# Patient Record
Sex: FEMALE | Race: WHITE | NOT HISPANIC OR LATINO
[De-identification: names, ages, dates, MRNs, and addresses within clinical notes are randomized per-mention and may not be internally consistent; named-entity substitution may affect disease eponyms.]

---

## 2017-08-16 ENCOUNTER — NON-APPOINTMENT (OUTPATIENT)
Age: 52
End: 2017-08-16

## 2017-08-16 ENCOUNTER — APPOINTMENT (OUTPATIENT)
Dept: INTERNAL MEDICINE | Facility: CLINIC | Age: 52
End: 2017-08-16
Payer: COMMERCIAL

## 2017-08-16 ENCOUNTER — OTHER (OUTPATIENT)
Age: 52
End: 2017-08-16

## 2017-08-16 VITALS
BODY MASS INDEX: 37.73 KG/M2 | HEART RATE: 74 BPM | HEIGHT: 64 IN | RESPIRATION RATE: 16 BRPM | WEIGHT: 220.99 LBS | OXYGEN SATURATION: 95 % | SYSTOLIC BLOOD PRESSURE: 128 MMHG | DIASTOLIC BLOOD PRESSURE: 70 MMHG | TEMPERATURE: 98.3 F

## 2017-08-16 PROCEDURE — 99214 OFFICE O/P EST MOD 30 MIN: CPT | Mod: 25

## 2017-08-16 PROCEDURE — 94060 EVALUATION OF WHEEZING: CPT

## 2017-10-30 ENCOUNTER — RX RENEWAL (OUTPATIENT)
Age: 52
End: 2017-10-30

## 2017-11-10 ENCOUNTER — RX RENEWAL (OUTPATIENT)
Age: 52
End: 2017-11-10

## 2017-12-01 ENCOUNTER — NON-APPOINTMENT (OUTPATIENT)
Age: 52
End: 2017-12-01

## 2017-12-01 ENCOUNTER — APPOINTMENT (OUTPATIENT)
Dept: RADIOLOGY | Facility: CLINIC | Age: 52
End: 2017-12-01
Payer: COMMERCIAL

## 2017-12-01 ENCOUNTER — APPOINTMENT (OUTPATIENT)
Dept: INTERNAL MEDICINE | Facility: CLINIC | Age: 52
End: 2017-12-01
Payer: COMMERCIAL

## 2017-12-01 ENCOUNTER — OUTPATIENT (OUTPATIENT)
Dept: OUTPATIENT SERVICES | Facility: HOSPITAL | Age: 52
LOS: 1 days | End: 2017-12-01
Payer: COMMERCIAL

## 2017-12-01 VITALS
WEIGHT: 220 LBS | SYSTOLIC BLOOD PRESSURE: 136 MMHG | TEMPERATURE: 98.6 F | RESPIRATION RATE: 16 BRPM | HEART RATE: 74 BPM | HEIGHT: 64 IN | OXYGEN SATURATION: 95 % | DIASTOLIC BLOOD PRESSURE: 80 MMHG | BODY MASS INDEX: 37.56 KG/M2

## 2017-12-01 DIAGNOSIS — Z00.8 ENCOUNTER FOR OTHER GENERAL EXAMINATION: ICD-10-CM

## 2017-12-01 PROCEDURE — 94060 EVALUATION OF WHEEZING: CPT

## 2017-12-01 PROCEDURE — 71020: CPT | Mod: 26

## 2017-12-01 PROCEDURE — 71046 X-RAY EXAM CHEST 2 VIEWS: CPT

## 2017-12-01 PROCEDURE — 99214 OFFICE O/P EST MOD 30 MIN: CPT | Mod: 25

## 2017-12-01 RX ORDER — DOXYCYCLINE HYCLATE 100 MG/1
100 CAPSULE ORAL
Qty: 20 | Refills: 0 | Status: COMPLETED | COMMUNITY
Start: 2017-12-01 | End: 2017-12-01

## 2018-01-02 ENCOUNTER — APPOINTMENT (OUTPATIENT)
Dept: INTERNAL MEDICINE | Facility: CLINIC | Age: 53
End: 2018-01-02
Payer: COMMERCIAL

## 2018-01-02 ENCOUNTER — RECORD ABSTRACTING (OUTPATIENT)
Age: 53
End: 2018-01-02

## 2018-01-02 VITALS
WEIGHT: 229 LBS | SYSTOLIC BLOOD PRESSURE: 108 MMHG | RESPIRATION RATE: 18 BRPM | HEART RATE: 84 BPM | BODY MASS INDEX: 39.09 KG/M2 | DIASTOLIC BLOOD PRESSURE: 66 MMHG | TEMPERATURE: 98.4 F | OXYGEN SATURATION: 95 % | HEIGHT: 64 IN

## 2018-01-02 DIAGNOSIS — Z82.49 FAMILY HISTORY OF ISCHEMIC HEART DISEASE AND OTHER DISEASES OF THE CIRCULATORY SYSTEM: ICD-10-CM

## 2018-01-02 DIAGNOSIS — I38 ENDOCARDITIS, VALVE UNSPECIFIED: ICD-10-CM

## 2018-01-02 DIAGNOSIS — Z80.0 FAMILY HISTORY OF MALIGNANT NEOPLASM OF DIGESTIVE ORGANS: ICD-10-CM

## 2018-01-02 DIAGNOSIS — D50.9 IRON DEFICIENCY ANEMIA, UNSPECIFIED: ICD-10-CM

## 2018-01-02 PROCEDURE — 94060 EVALUATION OF WHEEZING: CPT

## 2018-01-02 PROCEDURE — 94729 DIFFUSING CAPACITY: CPT

## 2018-01-02 PROCEDURE — 99215 OFFICE O/P EST HI 40 MIN: CPT | Mod: 25

## 2018-01-02 PROCEDURE — 94727 GAS DIL/WSHOT DETER LNG VOL: CPT

## 2018-01-02 PROCEDURE — ZZZZZ: CPT

## 2018-01-02 RX ORDER — PREDNISONE 10 MG/1
10 TABLET ORAL
Qty: 27 | Refills: 0 | Status: DISCONTINUED | COMMUNITY
Start: 2017-08-16 | End: 2018-01-02

## 2018-01-02 RX ORDER — PREDNISONE 20 MG/1
20 TABLET ORAL
Qty: 20 | Refills: 0 | Status: DISCONTINUED | COMMUNITY
Start: 2017-12-01 | End: 2018-01-02

## 2018-01-02 RX ORDER — LEVOFLOXACIN 500 MG/1
500 TABLET, FILM COATED ORAL DAILY
Qty: 10 | Refills: 0 | Status: DISCONTINUED | COMMUNITY
Start: 2017-12-01 | End: 2018-01-02

## 2018-06-29 ENCOUNTER — NON-APPOINTMENT (OUTPATIENT)
Age: 53
End: 2018-06-29

## 2018-06-29 ENCOUNTER — APPOINTMENT (OUTPATIENT)
Dept: INTERNAL MEDICINE | Facility: CLINIC | Age: 53
End: 2018-06-29
Payer: COMMERCIAL

## 2018-06-29 VITALS
OXYGEN SATURATION: 95 % | HEART RATE: 76 BPM | TEMPERATURE: 98.3 F | WEIGHT: 225 LBS | BODY MASS INDEX: 38.41 KG/M2 | DIASTOLIC BLOOD PRESSURE: 80 MMHG | SYSTOLIC BLOOD PRESSURE: 100 MMHG | HEIGHT: 64 IN | RESPIRATION RATE: 18 BRPM

## 2018-06-29 PROCEDURE — 94060 EVALUATION OF WHEEZING: CPT

## 2018-06-29 PROCEDURE — 99214 OFFICE O/P EST MOD 30 MIN: CPT | Mod: 25

## 2018-06-29 RX ORDER — FEXOFENADINE HYDROCHLORIDE 180 MG/1
180 TABLET, FILM COATED ORAL DAILY
Refills: 0 | Status: DISCONTINUED | COMMUNITY
End: 2018-06-29

## 2018-06-29 RX ORDER — PREDNISONE 20 MG/1
20 TABLET ORAL DAILY
Qty: 19 | Refills: 0 | Status: DISCONTINUED | COMMUNITY
Start: 2018-01-02 | End: 2018-06-29

## 2018-06-29 RX ORDER — ALBUTEROL SULFATE 2.5 MG/3ML
(2.5 MG/3ML) SOLUTION RESPIRATORY (INHALATION) EVERY 6 HOURS
Refills: 0 | Status: DISCONTINUED | COMMUNITY
End: 2018-06-29

## 2018-06-29 RX ORDER — OMEPRAZOLE 40 MG/1
40 CAPSULE, DELAYED RELEASE ORAL
Qty: 30 | Refills: 2 | Status: DISCONTINUED | COMMUNITY
Start: 2017-10-30 | End: 2018-06-29

## 2018-06-29 RX ORDER — ALBUTEROL SULFATE 0.63 MG/3ML
0.63 SOLUTION RESPIRATORY (INHALATION)
Qty: 75 | Refills: 0 | Status: DISCONTINUED | COMMUNITY
Start: 2018-02-03 | End: 2018-06-29

## 2018-06-29 RX ORDER — AZITHROMYCIN 500 MG/1
500 TABLET, FILM COATED ORAL DAILY
Qty: 7 | Refills: 0 | Status: DISCONTINUED | COMMUNITY
Start: 2018-01-03 | End: 2018-06-29

## 2018-06-29 RX ORDER — METHYLPREDNISOLONE 4 MG/1
4 TABLET ORAL
Qty: 21 | Refills: 0 | Status: DISCONTINUED | COMMUNITY
Start: 2018-02-23 | End: 2018-06-29

## 2018-06-29 RX ORDER — CLARITHROMYCIN 500 MG/1
500 TABLET, FILM COATED ORAL
Qty: 20 | Refills: 0 | Status: DISCONTINUED | COMMUNITY
Start: 2018-02-12 | End: 2018-06-29

## 2018-06-29 RX ORDER — AMLODIPINE BESYLATE 10 MG/1
10 TABLET ORAL
Qty: 90 | Refills: 0 | Status: DISCONTINUED | COMMUNITY
Start: 2018-04-26 | End: 2018-06-29

## 2018-06-29 NOTE — DATA REVIEWED
[FreeTextEntry1] : Spirometric analysis reveals a moderate degree of restriction. An underlying obstructive process cannot be entirely ruled out. Bronchodilator reactivity is demonstrated.

## 2018-06-29 NOTE — PLAN
[FreeTextEntry1] : 1. Continue with medication as outlined above.\par \par 2. The patient will use a nebulizer with budesonide for emergencies only.\par \par 3. Will now institute a trial of Astepro nasal spray, 2 squirts in each nostril b.i.d. to help with her morning sputum production which is most likely due to postnasal drip.\par \par 4. Followup in 6 months with full pulmonary function testing.\par \par 5. Follow up with her primary care physician, Dr. Gramajo.

## 2018-06-29 NOTE — PHYSICAL EXAM
[General Appearance - Alert] : alert [General Appearance - In No Acute Distress] : in no acute distress [Outer Ear] : the ears and nose were normal in appearance [Oropharynx] : the oropharynx was normal [Neck Appearance] : the appearance of the neck was normal [Neck Cervical Mass (___cm)] : no neck mass was observed [Jugular Venous Distention Increased] : there was no jugular-venous distention [Thyroid Diffuse Enlargement] : the thyroid was not enlarged [Thyroid Nodule] : there were no palpable thyroid nodules [Auscultation Breath Sounds / Voice Sounds] : lungs were clear to auscultation bilaterally [Heart Rate And Rhythm] : heart rate was normal and rhythm regular [Heart Sounds] : normal S1 and S2 [Heart Sounds Gallop] : no gallops [Murmurs] : no murmurs [Heart Sounds Pericardial Friction Rub] : no pericardial rub [Full Pulse] : the pedal pulses are present [Edema] : there was no peripheral edema [Bowel Sounds] : normal bowel sounds [Abdomen Soft] : soft [Abdomen Tenderness] : non-tender [Cervical Lymph Nodes Enlarged Posterior Bilaterally] : posterior cervical [Cervical Lymph Nodes Enlarged Anterior Bilaterally] : anterior cervical [Supraclavicular Lymph Nodes Enlarged Bilaterally] : supraclavicular [] : no rash [FreeTextEntry1] : Centripetal obesity is noted

## 2018-06-29 NOTE — HISTORY OF PRESENT ILLNESS
[de-identified] : Patient comes in today for a followup evaluation, and reassessment of her pulmonary status.\par \par Overall, from a pulmonary standpoint, she is doing relatively well. She is using her Advair and the Singulair on a daily basis. She has not had any recent upper respiratory infections. At the URI is, usually triggered the asthma if they are significant. 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 It takes her some time to get the secretions expectorated. She is not sure if she has any allergies. She is not using any inhaled nasal corticosteroids. She does feel as if she has some nasal congestion. Secretions from her nose are clear.\par \par The patient was given a nebulizer by a physician at an urgent care center. She has been using budesonide in the nebulizer only if she has worsening symptoms. She has been fairly stable, and she has not used it much recently.\par \par The patient does not exercise. She now comes in for an assessment.

## 2018-07-27 PROBLEM — Z80.0 FAMILY HISTORY OF COLON CANCER: Status: ACTIVE | Noted: 2017-08-16

## 2018-11-20 ENCOUNTER — RX RENEWAL (OUTPATIENT)
Age: 53
End: 2018-11-20

## 2019-02-26 ENCOUNTER — RX RENEWAL (OUTPATIENT)
Age: 54
End: 2019-02-26

## 2019-03-31 ENCOUNTER — INPATIENT (INPATIENT)
Facility: HOSPITAL | Age: 54
LOS: 2 days | Discharge: HOME | End: 2019-04-03
Attending: FAMILY MEDICINE | Admitting: FAMILY MEDICINE
Payer: COMMERCIAL

## 2019-03-31 VITALS
OXYGEN SATURATION: 91 % | HEART RATE: 113 BPM | SYSTOLIC BLOOD PRESSURE: 158 MMHG | DIASTOLIC BLOOD PRESSURE: 102 MMHG | RESPIRATION RATE: 28 BRPM | TEMPERATURE: 98 F | WEIGHT: 210.1 LBS | HEIGHT: 64 IN

## 2019-03-31 LAB
ALBUMIN SERPL ELPH-MCNC: 3.5 G/DL — SIGNIFICANT CHANGE UP (ref 3.3–5)
ALP SERPL-CCNC: 121 U/L — HIGH (ref 40–120)
ALT FLD-CCNC: 24 U/L — SIGNIFICANT CHANGE UP (ref 12–78)
ANION GAP SERPL CALC-SCNC: 7 MMOL/L — SIGNIFICANT CHANGE UP (ref 5–17)
AST SERPL-CCNC: 57 U/L — HIGH (ref 15–37)
BASOPHILS # BLD AUTO: 0.05 K/UL — SIGNIFICANT CHANGE UP (ref 0–0.2)
BASOPHILS NFR BLD AUTO: 0.5 % — SIGNIFICANT CHANGE UP (ref 0–2)
BILIRUB SERPL-MCNC: 0.2 MG/DL — SIGNIFICANT CHANGE UP (ref 0.2–1.2)
BUN SERPL-MCNC: 13 MG/DL — SIGNIFICANT CHANGE UP (ref 7–23)
CALCIUM SERPL-MCNC: 8.7 MG/DL — SIGNIFICANT CHANGE UP (ref 8.5–10.1)
CHLORIDE SERPL-SCNC: 111 MMOL/L — HIGH (ref 96–108)
CO2 SERPL-SCNC: 23 MMOL/L — SIGNIFICANT CHANGE UP (ref 22–31)
CREAT SERPL-MCNC: 0.93 MG/DL — SIGNIFICANT CHANGE UP (ref 0.5–1.3)
EOSINOPHIL # BLD AUTO: 0.01 K/UL — SIGNIFICANT CHANGE UP (ref 0–0.5)
EOSINOPHIL NFR BLD AUTO: 0.1 % — SIGNIFICANT CHANGE UP (ref 0–6)
GLUCOSE SERPL-MCNC: 141 MG/DL — HIGH (ref 70–99)
HCT VFR BLD CALC: 42.6 % — SIGNIFICANT CHANGE UP (ref 34.5–45)
HGB BLD-MCNC: 14.7 G/DL — SIGNIFICANT CHANGE UP (ref 11.5–15.5)
IMM GRANULOCYTES NFR BLD AUTO: 0.4 % — SIGNIFICANT CHANGE UP (ref 0–1.5)
LYMPHOCYTES # BLD AUTO: 1.01 K/UL — SIGNIFICANT CHANGE UP (ref 1–3.3)
LYMPHOCYTES # BLD AUTO: 9.9 % — LOW (ref 13–44)
MCHC RBC-ENTMCNC: 30.5 PG — SIGNIFICANT CHANGE UP (ref 27–34)
MCHC RBC-ENTMCNC: 34.5 GM/DL — SIGNIFICANT CHANGE UP (ref 32–36)
MCV RBC AUTO: 88.4 FL — SIGNIFICANT CHANGE UP (ref 80–100)
MONOCYTES # BLD AUTO: 0.48 K/UL — SIGNIFICANT CHANGE UP (ref 0–0.9)
MONOCYTES NFR BLD AUTO: 4.7 % — SIGNIFICANT CHANGE UP (ref 2–14)
NEUTROPHILS # BLD AUTO: 8.59 K/UL — HIGH (ref 1.8–7.4)
NEUTROPHILS NFR BLD AUTO: 84.4 % — HIGH (ref 43–77)
NRBC # BLD: 0 /100 WBCS — SIGNIFICANT CHANGE UP (ref 0–0)
PLATELET # BLD AUTO: 353 K/UL — SIGNIFICANT CHANGE UP (ref 150–400)
POTASSIUM SERPL-MCNC: 3.3 MMOL/L — LOW (ref 3.5–5.3)
POTASSIUM SERPL-SCNC: 3.3 MMOL/L — LOW (ref 3.5–5.3)
PROT SERPL-MCNC: 7 GM/DL — SIGNIFICANT CHANGE UP (ref 6–8.3)
RBC # BLD: 4.82 M/UL — SIGNIFICANT CHANGE UP (ref 3.8–5.2)
RBC # FLD: 12.2 % — SIGNIFICANT CHANGE UP (ref 10.3–14.5)
SODIUM SERPL-SCNC: 141 MMOL/L — SIGNIFICANT CHANGE UP (ref 135–145)
WBC # BLD: 10.18 K/UL — SIGNIFICANT CHANGE UP (ref 3.8–10.5)
WBC # FLD AUTO: 10.18 K/UL — SIGNIFICANT CHANGE UP (ref 3.8–10.5)

## 2019-03-31 PROCEDURE — 71045 X-RAY EXAM CHEST 1 VIEW: CPT | Mod: 26

## 2019-03-31 PROCEDURE — 93010 ELECTROCARDIOGRAM REPORT: CPT

## 2019-03-31 PROCEDURE — 99285 EMERGENCY DEPT VISIT HI MDM: CPT | Mod: 25

## 2019-03-31 RX ORDER — ALBUTEROL 90 UG/1
2.5 AEROSOL, METERED ORAL
Qty: 0 | Refills: 0 | Status: COMPLETED | OUTPATIENT
Start: 2019-03-31 | End: 2019-03-31

## 2019-03-31 RX ORDER — TIOTROPIUM BROMIDE 18 UG/1
1 CAPSULE ORAL; RESPIRATORY (INHALATION) DAILY
Qty: 0 | Refills: 0 | Status: DISCONTINUED | OUTPATIENT
Start: 2019-03-31 | End: 2019-04-03

## 2019-03-31 RX ORDER — SODIUM CHLORIDE 9 MG/ML
2000 INJECTION INTRAMUSCULAR; INTRAVENOUS; SUBCUTANEOUS ONCE
Qty: 0 | Refills: 0 | Status: COMPLETED | OUTPATIENT
Start: 2019-03-31 | End: 2019-03-31

## 2019-03-31 RX ORDER — IPRATROPIUM/ALBUTEROL SULFATE 18-103MCG
3 AEROSOL WITH ADAPTER (GRAM) INHALATION
Qty: 0 | Refills: 0 | Status: COMPLETED | OUTPATIENT
Start: 2019-03-31 | End: 2019-03-31

## 2019-03-31 RX ORDER — POTASSIUM CHLORIDE 20 MEQ
40 PACKET (EA) ORAL ONCE
Qty: 0 | Refills: 0 | Status: COMPLETED | OUTPATIENT
Start: 2019-03-31 | End: 2019-03-31

## 2019-03-31 RX ORDER — SODIUM CHLORIDE 9 MG/ML
1000 INJECTION INTRAMUSCULAR; INTRAVENOUS; SUBCUTANEOUS
Qty: 0 | Refills: 0 | Status: DISCONTINUED | OUTPATIENT
Start: 2019-03-31 | End: 2019-04-02

## 2019-03-31 RX ORDER — IPRATROPIUM/ALBUTEROL SULFATE 18-103MCG
3 AEROSOL WITH ADAPTER (GRAM) INHALATION EVERY 4 HOURS
Qty: 0 | Refills: 0 | Status: DISCONTINUED | OUTPATIENT
Start: 2019-03-31 | End: 2019-04-03

## 2019-03-31 RX ORDER — ALBUTEROL 90 UG/1
1 AEROSOL, METERED ORAL EVERY 4 HOURS
Qty: 0 | Refills: 0 | Status: DISCONTINUED | OUTPATIENT
Start: 2019-03-31 | End: 2019-04-03

## 2019-03-31 RX ORDER — MAGNESIUM SULFATE 500 MG/ML
2 VIAL (ML) INJECTION ONCE
Qty: 0 | Refills: 0 | Status: COMPLETED | OUTPATIENT
Start: 2019-03-31 | End: 2019-03-31

## 2019-03-31 RX ADMIN — Medication 3 MILLILITER(S): at 21:43

## 2019-03-31 RX ADMIN — Medication 3 MILLILITER(S): at 21:26

## 2019-03-31 RX ADMIN — Medication 125 MILLIGRAM(S): at 21:02

## 2019-03-31 RX ADMIN — SODIUM CHLORIDE 2000 MILLILITER(S): 9 INJECTION INTRAMUSCULAR; INTRAVENOUS; SUBCUTANEOUS at 21:03

## 2019-03-31 RX ADMIN — Medication 40 MILLIEQUIVALENT(S): at 23:07

## 2019-03-31 RX ADMIN — Medication 3 MILLILITER(S): at 21:03

## 2019-03-31 RX ADMIN — Medication 50 GRAM(S): at 21:43

## 2019-03-31 NOTE — H&P ADULT - NSHPPHYSICALEXAM_GEN_ALL_CORE
ICU Vital Signs Last 24 Hrs    T(F): 98.4 (31 Mar 2019 20:02), Max: 98.4 (31 Mar 2019 20:02)  HR: 105 (31 Mar 2019 21:36) (105 - 113)  BP: 149/79 (31 Mar 2019 21:36) (149/79 - 158/102)    RR: 26 (31 Mar 2019 21:36) (24 - 28)  SpO2: 94% (31 Mar 2019 21:36) (91% - 95%)

## 2019-03-31 NOTE — H&P ADULT - HISTORY OF PRESENT ILLNESS
Patient with PMHx of Asthma presents to ED c/o SOB, wheezing, cough and congestion x 3 days. Patient seen by urgent care yesterday and started on Albuterol, Prednisone 40 daily, but S&S are getting worse. No intubations in the past. Non smoker. Patient is a 54 y/o F with  PMHx of Asthma, HTN, Right Nephrectomy   and Hypothyroidism who presents to the ED c/o increasing SOB,  wheezing, cough and congestion for 2-3 days.  Pt's  reports he also noticed she was wheezing in her sleep 5-6 days ago.   Patient was started on prednisone and albuterol after going to [facility] care yesterday, where RVP was also negative.   But pt now reports feeling worse.  She had completed a prednisone course on .   No history of intubations in the past.   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    Pt was hypoxic to 85 % on arrival to the ED.     Surg Hx:  R nephrectomy  Csection x2    Fam Hx:   Father  of cardiomyopathy at age 54   Mother  of colon ca in her 60s

## 2019-03-31 NOTE — ED PROVIDER NOTE - CLINICAL SUMMARY MEDICAL DECISION MAKING FREE TEXT BOX
Pt with likely asthma exacerbation, wheezing on arrival, satting 93% on RA.  Received duonebs, solumedrol, magnesium, with improvement while in bed, but remains MEAD with sat 89% with walking.  RVP pending.  CXR appears unremarkable.  No cardiac risk factors, EKG nonischemic.  No PE risk factors, and no signs of DVT on exam.  Will admit for further evaluation and management given continued MEAD and hypoxia.

## 2019-03-31 NOTE — H&P ADULT - NSHPOUTPATIENTPROVIDERS_GEN_ALL_CORE
Dr. Mayra Gramajo Dr. Mayra Gramajo  Pulmonary Dr. Monalisa Issa [facility] [place]  [facility], [facility] Dr. Fuentes  Urology Dr. Young

## 2019-03-31 NOTE — ED ADULT TRIAGE NOTE - CHIEF COMPLAINT QUOTE
shortness of breath and chest pain that began Friday. was seen at urgent care and given albuterol with no improvement. hx of asthma

## 2019-03-31 NOTE — ED PROVIDER NOTE - OBJECTIVE STATEMENT
Patient with PMHx of Asthma presents to ED c/o SOB, wheezing, cough and congestion x 3 days. Patient seen by urgent care yesterday and started on Albuterol, Prednisone 40 daily, but S&S are getting worse. No intubations in the past. Non smoker. PATRICIA Knight

## 2019-03-31 NOTE — ED STATDOCS - PROGRESS NOTE DETAILS
[facility] for ED attending, Dr. Darden. 52y/o F w/ PMHx of Asthma presents to the ED w/ SOB and asthma exacerbation.  Pt started feeling ill with generalized weakness 2 days ago while at work.  Then went to clinic and was started on PO prednisone, she did not start medication until today.  Pt states she could not sleep yesterday, +orthopnea, needed to sit up to sleep.  Pt states she used her Albuterol inhaler 2x today w/o improvement.  Pt currently on 2LNC and O2sat 90-93%.  Pt is compliant w/ her Asthma medication.  Allergy to PCN. Due to Asthma exacerbation while on medication, will send to main ED for further evaluation.

## 2019-03-31 NOTE — ED ADULT NURSE NOTE - OBJECTIVE STATEMENT
pt p/w c/o asthma exacerbation with audible wheezing, and cough.  pt has hx of asthma since 04, seen at urgent care given nebs and prednisone taper with no improvement.  pt a/ox3 speaking in full sentences but MEAD when walking 10 ft.  sating 89 on RA, and >95 on 2L.

## 2019-03-31 NOTE — ED ADULT NURSE NOTE - NSIMPLEMENTINTERV_GEN_ALL_ED
Implemented All Universal Safety Interventions:  [place] to call system. Call bell, personal items and telephone within reach. Instruct patient to call for assistance. Room bathroom lighting operational. Non-slip footwear when patient is off stretcher. Physically safe environment: no spills, clutter or unnecessary equipment. Stretcher in lowest position, wheels locked, appropriate side rails in place.

## 2019-03-31 NOTE — H&P ADULT - NSHPSOCIALHISTORY_GEN_ALL_CORE
No tob/ETOH/drug use Pt works as a .  She lives with her .   No tob/ETOH/drug use. Pt reports second hand smoke from her parents.

## 2019-03-31 NOTE — ED PROVIDER NOTE - PROGRESS NOTE DETAILS
O2 sat continues to drop t0 89% RA and SOB gets worse when ambulating. Will admit patient. PATRCIIA Knight

## 2019-03-31 NOTE — H&P ADULT - ASSESSMENT
Pt is admitted w/    I. Asthma Exacerbation, SOB, MEAD and  hypoxia   - s/p Duonebs in the ED and Mag, cont nebs  - s/p Solumedrol , cont  - s/p IVF   - O2 support    II. Hypokalemia  - replete    III. DVT prophylaxis  - ambulation  - consider heparin if pt does not walk    IV. IMPROVE VTE Individual Risk Assessment    RISK                                                                Points    [  ] Previous VTE                                                  3    [  ] Thrombophilia                                               2    [  ] Lower limb paralysis                                      2        (unable to hold up >15 seconds)      [  ] Current Cancer                                              2         (within 6 months)    [  ] Immobilization > 24 hrs                                1    [  ] ICU/CCU stay > 24 hours                              1    [  ] Age > 60                                                      1    IMPROVE VTE Score _____0____    IMPROVE Score 0-1: Low Risk, No VTE prophylaxis required for most patients, encourage ambulation.   IMPROVE Score 2-3: At risk, pharmacologic VTE prophylaxis is indicated for most patients (in the absence of a contraindication)  IMPROVE Score > or = 4: High Risk, pharmacologic VTE prophylaxis is indicated for most patients (in the absence of a contraindication) Patient is a 54 y/o F with  PMHx of Asthma, HTN, Right Nephrectomy  and Hypothyroidism who presents to the ED c/o increasing SOB,  wheezing, cough and congestion for 2-3 days.  Pt's  reports he also noticed she was wheezing in her sleep 5-6 days ago.   Patient was started on prednisone and albuterol after going to [facility] care yesterday where RVP was also negative.  But pt now reports feeling worse.  She had completed a prednisone course on Jan 26.   No history of intubations in the past.   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    Pt was hypoxic to 85 % on arrival to the ED.     Pt is admitted w/    I. Asthma Exacerbation, SOB, MAED and  hypoxia to 85%   - s/p 3 Duonebs in the ED and 2g Mag, cont nebs  - s/p Solumedrol 125mg , cont with taper  - s/p IVF  x2 L  - O2 support  - pulm consult Dr. Sheldon    II. Hypokalemia  - replete, s/p 40 MeQ    III. Mild elevation of LFTS  - Hep C screening  - outpt follow up with Dr. Fuentes    IV. DVT prophylaxis  - ambulation, scds  - consider heparin if pt does not walk    V. IMPROVE VTE Individual Risk Assessment    RISK                                                                Points    [  ] Previous VTE                                                  3    [  ] Thrombophilia                                               2    [  ] Lower limb paralysis                                      2        (unable to hold up >15 seconds)      [  ] Current Cancer                                              2         (within 6 months)    [  ] Immobilization > 24 hrs                                1    [  ] ICU/CCU stay > 24 hours                              1    [  ] Age > 60                                                      1    IMPROVE VTE Score _____0____    IMPROVE Score 0-1: Low Risk, No VTE prophylaxis required for most patients, encourage ambulation.   IMPROVE Score 2-3: At risk, pharmacologic VTE prophylaxis is indicated for most patients (in the absence of a contraindication)  IMPROVE Score > or = 4: High Risk, pharmacologic VTE prophylaxis is indicated for most patients (in the absence of a contraindication)

## 2019-04-01 DIAGNOSIS — J98.01 ACUTE BRONCHOSPASM: ICD-10-CM

## 2019-04-01 DIAGNOSIS — J45.32 MILD PERSISTENT ASTHMA WITH STATUS ASTHMATICUS: ICD-10-CM

## 2019-04-01 DIAGNOSIS — R05 COUGH: ICD-10-CM

## 2019-04-01 DIAGNOSIS — R06.00 DYSPNEA, UNSPECIFIED: ICD-10-CM

## 2019-04-01 LAB
ADD ON TEST-SPECIMEN IN LAB: SIGNIFICANT CHANGE UP
ANION GAP SERPL CALC-SCNC: 10 MMOL/L — SIGNIFICANT CHANGE UP (ref 5–17)
BUN SERPL-MCNC: 9 MG/DL — SIGNIFICANT CHANGE UP (ref 7–23)
CALCIUM SERPL-MCNC: 9.5 MG/DL — SIGNIFICANT CHANGE UP (ref 8.5–10.1)
CHLORIDE SERPL-SCNC: 110 MMOL/L — HIGH (ref 96–108)
CO2 SERPL-SCNC: 23 MMOL/L — SIGNIFICANT CHANGE UP (ref 22–31)
CREAT SERPL-MCNC: 0.82 MG/DL — SIGNIFICANT CHANGE UP (ref 0.5–1.3)
GLUCOSE SERPL-MCNC: 149 MG/DL — HIGH (ref 70–99)
HCV AB S/CO SERPL IA: 0.18 S/CO — SIGNIFICANT CHANGE UP (ref 0–0.79)
HCV AB SERPL-IMP: SIGNIFICANT CHANGE UP
MAGNESIUM SERPL-MCNC: 2.3 MG/DL — SIGNIFICANT CHANGE UP (ref 1.6–2.6)
POTASSIUM SERPL-MCNC: 4.5 MMOL/L — SIGNIFICANT CHANGE UP (ref 3.5–5.3)
POTASSIUM SERPL-SCNC: 4.5 MMOL/L — SIGNIFICANT CHANGE UP (ref 3.5–5.3)
RAPID RVP RESULT: DETECTED
RV+EV RNA SPEC QL NAA+PROBE: DETECTED
SODIUM SERPL-SCNC: 143 MMOL/L — SIGNIFICANT CHANGE UP (ref 135–145)

## 2019-04-01 PROCEDURE — 99255 IP/OBS CONSLTJ NEW/EST HI 80: CPT

## 2019-04-01 RX ORDER — AMLODIPINE BESYLATE 2.5 MG/1
10 TABLET ORAL DAILY
Qty: 0 | Refills: 0 | Status: DISCONTINUED | OUTPATIENT
Start: 2019-04-01 | End: 2019-04-03

## 2019-04-01 RX ORDER — CLARITHROMYCIN 500 MG
0 TABLET ORAL
Qty: 20 | Refills: 0 | COMMUNITY

## 2019-04-01 RX ORDER — LEVOTHYROXINE SODIUM 125 MCG
88 TABLET ORAL DAILY
Qty: 0 | Refills: 0 | Status: DISCONTINUED | OUTPATIENT
Start: 2019-04-01 | End: 2019-04-03

## 2019-04-01 RX ORDER — MONTELUKAST 4 MG/1
10 TABLET, CHEWABLE ORAL DAILY
Qty: 0 | Refills: 0 | Status: DISCONTINUED | OUTPATIENT
Start: 2019-04-01 | End: 2019-04-03

## 2019-04-01 RX ORDER — BUDESONIDE, MICRONIZED 100 %
0 POWDER (GRAM) MISCELLANEOUS
Qty: 120 | Refills: 0 | COMMUNITY

## 2019-04-01 RX ORDER — BUDESONIDE AND FORMOTEROL FUMARATE DIHYDRATE 160; 4.5 UG/1; UG/1
2 AEROSOL RESPIRATORY (INHALATION)
Qty: 0 | Refills: 0 | Status: DISCONTINUED | OUTPATIENT
Start: 2019-04-01 | End: 2019-04-03

## 2019-04-01 RX ADMIN — SODIUM CHLORIDE 75 MILLILITER(S): 9 INJECTION INTRAMUSCULAR; INTRAVENOUS; SUBCUTANEOUS at 03:59

## 2019-04-01 RX ADMIN — Medication 40 MILLIGRAM(S): at 06:44

## 2019-04-01 RX ADMIN — Medication 600 MILLIGRAM(S): at 17:40

## 2019-04-01 RX ADMIN — Medication 3 MILLILITER(S): at 22:07

## 2019-04-01 RX ADMIN — Medication 3 MILLILITER(S): at 12:01

## 2019-04-01 RX ADMIN — Medication 3 MILLILITER(S): at 16:41

## 2019-04-01 RX ADMIN — Medication 88 MICROGRAM(S): at 05:29

## 2019-04-01 RX ADMIN — MONTELUKAST 10 MILLIGRAM(S): 4 TABLET, CHEWABLE ORAL at 12:00

## 2019-04-01 RX ADMIN — Medication 40 MILLIGRAM(S): at 17:41

## 2019-04-01 RX ADMIN — Medication 3 MILLILITER(S): at 08:23

## 2019-04-01 RX ADMIN — AMLODIPINE BESYLATE 10 MILLIGRAM(S): 2.5 TABLET ORAL at 06:45

## 2019-04-01 RX ADMIN — BUDESONIDE AND FORMOTEROL FUMARATE DIHYDRATE 2 PUFF(S): 160; 4.5 AEROSOL RESPIRATORY (INHALATION) at 22:07

## 2019-04-01 RX ADMIN — Medication 40 MILLIGRAM(S): at 12:00

## 2019-04-01 NOTE — PROGRESS NOTE ADULT - ASSESSMENT
52 y/o F with  PMH of Asthma, HTN, Right Nephrectomy 2004  and Hypothyroidism presented with complaints of worsening SOB,  wheezing, cough and congestion and admitted for Asthma exacerbation.      #Acute Hypoxic Respiratory Failure 2/2 Acute Asthma Exacerbation (Mild Persistent) d/t entero/rhino virus infection  - CXR clear  - Pt was hypoxic to 85% on admission  - s/p 3 Duonebs in the ED and 2g Mg  - s/p Solumedrol 125mg   - s/p IVF  x2 L  - Continue Nebs, inhalers PRN  - O2 support PRN  - Mucolytics  - Supportive care with IVF @ 75mm/hr  - Solumedrol increased to 40mg q6hrs. Will taper based on clinical assessment.   - pulm consult with Dr. Sheldon appreciated  - Monitor pt clinically and O2 sat    #Hypokalemia  - Resolved  - Continue to monitor electrolytes    #Mildly elevated LFTs likely 2/2 viral infection  - Hep C negative  - Will continue to monitor  - follow up with Dr. Fuentes outpatient    #HTN  - Continue Norvasc 10mg daily    #Hypothyroidism  - Continue Synthroid 88mcg    #DVT prophylaxis  - ambulation/scds

## 2019-04-01 NOTE — CONSULT NOTE ADULT - SUBJECTIVE AND OBJECTIVE BOX
HPI:  Patient is a 52 y/o F with  PMHx of Asthma, HTN, Right Nephrectomy   and Hypothyroidism who presents to the ED c/o increasing SOB,  wheezing, cough and congestion for 2-3 days.  Pt's  reports he also noticed she was wheezing in her sleep 5-6 days ago.   Patient was started on prednisone and albuterol after going to [facility] care yesterday, where RVP was also negative.   But pt now reports feeling worse.  She had completed a prednisone course on .   No history of intubations in the past.   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    The patient is maintained on Advair 500/50 as outpatient. States she has been compliant. She had an exacerbation in January, treated with steroids. Now admitted for worsening symptoms.    Pt was hypoxic to 85 % on arrival to the ED.     Surg Hx:  R nephrectomy  Csection x2    Fam Hx:   Father  of cardiomyopathy at age 54   Mother  of colon ca in her 60s (31 Mar 2019 23:42)      PAST MEDICAL & SURGICAL HISTORY:      MEDICATIONS  (STANDING):  ALBUTerol    90 MICROgram(s) HFA Inhaler 1 Puff(s) Inhalation every 4 hours  ALBUTerol/ipratropium for Nebulization 3 milliLiter(s) Nebulizer every 4 hours  amLODIPine   Tablet 10 milliGRAM(s) Oral daily  buDESOnide 160 MICROgram(s)/formoterol 4.5 MICROgram(s) Inhaler 2 Puff(s) Inhalation two times a day  guaiFENesin  milliGRAM(s) Oral every 12 hours  levothyroxine 88 MICROGram(s) Oral daily  methylPREDNISolone sodium succinate Injectable 40 milliGRAM(s) IV Push every 6 hours  methylPREDNISolone sodium succinate Injectable 40 milliGRAM(s) IV Push two times a day  montelukast 10 milliGRAM(s) Oral daily  sodium chloride 0.9%. 1000 milliLiter(s) (75 mL/Hr) IV Continuous <Continuous>  tiotropium 18 MICROgram(s) Capsule 1 Capsule(s) Inhalation daily    MEDICATIONS  (PRN):      Allergies    penicillin (Unknown)    Intolerances        SOCIAL HISTORY: Denies tobacco, etoh abuse or illicit drug use    FAMILY HISTORY:      Vital Signs Last 24 Hrs  T(C): 36.9 (2019 05:14), Max: 36.9 (31 Mar 2019 20:02)  T(F): 98.5 (2019 05:14), Max: 98.5 (2019 03:53)  HR: 73 (2019 08:26) (73 - 113)  BP: 125/44 (2019 05:14) (125/44 - 158/102)  BP(mean): --  RR: 18 (2019 05:14) (18 - 28)  SpO2: 97% (2019 05:14) (91% - 98%)    REVIEW OF SYSTEMS:    CONSTITUTIONAL:  As per HPI.  HEENT:  Eyes:  No diplopia or blurred vision. ENT:  No earache, sore throat or runny nose.  CARDIOVASCULAR:  No pressure, squeezing, tightness, heaviness or aching about the chest, neck, axilla or epigastrium.  RESPIRATORY:  See HPI  GASTROINTESTINAL:  No nausea, vomiting or diarrhea.  GENITOURINARY:  No dysuria, frequency or urgency.  MUSCULOSKELETAL:  As per HPI.  SKIN:  No change in skin, hair or nails.  NEUROLOGIC:  No paresthesias, fasciculations, seizures or weakness.  PSYCHIATRIC:  No disorder of thought or mood.  ENDOCRINE:  No heat or cold intolerance, polyuria or polydipsia.  HEMATOLOGICAL:  No easy bruising or bleedings:  .     PHYSICAL EXAMINATION:    GENERAL APPEARANCE:  Pt. is not currently dyspneic, in no distress. Pt. is alert, oriented, and pleasant.  HEENT:  Pupils are normal and react normally. No icterus. Mucous membranes well colored.  NECK:  Supple. No lymphadenopathy. Jugular venous pressure not elevated. Carotids equal.   HEART:   The cardiac impulse has a normal quality. Regular. Normal S1 and S2. There are no murmurs, rubs or gallops noted  CHEST:  Chest with scattered wheezes diffusely. Sl increased respiratory effort.  ABDOMEN:  Soft and nontender.   EXTREMITIES:  There is no cyanosis, clubbing or edema.   SKIN:  No rash or significant lesions are noted.    LABS:                        14.7   10.18 )-----------( 353      ( 31 Mar 2019 20:54 )             42.6         143  |  110<H>  |  9   ----------------------------<  149<H>  4.5   |  23  |  0.82    Ca    9.5      2019 07:46  Mg     2.3         TPro  7.0  /  Alb  3.5  /  TBili  0.2  /  DBili  x   /  AST  57<H>  /  ALT  24  /  AlkPhos  121<H>      LIVER FUNCTIONS - ( 31 Mar 2019 22:17 )  Alb: 3.5 g/dL / Pro: 7.0 gm/dL / ALK PHOS: 121 U/L / ALT: 24 U/L / AST: 57 U/L / GGT: x                       RADIOLOGY & ADDITIONAL STUDIES:

## 2019-04-01 NOTE — CONSULT NOTE ADULT - ASSESSMENT
- IV Solumedrol--increase to Q6H, until improved, then taper  - Cont aerosols   - Mucolytics  - Monitor O2 sats

## 2019-04-01 NOTE — PROGRESS NOTE ADULT - SUBJECTIVE AND OBJECTIVE BOX
HPI: 52 y/o F with  PMH of Asthma, HTN, Right Nephrectomy 2004  and Hypothyroidism presented to the ED with complaints of worsening SOB,  wheezing, cough and congestion for 2-3 days PTA.  Pt report onset 3/29. Pt reports SOB was not relieved with inhaler and nebs at home. Pt went to urgent care and was tested neg for RVP. Pt was given 5- therapy of prednisone 40mg but only took 1 dose. Wheezing and SOB worsened on 3/30-3/31 so much that pt was unable to ambulate short distance without dyspnea. Pt then came to the ED. As per Pt's , he also noticed pt was wheezing in her sleep 5-6 days PTA. She had completed a prednisone course on Jan 26. No history of intubations in the past or hospital admission for Asthma exacerbation. 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  Pt was hypoxic to 85 % on arrival to the ED.     SUBJECTIVE (4/1): Pt was seen and examined. Pt endorses HPI above. Reports she feels much better than on admission. Still has SOB but with ambulation but is able to ambulate longer distance than previous. Denies CP or tightness. Saturating above 90s.     REVIEW OF SYSTEMS:  CONSTITUTIONAL: No weakness, fevers or chills  EYES/ENT: No visual changes;  No vertigo or throat pain   NECK: No pain or stiffness  RESPIRATORY: + cough and wheezing; + shortness of breath on exertion  CARDIOVASCULAR: No chest pain or palpitations  GASTROINTESTINAL: No abdominal or epigastric pain. No nausea, vomiting, or hematemesis; No diarrhea or constipation. No melena or hematochezia.  GENITOURINARY: No dysuria, frequency or hematuria  NEUROLOGICAL: No numbness or weakness  SKIN: No itching, burning, rashes, or lesions   All other review of systems is negative unless indicated above    Vital Signs Last 24 Hrs  T(C): 36.5 (01 Apr 2019 17:32), Max: 36.9 (31 Mar 2019 20:02)  T(F): 97.7 (01 Apr 2019 17:32), Max: 98.5 (01 Apr 2019 03:53)  HR: 91 (01 Apr 2019 17:32) (73 - 113)  BP: 148/81 (01 Apr 2019 17:32) (125/44 - 158/102)  RR: 18 (01 Apr 2019 17:32) (18 - 28)  SpO2: 93% (01 Apr 2019 17:32) (91% - 98%)      PHYSICAL EXAM:  Constitutional: NAD, awake and alert, well-developed  HEENT: PERR, EOMI, Normal Hearing, MMM  Neck: Soft and supple, No LAD, No JVD  Respiratory: Diffuse wheezing b/l  Cardiovascular: S1 and S2, regular rate and rhythm, no Murmurs, gallops or rubs  Gastrointestinal: Bowel Sounds present, soft, nontender, nondistended, no guarding, no rebound  Extremities: No peripheral edema  Vascular: 2+ peripheral pulses  Neurological: A/O x 3, no focal deficits  Musculoskeletal: 5/5 strength b/l upper and lower extremities  Skin: No rashes    MEDICATIONS:  MEDICATIONS  (STANDING):  ALBUTerol    90 MICROgram(s) HFA Inhaler 1 Puff(s) Inhalation every 4 hours  ALBUTerol/ipratropium for Nebulization 3 milliLiter(s) Nebulizer every 4 hours  amLODIPine   Tablet 10 milliGRAM(s) Oral daily  buDESOnide 160 MICROgram(s)/formoterol 4.5 MICROgram(s) Inhaler 2 Puff(s) Inhalation two times a day  guaiFENesin  milliGRAM(s) Oral every 12 hours  levothyroxine 88 MICROGram(s) Oral daily  methylPREDNISolone sodium succinate Injectable 40 milliGRAM(s) IV Push every 6 hours  montelukast 10 milliGRAM(s) Oral daily  sodium chloride 0.9%. 1000 milliLiter(s) (75 mL/Hr) IV Continuous <Continuous>  tiotropium 18 MICROgram(s) Capsule 1 Capsule(s) Inhalation daily      LABS: All Labs Reviewed:                        14.7   10.18 )-----------( 353      ( 31 Mar 2019 20:54 )             42.6     04-01    143  |  110<H>  |  9   ----------------------------<  149<H>  4.5   |  23  |  0.82    Ca    9.5      01 Apr 2019 07:46  Mg     2.3     04-01    TPro  7.0  /  Alb  3.5  /  TBili  0.2  /  DBili  x   /  AST  57<H>  /  ALT  24  /  AlkPhos  121<H>  03-31      RADIOLOGY/EKG:    < from: Xray Chest 1 View-PORTABLE IMMEDIATE (03.31.19 @ 21:23) >  EXAM:  XR CHEST PORTABLE IMMED 1V                            PROCEDURE DATE:  03/31/2019          INTERPRETATION:  Exam Date: 3/31/2019 9:23 PM    Chest radiograph (one view)         CLINICAL INFORMATION:  SOB    TECHNIQUE:  Single frontal view of the chest was obtained.    COMPARISON: 12/1/2017    FINDINGS/  IMPRESSION:          The lungs are clear.  No pleural abnormality is seen.      Cardiomediastinal silhouette is intact.    < end of copied text >

## 2019-04-01 NOTE — PATIENT PROFILE ADULT - HAS THE PATIENT BEEN ADMITTED FROM SHORT TERM REHAB?
Problem: Patient Care Overview  Goal: Plan of Care Review  Outcome: Ongoing (interventions implemented as appropriate)  Infant remains in isolette with stable axillary temperatures.  VSS on RA.  Infant is unable to consistently complete ordered BID nipple attempts (OT/PT consult placed).  No parental contact so far this shift.       no

## 2019-04-01 NOTE — PROGRESS NOTE ADULT - ATTENDING COMMENTS
Patient seen and examined with Family Medicine Residents Samantha Moore, Yaneth Delgado and Miryam Mejia on the Family Medicine Teaching Service.  Agree with history, physical, labs and plan which were reviewed in detail after a face to face encounter with the patient.

## 2019-04-02 DIAGNOSIS — Z90.5 ACQUIRED ABSENCE OF KIDNEY: ICD-10-CM

## 2019-04-02 LAB
ANION GAP SERPL CALC-SCNC: 10 MMOL/L — SIGNIFICANT CHANGE UP (ref 5–17)
BUN SERPL-MCNC: 17 MG/DL — SIGNIFICANT CHANGE UP (ref 7–23)
CALCIUM SERPL-MCNC: 8.9 MG/DL — SIGNIFICANT CHANGE UP (ref 8.5–10.1)
CHLORIDE SERPL-SCNC: 111 MMOL/L — HIGH (ref 96–108)
CO2 SERPL-SCNC: 24 MMOL/L — SIGNIFICANT CHANGE UP (ref 22–31)
CREAT SERPL-MCNC: 0.86 MG/DL — SIGNIFICANT CHANGE UP (ref 0.5–1.3)
GLUCOSE SERPL-MCNC: 135 MG/DL — HIGH (ref 70–99)
HCT VFR BLD CALC: 40.5 % — SIGNIFICANT CHANGE UP (ref 34.5–45)
HGB BLD-MCNC: 13.8 G/DL — SIGNIFICANT CHANGE UP (ref 11.5–15.5)
MCHC RBC-ENTMCNC: 30.7 PG — SIGNIFICANT CHANGE UP (ref 27–34)
MCHC RBC-ENTMCNC: 34.1 GM/DL — SIGNIFICANT CHANGE UP (ref 32–36)
MCV RBC AUTO: 90 FL — SIGNIFICANT CHANGE UP (ref 80–100)
NRBC # BLD: 0 /100 WBCS — SIGNIFICANT CHANGE UP (ref 0–0)
PLATELET # BLD AUTO: 324 K/UL — SIGNIFICANT CHANGE UP (ref 150–400)
POTASSIUM SERPL-MCNC: 4 MMOL/L — SIGNIFICANT CHANGE UP (ref 3.5–5.3)
POTASSIUM SERPL-SCNC: 4 MMOL/L — SIGNIFICANT CHANGE UP (ref 3.5–5.3)
RBC # BLD: 4.5 M/UL — SIGNIFICANT CHANGE UP (ref 3.8–5.2)
RBC # FLD: 12.7 % — SIGNIFICANT CHANGE UP (ref 10.3–14.5)
SODIUM SERPL-SCNC: 145 MMOL/L — SIGNIFICANT CHANGE UP (ref 135–145)
WBC # BLD: 19.73 K/UL — HIGH (ref 3.8–10.5)
WBC # FLD AUTO: 19.73 K/UL — HIGH (ref 3.8–10.5)

## 2019-04-02 PROCEDURE — 99233 SBSQ HOSP IP/OBS HIGH 50: CPT

## 2019-04-02 RX ORDER — TRIAMCINOLONE 4 MG
60 TABLET ORAL ONCE
Qty: 0 | Refills: 0 | Status: DISCONTINUED | OUTPATIENT
Start: 2019-04-02 | End: 2019-04-02

## 2019-04-02 RX ORDER — SODIUM CHLORIDE 0.65 %
1 AEROSOL, SPRAY (ML) NASAL
Qty: 0 | Refills: 0 | Status: DISCONTINUED | OUTPATIENT
Start: 2019-04-02 | End: 2019-04-03

## 2019-04-02 RX ORDER — TRIAMCINOLONE 4 MG
60 TABLET ORAL AT BEDTIME
Qty: 0 | Refills: 0 | Status: COMPLETED | OUTPATIENT
Start: 2019-04-02 | End: 2019-04-02

## 2019-04-02 RX ORDER — PANTOPRAZOLE SODIUM 20 MG/1
40 TABLET, DELAYED RELEASE ORAL ONCE
Qty: 0 | Refills: 0 | Status: COMPLETED | OUTPATIENT
Start: 2019-04-02 | End: 2019-04-02

## 2019-04-02 RX ADMIN — Medication 3 MILLILITER(S): at 18:16

## 2019-04-02 RX ADMIN — BUDESONIDE AND FORMOTEROL FUMARATE DIHYDRATE 2 PUFF(S): 160; 4.5 AEROSOL RESPIRATORY (INHALATION) at 10:13

## 2019-04-02 RX ADMIN — MONTELUKAST 10 MILLIGRAM(S): 4 TABLET, CHEWABLE ORAL at 11:29

## 2019-04-02 RX ADMIN — Medication 3 MILLILITER(S): at 14:19

## 2019-04-02 RX ADMIN — Medication 3 MILLILITER(S): at 20:15

## 2019-04-02 RX ADMIN — Medication 40 MILLIGRAM(S): at 11:27

## 2019-04-02 RX ADMIN — Medication 40 MILLIGRAM(S): at 16:53

## 2019-04-02 RX ADMIN — Medication 40 MILLIGRAM(S): at 01:00

## 2019-04-02 RX ADMIN — Medication 40 MILLIGRAM(S): at 05:56

## 2019-04-02 RX ADMIN — BUDESONIDE AND FORMOTEROL FUMARATE DIHYDRATE 2 PUFF(S): 160; 4.5 AEROSOL RESPIRATORY (INHALATION) at 20:16

## 2019-04-02 RX ADMIN — Medication 60 MILLIGRAM(S): at 21:41

## 2019-04-02 RX ADMIN — Medication 88 MICROGRAM(S): at 05:56

## 2019-04-02 RX ADMIN — PANTOPRAZOLE SODIUM 40 MILLIGRAM(S): 20 TABLET, DELAYED RELEASE ORAL at 21:41

## 2019-04-02 RX ADMIN — Medication 3 MILLILITER(S): at 00:59

## 2019-04-02 RX ADMIN — Medication 600 MILLIGRAM(S): at 05:56

## 2019-04-02 RX ADMIN — Medication 600 MILLIGRAM(S): at 16:54

## 2019-04-02 RX ADMIN — Medication 3 MILLILITER(S): at 05:17

## 2019-04-02 RX ADMIN — Medication 3 MILLILITER(S): at 10:06

## 2019-04-02 RX ADMIN — AMLODIPINE BESYLATE 10 MILLIGRAM(S): 2.5 TABLET ORAL at 05:56

## 2019-04-02 NOTE — PROGRESS NOTE ADULT - ATTENDING COMMENTS
Patient seen and examined with Family Medicine Residents Samantha Moore on the Family Medicine Teaching Service.  Agree with history, physical, labs and plan which were reviewed in detail after a face to face encounter with the patient.

## 2019-04-02 NOTE — PROGRESS NOTE ADULT - ASSESSMENT
54 y/o F with  PMH of Asthma, HTN, Right Nephrectomy 2004  and Hypothyroidism presented with complaints of worsening SOB,  wheezing, cough and congestion and admitted for Asthma exacerbation.      #Acute Asthma Exacerbation (Mild Persistent) d/t entero/rhino virus infection  - Continue Nebs and inhalers PRN  - O2 support PRN  - Mucolytics  - Supportive care with IVF @ 75mm/hr  - Solumedrol changed to 40mg IV q8hrs.   - Will switch to po steroid tomorrow.  - Give Kenalog 60mg today  - Continuous pulse ox  - Monitor pt clinically and O2 sat  - pulm consult with Dr. Sheldon appreciated    #Leukocytosis likely 2/2 to steroid use  - Monitor vitals and CBC.    #Mildly elevated LFTs likely 2/2 viral infection  - Will continue to monitor  - follow up with Dr. Fuentes outpatient    #HTN  - Continue Norvasc 10mg daily    #Hypothyroidism  - Continue Synthroid 88mcg    #DVT prophylaxis  - ambulation/scds

## 2019-04-02 NOTE — PROGRESS NOTE ADULT - SUBJECTIVE AND OBJECTIVE BOX
HPI: 54 y/o F with  PMH of Asthma, HTN, Right Nephrectomy 2004  and Hypothyroidism presented to the ED with complaints of worsening SOB,  wheezing, cough and congestion for 2-3 days PTA.  Pt report onset 3/29. Pt reports SOB was not relieved with inhaler and nebs at home. Pt went to urgent care and was tested neg for RVP. Pt was given 5- therapy of prednisone 40mg but only took 1 dose. Wheezing and SOB worsened on 3/30-3/31 so much that pt was unable to ambulate short distance without dyspnea. Pt then came to the ED. As per Pt's , he also noticed pt was wheezing in her sleep 5-6 days PTA. She had completed a prednisone course on Jan 26. No history of intubations in the past or hospital admission for Asthma exacerbation. 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  Pt was hypoxic to 85 % on arrival to the ED.     4/2: Pt was seen and examined. No acute complaints. Pt is ambulating on room air with no episodes of desaturation. Wheezing is much improved as well.     REVIEW OF SYSTEMS:  CONSTITUTIONAL: No weakness, fevers or chills  EYES/ENT: No visual changes;  No vertigo or throat pain   NECK: No pain or stiffness  RESPIRATORY: + cough. No wheezing, hemoptysis; No shortness of breath  CARDIOVASCULAR: No chest pain or palpitations  GASTROINTESTINAL: No abdominal or epigastric pain. No nausea, vomiting, or hematemesis; No diarrhea or constipation. No melena or hematochezia.  GENITOURINARY: No dysuria, frequency or hematuria  NEUROLOGICAL: No numbness or weakness  SKIN: No itching, burning, rashes, or lesions   All other review of systems is negative unless indicated above    Vital Signs Last 24 Hrs  T(C): 36.8 (02 Apr 2019 11:06), Max: 36.8 (02 Apr 2019 11:06)  T(F): 98.2 (02 Apr 2019 11:06), Max: 98.2 (02 Apr 2019 11:06)  HR: 95 (02 Apr 2019 11:06) (73 - 95)  BP: 135/80 (02 Apr 2019 11:06) (135/80 - 148/81)  RR: 18 (02 Apr 2019 11:06) (18 - 18)  SpO2: 100% (02 Apr 2019 11:06) (93% - 100%)    I&O's Summary    01 Apr 2019 07:01  -  02 Apr 2019 07:00  --------------------------------------------------------  IN: 1150 mL / OUT: 0 mL / NET: 1150 mL        PHYSICAL EXAM:  Constitutional: NAD, awake and alert, well-developed  HEENT: PERR, EOMI, Normal Hearing, MMM  Neck: Soft and supple, No LAD, No JVD  Respiratory: Breath sounds are clear bilaterally, No wheezing, rales or rhonchi  Cardiovascular: S1 and S2, regular rate and rhythm, no Murmurs, gallops or rubs. No wheezing   Gastrointestinal: Bowel Sounds present, soft, nontender, nondistended, no guarding, no rebound  Extremities: No peripheral edema  Vascular: 2+ peripheral pulses  Neurological: A/O x 3, no focal deficits  Musculoskeletal: 5/5 strength b/l upper and lower extremities  Skin: No rashes    MEDICATIONS:  MEDICATIONS  (STANDING):  ALBUTerol    90 MICROgram(s) HFA Inhaler 1 Puff(s) Inhalation every 4 hours  ALBUTerol/ipratropium for Nebulization 3 milliLiter(s) Nebulizer every 4 hours  amLODIPine   Tablet 10 milliGRAM(s) Oral daily  buDESOnide 160 MICROgram(s)/formoterol 4.5 MICROgram(s) Inhaler 2 Puff(s) Inhalation two times a day  guaiFENesin  milliGRAM(s) Oral every 12 hours  levothyroxine 88 MICROGram(s) Oral daily  methylPREDNISolone sodium succinate Injectable 40 milliGRAM(s) IV Push every 8 hours  montelukast 10 milliGRAM(s) Oral daily  sodium chloride 0.9%. 1000 milliLiter(s) (75 mL/Hr) IV Continuous <Continuous>  tiotropium 18 MICROgram(s) Capsule 1 Capsule(s) Inhalation daily  triamcinolone    acetonide 40 mG/mL Injectable (KENALOG-40) 60 milliGRAM(s) IntraMuscular once      LABS: All Labs Reviewed:                        13.8   19.73 )-----------( 324      ( 02 Apr 2019 07:06 )             40.5     04-02    145  |  111<H>  |  17  ----------------------------<  135<H>  4.0   |  24  |  0.86    Ca    8.9      02 Apr 2019 07:06  Mg     2.3     04-01    TPro  7.0  /  Alb  3.5  /  TBili  0.2  /  DBili  x   /  AST  57<H>  /  ALT  24  /  AlkPhos  121<H>  03-31

## 2019-04-02 NOTE — PROGRESS NOTE ADULT - SUBJECTIVE AND OBJECTIVE BOX
Subjective:    Awake, alert. Feels better, but still notes wheezing. Min sputum    MEDICATIONS  (STANDING):  ALBUTerol    90 MICROgram(s) HFA Inhaler 1 Puff(s) Inhalation every 4 hours  ALBUTerol/ipratropium for Nebulization 3 milliLiter(s) Nebulizer every 4 hours  amLODIPine   Tablet 10 milliGRAM(s) Oral daily  buDESOnide 160 MICROgram(s)/formoterol 4.5 MICROgram(s) Inhaler 2 Puff(s) Inhalation two times a day  guaiFENesin  milliGRAM(s) Oral every 12 hours  levothyroxine 88 MICROGram(s) Oral daily  methylPREDNISolone sodium succinate Injectable 40 milliGRAM(s) IV Push every 6 hours  montelukast 10 milliGRAM(s) Oral daily  sodium chloride 0.9%. 1000 milliLiter(s) (75 mL/Hr) IV Continuous <Continuous>  tiotropium 18 MICROgram(s) Capsule 1 Capsule(s) Inhalation daily    MEDICATIONS  (PRN):      Allergies    penicillin (Unknown)    Intolerances        Vital Signs Last 24 Hrs  T(C): 36.4 (02 Apr 2019 05:21), Max: 36.9 (01 Apr 2019 10:59)  T(F): 97.5 (02 Apr 2019 05:21), Max: 98.5 (01 Apr 2019 10:59)  HR: 84 (02 Apr 2019 05:22) (73 - 93)  BP: 140/61 (02 Apr 2019 05:21) (134/75 - 148/81)  BP(mean): --  RR: 18 (02 Apr 2019 05:21) (18 - 18)  SpO2: 93% (02 Apr 2019 05:22) (93% - 93%)    PHYSICAL EXAMINATION:    NECK:  Supple. No lymphadenopathy. Jugular venous pressure not elevated. Carotids equal.   HEART:   The cardiac impulse has a normal quality. Reg., Nl S1 and S2.  There are no murmurs, rubs or gallops noted  CHEST:  Chest with persistent wheezing. Normal respiratory effort.  ABDOMEN:  Soft and nontender.   EXTREMITIES:  There is no edema.       LABS:                        13.8   19.73 )-----------( 324      ( 02 Apr 2019 07:06 )             40.5     04-02    145  |  111<H>  |  17  ----------------------------<  135<H>  4.0   |  24  |  0.86    Ca    8.9      02 Apr 2019 07:06  Mg     2.3     04-01    TPro  7.0  /  Alb  3.5  /  TBili  0.2  /  DBili  x   /  AST  57<H>  /  ALT  24  /  AlkPhos  121<H>  03-31          RADIOLOGY & ADDITIONAL TESTS:    Assessment and Recommendation:   · Assessment		  - IV Solumedrol--maintain Q6H another 24 hrs, then taper  - Cont aerosols   - Mucolytics  - Monitor O2 sats  - Ambulate in hallways  - Kenalog to be administered prior to D/C  - Leukocytosis-?due to steroids    Problem/Recommendation - 1:  Problem: Mild persistent asthma with status asthmaticus.    Problem/Recommendation - 2:  ·  Problem: Acute bronchospasm.     Problem/Recommendation - 3:  ·  Problem: Cough.     Problem/Recommendation - 4:  ·  Problem: Dyspnea, unspecified type.

## 2019-04-03 ENCOUNTER — TRANSCRIPTION ENCOUNTER (OUTPATIENT)
Age: 54
End: 2019-04-03

## 2019-04-03 VITALS — OXYGEN SATURATION: 94 %

## 2019-04-03 LAB
ANION GAP SERPL CALC-SCNC: 6 MMOL/L — SIGNIFICANT CHANGE UP (ref 5–17)
BUN SERPL-MCNC: 18 MG/DL — SIGNIFICANT CHANGE UP (ref 7–23)
CALCIUM SERPL-MCNC: 8.8 MG/DL — SIGNIFICANT CHANGE UP (ref 8.5–10.1)
CHLORIDE SERPL-SCNC: 108 MMOL/L — SIGNIFICANT CHANGE UP (ref 96–108)
CO2 SERPL-SCNC: 28 MMOL/L — SIGNIFICANT CHANGE UP (ref 22–31)
CREAT SERPL-MCNC: 0.95 MG/DL — SIGNIFICANT CHANGE UP (ref 0.5–1.3)
GLUCOSE SERPL-MCNC: 114 MG/DL — HIGH (ref 70–99)
HCT VFR BLD CALC: 40.1 % — SIGNIFICANT CHANGE UP (ref 34.5–45)
HGB BLD-MCNC: 13.4 G/DL — SIGNIFICANT CHANGE UP (ref 11.5–15.5)
MCHC RBC-ENTMCNC: 30.7 PG — SIGNIFICANT CHANGE UP (ref 27–34)
MCHC RBC-ENTMCNC: 33.4 GM/DL — SIGNIFICANT CHANGE UP (ref 32–36)
MCV RBC AUTO: 92 FL — SIGNIFICANT CHANGE UP (ref 80–100)
NRBC # BLD: 0 /100 WBCS — SIGNIFICANT CHANGE UP (ref 0–0)
PLATELET # BLD AUTO: 338 K/UL — SIGNIFICANT CHANGE UP (ref 150–400)
POTASSIUM SERPL-MCNC: 4.4 MMOL/L — SIGNIFICANT CHANGE UP (ref 3.5–5.3)
POTASSIUM SERPL-SCNC: 4.4 MMOL/L — SIGNIFICANT CHANGE UP (ref 3.5–5.3)
RBC # BLD: 4.36 M/UL — SIGNIFICANT CHANGE UP (ref 3.8–5.2)
RBC # FLD: 12.8 % — SIGNIFICANT CHANGE UP (ref 10.3–14.5)
SODIUM SERPL-SCNC: 142 MMOL/L — SIGNIFICANT CHANGE UP (ref 135–145)
WBC # BLD: 18.36 K/UL — HIGH (ref 3.8–10.5)
WBC # FLD AUTO: 18.36 K/UL — HIGH (ref 3.8–10.5)

## 2019-04-03 RX ADMIN — AMLODIPINE BESYLATE 10 MILLIGRAM(S): 2.5 TABLET ORAL at 06:21

## 2019-04-03 RX ADMIN — Medication 3 MILLILITER(S): at 12:02

## 2019-04-03 RX ADMIN — Medication 88 MICROGRAM(S): at 06:21

## 2019-04-03 RX ADMIN — Medication 3 MILLILITER(S): at 00:51

## 2019-04-03 RX ADMIN — BUDESONIDE AND FORMOTEROL FUMARATE DIHYDRATE 2 PUFF(S): 160; 4.5 AEROSOL RESPIRATORY (INHALATION) at 08:33

## 2019-04-03 RX ADMIN — MONTELUKAST 10 MILLIGRAM(S): 4 TABLET, CHEWABLE ORAL at 09:45

## 2019-04-03 RX ADMIN — Medication 600 MILLIGRAM(S): at 06:21

## 2019-04-03 RX ADMIN — Medication 3 MILLILITER(S): at 08:38

## 2019-04-03 RX ADMIN — Medication 40 MILLIGRAM(S): at 06:21

## 2019-04-03 NOTE — DISCHARGE NOTE PROVIDER - CARE PROVIDERS DIRECT ADDRESSES
[email or web address]

## 2019-04-03 NOTE — DISCHARGE NOTE PROVIDER - CARE PROVIDER_API CALL
Mayra Gramajo)  Internal Medicine  [address] [place] [address]  Phone: [phone]  Fax: [phone]  Follow Up Time:     Johnny Sheldon)  Internal Medicine; Pulmonary Disease  [address] [place] [address]  Phone: [phone]  Fax: [phone]  Follow Up Time:

## 2019-04-03 NOTE — DISCHARGE NOTE PROVIDER - NSDCCPCAREPLAN_GEN_ALL_CORE_FT
PRINCIPAL DISCHARGE DIAGNOSIS  Diagnosis: Asthma exacerbation  Assessment and Plan of Treatment: Take Predisone 40mg daily for 3 additional day.   Continue to use your albuterol inhaler and Advair as needed for wheezing/shortness of breath.  Continue Mucinex as needed for cough  Continue to take Montelukast 10mg daily  Continue to adequately hydrate yourself  Follow up with Dr. guevara in 3-4 days for re assessment      SECONDARY DISCHARGE DIAGNOSES  Diagnosis: Hypothyroidism  Assessment and Plan of Treatment: Continue synthroid 88mcg daily  Follow up with Dr. Gramajo within a week.    Diagnosis: HTN (hypertension)  Assessment and Plan of Treatment: Continue Norvasc 10mg daily  Follow up with Dr. Gramajo within a week.    Diagnosis: Elevated LFTs  Assessment and Plan of Treatment: Your liver enzymes were mildly elevated. It can be because of the viral infection you had.   Follow up with Dr. Gramajo within 1 week.

## 2019-04-03 NOTE — DISCHARGE NOTE NURSING/CASE MANAGEMENT/SOCIAL WORK - NSDCDPATPORTLINK_GEN_ALL_CORE
You can access the [facility] Portal, offered by [facility], by registering with the following website: [email or web address]

## 2019-04-03 NOTE — CHART NOTE - NSCHARTNOTEFT_GEN_A_CORE
Notified PCP, Dr. Gramajo about pt hospital course. PMD will f/u LFTs, Asthma and medical conditions.

## 2019-04-03 NOTE — DISCHARGE NOTE PROVIDER - HOSPITAL COURSE
54 y/o F presented to the ED with complaints of worsening SOB,  wheezing, cough and congestion and was admitted for Asthma exacerbation. Pt received supportive care, oxygen as needed, Steroid and nebulizer treatment in the hospital. CXR was done which was negative for infection. Pt has shown significant clinical improvement and is stable to be discharged home today.         4/3: Pt was seen and examined. Denies SOB. Reports great improvement. Feels better to go home. No acute complaints or concerns.         REVIEW OF SYSTEMS:    CONSTITUTIONAL: No weakness, fevers or chills    EYES/ENT: No visual changes;  No vertigo or throat pain     NECK: No pain or stiffness    RESPIRATORY: No cough, wheezing, hemoptysis; No shortness of breath    CARDIOVASCULAR: No chest pain or palpitations    GASTROINTESTINAL: No abdominal or epigastric pain. No nausea, vomiting, or hematemesis; No diarrhea or constipation. No melena or hematochezia.    GENITOURINARY: No dysuria, frequency or hematuria    NEUROLOGICAL: No numbness or weakness    SKIN: No itching, burning, rashes, or lesions     All other review of systems is negative unless indicated above        Vital Signs Last 24 Hrs    T(C): 37.1 (03 Apr 2019 10:52), Max: 37.3 (02 Apr 2019 16:56)    T(F): 98.7 (03 Apr 2019 10:52), Max: 99.2 (02 Apr 2019 16:56)    HR: 88 (03 Apr 2019 12:02) (77 - 91)    BP: 138/77 (03 Apr 2019 10:52) (120/78 - 138/77)    RR: 18 (03 Apr 2019 10:52) (18 - 18)    SpO2: 94% (03 Apr 2019 12:02) (94% - 100%)            PHYSICAL EXAM:    Constitutional: NAD, awake and alert, well-developed    HEENT: PERR, EOMI, Normal Hearing, MMM    Neck: Soft and supple, No LAD, No JVD    Respiratory: Very mild wheezing at Right lung.     Cardiovascular: S1 and S2, regular rate and rhythm, no Murmurs, gallops or rubs    Gastrointestinal: Bowel Sounds present, soft, nontender, nondistended, no guarding, no rebound    Extremities: No peripheral edema    Vascular: 2+ peripheral pulses    Neurological: A/O x 3, no focal deficits    Musculoskeletal: 5/5 strength b/l upper and lower extremities    Skin: No rashes                DISPOSITION: 52 y/o Female with pertinent hx of asthma  presented to the ED with complaints of worsening SOB, wheezing, cough and congestion and was subsequently  admitted for Asthma exacerbation. Pt received supportive care, oxygen as needed, Steroid and nebulizer treatment in the hospital. CXR was done which was negative for infection. Pt has shown significant clinical improvement and is stable to be discharged home today. Will be discharged on 3 additional days of steroids. instructed to follow up with Pulmonologist within a week of discharge.        4/3: Pt was seen and examined. Denies SOB. Reports great improvement. Feels better to go home. No acute complaints or concerns.         REVIEW OF SYSTEMS:    CONSTITUTIONAL: No weakness, fevers or chills    EYES/ENT: No visual changes;  No vertigo or throat pain     NECK: No pain or stiffness    RESPIRATORY: No cough, wheezing, hemoptysis; No shortness of breath    CARDIOVASCULAR: No chest pain or palpitations    GASTROINTESTINAL: No abdominal or epigastric pain. No nausea, vomiting, or hematemesis; No diarrhea or constipation. No melena or hematochezia.    GENITOURINARY: No dysuria, frequency or hematuria    NEUROLOGICAL: No numbness or weakness    SKIN: No itching, burning, rashes, or lesions     All other review of systems is negative unless indicated above        Vital Signs Last 24 Hrs    T(C): 37.1 (03 Apr 2019 10:52), Max: 37.3 (02 Apr 2019 16:56)    T(F): 98.7 (03 Apr 2019 10:52), Max: 99.2 (02 Apr 2019 16:56)    HR: 88 (03 Apr 2019 12:02) (77 - 91)    BP: 138/77 (03 Apr 2019 10:52) (120/78 - 138/77)    RR: 18 (03 Apr 2019 10:52) (18 - 18)    SpO2: 94% (03 Apr 2019 12:02) (94% - 100%)            PHYSICAL EXAM:    Constitutional: NAD, awake and alert, well-developed    HEENT: PERR, EOMI, Normal Hearing, MMM    Neck: Soft and supple, No LAD, No JVD    Respiratory: Very mild wheezing at Right lung.     Cardiovascular: S1 and S2, regular rate and rhythm, no Murmurs, gallops or rubs    Gastrointestinal: Bowel Sounds present, soft, nontender, nondistended, no guarding, no rebound    Extremities: No peripheral edema    Vascular: 2+ peripheral pulses    Neurological: A/O x 3, no focal deficits    Musculoskeletal: 5/5 strength b/l upper and lower extremities    Skin: No rashes                DISPOSITION:

## 2019-04-05 ENCOUNTER — APPOINTMENT (OUTPATIENT)
Dept: INTERNAL MEDICINE | Facility: CLINIC | Age: 54
End: 2019-04-05
Payer: COMMERCIAL

## 2019-04-05 VITALS
HEIGHT: 64 IN | BODY MASS INDEX: 36.19 KG/M2 | SYSTOLIC BLOOD PRESSURE: 116 MMHG | HEART RATE: 62 BPM | RESPIRATION RATE: 16 BRPM | DIASTOLIC BLOOD PRESSURE: 78 MMHG | OXYGEN SATURATION: 96 % | WEIGHT: 212 LBS | TEMPERATURE: 98.2 F

## 2019-04-05 PROCEDURE — 94727 GAS DIL/WSHOT DETER LNG VOL: CPT

## 2019-04-05 PROCEDURE — 99496 TRANSJ CARE MGMT HIGH F2F 7D: CPT | Mod: 25

## 2019-04-05 PROCEDURE — ZZZZZ: CPT

## 2019-04-05 PROCEDURE — 94729 DIFFUSING CAPACITY: CPT

## 2019-04-05 PROCEDURE — 94060 EVALUATION OF WHEEZING: CPT

## 2019-04-05 RX ORDER — FLUTICASONE PROPIONATE AND SALMETEROL 500; 50 UG/1; UG/1
500-50 POWDER RESPIRATORY (INHALATION)
Qty: 1 | Refills: 2 | Status: DISCONTINUED | COMMUNITY
Start: 2019-02-26 | End: 2019-04-05

## 2019-04-05 RX ORDER — BUDESONIDE 1 MG/2ML
1 INHALANT ORAL
Qty: 120 | Refills: 0 | Status: DISCONTINUED | COMMUNITY
Start: 2018-02-14 | End: 2019-04-05

## 2019-04-05 NOTE — PLAN
[FreeTextEntry1] : 1. Continue his medication as outlined above.\par \par 2. The patient will continue with her prednisone taper. She will take 40 mg for 2 more days, on 4/6, and 4/7. She will then decrease to 20 mg daily for 4 days, followed by 10 mg daily for 6 days before discontinuing.\par \par 3. Zithromax 500 mg daily for 5 days\par \par 4. Continue with Mucinex on an as-needed basis\par \par 5. Increase activity as tolerated.\par \par 6. Follow up with myself in 6 months with pre-and post spirometry and O2 saturation.

## 2019-04-05 NOTE — HISTORY OF PRESENT ILLNESS
[de-identified] : Patient comes in today for a transition of care/hospital followup assessment.\par \par The patient was recently hospitalized at [facility] for an episode of asthmatic bronchitis. She was treated for several days with IV Solu-Medrol. She was given aerosols around the clock. Prior to discharge, she received 60 mg of Kenalog intramuscularly. She was eventually discharged home on 4/3/19.\par \par At this time, the patient states that overall she is improved from a pulmonary standpoint. She does still however complained of generalized fatigue. She notes having muscle weakness in her lower extremities. She is producing green sputum. She was not placed on any antibiotics. Occasionally, she has used her albuterol for rescue since being home. She is using Mucinex as needed. She denies any fevers chills or rigors. She now comes in for this assessment.

## 2019-04-05 NOTE — PHYSICAL EXAM
[Normal Oropharynx] : the oropharynx was normal [No JVD] : no jugular venous distention [Supple] : supple [Clear to Auscultation] : lungs were clear to auscultation bilaterally [No Accessory Muscle Use] : no accessory muscle use [Regular Rhythm] : with a regular rhythm [Normal S1, S2] : normal S1 and S2 [No Edema] : there was no peripheral edema [No Extremity Clubbing/Cyanosis] : no extremity clubbing/cyanosis [Soft] : abdomen soft [Non Tender] : non-tender [Normal Supraclavicular Nodes] : no supraclavicular lymphadenopathy [Normal Posterior Cervical Nodes] : no posterior cervical lymphadenopathy [Normal Anterior Cervical Nodes] : no anterior cervical lymphadenopathy [No CVA Tenderness] : no CVA  tenderness [de-identified] : Obese white female

## 2019-04-05 NOTE — DATA REVIEWED
[FreeTextEntry1] : A pulmonary function test is performed. Lung volumes are within normal limits with a slight decrease in the vital capacity. Lung mechanics reveal a mild decrease in Rubio with slight bronchodilator reactivity. The DLCO and saturation maintained. This represents a mild degree of obstruction with slight air reactivity. This is consistent with patient's clinical diagnosis of asthma. A slight decrease in the vital capacity is noted.

## 2019-04-07 LAB — CYTOLOGY CVX/VAG DOC THIN PREP: NEGATIVE

## 2019-04-08 DIAGNOSIS — E87.6 HYPOKALEMIA: ICD-10-CM

## 2019-04-08 DIAGNOSIS — J98.01 ACUTE BRONCHOSPASM: ICD-10-CM

## 2019-04-08 DIAGNOSIS — B34.8 OTHER VIRAL INFECTIONS OF UNSPECIFIED SITE: ICD-10-CM

## 2019-04-08 DIAGNOSIS — J96.01 ACUTE RESPIRATORY FAILURE WITH HYPOXIA: ICD-10-CM

## 2019-04-08 DIAGNOSIS — B34.1 ENTEROVIRUS INFECTION, UNSPECIFIED: ICD-10-CM

## 2019-04-08 DIAGNOSIS — R94.5 ABNORMAL RESULTS OF LIVER FUNCTION STUDIES: ICD-10-CM

## 2019-04-08 DIAGNOSIS — J45.32 MILD PERSISTENT ASTHMA WITH STATUS ASTHMATICUS: ICD-10-CM

## 2019-04-08 DIAGNOSIS — J45.901 UNSPECIFIED ASTHMA WITH (ACUTE) EXACERBATION: ICD-10-CM

## 2019-04-08 DIAGNOSIS — Z88.0 ALLERGY STATUS TO PENICILLIN: ICD-10-CM

## 2019-04-08 DIAGNOSIS — E03.9 HYPOTHYROIDISM, UNSPECIFIED: ICD-10-CM

## 2019-04-08 DIAGNOSIS — I10 ESSENTIAL (PRIMARY) HYPERTENSION: ICD-10-CM

## 2019-04-12 PROBLEM — J45.909 UNSPECIFIED ASTHMA, UNCOMPLICATED: Chronic | Status: INACTIVE | Noted: 2019-03-31 | Resolved: 2019-04-11

## 2019-08-01 ENCOUNTER — APPOINTMENT (OUTPATIENT)
Dept: INTERNAL MEDICINE | Facility: CLINIC | Age: 54
End: 2019-08-01
Payer: COMMERCIAL

## 2019-08-01 ENCOUNTER — NON-APPOINTMENT (OUTPATIENT)
Age: 54
End: 2019-08-01

## 2019-08-01 VITALS
DIASTOLIC BLOOD PRESSURE: 88 MMHG | OXYGEN SATURATION: 96 % | WEIGHT: 217.99 LBS | HEART RATE: 82 BPM | RESPIRATION RATE: 18 BRPM | HEIGHT: 64 IN | TEMPERATURE: 98.9 F | SYSTOLIC BLOOD PRESSURE: 122 MMHG | BODY MASS INDEX: 37.22 KG/M2

## 2019-08-01 PROCEDURE — 94060 EVALUATION OF WHEEZING: CPT

## 2019-08-01 PROCEDURE — 99214 OFFICE O/P EST MOD 30 MIN: CPT | Mod: 25

## 2019-08-01 RX ORDER — AZELASTINE HYDROCHLORIDE 205.5 UG/1
0.15 SPRAY, METERED NASAL TWICE DAILY
Qty: 1 | Refills: 11 | Status: DISCONTINUED | COMMUNITY
Start: 2018-06-29 | End: 2019-08-01

## 2019-08-01 RX ORDER — LEVOTHYROXINE SODIUM 0.07 MG/1
75 TABLET ORAL
Refills: 0 | Status: DISCONTINUED | COMMUNITY
End: 2019-08-01

## 2019-08-01 RX ORDER — AZITHROMYCIN 500 MG/1
500 TABLET, FILM COATED ORAL DAILY
Qty: 5 | Refills: 0 | Status: DISCONTINUED | COMMUNITY
Start: 2019-04-05 | End: 2019-08-01

## 2019-08-01 RX ORDER — BUDESONIDE 1 MG/2ML
1 INHALANT ORAL
Qty: 60 | Refills: 5 | Status: DISCONTINUED | COMMUNITY
Start: 2018-06-29 | End: 2019-08-01

## 2019-08-01 RX ORDER — PREDNISONE 20 MG/1
20 TABLET ORAL AS DIRECTED
Qty: 9 | Refills: 0 | Status: DISCONTINUED | COMMUNITY
Start: 2019-04-05 | End: 2019-08-01

## 2019-08-01 RX ORDER — ALBUTEROL SULFATE 90 UG/1
108 (90 BASE) POWDER, METERED RESPIRATORY (INHALATION)
Refills: 0 | Status: DISCONTINUED | COMMUNITY
End: 2019-08-01

## 2019-08-01 NOTE — DATA REVIEWED
[FreeTextEntry1] : Spirometric analysis reveals a mild degree of restriction a moderate to significant degree of obstruction is noted. Bronchodilator reactivity is not demonstrated.

## 2019-08-01 NOTE — PHYSICAL EXAM
[Well Developed] : well developed [Well Nourished] : well nourished [PERRL] : pupils equal round and reactive to light [Normal Oropharynx] : the oropharynx was normal [No JVD] : no jugular venous distention [Supple] : supple [No Accessory Muscle Use] : no accessory muscle use [No Respiratory Distress] : no respiratory distress  [Regular Rhythm] : with a regular rhythm [Normal S1, S2] : normal S1 and S2 [No Edema] : there was no peripheral edema [No Extremity Clubbing/Cyanosis] : no extremity clubbing/cyanosis [Soft] : abdomen soft [Normal Bowel Sounds] : normal bowel sounds [Normal Supraclavicular Nodes] : no supraclavicular lymphadenopathy [Normal Anterior Cervical Nodes] : no anterior cervical lymphadenopathy [Normal Posterior Cervical Nodes] : no posterior cervical lymphadenopathy [No CVA Tenderness] : no CVA  tenderness [de-identified] : There are end inspiratory, and expiratory wheezes noted with a slight prolongation of expiratory phase.

## 2019-08-01 NOTE — HISTORY OF PRESENT ILLNESS
[FreeTextEntry8] : The patient comes in today for an emergency evaluation.\par \par The patient states that she was in her usual state of health, until approximately 1-1/2 days ago, when she noted the onset of a cough and sneezing. The cough has been nonproductive. She has developed chest tightness and chest congestion. She has developed a wheeze. She denies any fevers, chills, or rigors. She denies having any sick contacts.\par \par The patient notes that she has had nasal congestion as well. The secretions from her nose are clear. She has been compliant with her Flonase. She discontinued her Claritin back in June. She denies any obvious allergy-type symptoms at present. She now comes in for this assessment

## 2019-08-01 NOTE — PLAN
[FreeTextEntry1] : 1. Continue with medication as outlined above.\par \par 2. Will now administer prednisone in burst therapy at a dose of 40 mg daily for 6 days.\par \par 3. The patient will reinstitute an antihistamine.\par \par 4. Followup in October with pre-post spirometry and O2 saturation. She will contact this office immediately if the symptoms persist or worsen.

## 2019-09-10 ENCOUNTER — RX RENEWAL (OUTPATIENT)
Age: 54
End: 2019-09-10

## 2019-10-07 ENCOUNTER — APPOINTMENT (OUTPATIENT)
Dept: INTERNAL MEDICINE | Facility: CLINIC | Age: 54
End: 2019-10-07
Payer: COMMERCIAL

## 2019-10-07 ENCOUNTER — NON-APPOINTMENT (OUTPATIENT)
Age: 54
End: 2019-10-07

## 2019-10-07 VITALS
BODY MASS INDEX: 38.24 KG/M2 | SYSTOLIC BLOOD PRESSURE: 146 MMHG | TEMPERATURE: 98.3 F | WEIGHT: 224 LBS | HEART RATE: 66 BPM | OXYGEN SATURATION: 95 % | HEIGHT: 64 IN | DIASTOLIC BLOOD PRESSURE: 78 MMHG | RESPIRATION RATE: 18 BRPM

## 2019-10-07 VITALS — SYSTOLIC BLOOD PRESSURE: 144 MMHG | DIASTOLIC BLOOD PRESSURE: 80 MMHG

## 2019-10-07 PROCEDURE — 94060 EVALUATION OF WHEEZING: CPT

## 2019-10-07 PROCEDURE — 99214 OFFICE O/P EST MOD 30 MIN: CPT | Mod: 25

## 2019-10-07 PROCEDURE — 96372 THER/PROPH/DIAG INJ SC/IM: CPT | Mod: 59

## 2019-10-07 RX ORDER — AMLODIPINE BESYLATE 10 MG/1
10 TABLET ORAL
Refills: 0 | Status: DISCONTINUED | COMMUNITY
End: 2019-10-07

## 2019-10-07 RX ORDER — TRIAMCINOLONE ACETONIDE 40 MG/ML
40 SUSPENSION INTRA-ARTERIAL; INTRAMUSCULAR
Qty: 1 | Refills: 0 | Status: COMPLETED | OUTPATIENT
Start: 2019-10-07

## 2019-10-07 RX ORDER — PREDNISONE 20 MG/1
20 TABLET ORAL
Qty: 12 | Refills: 0 | Status: DISCONTINUED | COMMUNITY
Start: 2019-08-01 | End: 2019-10-07

## 2019-10-07 RX ADMIN — TRIAMCINOLONE ACETONIDE 0 MG/ML: 40 INJECTION, SUSPENSION INTRA-ARTICULAR; INTRAMUSCULAR at 00:00

## 2019-10-07 NOTE — DATA REVIEWED
[FreeTextEntry1] : Pre/post spirometry reveals restriction and obstruction with mild airway reactivity.

## 2019-10-07 NOTE — HISTORY OF PRESENT ILLNESS
[FreeTextEntry8] : Patient comes in today with complaints of wheezing and dry nonproductive cough over a week. She has allergies and says that they have been bad since September.  She went to urgent care on Friday night for a rash on her underarms that spread to her abdomen. They gave her prednisone 50 mg x 5 days and told her to take Benadryl. Her rash is now gone. She was wheezing at that time and says that the prednisone helped her wheezing a little bit. She is compliant with her Singulair and Advair. She has been using Flonase and mucinex. She used her rescue inhaler once on Friday night and used her nebulizer yesterday. Reports watery itchy eyes, sneezing, post nasal drip, nasal congestion with clear discharge.  Denies fevers, chills, SOB, body aches, nausea, vomiting, diarrhea. \par \par Her BP is slightly elevated this visit. She says she was switched from atenolol to diltiazem by her primary for her bp in September and it had been lower when she returned for BP check. She says she checks her bp at home and hasn’t since Friday morning because she started to take prednisone. She says bp usually runs 110s-120s/ 70s. Denies headache, visual changes, dizziness. Denies using any decongestants.

## 2019-10-07 NOTE — PHYSICAL EXAM
[Normal Sclera/Conjunctiva] : normal sclera/conjunctiva [Normal Outer Ear/Nose] : the outer ears and nose were normal in appearance [Normal Oropharynx] : the oropharynx was normal [Normal TMs] : both tympanic membranes were normal [No Respiratory Distress] : no respiratory distress  [No Lymphadenopathy] : no lymphadenopathy [No Accessory Muscle Use] : no accessory muscle use [Normal] : normal rate, regular rhythm, normal S1 and S2 and no murmur heard [No Edema] : there was no peripheral edema [No Rash] : no rash [Alert and Oriented x3] : oriented to person, place, and time [de-identified] : erythematous nasal mucosa. [de-identified] : end expiratory wheeze noted. No crackles.

## 2019-10-07 NOTE — PLAN
[FreeTextEntry1] : Continue medications as above.\par Kenalog 60 mg IM in office.\par Start Astepro nasal spray.\par Finish Prednisone 50 mg.\par Continue antihistamine.\par Continue to check BP at home. If remains elevated after completing Prednisone call primary. Reviewed s/s of stroke with patient and if experiencing to go to ER.\par If symptoms worsen or do not get better call MD office.

## 2019-10-22 ENCOUNTER — APPOINTMENT (OUTPATIENT)
Dept: INTERNAL MEDICINE | Facility: CLINIC | Age: 54
End: 2019-10-22

## 2019-11-13 ENCOUNTER — FORM ENCOUNTER (OUTPATIENT)
Age: 54
End: 2019-11-13

## 2019-11-14 ENCOUNTER — OUTPATIENT (OUTPATIENT)
Dept: OUTPATIENT SERVICES | Facility: HOSPITAL | Age: 54
LOS: 1 days | End: 2019-11-14
Payer: COMMERCIAL

## 2019-11-14 ENCOUNTER — APPOINTMENT (OUTPATIENT)
Dept: INTERNAL MEDICINE | Facility: CLINIC | Age: 54
End: 2019-11-14
Payer: COMMERCIAL

## 2019-11-14 ENCOUNTER — APPOINTMENT (OUTPATIENT)
Dept: RADIOLOGY | Facility: CLINIC | Age: 54
End: 2019-11-14
Payer: COMMERCIAL

## 2019-11-14 VITALS
DIASTOLIC BLOOD PRESSURE: 85 MMHG | TEMPERATURE: 98.6 F | HEIGHT: 64 IN | WEIGHT: 228 LBS | RESPIRATION RATE: 18 BRPM | OXYGEN SATURATION: 93 % | SYSTOLIC BLOOD PRESSURE: 134 MMHG | HEART RATE: 71 BPM | BODY MASS INDEX: 38.93 KG/M2

## 2019-11-14 DIAGNOSIS — J98.01 ACUTE BRONCHOSPASM: ICD-10-CM

## 2019-11-14 DIAGNOSIS — Z00.8 ENCOUNTER FOR OTHER GENERAL EXAMINATION: ICD-10-CM

## 2019-11-14 PROCEDURE — 71046 X-RAY EXAM CHEST 2 VIEWS: CPT

## 2019-11-14 PROCEDURE — 71046 X-RAY EXAM CHEST 2 VIEWS: CPT | Mod: 26

## 2019-11-14 PROCEDURE — 94060 EVALUATION OF WHEEZING: CPT

## 2019-11-14 PROCEDURE — 99214 OFFICE O/P EST MOD 30 MIN: CPT | Mod: 25

## 2019-11-14 RX ORDER — PREDNISONE 50 MG/1
50 TABLET ORAL DAILY
Refills: 0 | Status: DISCONTINUED | COMMUNITY
End: 2019-11-14

## 2019-11-14 NOTE — DATA REVIEWED
[FreeTextEntry1] : Pre/post spirometry shows restriction and obstruction with improvement post bronchodilator.

## 2019-11-14 NOTE — HISTORY OF PRESENT ILLNESS
[FreeTextEntry8] : \par Developed wheeze and cough 4 days ago.  She started using her nebulizer q 6 hrs.  Better next day but past 2 days increased dry cough   +nasal congestion-clear.  No fevers, body aches.  Having pain upper back unrelated to movement or respiration.  She is compliant with meds.  Denies overt GERD.  \par \par Treated here last month for Asthmatic Bronchitis.  She completed a course of prednisone(given by  for contact dermatitis) and had Kenalog 60 mg with complete resolution of symptoms.

## 2019-11-14 NOTE — REVIEW OF SYSTEMS
[Wheezing] : wheezing [Shortness Of Breath] : shortness of breath [Cough] : cough [Dyspnea on Exertion] : dyspnea on exertion [Skin Rash] : skin rash [Negative] : Psychiatric [Fever] : no fever [Chest Pain] : no chest pain [Sore Throat] : no sore throat

## 2019-11-14 NOTE — ASSESSMENT
[FreeTextEntry1] : CXR today.\par \par Start Prednisone 80 mg x 2 days, 60 mg x 3 days, 40 mg x 4 days, 20 mg x 5 days.\par \par Omeprazole 40 mg qd.\par \par doxycycline x 7 days\par \par Start Spiriva 2 inh qd.  Instructed on proper use.  \par \par Albuterol nebs q 4-6 hrs.\par \par To ED any decompensation in status.  All questions addressed.  Call any concerns.  \par \par

## 2019-11-14 NOTE — PHYSICAL EXAM
[No Respiratory Distress] : no respiratory distress  [No Accessory Muscle Use] : no accessory muscle use [Normal Rate] : normal rate  [Regular Rhythm] : with a regular rhythm [No Extremity Clubbing/Cyanosis] : no extremity clubbing/cyanosis [Normal S1, S2] : normal S1 and S2 [No Edema] : there was no peripheral edema [Normal] : no posterior cervical lymphadenopathy and no anterior cervical lymphadenopathy [de-identified] : scaly pink rash R bicep [de-identified] : expiratory wheezes throughout

## 2019-11-15 ENCOUNTER — TRANSCRIPTION ENCOUNTER (OUTPATIENT)
Age: 54
End: 2019-11-15

## 2019-11-15 ENCOUNTER — APPOINTMENT (OUTPATIENT)
Dept: INTERNAL MEDICINE | Facility: CLINIC | Age: 54
End: 2019-11-15

## 2019-11-25 ENCOUNTER — NON-APPOINTMENT (OUTPATIENT)
Age: 54
End: 2019-11-25

## 2019-11-25 ENCOUNTER — APPOINTMENT (OUTPATIENT)
Dept: INTERNAL MEDICINE | Facility: CLINIC | Age: 54
End: 2019-11-25
Payer: COMMERCIAL

## 2019-11-25 VITALS
DIASTOLIC BLOOD PRESSURE: 82 MMHG | SYSTOLIC BLOOD PRESSURE: 128 MMHG | TEMPERATURE: 98.4 F | HEART RATE: 62 BPM | OXYGEN SATURATION: 96 % | RESPIRATION RATE: 16 BRPM | BODY MASS INDEX: 40.12 KG/M2 | WEIGHT: 235 LBS | HEIGHT: 64 IN

## 2019-11-25 PROCEDURE — 99214 OFFICE O/P EST MOD 30 MIN: CPT | Mod: 25

## 2019-11-25 PROCEDURE — 94060 EVALUATION OF WHEEZING: CPT

## 2019-11-25 RX ORDER — GUAIFENESIN 600 MG/1
600 TABLET, EXTENDED RELEASE ORAL
Qty: 14 | Refills: 0 | Status: DISCONTINUED | COMMUNITY
Start: 2019-10-07 | End: 2019-11-25

## 2019-11-25 RX ORDER — DOXYCYCLINE HYCLATE 100 MG/1
100 TABLET ORAL TWICE DAILY
Qty: 14 | Refills: 0 | Status: DISCONTINUED | COMMUNITY
Start: 2019-11-14 | End: 2019-11-25

## 2019-11-25 NOTE — ASSESSMENT
[FreeTextEntry1] : Complete course of Prednisone.\par \par continue with Spiriva and Advair and Omeprazole along with GERD trigger avoidance.  \par \par Will check LFTs and RF now with all yearly FBW to be done prior to CPE with  in May.\par \par Call if swelling persists.  This is likely due to prolonged steroids.  \par \par FU with  as planned in February\par \par Discussed current recommendations regarding exercise for general health and well being and for prevention/progression of chronic disease with goal of 30 minutes of moderate intensity activity most days of week.  For weight lose may require 60 - 90 minutes of moderate intensity exercise.\par \par \par Long discussion focusing on benefits of  Lifestyle modifications.  Encouraged healthy diet with lean meats, fish, chicken,increased fruits and veggies, higher fiber and avoidance of sweets, soft drinks and fruit juices.   Discussed  portion control  and importance of  increased exercise for health promotion and disease prevention. Calorie restriction with 500 -1000 bryant necessary to promote weight loss of 1-2 lbs weekly.\par

## 2019-11-25 NOTE — DATA REVIEWED
[FreeTextEntry1] : Pre/post spirometry shows restriction with mild airway reactivity.   \par \par records reviewed\par 8/30/19  AST 55, ALT 22, creat 0.9, glu 96

## 2019-11-25 NOTE — PHYSICAL EXAM
[No Lymphadenopathy] : no lymphadenopathy [Supple] : supple [Thyroid Normal, No Nodules] : the thyroid was normal and there were no nodules present [Normal] : no posterior cervical lymphadenopathy and no anterior cervical lymphadenopathy [de-identified] : trace - 1+  LE edema BL

## 2019-11-25 NOTE — HISTORY OF PRESENT ILLNESS
[FreeTextEntry1] : Here for FU of Asthma/establish PCP [de-identified] : She is doing much better in regards to her asthma.  She is completing a 2 week taper of Prednisone.  currently on 20 mg.  completes in 2 days.  Her symptoms improved quickly.  She denies cough, SOB, wheeze.  Sleeping without difficulty.  She has had some knee and ankle swelling which she attributes to steroids.   \par Requesting to change to this office for PCP.   In May she was changed from Amlodopine 10 mg to Diltiazem ER 240mg with improved BP control.   \par Hx of mild - stable LFT elevations.    \par +solitary kidney - normal RF.  \par Thyroid nodules followed q 6 months by .   \par Just joined gym  and attempting weight loss.  Using treadmill.  Cutting back on carbs reginaldo bread.   Wants to get off her meds.

## 2019-12-21 ENCOUNTER — EMERGENCY (EMERGENCY)
Facility: HOSPITAL | Age: 54
LOS: 0 days | Discharge: HOME | End: 2019-12-21
Attending: STUDENT IN AN ORGANIZED HEALTH CARE EDUCATION/TRAINING PROGRAM
Payer: COMMERCIAL

## 2019-12-21 VITALS
HEART RATE: 75 BPM | DIASTOLIC BLOOD PRESSURE: 90 MMHG | RESPIRATION RATE: 17 BRPM | OXYGEN SATURATION: 99 % | SYSTOLIC BLOOD PRESSURE: 154 MMHG | TEMPERATURE: 98 F

## 2019-12-21 VITALS — HEIGHT: 64 IN | WEIGHT: 225.09 LBS

## 2019-12-21 DIAGNOSIS — Z88.0 ALLERGY STATUS TO PENICILLIN: ICD-10-CM

## 2019-12-21 DIAGNOSIS — L50.9 URTICARIA, UNSPECIFIED: ICD-10-CM

## 2019-12-21 DIAGNOSIS — J45.909 UNSPECIFIED ASTHMA, UNCOMPLICATED: ICD-10-CM

## 2019-12-21 DIAGNOSIS — R21 RASH AND OTHER NONSPECIFIC SKIN ERUPTION: ICD-10-CM

## 2019-12-21 PROCEDURE — 99284 EMERGENCY DEPT VISIT MOD MDM: CPT

## 2019-12-21 PROCEDURE — 99283 EMERGENCY DEPT VISIT LOW MDM: CPT

## 2019-12-21 RX ORDER — FAMOTIDINE 10 MG/ML
1 INJECTION INTRAVENOUS
Qty: 14 | Refills: 0
Start: 2019-12-21 | End: 2020-01-03

## 2019-12-21 NOTE — ED STATDOCS - OBJECTIVE STATEMENT
55 y/o female with PMHx presents to the ED c/o generalized pruritic urticarial rash. Notes similar sx 1 month ago for which she took Benadryl which offered temporary relief. Pt was following with pulmonologist regarding prior asthma sx and was advised allergist follow up which pt has scheduled. Notes that she has been consuming "breathe-easy" tea for asthma exacerbation. Recently dc'd PO steroids. No other new exposures/cosmetics. Applied OTC Cortisone with no relief. No other acute sx.

## 2019-12-21 NOTE — ED STATDOCS - SKIN, MLM
anterior trunk and b/l arms with macular rash nonblanching. no sloughing of skin. anterior trunk and b/l arms with macular rash; no sloughing of skin.

## 2019-12-21 NOTE — ED STATDOCS - CLINICAL SUMMARY MEDICAL DECISION MAKING FREE TEXT BOX
53 y/o with rash. Will give oral steroids, supportive care. 55 y/o with rash. Will give oral steroids, supportive care. Will manage with prednisone and pepcid. Advised to continue with benadryl at home. Advised to follow up with dermatology. Made aware if rash extends into oral mucosa to seek care at closest ER. Recommended outpatient dermatology within 1 week.

## 2019-12-21 NOTE — ED STATDOCS - PROGRESS NOTE DETAILS
54 y.o F with PMH of asthma presents with diffuse rash. Describes rash as itchy. States she had similar rash 1 month ago which improved with benadryl. Attempted benadryl at home yesterday which did not improve symptoms. States she is followed by pulmonology for her asthma, was advised by pulmonology to see allergist. Was recently d/c'd from steroids for management of asthma. Denies exposure to new lotions, soaps, detergents. Denies SOB, tongue swelling, pain in mouth. PE: Well appearing. Cardiac: s1s2, RRR. lungs: CTAB. Skin: diffuse macular rash to bilateral UE and abdomen. No intraoral lesions. No sloughing of skin. A/P: Rash. Will manage with prednisone and pepcid. Advised to continue with benadryl at home. Advised to follow up with dermatology. Made aware if rash extends into oral mucosa to seek care at closest ER. Recommended outpatient dermatology within 1 week. - Drake Giron PA-C patient states she will follow up with Dr. Gregory in [place] for dermatologic follow up. - Drake Giron PA-C

## 2019-12-21 NOTE — ED ADULT TRIAGE NOTE - CHIEF COMPLAINT QUOTE
Pt reports hives x days that have spread.  Pt denies SOB or andioedema. Pt denies contact with new detergents or medications, but reports drinking breathe easy tea.

## 2019-12-21 NOTE — ED STATDOCS - ENMT, MLM
no mucous membrane involvement. Nasal mucosa clear.  Mouth with normal mucosa  Throat has no vesicles, no oropharyngeal exudates and uvula is midline.

## 2019-12-21 NOTE — ED STATDOCS - ATTENDING CONTRIBUTION TO CARE
I, Yuliya Man DO,  performed the initial face to face bedside interview with this patient regarding history of present illness, review of symptoms and relevant past medical, social and family history.  I completed an independent physical examination.  I was the initial provider who evaluated this patient. I have signed out the follow up of any pending tests (i.e. labs, radiological studies) to the ACP.  I have communicated the patient’s plan of care and disposition with the ACP.  The history, relevant review of systems, past medical and surgical history, medical decision making, and physical examination was documented by the scribe in my presence and I attest to the accuracy of the documentation.

## 2019-12-23 ENCOUNTER — MEDICATION RENEWAL (OUTPATIENT)
Age: 54
End: 2019-12-23

## 2019-12-23 ENCOUNTER — RX RENEWAL (OUTPATIENT)
Age: 54
End: 2019-12-23

## 2020-01-15 ENCOUNTER — NON-APPOINTMENT (OUTPATIENT)
Age: 55
End: 2020-01-15

## 2020-01-15 ENCOUNTER — APPOINTMENT (OUTPATIENT)
Dept: INTERNAL MEDICINE | Facility: CLINIC | Age: 55
End: 2020-01-15

## 2020-01-15 ENCOUNTER — APPOINTMENT (OUTPATIENT)
Dept: INTERNAL MEDICINE | Facility: CLINIC | Age: 55
End: 2020-01-15
Payer: COMMERCIAL

## 2020-01-15 VITALS
DIASTOLIC BLOOD PRESSURE: 86 MMHG | TEMPERATURE: 98.5 F | RESPIRATION RATE: 18 BRPM | BODY MASS INDEX: 39.26 KG/M2 | OXYGEN SATURATION: 94 % | HEIGHT: 64 IN | SYSTOLIC BLOOD PRESSURE: 134 MMHG | HEART RATE: 72 BPM | WEIGHT: 229.99 LBS

## 2020-01-15 DIAGNOSIS — L50.9 URTICARIA, UNSPECIFIED: ICD-10-CM

## 2020-01-15 PROCEDURE — 94010 BREATHING CAPACITY TEST: CPT

## 2020-01-15 PROCEDURE — 99214 OFFICE O/P EST MOD 30 MIN: CPT | Mod: 25

## 2020-01-15 PROCEDURE — 96372 THER/PROPH/DIAG INJ SC/IM: CPT

## 2020-01-15 RX ORDER — PREDNISONE 20 MG/1
20 TABLET ORAL
Qty: 40 | Refills: 0 | Status: DISCONTINUED | COMMUNITY
Start: 2019-11-14 | End: 2020-01-15

## 2020-01-15 RX ORDER — LEVOCETIRIZINE DIHYDROCHLORIDE 5 MG/1
5 TABLET ORAL
Refills: 0 | Status: DISCONTINUED | COMMUNITY
End: 2020-01-15

## 2020-01-15 RX ORDER — PREDNISONE 20 MG/1
20 TABLET ORAL
Qty: 18 | Refills: 0 | Status: COMPLETED | COMMUNITY
Start: 2020-01-15 | End: 2020-01-24

## 2020-01-15 RX ORDER — TRIAMCINOLONE ACETONIDE 40 MG/ML
40 SUSPENSION INTRA-ARTERIAL; INTRAMUSCULAR
Qty: 6 | Refills: 0 | Status: COMPLETED | OUTPATIENT
Start: 2020-01-15

## 2020-01-15 RX ADMIN — TRIAMCINOLONE ACETONIDE 1.5 MG/ML: 40 INJECTION, SUSPENSION INTRA-ARTICULAR; INTRAMUSCULAR at 00:00

## 2020-01-15 NOTE — HISTORY OF PRESENT ILLNESS
[FreeTextEntry8] : Acute visit-cough.   Called this am for visit.  Went to allergist 1/8 with due to recurrent Hives  and started on Allegra bid and Pepcid.   Having BW and Sinus CT done.   Since then  increasing nasal congestion,  cough and wheeze.  + laryngitis.   Cough is productive  of purulent sputum. NO fevers.  Using rescue inhaler frequently.    \par Started on Spiriva last visit and finds this helpful.  \par Had recent Endo FU- no thyroid issues, no DM

## 2020-01-15 NOTE — PHYSICAL EXAM
[Normal Outer Ear/Nose] : the outer ears and nose were normal in appearance [Normal Oropharynx] : the oropharynx was normal [No JVD] : no jugular venous distention [Supple] : supple [No Lymphadenopathy] : no lymphadenopathy [No Respiratory Distress] : no respiratory distress  [No Accessory Muscle Use] : no accessory muscle use [No Edema] : there was no peripheral edema [No Extremity Clubbing/Cyanosis] : no extremity clubbing/cyanosis [Normal] : affect was normal and insight and judgment were intact [de-identified] : +laryngitis [de-identified] : Inspiratory and expiratory wheeze BL.   [de-identified] : +centri[ital obesity

## 2020-01-15 NOTE — ASSESSMENT
[FreeTextEntry1] : \par Prednisone 60 mg 9 day taper\par \par zithromycin 500 mg x 5 days\par \par Kenalog 60 mg IM today\par \par Albuterol q 4-6 hrs. \par \par Await PHILIP by .  Pt may be candidate for Xolair/Fasenra.   \par \par All questions were answered.  \par ED for any worsening.   \par

## 2020-02-19 ENCOUNTER — RESULT CHARGE (OUTPATIENT)
Age: 55
End: 2020-02-19

## 2020-02-20 ENCOUNTER — NON-APPOINTMENT (OUTPATIENT)
Age: 55
End: 2020-02-20

## 2020-02-20 ENCOUNTER — APPOINTMENT (OUTPATIENT)
Dept: INTERNAL MEDICINE | Facility: CLINIC | Age: 55
End: 2020-02-20
Payer: COMMERCIAL

## 2020-02-20 VITALS
WEIGHT: 230 LBS | TEMPERATURE: 98.7 F | HEART RATE: 72 BPM | OXYGEN SATURATION: 97 % | RESPIRATION RATE: 16 BRPM | HEIGHT: 64 IN | BODY MASS INDEX: 39.27 KG/M2 | DIASTOLIC BLOOD PRESSURE: 82 MMHG | SYSTOLIC BLOOD PRESSURE: 122 MMHG

## 2020-02-20 DIAGNOSIS — R94.5 ABNORMAL RESULTS OF LIVER FUNCTION STUDIES: ICD-10-CM

## 2020-02-20 DIAGNOSIS — E78.89 OTHER LIPOPROTEIN METABOLISM DISORDERS: ICD-10-CM

## 2020-02-20 LAB
ALBUMIN SERPL ELPH-MCNC: 4.2 G/DL
ALP BLD-CCNC: 89 U/L
ALT SERPL-CCNC: 31 U/L
ANION GAP SERPL CALC-SCNC: 12 MMOL/L
AST SERPL-CCNC: 66 U/L
BASOPHILS # BLD AUTO: 0.03 K/UL
BASOPHILS NFR BLD AUTO: 0.4 %
BILIRUB SERPL-MCNC: 0.4 MG/DL
BUN SERPL-MCNC: 16 MG/DL
CALCIUM SERPL-MCNC: 9.6 MG/DL
CHLORIDE SERPL-SCNC: 108 MMOL/L
CO2 SERPL-SCNC: 27 MMOL/L
CREAT SERPL-MCNC: 1.04 MG/DL
EOSINOPHIL # BLD AUTO: 0.37 K/UL
EOSINOPHIL NFR BLD AUTO: 5.1 %
GLUCOSE SERPL-MCNC: 102 MG/DL
HCT VFR BLD CALC: 42.5 %
HGB BLD-MCNC: 14 G/DL
IMM GRANULOCYTES NFR BLD AUTO: 0.1 %
LYMPHOCYTES # BLD AUTO: 1.98 K/UL
LYMPHOCYTES NFR BLD AUTO: 27.2 %
MAN DIFF?: NORMAL
MCHC RBC-ENTMCNC: 30.8 PG
MCHC RBC-ENTMCNC: 32.9 GM/DL
MCV RBC AUTO: 93.6 FL
MONOCYTES # BLD AUTO: 0.57 K/UL
MONOCYTES NFR BLD AUTO: 7.8 %
NEUTROPHILS # BLD AUTO: 4.33 K/UL
NEUTROPHILS NFR BLD AUTO: 59.4 %
PLATELET # BLD AUTO: 236 K/UL
POTASSIUM SERPL-SCNC: 4.4 MMOL/L
PROT SERPL-MCNC: 6.7 G/DL
RBC # BLD: 4.54 M/UL
RBC # FLD: 13.3 %
SODIUM SERPL-SCNC: 146 MMOL/L
WBC # FLD AUTO: 7.29 K/UL

## 2020-02-20 PROCEDURE — 99214 OFFICE O/P EST MOD 30 MIN: CPT | Mod: 25

## 2020-02-20 PROCEDURE — 94060 EVALUATION OF WHEEZING: CPT

## 2020-02-20 RX ORDER — AZITHROMYCIN 500 MG/1
500 TABLET, FILM COATED ORAL DAILY
Qty: 5 | Refills: 0 | Status: DISCONTINUED | COMMUNITY
Start: 2020-01-15 | End: 2020-02-20

## 2020-02-20 RX ORDER — FAMOTIDINE 10 MG/1
TABLET, FILM COATED ORAL
Refills: 0 | Status: DISCONTINUED | COMMUNITY
End: 2020-02-20

## 2020-02-20 RX ORDER — FEXOFENADINE HCL 60 MG
TABLET ORAL
Refills: 0 | Status: DISCONTINUED | COMMUNITY
End: 2020-02-20

## 2020-02-20 NOTE — PHYSICAL EXAM
[No Edema] : there was no peripheral edema [No Extremity Clubbing/Cyanosis] : no extremity clubbing/cyanosis [Normal] : affect was normal and insight and judgment were intact

## 2020-02-20 NOTE — ASSESSMENT
[FreeTextEntry1] : \par Educated on asthma triggers and treatment goals.  I have strongly opposed her decision to stop all her medications and reviewed her risks in doing so.  Encouraged her to continue with weight loss efforts and dietary changes  with eventual goal of reducing medication use however I do not support her decision of stopping all meds at this time.  \par She has declined Fasenra.  \par \par FU in May for CPE.  Do FBW prior to visit.  \par \par

## 2020-02-20 NOTE — DATA REVIEWED
[FreeTextEntry1] : Abdominal US + steatosis.\par \par \par Pre/post spirometry shows normal flows with significant airway reactivity.

## 2020-02-20 NOTE — HISTORY OF PRESENT ILLNESS
[de-identified] : Doing very well.  She was treated 4 weeks ago for another Asthma exacerbation. given Kenalog 60 mg, PRednsione 60 mg tapering and zithromax.   Her Asthma is now controlled.   has recommended Fasenra but she is reluctant.  She has now placed herself on a dairy and gluten free diet and she feels  that this is what is helping her and she does not want to take any more medications.  She has lost 10 lbs.  2 weeks ago  she stopped Allegra, Omeprazole, Advair and Spiriva.  She feels she has to take control of her life and insists on stopping her medications.  \par Hives have resolved and notes a sense of smell she has't had in years.   \par She had recent Stress test through .  All "perfect".     [FreeTextEntry1] : FU Asthma/HTN

## 2020-03-12 ENCOUNTER — APPOINTMENT (OUTPATIENT)
Dept: INTERNAL MEDICINE | Facility: CLINIC | Age: 55
End: 2020-03-12
Payer: COMMERCIAL

## 2020-03-12 VITALS
OXYGEN SATURATION: 97 % | HEART RATE: 99 BPM | BODY MASS INDEX: 37.39 KG/M2 | SYSTOLIC BLOOD PRESSURE: 128 MMHG | WEIGHT: 219 LBS | RESPIRATION RATE: 14 BRPM | HEIGHT: 64 IN | TEMPERATURE: 98.7 F | DIASTOLIC BLOOD PRESSURE: 80 MMHG

## 2020-03-12 DIAGNOSIS — Z87.09 PERSONAL HISTORY OF OTHER DISEASES OF THE RESPIRATORY SYSTEM: ICD-10-CM

## 2020-03-12 LAB
FLUAV SPEC QL CULT: NEGATIVE
FLUBV AG SPEC QL IA: NEGATIVE

## 2020-03-12 PROCEDURE — 87804 INFLUENZA ASSAY W/OPTIC: CPT | Mod: QW

## 2020-03-12 PROCEDURE — 99214 OFFICE O/P EST MOD 30 MIN: CPT | Mod: 25

## 2020-03-13 NOTE — ASSESSMENT
[FreeTextEntry1] : 1.Asthma: start medrol dose teagan, Went over instructions and side effects of medication.\par Rapid Flu negative. \par Increase fluids, rest. \par Take Tylenol 500 mg 1-2 tabs as needed every 8 hours for pain. \par Call for new or worsening symptoms.\par

## 2020-03-13 NOTE — HISTORY OF PRESENT ILLNESS
[FreeTextEntry8] : Ms. JOSE ALEJANDRO HOFF is a 54 year F who comes in for an acute visit.\par Pt is a 53 y/o F with pmhx of Asthma comes in with complaints of sore throat. Pt recently discontinued all of her medications including inhalers. Pt with subjective fever at home with fatigue. Denies any recent travel or sick contacts. \par Patient denies any cp, sob,abdominal pain, nausea, vomiting, palpitations, chills, constipation, diarrhea.\par

## 2020-03-23 ENCOUNTER — RX RENEWAL (OUTPATIENT)
Age: 55
End: 2020-03-23

## 2020-04-22 ENCOUNTER — TRANSCRIPTION ENCOUNTER (OUTPATIENT)
Age: 55
End: 2020-04-22

## 2020-04-23 ENCOUNTER — TRANSCRIPTION ENCOUNTER (OUTPATIENT)
Age: 55
End: 2020-04-23

## 2020-04-29 ENCOUNTER — APPOINTMENT (OUTPATIENT)
Dept: INTERNAL MEDICINE | Facility: CLINIC | Age: 55
End: 2020-04-29
Payer: COMMERCIAL

## 2020-04-29 PROCEDURE — 99213 OFFICE O/P EST LOW 20 MIN: CPT | Mod: 95

## 2020-04-29 RX ORDER — PREDNISONE 20 MG/1
20 TABLET ORAL
Qty: 18 | Refills: 0 | Status: COMPLETED | COMMUNITY
Start: 2020-04-29 | End: 2020-05-08

## 2020-04-29 RX ORDER — METHYLPREDNISOLONE 4 MG/1
4 TABLET ORAL
Qty: 1 | Refills: 0 | Status: DISCONTINUED | COMMUNITY
Start: 2020-03-12 | End: 2020-04-29

## 2020-04-29 NOTE — ASSESSMENT
[FreeTextEntry1] : \par \par Advised to continue Spiriva, advair, azelastine, singulair and allegra.  \par Albuterol q 4-6 hours.\par \par Prednisone 60 mg taper over 9 days.  \par \par Continue to shelter in place and other COVID precautions.  Number provided for COVID  testing.  Would consider if symptoms not improving or any fever or viral S+S develop. \par \par Call if not improving.  \par \par FU for CPE next week as scheduled.

## 2020-04-29 NOTE — HISTORY OF PRESENT ILLNESS
[Home] : at home, [unfilled] , at the time of the visit. [Medical Office: ([facility])___] : at the medical office located in  [Patient] : the patient [FreeTextEntry8] : Requests visit due to increased asthma symptoms.  \par 2 weeks ago she began to have onset of Migraine and allergy symptoms with tickle like cough.    She called requesting to restart her Asthma/allery meds.\par   2 months ago she decided against my urging  to discontinue all medications as she felt dietary changes she made elliminating gluten and dairy were sufficient in controlling her Asthma.   Over the past week she has had increased cough and wheeze. Cough She is using her NEbulizer some days.  She denies COVID exposure,  fevers, body aches, anosmia or SOB.  She has not been out of her home in 2 weeks.  \par BP at home 130/75

## 2020-04-29 NOTE — PHYSICAL EXAM
[No Acute Distress] : no acute distress [Well Nourished] : well nourished [Well-Appearing] : well-appearing [No Respiratory Distress] : no respiratory distress  [No Accessory Muscle Use] : no accessory muscle use [Speech Grossly Normal] : speech grossly normal [Normal Affect] : the affect was normal [Normal Insight/Judgement] : insight and judgment were intact [de-identified] : audible wheeze appreciated with forced exhalation.  Speaks in full sentences.

## 2020-05-12 ENCOUNTER — APPOINTMENT (OUTPATIENT)
Dept: INTERNAL MEDICINE | Facility: CLINIC | Age: 55
End: 2020-05-12
Payer: COMMERCIAL

## 2020-05-12 DIAGNOSIS — R09.82 POSTNASAL DRIP: ICD-10-CM

## 2020-05-12 DIAGNOSIS — E66.9 OBESITY, UNSPECIFIED: ICD-10-CM

## 2020-05-12 PROCEDURE — 99213 OFFICE O/P EST LOW 20 MIN: CPT | Mod: 95

## 2020-05-12 RX ADMIN — TRIAMCINOLONE ACETONIDE 1.5 MG/ML: 40 INJECTION, SUSPENSION INTRA-ARTICULAR; INTRAMUSCULAR at 00:00

## 2020-05-12 NOTE — HISTORY OF PRESENT ILLNESS
[Home] : at home, [unfilled] , at the time of the visit. [Medical Office: ([facility])___] : at the medical office located in  [Patient] : the patient [Self] : self [de-identified] : Spoke with the patient today via a Telehealth visit.\par \par The patient states, that at this time, she is doing relatively well. She recently finished a 9 day course of prednisone, and which she took 60 mg for 3 days, followed by 40 mg for 3 days, and 20 mg for 3 days. She finished this regimen 5 days ago. Prior to that, she had a significant dry nonproductive cough. Her cough is primarily resolved. She states, that she is currently taking Advair, Singulair, Astelin, Spiriva, and Allegra. Her cough is now only very slight.\par \par The patient has had several exacerbations of her underlying air reactivity since January. This is her third course of prednisone. She did receive a Kenalog injection back in January, which she states did help her for several months. Of note is the fact that the patient continues to remain gluten-free and dairy free. At one point, she had discontinued all of her maintenance asthma medications before they were restarted.\par \par The patient is complaining of mild allergy symptoms. She did see an allergist who placed her on Allegra. She is complaining of itchy eyes and intermittent sneezing. She denies having any significant postnasal drip and she now calls in for this evaluation.\par \par

## 2020-05-12 NOTE — PHYSICAL EXAM
[No Acute Distress] : no acute distress [Well Nourished] : well nourished [No Respiratory Distress] : no respiratory distress  [Normal Affect] : the affect was normal [Normal Insight/Judgement] : insight and judgment were intact

## 2020-05-12 NOTE — PLAN
[FreeTextEntry1] : 1. The patient will remain on her current maintenance regimen as outlined above.\par \par 2. The patient will come in for a Kenalog injection in which she will receive 60 mg intramuscularly. This will be administered sometime this week. Hopefully it will carry her through the remainder of the allergy season.\par \par 3. The patient will followup within the next 6 months for her routine wellness evaluation with yearly blood work.\par \par The total length of time of the call was 16 minutes

## 2020-05-15 ENCOUNTER — MED ADMIN CHARGE (OUTPATIENT)
Age: 55
End: 2020-05-15

## 2020-05-15 ENCOUNTER — APPOINTMENT (OUTPATIENT)
Dept: INTERNAL MEDICINE | Facility: CLINIC | Age: 55
End: 2020-05-15
Payer: COMMERCIAL

## 2020-05-15 PROCEDURE — 96372 THER/PROPH/DIAG INJ SC/IM: CPT

## 2020-05-15 RX ORDER — TRIAMCINOLONE ACETONIDE 40 MG/ML
40 SUSPENSION INTRA-ARTERIAL; INTRAMUSCULAR
Qty: 6 | Refills: 0 | Status: COMPLETED | OUTPATIENT
Start: 2020-05-12

## 2020-07-09 DIAGNOSIS — I65.29 OCCLUSION AND STENOSIS OF UNSPECIFIED CAROTID ARTERY: ICD-10-CM

## 2020-08-15 ENCOUNTER — LABORATORY RESULT (OUTPATIENT)
Age: 55
End: 2020-08-15

## 2020-08-16 LAB
25(OH)D3 SERPL-MCNC: 56.5 NG/ML
ALBUMIN SERPL ELPH-MCNC: 4.2 G/DL
ALP BLD-CCNC: 104 U/L
ALT SERPL-CCNC: 24 U/L
ANION GAP SERPL CALC-SCNC: 14 MMOL/L
APPEARANCE: ABNORMAL
AST SERPL-CCNC: 61 U/L
BACTERIA: NEGATIVE
BASOPHILS # BLD AUTO: 0.08 K/UL
BASOPHILS NFR BLD AUTO: 0.9 %
BILIRUB SERPL-MCNC: 0.3 MG/DL
BILIRUBIN URINE: NEGATIVE
BLOOD URINE: NEGATIVE
BUN SERPL-MCNC: 12 MG/DL
CALCIUM SERPL-MCNC: 9.2 MG/DL
CHLORIDE SERPL-SCNC: 107 MMOL/L
CHOLEST SERPL-MCNC: 184 MG/DL
CHOLEST/HDLC SERPL: 2.8 RATIO
CO2 SERPL-SCNC: 23 MMOL/L
COLOR: YELLOW
CREAT SERPL-MCNC: 0.92 MG/DL
EOSINOPHIL # BLD AUTO: 1.5 K/UL
EOSINOPHIL NFR BLD AUTO: 16.7 %
ESTIMATED AVERAGE GLUCOSE: 105 MG/DL
GLUCOSE QUALITATIVE U: NEGATIVE
GLUCOSE SERPL-MCNC: 108 MG/DL
HBA1C MFR BLD HPLC: 5.3 %
HCT VFR BLD CALC: 44.1 %
HDLC SERPL-MCNC: 67 MG/DL
HGB BLD-MCNC: 14.2 G/DL
HYALINE CASTS: 0 /LPF
IMM GRANULOCYTES NFR BLD AUTO: 0.2 %
KETONES URINE: NEGATIVE
LDLC SERPL CALC-MCNC: 109 MG/DL
LEUKOCYTE ESTERASE URINE: ABNORMAL
LYMPHOCYTES # BLD AUTO: 2.04 K/UL
LYMPHOCYTES NFR BLD AUTO: 22.7 %
MAN DIFF?: NORMAL
MCHC RBC-ENTMCNC: 30.5 PG
MCHC RBC-ENTMCNC: 32.2 GM/DL
MCV RBC AUTO: 94.6 FL
MICROSCOPIC-UA: NORMAL
MONOCYTES # BLD AUTO: 0.63 K/UL
MONOCYTES NFR BLD AUTO: 7 %
NEUTROPHILS # BLD AUTO: 4.71 K/UL
NEUTROPHILS NFR BLD AUTO: 52.5 %
NITRITE URINE: NEGATIVE
PH URINE: 6.5
PLATELET # BLD AUTO: 299 K/UL
POTASSIUM SERPL-SCNC: 4.2 MMOL/L
PROT SERPL-MCNC: 6.6 G/DL
PROTEIN URINE: NORMAL
RBC # BLD: 4.66 M/UL
RBC # FLD: 12.6 %
RED BLOOD CELLS URINE: 1 /HPF
SODIUM SERPL-SCNC: 144 MMOL/L
SPECIFIC GRAVITY URINE: 1.02
SQUAMOUS EPITHELIAL CELLS: 10 /HPF
T3FREE SERPL-MCNC: 2.42 PG/ML
T3RU NFR SERPL: 0.9 TBI
T4 FREE SERPL-MCNC: 1.6 NG/DL
T4 SERPL-MCNC: 9 UG/DL
TRIGL SERPL-MCNC: 41 MG/DL
TSH SERPL-ACNC: 1.85 UIU/ML
UROBILINOGEN URINE: ABNORMAL
WBC # FLD AUTO: 8.98 K/UL
WHITE BLOOD CELLS URINE: 150 /HPF

## 2020-08-17 ENCOUNTER — TRANSCRIPTION ENCOUNTER (OUTPATIENT)
Age: 55
End: 2020-08-17

## 2020-08-20 DIAGNOSIS — Z87.440 PERSONAL HISTORY OF URINARY (TRACT) INFECTIONS: ICD-10-CM

## 2020-08-20 LAB
APPEARANCE: CLEAR
BACTERIA UR CULT: NORMAL
BACTERIA: NEGATIVE
BILIRUBIN URINE: NEGATIVE
BLOOD URINE: NEGATIVE
COLOR: NORMAL
GLUCOSE QUALITATIVE U: NEGATIVE
HYALINE CASTS: 1 /LPF
KETONES URINE: NEGATIVE
LEUKOCYTE ESTERASE URINE: ABNORMAL
MICROSCOPIC-UA: NORMAL
NITRITE URINE: NEGATIVE
PH URINE: 6
PROTEIN URINE: NEGATIVE
RED BLOOD CELLS URINE: 3 /HPF
SPECIFIC GRAVITY URINE: 1.01
SQUAMOUS EPITHELIAL CELLS: 5 /HPF
UROBILINOGEN URINE: NORMAL
WHITE BLOOD CELLS URINE: 10 /HPF

## 2020-09-07 ENCOUNTER — EMERGENCY (EMERGENCY)
Facility: HOSPITAL | Age: 55
LOS: 0 days | Discharge: HOME | End: 2020-09-07
Attending: EMERGENCY MEDICINE
Payer: MEDICAID

## 2020-09-07 VITALS
SYSTOLIC BLOOD PRESSURE: 148 MMHG | RESPIRATION RATE: 18 BRPM | OXYGEN SATURATION: 100 % | HEART RATE: 85 BPM | DIASTOLIC BLOOD PRESSURE: 90 MMHG | TEMPERATURE: 98 F

## 2020-09-07 DIAGNOSIS — M54.30 SCIATICA, UNSPECIFIED SIDE: ICD-10-CM

## 2020-09-07 DIAGNOSIS — Z90.5 ACQUIRED ABSENCE OF KIDNEY: ICD-10-CM

## 2020-09-07 DIAGNOSIS — G89.29 OTHER CHRONIC PAIN: ICD-10-CM

## 2020-09-07 DIAGNOSIS — J45.909 UNSPECIFIED ASTHMA, UNCOMPLICATED: ICD-10-CM

## 2020-09-07 DIAGNOSIS — M54.9 DORSALGIA, UNSPECIFIED: ICD-10-CM
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
## 2020-09-07 DIAGNOSIS — Z88.0 ALLERGY STATUS TO PENICILLIN: ICD-10-CM

## 2020-09-07 DIAGNOSIS — M54.5 LOW BACK PAIN: ICD-10-CM
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
## 2020-09-07 DIAGNOSIS — I10 ESSENTIAL (PRIMARY) HYPERTENSION: ICD-10-CM

## 2020-09-07 DIAGNOSIS — E03.9 HYPOTHYROIDISM, UNSPECIFIED: ICD-10-CM

## 2020-09-07 LAB
APPEARANCE UR: CLEAR — SIGNIFICANT CHANGE UP
BILIRUB UR-MCNC: NEGATIVE — SIGNIFICANT CHANGE UP
COLOR SPEC: YELLOW — SIGNIFICANT CHANGE UP
DIFF PNL FLD: NEGATIVE — SIGNIFICANT CHANGE UP
GLUCOSE UR QL: NEGATIVE MG/DL — SIGNIFICANT CHANGE UP
KETONES UR-MCNC: NEGATIVE — SIGNIFICANT CHANGE UP
LEUKOCYTE ESTERASE UR-ACNC: ABNORMAL
NITRITE UR-MCNC: NEGATIVE — SIGNIFICANT CHANGE UP
PH UR: 8 — SIGNIFICANT CHANGE UP (ref 5–8)
PROT UR-MCNC: NEGATIVE MG/DL — SIGNIFICANT CHANGE UP
SP GR SPEC: 1.01 — SIGNIFICANT CHANGE UP (ref 1.01–1.02)
UROBILINOGEN FLD QL: NEGATIVE MG/DL — SIGNIFICANT CHANGE UP

## 2020-09-07 PROCEDURE — 99283 EMERGENCY DEPT VISIT LOW MDM: CPT

## 2020-09-07 PROCEDURE — 81001 URINALYSIS AUTO W/SCOPE: CPT

## 2020-09-07 PROCEDURE — 99284 EMERGENCY DEPT VISIT MOD MDM: CPT

## 2020-09-07 PROCEDURE — 87086 URINE CULTURE/COLONY COUNT: CPT

## 2020-09-07 RX ORDER — OXYCODONE HYDROCHLORIDE 5 MG/1
5 TABLET ORAL ONCE
Refills: 0 | Status: DISCONTINUED | OUTPATIENT
Start: 2020-09-07 | End: 2020-09-07

## 2020-09-07 RX ORDER — ACETAMINOPHEN 500 MG
1000 TABLET ORAL ONCE
Refills: 0 | Status: COMPLETED | OUTPATIENT
Start: 2020-09-07 | End: 2020-09-07

## 2020-09-07 RX ORDER — OXYCODONE HYDROCHLORIDE 5 MG/1
1 TABLET ORAL
Qty: 12 | Refills: 0
Start: 2020-09-07 | End: 2020-09-09

## 2020-09-07 RX ORDER — CYCLOBENZAPRINE HYDROCHLORIDE 10 MG/1
10 TABLET, FILM COATED ORAL ONCE
Refills: 0 | Status: COMPLETED | OUTPATIENT
Start: 2020-09-07 | End: 2020-09-07

## 2020-09-07 RX ORDER — ACETAMINOPHEN 500 MG
2 TABLET ORAL
Qty: 30 | Refills: 0
Start: 2020-09-07 | End: 2020-09-11

## 2020-09-07 RX ORDER — CYCLOBENZAPRINE HYDROCHLORIDE 10 MG/1
1 TABLET, FILM COATED ORAL
Qty: 12 | Refills: 0
Start: 2020-09-07 | End: 2020-09-10

## 2020-09-07 RX ADMIN — CYCLOBENZAPRINE HYDROCHLORIDE 10 MILLIGRAM(S): 10 TABLET, FILM COATED ORAL at 12:35

## 2020-09-07 RX ADMIN — OXYCODONE HYDROCHLORIDE 5 MILLIGRAM(S): 5 TABLET ORAL at 12:35

## 2020-09-07 RX ADMIN — Medication 1000 MILLIGRAM(S): at 12:35

## 2020-09-07 NOTE — ED STATDOCS - CLINICAL SUMMARY MEDICAL DECISION MAKING FREE TEXT BOX
Acute on chronic back pain x8 days. Started after long drive and heavy lifting while helping her daughter move into college. Has hx of sciatica but states feels different. Pt was treated for contaminated urine in August. No urinary complaints. No evidence of pyelo. Symptoms likely due to strain/sprain. Will provide analgesics, consider d/c with spine follow up. Acute on chronic back pain x8 days. Started after long drive and heavy lifting while helping her daughter move into college. Has hx of sciatica but states feels different. Pt was treated for contaminated urine in August. No urinary complaints. No evidence of pyelo. Symptoms likely due to strain/sprain. Will provide analgesics, consider d/c with spine follow up. Back pain is not midline, is not associated with: fever, chills, IVDU, trauma, urinary fecal incontinence, saddle anesthesia, Lower extremity weakness

## 2020-09-07 NOTE — ED STATDOCS - OBJECTIVE STATEMENT
Pertinent HPI/PMH/PSH/FHx/SHx and Review of Systems contained within  HPI: 56 y/o female p/w back pain x8 days, acute on Started after long drive and heavy lifting while helping her daughter move into college. States back pain is different from her sciatica. Treated for contaminated urine in mid August. No other complaints at this time. PCP: Dr. Sheldon.  PMH/PSH relevant for: asthma, HTN, hypothyroid, sciatica, h/o right nephrectomy in 2004  ROS negative for: fever, Chest pain, SOB, Nausea, vomiting, diarrhea, abdominal pain, dysuria, bladder/bowel incontinence, pain/burning with urination   FamilyHx and SocialHx not otherwise contributory Pertinent HPI/PMH/PSH/FHx/SHx and Review of Systems contained within  HPI: 54 y/o female p/w back pain x8 days, acute on chroniic. Started after long drive and heavy lifting while helping her daughter move into college. States back pain is different from her sciatica. Treated for contaminated urine in mid August with abx. No other complaints at this time.   PMH/PSH relevant for: asthma, HTN, hypothyroid, sciatica, h/o right nephrectomy in 2004  ROS negative for: fever, Chest pain, SOB, Nausea, vomiting, diarrhea, abdominal pain, dysuria, bladder/bowel incontinence, pain/burning with urination   FamilyHx and SocialHx not otherwise contributory

## 2020-09-07 NOTE — ED STATDOCS - ATTENDING CONTRIBUTION TO CARE
I, Cristino Juárez MD,  performed the initial face to face bedside interview with this patient regarding history of present illness, review of symptoms and relevant past medical, social and family history.  I completed an independent physical examination.  I was the initial provider who evaluated this patient. I have signed out the follow up of any pending tests (i.e. labs, radiological studies) to the ACP.  The ACP will complete the work up, interpret tests, administer medications if necessary and reassess the patient for an appropriate disposition.  If questions arise the ACP is expected to contact me for consultation. In the current work-flow I usually don't get to speak to nor reassess patients for final disposition once I sign the case out to the ACP (Unless otherwise documented).

## 2020-09-07 NOTE — ED STATDOCS - NS ED ROS FT
Review of Systems:  	•	CONSTITUTIONAL: no fever  	•	SKIN: no rash  	•	RESPIRATORY: no shortness of breath  	•	CARDIAC: no chest pain  	•	GI:  no abd pain, no nausea, no vomiting, no diarrhea  	•	GENITO-URINARY:  no dysuria  	•	MUSCULOSKELETAL:  +back pain  	•	NEUROLOGIC: no weakness  	•	ALLERGY: no rhinorrhea  	•	PSYSCHIATRIC: appropriate concern about symptoms

## 2020-09-07 NOTE — ED STATDOCS - PROGRESS NOTE DETAILS
56 y/o F presents with low back pain. Pt reports intermittent low back pain for the past 8 days thatstarted after a long drive helping her daughter move. Denies fever/chills, n/v/d, urinary retention/incontinence, saddle anesthesias, weakness, numbness or tingling, urinary sx, CP, SOB, abd pain or other complaints at this time. -Felipe Cuevas PA-C Results reviewed and discussed with pt. Will dc with pain control, SW given. Discussed importance of close FU with PMD. Pt asked to return to ED immediately for any new or concerning sx or worsening. Pt acknowledges and understands plan

## 2020-09-07 NOTE — ED STATDOCS - PATIENT PORTAL LINK FT
You can access the FollowMyHealth Patient Portal offered by [facility] by registering at the following website: [email or web address]. By joining [facility]’s FollowMyHealth portal, you will also be able to view your health information using other applications (apps) compatible with our system.

## 2020-09-07 NOTE — ED STATDOCS - NSFOLLOWUPINSTRUCTIONS_ED_ALL_ED_FT
Please follow up with Primary MD in 2-3 days. Return to ED immediately for any new or concerning symptoms or worsening symptoms       Back Pain    WHAT YOU NEED TO KNOW:    Back pain is common. It can be caused by many conditions, such as arthritis or the breakdown of spinal discs. Your risk for back pain is increased by injuries, lack of activity, or repeated bending and twisting. You may feel sore or stiff on one or both sides of your back. The pain may spread to your buttocks or thighs.    DISCHARGE INSTRUCTIONS:    Return to the emergency department if:     You have pain, numbness, or weakness in one or both legs.      Your pain becomes so severe that you cannot walk.      You cannot control your urine or bowel movements.      You have severe back pain with chest pain.      You have severe back pain, nausea, and vomiting.      You have severe back pain that spreads to your side or genital area.    Contact your healthcare provider if:     You have back pain that does not get better with rest and pain medicine.      You have a fever.      You have pain that worsens when you are on your back or when you rest.      You have pain that worsens when you cough or sneeze.      You lose weight without trying.      You have questions or concerns about your condition or care.    Medicines:     NSAIDs help decrease swelling and pain. This medicine is available with or without a doctor's order. NSAIDs can cause stomach bleeding or kidney problems in certain people. If you take blood thinner medicine, always ask your healthcare provider if NSAIDs are safe for you. Always read the medicine label and follow directions.      Acetaminophen decreases pain and fever. It is available without a doctor's order. Ask how much to take and how often to take it. Follow directions. Read the labels of all other medicines you are using to see if they also contain acetaminophen, or ask your doctor or pharmacist. Acetaminophen can cause liver damage if not taken correctly. Do not use more than 4 grams (4,000 milligrams) total of acetaminophen in one day.       Muscle relaxers help decrease muscle spasms and back pain.      Prescription pain medicine may be given. Ask your healthcare provider how to take this medicine safely. Some prescription pain medicines contain acetaminophen. Do not take other medicines that contain acetaminophen without talking to your healthcare provider. Too much acetaminophen may cause liver damage. Prescription pain medicine may cause constipation. Ask your healthcare provider how to prevent or treat constipation.       Take your medicine as directed. Contact your healthcare provider if you think your medicine is not helping or if you have side effects. Tell him or her if you are allergic to any medicine. Keep a list of the medicines, vitamins, and herbs you take. Include the amounts, and when and why you take them. Bring the list or the pill bottles to follow-up visits. Carry your medicine list with you in case of an emergency.    How to manage your back pain:     Apply ice on your back for 15 to 20 minutes every hour or as directed. Use an ice pack, or put crushed ice in a plastic bag. Cover it with a towel before you apply it to your skin. Ice helps prevent tissue damage and decreases pain.      Apply heat on your back for 20 to 30 minutes every 2 hours for as many days as directed. Heat helps decrease pain and muscle spasms.      Stay active as much as you can without causing more pain. Bed rest could make your back pain worse. Avoid heavy lifting until your pain is gone.      Go to physical therapy as directed. A physical therapist can teach you exercises to help improve movement and strength, and to decrease pain.    Follow up with your healthcare provider in 2 weeks, or as directed: Write down your questions so you remember to ask them during your visits.

## 2020-09-07 NOTE — ED STATDOCS - CARE PROVIDER_API CALL
Hailey Sheehan  ORTHOPAEDIC SURGERY  [address] [facility], [address] [place] [address]  Phone: [phone]  Fax: [phone]  Follow Up Time: 7-10 Days

## 2020-09-07 NOTE — ED STATDOCS - PHYSICAL EXAMINATION
*GEN: No acute distress, well appearing   *HEAD: Normocephalic, Atraumatic  *EYES/NOSE: b/l Pupils symmetric & Reactive to ligth, EOMI b/l  *THROAT: airway patent, moist mucous membranes  *NECK: Neck supple  *PULMONARY: No Respiratory distress, symmetric b/l chest rise  *CARDIAC: s1s2, regular rhythm   *ABDOMEN:  Non Tender, Non Distended, soft, no guarding, no rebound, no masses   *BACK: no CVA tenderness. b/l paralumbosacral tenderness.   *EXTREMITIES: symmetric pulses, 2+ DP & radial pulses, no cyanosis, no edema   *SKIN: no rash, no bruising   *NEUROLOGIC: alert,  full active & passive ROM in all 4 extremities,   *PSYCH: appropriate concern about symptoms, pleasant *GEN: No acute distress, well appearing   *HEAD: Normocephalic, Atraumatic  *EYES/NOSE: b/l Pupils symmetric & Reactive to ligth, EOMI b/l  *THROAT: airway patent, moist mucous membranes  *NECK: Neck supple  *PULMONARY: No Respiratory distress, symmetric b/l chest rise  *CARDIAC: s1s2, regular rhythm   *ABDOMEN:  Non Tender, Non Distended, soft, no guarding, no rebound, no masses   *BACK: no CVA tenderness. b/l paralumbosacral tenderness.   *EXTREMITIES: symmetric pulses, 2+ DP & radial pulses, no cyanosis, no edema   *SKIN: no rash, no bruising   *NEUROLOGIC: CN 2-12 intact, normal finger to nose & heel to shin; no dysdiadochokinesis; equal & normal strength & sensation in b/l UE/LE; full active & passive ROM in all extremeties,  no pronator drift, normal patellar reflex, normal gait, romberg sign negative   *PSYCH: appropriate concern about symptoms, pleasant

## 2020-09-08 LAB
CULTURE RESULTS: SIGNIFICANT CHANGE UP
SPECIMEN SOURCE: SIGNIFICANT CHANGE UP

## 2020-09-13 ENCOUNTER — TRANSCRIPTION ENCOUNTER (OUTPATIENT)
Age: 55
End: 2020-09-13

## 2020-09-13 PROBLEM — I65.29 CAROTID ATHEROSCLEROSIS: Status: ACTIVE | Noted: 2020-09-13

## 2020-09-14 ENCOUNTER — TRANSCRIPTION ENCOUNTER (OUTPATIENT)
Age: 55
End: 2020-09-14

## 2020-09-28 PROBLEM — E03.9 HYPOTHYROIDISM, UNSPECIFIED: Chronic | Status: ACTIVE | Noted: 2020-09-11

## 2020-09-28 PROBLEM — I10 ESSENTIAL (PRIMARY) HYPERTENSION: Chronic | Status: ACTIVE | Noted: 2020-09-11

## 2020-09-28 PROBLEM — M54.30 SCIATICA, UNSPECIFIED SIDE: Chronic | Status: ACTIVE | Noted: 2020-09-11

## 2020-11-02 ENCOUNTER — APPOINTMENT (OUTPATIENT)
Dept: CARDIOLOGY | Facility: CLINIC | Age: 55
End: 2020-11-02
Payer: MEDICAID

## 2020-11-02 ENCOUNTER — NON-APPOINTMENT (OUTPATIENT)
Age: 55
End: 2020-11-02

## 2020-11-02 VITALS
BODY MASS INDEX: 33.12 KG/M2 | HEART RATE: 100 BPM | WEIGHT: 194 LBS | DIASTOLIC BLOOD PRESSURE: 81 MMHG | OXYGEN SATURATION: 70 % | HEIGHT: 64 IN | SYSTOLIC BLOOD PRESSURE: 137 MMHG

## 2020-11-02 PROCEDURE — 99204 OFFICE O/P NEW MOD 45 MIN: CPT | Mod: 25

## 2020-11-02 PROCEDURE — 99072 ADDL SUPL MATRL&STAF TM PHE: CPT

## 2020-11-02 PROCEDURE — 93000 ELECTROCARDIOGRAM COMPLETE: CPT

## 2020-11-02 NOTE — REASON FOR VISIT
[Initial Evaluation] : an initial evaluation of [Hypertension] : hypertension [Medication Management] : Medication management [FreeTextEntry1] : Asthma

## 2020-11-02 NOTE — DISCUSSION/SUMMARY
[FreeTextEntry1] : HTN: Controlled \par \par MR: MIld by Echo \par \par Asthma: Medical management with Dr Sheldon\par \par Hypothyroid: Medical management \par \par OV 3 months

## 2020-11-02 NOTE — PHYSICAL EXAM
[General Appearance - Well Developed] : well developed [Normal Appearance] : normal appearance [Well Groomed] : well groomed [General Appearance - Well Nourished] : well nourished [No Deformities] : no deformities [General Appearance - In No Acute Distress] : no acute distress [Heart Rate And Rhythm] : heart rate and rhythm were normal [Heart Sounds] : normal S1 and S2 [] : no respiratory distress [Respiration, Rhythm And Depth] : normal respiratory rhythm and effort [Exaggerated Use Of Accessory Muscles For Inspiration] : no accessory muscle use [Auscultation Breath Sounds / Voice Sounds] : lungs were clear to auscultation bilaterally [Abdomen Soft] : soft [Abdomen Tenderness] : non-tender [Abnormal Walk] : normal gait [Skin Color & Pigmentation] : normal skin color and pigmentation [Oriented To Time, Place, And Person] : oriented to person, place, and time [FreeTextEntry1] : No LE Edema

## 2020-11-02 NOTE — HISTORY OF PRESENT ILLNESS
[FreeTextEntry1] : 55 Year old female PMH: HTN, Nephrotic syndrome right kidney removal 2003, Asthma/Hypothyroid followed with Dr Sheldon, here today to establish primary cardiac care. \par \par Reports she recently received cipro for UTI which she experienced diffuse multi system complications to include elevated Sed rate/rash/diffuse myalgias/back pain she was evaluated by Rheumatology took course of prednisone symptoms improved.  She was also taken off Singulair 2/2 possible drug rash which subsided with cessation of this medication \par \par Presently feels well asymptomatic from cardiac standpoint\par \par Prior cardiac testing VIA previous cardiologist  reviewed to include\par Carotid US/US Aorta/stress test and Echocardiogram\par \par Allergies: Cipro/Singulair/Levaquin/PCN

## 2020-11-12 ENCOUNTER — APPOINTMENT (OUTPATIENT)
Dept: UROLOGY | Facility: CLINIC | Age: 55
End: 2020-11-12
Payer: MEDICAID

## 2020-11-12 VITALS
SYSTOLIC BLOOD PRESSURE: 124 MMHG | TEMPERATURE: 98.1 F | DIASTOLIC BLOOD PRESSURE: 80 MMHG | WEIGHT: 190 LBS | HEIGHT: 64 IN | BODY MASS INDEX: 32.44 KG/M2

## 2020-11-12 DIAGNOSIS — R82.90 UNSPECIFIED ABNORMAL FINDINGS IN URINE: ICD-10-CM

## 2020-11-12 PROCEDURE — 99203 OFFICE O/P NEW LOW 30 MIN: CPT

## 2020-11-12 PROCEDURE — 99072 ADDL SUPL MATRL&STAF TM PHE: CPT

## 2020-11-12 NOTE — PHYSICAL EXAM
[General Appearance - Well Developed] : well developed [General Appearance - Well Nourished] : well nourished [General Appearance - In No Acute Distress] : no acute distress [Edema] : no peripheral edema [Exaggerated Use Of Accessory Muscles For Inspiration] : no accessory muscle use [Abdomen Soft] : soft [Abdomen Tenderness] : non-tender [Costovertebral Angle Tenderness] : no ~M costovertebral angle tenderness [Urethral Meatus] : normal urethra [Urinary Bladder Findings] : the bladder was normal on palpation [External Female Genitalia] : normal external genitalia [FreeTextEntry1] : LE discomfort [Oriented To Time, Place, And Person] : oriented to person, place, and time [Cervical Lymph Nodes Enlarged Anterior Bilaterally] : anterior cervical [Supraclavicular Lymph Nodes Enlarged Bilaterally] : supraclavicular

## 2020-11-12 NOTE — ASSESSMENT
[FreeTextEntry1] : Suspect contaminated urine leading to WBC. No evidence of true UTI. Cath urine obtained today\par --UA, UCx. will call pt with results

## 2020-11-12 NOTE — HISTORY OF PRESENT ILLNESS
[FreeTextEntry1] : 56yo female with cc of UTI and abnormal UA. Pt PMHx significant for R hydro with renal atrophy s/p nephrectomy 2003. In August, she had labs done with Dr Sheldon (PCP) that showed WBC in urine. UA showed 10 epid and 150 WBC. She did repeat that shwoed 5 epis and 10 WBC. UCx was contaminated. No urinary sx at the time. She was given cipro x5d. One week following this developed pains and muscle weakness. She went to ER and was given pain meds without benefit. She was given steroid with benefit but then sx returned immediately after stopping. She had elevated ESR and CRP and was sent to rheum. Had rash as well. She is due to see Dr Martínez tomorrow for consultation. She continues to have issues with pain. No hx of TB and was negative with rheum. Had US in Jan that showed normal L solitary kidney\par \par Sterile cath urine obtained today. \par \par

## 2020-11-12 NOTE — REVIEW OF SYSTEMS
[Nocturia] : nocturia [Joint Pain] : joint pain [Joint Swelling] : joint swelling [Skin Lesions] : skin lesion [Skin Wound] : skin wound [Itching] : itching [Limb Weakness] : limb weakness [Difficulty Walking] : difficulty walking [Anxiety] : anxiety [Muscle Weakness] : muscle weakness [Negative] : Heme/Lymph [FreeTextEntry2] : hypertension [FreeTextEntry3] : microhem, frequency, hx uti

## 2020-11-13 ENCOUNTER — APPOINTMENT (OUTPATIENT)
Dept: NEPHROLOGY | Facility: CLINIC | Age: 55
End: 2020-11-13
Payer: MEDICAID

## 2020-11-13 VITALS
BODY MASS INDEX: 32.44 KG/M2 | DIASTOLIC BLOOD PRESSURE: 76 MMHG | SYSTOLIC BLOOD PRESSURE: 122 MMHG | WEIGHT: 190 LBS | HEART RATE: 78 BPM | HEIGHT: 64 IN

## 2020-11-13 DIAGNOSIS — Z90.5 ACQUIRED ABSENCE OF KIDNEY: ICD-10-CM

## 2020-11-13 PROCEDURE — 99204 OFFICE O/P NEW MOD 45 MIN: CPT | Mod: 25

## 2020-11-13 PROCEDURE — 99072 ADDL SUPL MATRL&STAF TM PHE: CPT

## 2020-11-13 RX ORDER — FLUTICASONE PROPIONATE AND SALMETEROL 50; 500 UG/1; UG/1
500-50 POWDER RESPIRATORY (INHALATION) TWICE DAILY
Refills: 3 | Status: DISCONTINUED | COMMUNITY
End: 2020-11-13

## 2020-11-14 NOTE — ASSESSMENT
[FreeTextEntry1] : 54 yo female with HTN, asthma and solitary kidney with recent possible adverse reaction ?vasculitis, neuropathy, with tendonitis from cipro likely.\par Does not appear to have any kidney involvement. \par Renal function has been preserved. U/a negative\par Abd sono done in Jan 2020 was performed and solitary kidney with no stone or masses. \par Continue to follow with rheum and see neurologist for her neuropathy. \par No signs of UTI\par Cautious use of future antibiotics. \par She can followup in 6 months can be televisit.

## 2020-11-14 NOTE — REASON FOR VISIT
[Consultation] : a consultation visit [Spouse] : spouse [FreeTextEntry1] : solitary kidney and recent ?vasculitis

## 2020-11-14 NOTE — REVIEW OF SYSTEMS
[Feeling Tired] : feeling tired [Recent Weight Loss (___ Lbs)] : recent [unfilled] ~Ulb weight loss [Arthralgias] : arthralgias [Joint Pain] : joint pain [Skin Lesions] : skin lesion [Negative] : Heme/Lymph [de-identified] : abdomen

## 2020-11-14 NOTE — HISTORY OF PRESENT ILLNESS
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 She is here because of concern for her only remaining kidney after recent events. Her  is a patient of mine. \par She had UTI suspected and was given Cipro in the summer. She also had been on singulair which she was on for years. She had a severe lower ext pain, back pain. She thereafter developed purpuric rash on her abdomen. \par She had seen a rheumatologist and followed for potential vasculitis from medication. She was advised to stop the singulair and never take cipro.\par Renal function has been normal throughout.  She is still suffering from neuropathy and going to see a neurologist. \par She has no proteinuria. No hematuria. \par She had recently seen Dr Rodriguez yesterday and had cath urine collected which  was normal except for ketones.

## 2020-11-14 NOTE — CONSULT LETTER
[Dear  ___] : Dear  [unfilled], [Consult Letter:] : I had the pleasure of evaluating your patient, [unfilled]. [( Thank you for referring [unfilled] for consultation for _____ )] : Thank you for referring [unfilled] for consultation for [unfilled] [Please see my note below.] : Please see my note below. [Consult Closing:] : Thank you very much for allowing me to participate in the care of this patient.  If you have any questions, please do not hesitate to contact me. [Sincerely,] : Sincerely, [FreeTextEntry3] : Sybil Martínez MD\par  [facility]\par Division of Nephrology and Hypertension\par \par

## 2020-11-14 NOTE — PHYSICAL EXAM
[General Appearance - Alert] : alert [General Appearance - In No Acute Distress] : in no acute distress [Sclera] : the sclera and conjunctiva were normal [Outer Ear] : the ears and nose were normal in appearance [Neck Appearance] : the appearance of the neck was normal [] : no respiratory distress [Auscultation Breath Sounds / Voice Sounds] : lungs were clear to auscultation bilaterally [Heart Rate And Rhythm] : heart rate was normal and rhythm regular [Heart Sounds] : normal S1 and S2 [Murmurs] : no murmurs [Edema] : there was no peripheral edema [Bowel Sounds] : normal bowel sounds [Abdomen Soft] : soft [Abdomen Tenderness] : non-tender [No CVA Tenderness] : no ~M costovertebral angle tenderness [Involuntary Movements] : no involuntary movements were seen [Skin Color & Pigmentation] : normal skin color and pigmentation [No Focal Deficits] : no focal deficits [Oriented To Time, Place, And Person] : oriented to person, place, and time [Affect] : the affect was normal [Mood] : the mood was normal

## 2020-11-20 LAB
APPEARANCE: CLEAR
BACTERIA UR CULT: NORMAL
BACTERIA: NEGATIVE
BILIRUBIN URINE: NEGATIVE
BLOOD URINE: NEGATIVE
COLOR: YELLOW
GLUCOSE QUALITATIVE U: NEGATIVE
HYALINE CASTS: 0 /LPF
KETONES URINE: ABNORMAL
LEUKOCYTE ESTERASE URINE: NEGATIVE
MICROSCOPIC-UA: NORMAL
NITRITE URINE: NEGATIVE
PH URINE: 6.5
PROTEIN URINE: NEGATIVE
RED BLOOD CELLS URINE: 2 /HPF
SPECIFIC GRAVITY URINE: 1.01
SQUAMOUS EPITHELIAL CELLS: 0 /HPF
UROBILINOGEN URINE: NORMAL
WHITE BLOOD CELLS URINE: 0 /HPF

## 2020-12-01 DIAGNOSIS — Z20.828 CONTACT WITH AND (SUSPECTED) EXPOSURE TO OTHER VIRAL COMMUNICABLE DISEASES: ICD-10-CM

## 2020-12-04 ENCOUNTER — NON-APPOINTMENT (OUTPATIENT)
Age: 55
End: 2020-12-04

## 2020-12-07 ENCOUNTER — APPOINTMENT (OUTPATIENT)
Dept: INTERNAL MEDICINE | Facility: CLINIC | Age: 55
End: 2020-12-07
Payer: MEDICAID

## 2020-12-07 VITALS
OXYGEN SATURATION: 96 % | HEART RATE: 92 BPM | WEIGHT: 185 LBS | RESPIRATION RATE: 16 BRPM | SYSTOLIC BLOOD PRESSURE: 138 MMHG | DIASTOLIC BLOOD PRESSURE: 90 MMHG | HEIGHT: 64 IN | TEMPERATURE: 97.5 F | BODY MASS INDEX: 31.58 KG/M2

## 2020-12-07 DIAGNOSIS — I77.6 ARTERITIS, UNSPECIFIED: ICD-10-CM

## 2020-12-07 PROCEDURE — 99214 OFFICE O/P EST MOD 30 MIN: CPT

## 2020-12-07 PROCEDURE — 99072 ADDL SUPL MATRL&STAF TM PHE: CPT

## 2020-12-07 NOTE — HISTORY OF PRESENT ILLNESS
[FreeTextEntry8] : Presents for evaluation of Nueropathy/vasculitis.   She spoke with  last week -see below \par \par 12/4/20 -Spoke with the patient this evening. She has been having significant difficulties over the past 2 months. She appeared to develop a vesicular type of rash across her abdominal wall. She also developed pains in her legs. She was told that she may have had a neuropathy. She was seen by Dr. Laws, an orthopedic surgeon. He then sent her to a rheumatologist, Dr. Holliday, who saw her on 9/30/20. The patient had multiple serologic tests performed and nothing specific was found. It was felt that she may have had a drug-induced vasculitis, possibly from Singulair or Cipro both of these medications were discontinued. The rash resolved within several days of stopping the Singulair. The patient was treated with prednisone starting at 40 mg daily and it was then gradually tapered. Unfortunately, the patient has developed worsening neuropathic pain. She was seen by a nephrologist who did not feel there were any renal ramifications of the above-noted inflammatory process. She was then evaluated from a neurological standpoint, by Dr. Moore. She underwent MRIs of the thoracic, and lumbosacral spines earlier today. The results are still pending. She was placed on Neurontin. She is scheduled to start 30 mg of prednisone tomorrow, on 12/5/20. She has only been taking low doses of gabapentin on an as-needed basis, not consistently.\par Recommendations:\par \par 1. The patient has been told to take 300 mg of gabapentin on a t.i.d. basis for the next several days, not p.r.n. Her creatinine clearance is above 70 cc an hour.\par \par 2. The patient will continue with the prednisone as prescribed by her rheumatologist.\par \par 3. The patient has an appointment on 12/9/20 with Dr. Morgan, a neuromuscular neurologist.\par \par 4. The patient will be seen in the office on 12/7/20 for an evaluation. \par \par 12/7/20 -  She was notified today that MRIs of thoracic and LS spine all looked ok.  She has appt with Dr. Morgan 12/9.   Currently she continues to have significant  joint pains with burning, pins and needles and stabbing pain to  L foot into into knee and R foot.  she is now using a walker due to weakness of lower extremities.  Now on prednisone 30 mg which is helping.  \par Gabapentin increased by   to  300 mg qid which is helping.  COVID AB and PCR both  negative.  \par  \par

## 2020-12-07 NOTE — PHYSICAL EXAM
[No Edema] : there was no peripheral edema [No Extremity Clubbing/Cyanosis] : no extremity clubbing/cyanosis [No Rash] : no rash [Coordination Grossly Intact] : coordination grossly intact [Normal] : affect was normal and insight and judgment were intact [de-identified] : Using walker

## 2020-12-09 ENCOUNTER — APPOINTMENT (OUTPATIENT)
Dept: NEUROLOGY | Facility: CLINIC | Age: 55
End: 2020-12-09
Payer: MEDICAID

## 2020-12-09 VITALS
DIASTOLIC BLOOD PRESSURE: 81 MMHG | SYSTOLIC BLOOD PRESSURE: 144 MMHG | WEIGHT: 185 LBS | BODY MASS INDEX: 31.58 KG/M2 | HEART RATE: 74 BPM | HEIGHT: 64 IN

## 2020-12-09 VITALS — TEMPERATURE: 96.8 F

## 2020-12-09 DIAGNOSIS — G95.9 DISEASE OF SPINAL CORD, UNSPECIFIED: ICD-10-CM

## 2020-12-09 PROCEDURE — 99205 OFFICE O/P NEW HI 60 MIN: CPT

## 2020-12-09 PROCEDURE — 99072 ADDL SUPL MATRL&STAF TM PHE: CPT

## 2020-12-09 NOTE — HISTORY OF PRESENT ILLNESS
[FreeTextEntry1] : Patient states that this past September she awoke with tingling right foot to ankle, acutely.  A few days She had been hospital early September for muscle spasms (in ED) and was given oxycodone.  A few after the foot began tingling she had recurrence of back muscle spasms/pain and difficulty walking.  \par \par The right foot tingling is worse with stabbing pains, and now she has tingling left foot with numbness and burning lateral leg from knee to lateral malleolus.  Recently the low back is ok.  She states the feet are weak at the ankles.  Arms are not weak.  She also developed a rash on abdomen (viewed by me on her phone) which appears to be ?livedo reticularis and now has erythematous nodules (?erythema nodosum) with arthralgias.  Denies fever.  Denies sores in mouth or genitals.  She has had a 40lb weight loss since Jan.  \par \par Eosinophils 8/2020: 1.5, 16.7% (elevated, normal 6%)\par \par This past Friday she felt very weak and had trouble standing.  She had been on prednisone from Dr. Eusebia barron at [facility] in September and helped lucio.  She has been on and off steroids since Sept from rheum and ortho at [facility] and [facility].  She is taking gabapentin 300mg QID.  \par \par She denies significant FH.

## 2020-12-09 NOTE — ASSESSMENT
[FreeTextEntry1] : This nice patient has a rapid development of a constellation of symptoms with neurologic findings referable to both the peripheral and central nervous system (myelopathy and neuropathy). \par \par With the eosinophilia, asthma history, skin lesions, ?livedo and arthralgias the DDx includes several entities that have a vasculitic component involved.  EGPA is a consideration as well.\par \par Will start with CBC, neuropathy lab panel, OCTAVIO panel, VEGF and have her back for EMG. \par \par I will review MRI's and discuss with Dr. Sheldon

## 2020-12-09 NOTE — PHYSICAL EXAM
[General Appearance - Alert] : alert [General Appearance - In No Acute Distress] : in no acute distress [Person] : oriented to person [Place] : oriented to place [Time] : oriented to time [Short Term Intact] : short term memory intact [Remote Intact] : remote memory intact [Registration Intact] : recent registration memory intact [Concentration Intact] : normal concentrating ability [Visual Intact] : visual attention was ~T not ~L decreased [Naming Objects] : no difficulty naming common objects [Repeating Phrases] : no difficulty repeating a phrase [Writing A Sentence] : no difficulty writing a sentence [Fluency] : fluency intact [Comprehension] : comprehension intact [Reading] : reading intact [Past History] : adequate knowledge of personal past history [Cranial Nerves Optic (II)] : visual acuity intact bilaterally,  visual fields full to confrontation, pupils equal round and reactive to light [Cranial Nerves Oculomotor (III)] : extraocular motion intact [Cranial Nerves Trigeminal (V)] : facial sensation intact symmetrically [Cranial Nerves Facial (VII)] : face symmetrical [Cranial Nerves Vestibulocochlear (VIII)] : hearing was intact bilaterally [Cranial Nerves Glossopharyngeal (IX)] : tongue and palate midline [Cranial Nerves Accessory (XI - Cranial And Spinal)] : head turning and shoulder shrug symmetric [Cranial Nerves Hypoglossal (XII)] : there was no tongue deviation with protrusion [No Muscle Atrophy] : normal bulk in all four extremities [Motor Handedness Right-Handed] : the patient is right hand dominant [Hand Weakness Right] : normal hand  [Hand Weakness Left] : normal hand  [+4] : knee extension +4/5 [4] : ankle dorsiflexion 4/5 [5] : ankle inversion 5/5 [Sensation Tactile Decrease] : light touch was intact [Romberg's Sign] : a positive Romberg's sign was present [Vibration Decrease Leg / Foot Toes Both Feet] : decreased at the toes of both feet  [Position Sensation Decrease Leg/Foot At Level Of Toes] : impaired at the toes in both legs [Past-pointing] : there was no past-pointing [Tremor] : no tremor present [4+] : Patella left 4+ [3+] : Ankle jerk left 3+ [Plantar Reflex Right Only] : abnormal on the right [Plantar Reflex Left Only] : normal on the left [___] : absent on the right [___] : absent on the left [FreeTextEntry6] : slight increased tone LE's [FreeTextEntry8] : wide based gait with slight steppage [Neck Appearance] : the appearance of the neck was normal [Neck Cervical Mass (___cm)] : no neck mass was observed [Jugular Venous Distention Increased] : there was no jugular-venous distention [Thyroid Diffuse Enlargement] : the thyroid was not enlarged [Thyroid Nodule] : there were no palpable thyroid nodules [] : no respiratory distress [Auscultation Breath Sounds / Voice Sounds] : lungs were clear to auscultation bilaterally [Heart Rate And Rhythm] : heart rate was normal and rhythm regular [Heart Sounds] : normal S1 and S2 [Heart Sounds Gallop] : no gallops [Murmurs] : no murmurs [Heart Sounds Pericardial Friction Rub] : no pericardial rub [FreeTextEntry1] : extensor elbow surfaces: small erythematous nodules

## 2020-12-14 ENCOUNTER — TRANSCRIPTION ENCOUNTER (OUTPATIENT)
Age: 55
End: 2020-12-14

## 2020-12-15 ENCOUNTER — NON-APPOINTMENT (OUTPATIENT)
Age: 55
End: 2020-12-15

## 2020-12-15 ENCOUNTER — TRANSCRIPTION ENCOUNTER (OUTPATIENT)
Age: 55
End: 2020-12-15

## 2020-12-17 ENCOUNTER — APPOINTMENT (OUTPATIENT)
Dept: INFECTIOUS DISEASE | Facility: CLINIC | Age: 55
End: 2020-12-17

## 2020-12-21 ENCOUNTER — NON-APPOINTMENT (OUTPATIENT)
Age: 55
End: 2020-12-21

## 2020-12-21 ENCOUNTER — TRANSCRIPTION ENCOUNTER (OUTPATIENT)
Age: 55
End: 2020-12-21

## 2020-12-21 ENCOUNTER — MED ADMIN CHARGE (OUTPATIENT)
Age: 55
End: 2020-12-21

## 2020-12-21 ENCOUNTER — APPOINTMENT (OUTPATIENT)
Dept: INTERNAL MEDICINE | Facility: CLINIC | Age: 55
End: 2020-12-21
Payer: MEDICAID

## 2020-12-21 PROCEDURE — 96372 THER/PROPH/DIAG INJ SC/IM: CPT

## 2020-12-21 PROCEDURE — 99072 ADDL SUPL MATRL&STAF TM PHE: CPT

## 2020-12-21 RX ORDER — MONTELUKAST 10 MG/1
10 TABLET, FILM COATED ORAL
Qty: 90 | Refills: 0 | Status: DISCONTINUED | COMMUNITY
Start: 2020-07-17 | End: 2020-12-21

## 2020-12-21 RX ORDER — CYANOCOBALAMIN 1000 UG/ML
1000 INJECTION INTRAMUSCULAR; SUBCUTANEOUS
Qty: 0 | Refills: 0 | Status: COMPLETED | OUTPATIENT
Start: 2020-12-21

## 2020-12-21 RX ORDER — DILTIAZEM HYDROCHLORIDE 240 MG/1
240 CAPSULE, EXTENDED RELEASE ORAL DAILY
Refills: 1 | Status: DISCONTINUED | COMMUNITY
End: 2020-12-21

## 2020-12-22 ENCOUNTER — TRANSCRIPTION ENCOUNTER (OUTPATIENT)
Age: 55
End: 2020-12-22

## 2020-12-23 ENCOUNTER — TRANSCRIPTION ENCOUNTER (OUTPATIENT)
Age: 55
End: 2020-12-23

## 2020-12-23 PROBLEM — Z87.440 HISTORY OF URINARY TRACT INFECTION: Status: RESOLVED | Noted: 2020-08-20 | Resolved: 2020-12-23

## 2020-12-23 PROBLEM — Z87.09 HISTORY OF PHARYNGITIS: Status: RESOLVED | Noted: 2020-03-12 | Resolved: 2020-12-23

## 2020-12-29 ENCOUNTER — APPOINTMENT (OUTPATIENT)
Dept: INTERNAL MEDICINE | Facility: CLINIC | Age: 55
End: 2020-12-29
Payer: MEDICAID

## 2020-12-29 ENCOUNTER — MED ADMIN CHARGE (OUTPATIENT)
Age: 55
End: 2020-12-29

## 2020-12-29 ENCOUNTER — TRANSCRIPTION ENCOUNTER (OUTPATIENT)
Age: 55
End: 2020-12-29

## 2020-12-29 PROCEDURE — 99072 ADDL SUPL MATRL&STAF TM PHE: CPT

## 2020-12-29 PROCEDURE — 96372 THER/PROPH/DIAG INJ SC/IM: CPT

## 2020-12-29 RX ORDER — CYANOCOBALAMIN 1000 UG/ML
1000 INJECTION INTRAMUSCULAR; SUBCUTANEOUS
Qty: 0 | Refills: 0 | Status: COMPLETED | OUTPATIENT
Start: 2020-12-29

## 2020-12-30 ENCOUNTER — TRANSCRIPTION ENCOUNTER (OUTPATIENT)
Age: 55
End: 2020-12-30

## 2020-12-31 ENCOUNTER — TRANSCRIPTION ENCOUNTER (OUTPATIENT)
Age: 55
End: 2020-12-31

## 2021-01-04 ENCOUNTER — TRANSCRIPTION ENCOUNTER (OUTPATIENT)
Age: 56
End: 2021-01-04

## 2021-01-05 ENCOUNTER — MED ADMIN CHARGE (OUTPATIENT)
Age: 56
End: 2021-01-05

## 2021-01-05 ENCOUNTER — APPOINTMENT (OUTPATIENT)
Dept: INTERNAL MEDICINE | Facility: CLINIC | Age: 56
End: 2021-01-05
Payer: MEDICAID

## 2021-01-05 PROCEDURE — 96372 THER/PROPH/DIAG INJ SC/IM: CPT

## 2021-01-05 PROCEDURE — 99072 ADDL SUPL MATRL&STAF TM PHE: CPT

## 2021-01-05 RX ORDER — CYANOCOBALAMIN 1000 UG/ML
1000 INJECTION INTRAMUSCULAR; SUBCUTANEOUS
Qty: 0 | Refills: 0 | Status: COMPLETED | OUTPATIENT
Start: 2020-12-23

## 2021-01-08 ENCOUNTER — APPOINTMENT (OUTPATIENT)
Dept: NEUROLOGY | Facility: CLINIC | Age: 56
End: 2021-01-08

## 2021-01-12 ENCOUNTER — APPOINTMENT (OUTPATIENT)
Dept: INTERNAL MEDICINE | Facility: CLINIC | Age: 56
End: 2021-01-12
Payer: MEDICAID

## 2021-01-12 PROCEDURE — 96372 THER/PROPH/DIAG INJ SC/IM: CPT

## 2021-01-12 PROCEDURE — 99072 ADDL SUPL MATRL&STAF TM PHE: CPT

## 2021-01-12 RX ORDER — CYANOCOBALAMIN 1000 UG/ML
1000 INJECTION INTRAMUSCULAR; SUBCUTANEOUS
Qty: 0 | Refills: 0 | Status: COMPLETED | OUTPATIENT
Start: 2021-01-10

## 2021-01-15 ENCOUNTER — APPOINTMENT (OUTPATIENT)
Dept: NEUROLOGY | Facility: CLINIC | Age: 56
End: 2021-01-15
Payer: MEDICAID

## 2021-01-15 VITALS
DIASTOLIC BLOOD PRESSURE: 84 MMHG | WEIGHT: 218 LBS | BODY MASS INDEX: 37.22 KG/M2 | HEART RATE: 73 BPM | HEIGHT: 64 IN | SYSTOLIC BLOOD PRESSURE: 128 MMHG

## 2021-01-15 VITALS — TEMPERATURE: 97.3 F

## 2021-01-15 PROCEDURE — 99072 ADDL SUPL MATRL&STAF TM PHE: CPT

## 2021-01-15 PROCEDURE — 99214 OFFICE O/P EST MOD 30 MIN: CPT

## 2021-01-15 NOTE — ASSESSMENT
[FreeTextEntry1] : Patient has made a remarkable improvement and has full strength, no pain in feet. \par \par Will cont weekly B12 weekly until 12 weeks, then monthly. \par \par She will try to reduce further the oxycodone, and go down to prednisone 5mg QD, f/u 2 months.

## 2021-01-15 NOTE — PHYSICAL EXAM
[Person] : oriented to person [Place] : oriented to place [Time] : oriented to time [Short Term Intact] : short term memory intact [Remote Intact] : remote memory intact [Registration Intact] : recent registration memory intact [Concentration Intact] : normal concentrating ability [Visual Intact] : visual attention was ~T not ~L decreased [Naming Objects] : no difficulty naming common objects [Repeating Phrases] : no difficulty repeating a phrase [Writing A Sentence] : no difficulty writing a sentence [Fluency] : fluency intact [Comprehension] : comprehension intact [Reading] : reading intact [Past History] : adequate knowledge of personal past history [Cranial Nerves Optic (II)] : visual acuity intact bilaterally,  visual fields full to confrontation, pupils equal round and reactive to light [Cranial Nerves Oculomotor (III)] : extraocular motion intact [Cranial Nerves Trigeminal (V)] : facial sensation intact symmetrically [Cranial Nerves Vestibulocochlear (VIII)] : hearing was intact bilaterally [Cranial Nerves Facial (VII)] : face symmetrical [Cranial Nerves Glossopharyngeal (IX)] : tongue and palate midline [Cranial Nerves Accessory (XI - Cranial And Spinal)] : head turning and shoulder shrug symmetric [Cranial Nerves Hypoglossal (XII)] : there was no tongue deviation with protrusion [No Muscle Atrophy] : normal bulk in all four extremities [Motor Handedness Right-Handed] : the patient is right hand dominant [Hand Weakness Right] : normal hand  [Hand Weakness Left] : normal hand  [+4] : ankle dorsiflexion +4/5 [5] : ankle inversion 5/5 [Sensation Tactile Decrease] : light touch was intact [Romberg's Sign] : a positive Romberg's sign was present [Vibration Decrease Leg / Foot Toes Both Feet] : decreased at the toes of both feet  [Position Sensation Decrease Leg/Foot At Level Of Toes] : impaired at the toes in both legs [Past-pointing] : there was no past-pointing [Tremor] : no tremor present [4+] : Patella left 4+ [3+] : Ankle jerk left 3+ [Plantar Reflex Right Only] : normal on the right [Plantar Reflex Left Only] : normal on the left [___] : absent on the right [___] : absent on the left [FreeTextEntry6] : slight increased tone LE's [FreeTextEntry8] : improved gait, still wide based but not spastic or steppage

## 2021-01-15 NOTE — HISTORY OF PRESENT ILLNESS
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  \par \par She has been getting weekly B12 injections from Dr. Sheldon, just had the fifth.  She states the pain in feet is gone, they are numb now.  She gets stabbing pain occasionally.  Down to about one percocet at night.  Takes gabapentin  600mg TID.  She has a feeling of muscle cramp posterior thighs.  \par \par Still on prednisone 10mg QD, for about three months.

## 2021-01-18 ENCOUNTER — TRANSCRIPTION ENCOUNTER (OUTPATIENT)
Age: 56
End: 2021-01-18

## 2021-01-18 DIAGNOSIS — F41.9 ANXIETY DISORDER, UNSPECIFIED: ICD-10-CM

## 2021-01-18 DIAGNOSIS — F32.9 ANXIETY DISORDER, UNSPECIFIED: ICD-10-CM

## 2021-01-19 ENCOUNTER — TRANSCRIPTION ENCOUNTER (OUTPATIENT)
Age: 56
End: 2021-01-19

## 2021-01-19 ENCOUNTER — APPOINTMENT (OUTPATIENT)
Dept: INTERNAL MEDICINE | Facility: CLINIC | Age: 56
End: 2021-01-19
Payer: MEDICAID

## 2021-01-19 PROCEDURE — 99072 ADDL SUPL MATRL&STAF TM PHE: CPT

## 2021-01-19 PROCEDURE — 96372 THER/PROPH/DIAG INJ SC/IM: CPT

## 2021-01-19 RX ORDER — CYANOCOBALAMIN 1000 UG/ML
1000 INJECTION INTRAMUSCULAR; SUBCUTANEOUS
Qty: 0 | Refills: 0 | Status: COMPLETED | OUTPATIENT
Start: 2021-01-16

## 2021-01-20 ENCOUNTER — TRANSCRIPTION ENCOUNTER (OUTPATIENT)
Age: 56
End: 2021-01-20

## 2021-01-25 ENCOUNTER — NON-APPOINTMENT (OUTPATIENT)
Age: 56
End: 2021-01-25

## 2021-01-25 ENCOUNTER — TRANSCRIPTION ENCOUNTER (OUTPATIENT)
Age: 56
End: 2021-01-25

## 2021-01-26 ENCOUNTER — MED ADMIN CHARGE (OUTPATIENT)
Age: 56
End: 2021-01-26

## 2021-01-26 ENCOUNTER — APPOINTMENT (OUTPATIENT)
Dept: INTERNAL MEDICINE | Facility: CLINIC | Age: 56
End: 2021-01-26
Payer: MEDICAID

## 2021-01-26 ENCOUNTER — APPOINTMENT (OUTPATIENT)
Dept: GASTROENTEROLOGY | Facility: CLINIC | Age: 56
End: 2021-01-26
Payer: MEDICAID

## 2021-01-26 VITALS
WEIGHT: 177 LBS | HEART RATE: 87 BPM | BODY MASS INDEX: 30.22 KG/M2 | TEMPERATURE: 98 F | SYSTOLIC BLOOD PRESSURE: 139 MMHG | DIASTOLIC BLOOD PRESSURE: 102 MMHG | HEIGHT: 64 IN

## 2021-01-26 DIAGNOSIS — D64.9 ANEMIA, UNSPECIFIED: ICD-10-CM

## 2021-01-26 DIAGNOSIS — K63.5 POLYP OF COLON: ICD-10-CM

## 2021-01-26 DIAGNOSIS — D51.0 VITAMIN B12 DEFICIENCY ANEMIA DUE TO INTRINSIC FACTOR DEFICIENCY: ICD-10-CM

## 2021-01-26 DIAGNOSIS — R09.89 OTHER SPECIFIED SYMPTOMS AND SIGNS INVOLVING THE CIRCULATORY AND RESPIRATORY SYSTEMS: ICD-10-CM

## 2021-01-26 PROCEDURE — 99072 ADDL SUPL MATRL&STAF TM PHE: CPT

## 2021-01-26 PROCEDURE — 96372 THER/PROPH/DIAG INJ SC/IM: CPT

## 2021-01-26 PROCEDURE — 99204 OFFICE O/P NEW MOD 45 MIN: CPT

## 2021-01-26 RX ORDER — CYANOCOBALAMIN 1000 UG/ML
1000 INJECTION INTRAMUSCULAR; SUBCUTANEOUS
Qty: 0 | Refills: 0 | Status: COMPLETED | OUTPATIENT
Start: 2021-01-22

## 2021-01-27 PROBLEM — R09.89 BIBASILAR CRACKLES: Status: ACTIVE | Noted: 2017-12-01

## 2021-01-27 NOTE — HISTORY OF PRESENT ILLNESS
[de-identified] : 54yo female with vitamin B12 deficiency with pernicious anemia\par I reviewed extensive prior records with labwork with anti-parietal cell antibodies\par She has been suffering for months with weakness and malaise\par She has profound fatigue and malaise with peripheral neuropathy and pain\par She is several weeks in to weekly vitamin b12\par \par She has hx colon polyps and is due for surveillance colonoscopy

## 2021-01-27 NOTE — REVIEW OF SYSTEMS
[Feeling Poorly] : feeling poorly [Arthralgias] : arthralgias [Feeling Tired] : feeling tired [Joint Pain] : joint pain [Limb Pain] : limb pain [Skin Lesions] : skin lesion [Limb Weakness] : limb weakness [Negative] : Heme/Lymph [de-identified] : numbness

## 2021-01-27 NOTE — ASSESSMENT
[FreeTextEntry1] : 56yo female with pernicious anemia, \par will check egd with biopsies for atrophic gastritis\par will hold off on colonoscopy as prep would be difficult in her weakened state\par will reconsider colonoscopy in few months when she hopefully feeling better when vitamin b12 replaced

## 2021-01-27 NOTE — PHYSICAL EXAM
[General Appearance - Alert] : alert [General Appearance - In No Acute Distress] : in no acute distress [Auscultation Breath Sounds / Voice Sounds] : lungs were clear to auscultation bilaterally [Heart Rate And Rhythm] : heart rate was normal and rhythm regular [Heart Sounds] : normal S1 and S2 [Heart Sounds Gallop] : no gallops [Murmurs] : no murmurs [Heart Sounds Pericardial Friction Rub] : no pericardial rub [Bowel Sounds] : normal bowel sounds [Abdomen Soft] : soft [Abdomen Tenderness] : non-tender [] : no hepato-splenomegaly [Abdomen Mass (___ Cm)] : no abdominal mass palpated [Abnormal Walk] : normal gait [Musculoskeletal - Swelling] : no joint swelling seen [Nail Clubbing] : no clubbing  or cyanosis of the fingernails [Motor Tone] : muscle strength and tone were normal [Oriented To Time, Place, And Person] : oriented to person, place, and time [Impaired Insight] : insight and judgment were intact [Affect] : the affect was normal

## 2021-01-29 ENCOUNTER — APPOINTMENT (OUTPATIENT)
Dept: DISASTER EMERGENCY | Facility: CLINIC | Age: 56
End: 2021-01-29

## 2021-01-30 LAB — SARS-COV-2 N GENE NPH QL NAA+PROBE: NOT DETECTED

## 2021-02-01 ENCOUNTER — NON-APPOINTMENT (OUTPATIENT)
Age: 56
End: 2021-02-01

## 2021-02-02 ENCOUNTER — APPOINTMENT (OUTPATIENT)
Dept: INTERNAL MEDICINE | Facility: CLINIC | Age: 56
End: 2021-02-02

## 2021-02-02 ENCOUNTER — RESULT REVIEW (OUTPATIENT)
Age: 56
End: 2021-02-02

## 2021-02-02 ENCOUNTER — TRANSCRIPTION ENCOUNTER (OUTPATIENT)
Age: 56
End: 2021-02-02

## 2021-02-02 ENCOUNTER — APPOINTMENT (OUTPATIENT)
Dept: GASTROENTEROLOGY | Facility: AMBULATORY MEDICAL SERVICES | Age: 56
End: 2021-02-02
Payer: MEDICAID

## 2021-02-02 DIAGNOSIS — B37.81 CANDIDAL ESOPHAGITIS: ICD-10-CM

## 2021-02-02 PROCEDURE — 43239 EGD BIOPSY SINGLE/MULTIPLE: CPT

## 2021-02-02 RX ORDER — FLUCONAZOLE 150 MG/1
1 TABLET ORAL
Qty: 0 | Refills: 0 | DISCHARGE
Start: 2021-02-02 | End: 2021-02-12

## 2021-02-03 ENCOUNTER — APPOINTMENT (OUTPATIENT)
Dept: INTERNAL MEDICINE | Facility: CLINIC | Age: 56
End: 2021-02-03
Payer: MEDICAID

## 2021-02-03 ENCOUNTER — MED ADMIN CHARGE (OUTPATIENT)
Age: 56
End: 2021-02-03

## 2021-02-03 PROCEDURE — 96372 THER/PROPH/DIAG INJ SC/IM: CPT

## 2021-02-03 PROCEDURE — 99072 ADDL SUPL MATRL&STAF TM PHE: CPT

## 2021-02-03 RX ORDER — CYANOCOBALAMIN 1000 UG/ML
1000 INJECTION INTRAMUSCULAR; SUBCUTANEOUS
Qty: 0 | Refills: 0 | Status: COMPLETED | OUTPATIENT
Start: 2021-02-02

## 2021-02-04 ENCOUNTER — APPOINTMENT (OUTPATIENT)
Dept: CARDIOLOGY | Facility: CLINIC | Age: 56
End: 2021-02-04

## 2021-02-05 ENCOUNTER — NON-APPOINTMENT (OUTPATIENT)
Age: 56
End: 2021-02-05

## 2021-02-08 ENCOUNTER — NON-APPOINTMENT (OUTPATIENT)
Age: 56
End: 2021-02-08

## 2021-02-08 ENCOUNTER — TRANSCRIPTION ENCOUNTER (OUTPATIENT)
Age: 56
End: 2021-02-08

## 2021-02-09 ENCOUNTER — TRANSCRIPTION ENCOUNTER (OUTPATIENT)
Age: 56
End: 2021-02-09

## 2021-02-09 ENCOUNTER — APPOINTMENT (OUTPATIENT)
Dept: INTERNAL MEDICINE | Facility: CLINIC | Age: 56
End: 2021-02-09

## 2021-02-09 ENCOUNTER — INPATIENT (INPATIENT)
Facility: HOSPITAL | Age: 56
LOS: 7 days | Discharge: HOME | DRG: 346 | End: 2021-02-17
Attending: INTERNAL MEDICINE | Admitting: HOSPITALIST
Payer: MEDICAID

## 2021-02-09 VITALS
DIASTOLIC BLOOD PRESSURE: 94 MMHG | HEIGHT: 64 IN | RESPIRATION RATE: 18 BRPM | SYSTOLIC BLOOD PRESSURE: 158 MMHG | HEART RATE: 83 BPM | OXYGEN SATURATION: 96 % | TEMPERATURE: 98 F | WEIGHT: 179.9 LBS

## 2021-02-09 DIAGNOSIS — J18.9 PNEUMONIA, UNSPECIFIED ORGANISM: ICD-10-CM

## 2021-02-09 LAB
ALBUMIN SERPL ELPH-MCNC: 2.5 G/DL — LOW (ref 3.3–5)
ALP SERPL-CCNC: 111 U/L — SIGNIFICANT CHANGE UP (ref 40–120)
ALT FLD-CCNC: 64 U/L — SIGNIFICANT CHANGE UP (ref 12–78)
ANION GAP SERPL CALC-SCNC: 8 MMOL/L — SIGNIFICANT CHANGE UP (ref 5–17)
APPEARANCE UR: CLEAR — SIGNIFICANT CHANGE UP
AST SERPL-CCNC: 192 U/L — HIGH (ref 15–37)
BASOPHILS # BLD AUTO: 0 K/UL — SIGNIFICANT CHANGE UP (ref 0–0.2)
BASOPHILS NFR BLD AUTO: 0 % — SIGNIFICANT CHANGE UP (ref 0–2)
BILIRUB SERPL-MCNC: 0.5 MG/DL — SIGNIFICANT CHANGE UP (ref 0.2–1.2)
BILIRUB UR-MCNC: NEGATIVE — SIGNIFICANT CHANGE UP
BUN SERPL-MCNC: 15 MG/DL — SIGNIFICANT CHANGE UP (ref 7–23)
C3 SERPL-MCNC: 145 MG/DL — SIGNIFICANT CHANGE UP (ref 81–157)
C4 SERPL-MCNC: 22 MG/DL — SIGNIFICANT CHANGE UP (ref 13–39)
CALCIUM SERPL-MCNC: 9.2 MG/DL — SIGNIFICANT CHANGE UP (ref 8.5–10.1)
CHLORIDE SERPL-SCNC: 102 MMOL/L — SIGNIFICANT CHANGE UP (ref 96–108)
CO2 SERPL-SCNC: 25 MMOL/L — SIGNIFICANT CHANGE UP (ref 22–31)
COLOR SPEC: YELLOW — SIGNIFICANT CHANGE UP
CREAT SERPL-MCNC: 0.72 MG/DL — SIGNIFICANT CHANGE UP (ref 0.5–1.3)
DIFF PNL FLD: ABNORMAL
EOSINOPHIL # BLD AUTO: 7.68 K/UL — HIGH (ref 0–0.5)
EOSINOPHIL NFR BLD AUTO: 32 % — HIGH (ref 0–6)
ERYTHROCYTE [SEDIMENTATION RATE] IN BLOOD: 97 MM/HR — HIGH (ref 0–20)
GLUCOSE SERPL-MCNC: 106 MG/DL — HIGH (ref 70–99)
GLUCOSE UR QL: NEGATIVE MG/DL — SIGNIFICANT CHANGE UP
HCT VFR BLD CALC: 34.7 % — SIGNIFICANT CHANGE UP (ref 34.5–45)
HGB BLD-MCNC: 11.4 G/DL — LOW (ref 11.5–15.5)
KETONES UR-MCNC: ABNORMAL
LACTATE SERPL-SCNC: 1 MMOL/L — SIGNIFICANT CHANGE UP (ref 0.7–2)
LEUKOCYTE ESTERASE UR-ACNC: ABNORMAL
LIDOCAIN IGE QN: 113 U/L — SIGNIFICANT CHANGE UP (ref 73–393)
LYMPHOCYTES # BLD AUTO: 10 % — LOW (ref 13–44)
LYMPHOCYTES # BLD AUTO: 2.4 K/UL — SIGNIFICANT CHANGE UP (ref 1–3.3)
MCHC RBC-ENTMCNC: 27.9 PG — SIGNIFICANT CHANGE UP (ref 27–34)
MCHC RBC-ENTMCNC: 32.9 GM/DL — SIGNIFICANT CHANGE UP (ref 32–36)
MCV RBC AUTO: 85 FL — SIGNIFICANT CHANGE UP (ref 80–100)
MONOCYTES # BLD AUTO: 2.16 K/UL — HIGH (ref 0–0.9)
MONOCYTES NFR BLD AUTO: 9 % — SIGNIFICANT CHANGE UP (ref 2–14)
NEUTROPHILS # BLD AUTO: 11.76 K/UL — HIGH (ref 1.8–7.4)
NEUTROPHILS NFR BLD AUTO: 49 % — SIGNIFICANT CHANGE UP (ref 43–77)
NITRITE UR-MCNC: NEGATIVE — SIGNIFICANT CHANGE UP
NRBC # BLD: SIGNIFICANT CHANGE UP /100 WBCS (ref 0–0)
PH UR: 6 — SIGNIFICANT CHANGE UP (ref 5–8)
PLATELET # BLD AUTO: 412 K/UL — HIGH (ref 150–400)
POTASSIUM SERPL-MCNC: 3.5 MMOL/L — SIGNIFICANT CHANGE UP (ref 3.5–5.3)
POTASSIUM SERPL-SCNC: 3.5 MMOL/L — SIGNIFICANT CHANGE UP (ref 3.5–5.3)
PROT SERPL-MCNC: 8 GM/DL — SIGNIFICANT CHANGE UP (ref 6–8.3)
PROT UR-MCNC: 30 MG/DL
RBC # BLD: 4.08 M/UL — SIGNIFICANT CHANGE UP (ref 3.8–5.2)
RBC # FLD: 13.8 % — SIGNIFICANT CHANGE UP (ref 10.3–14.5)
RHEUMATOID FACT SERPL-ACNC: 348 IU/ML — HIGH (ref 0–13)
SARS-COV-2 RNA SPEC QL NAA+PROBE: SIGNIFICANT CHANGE UP
SODIUM SERPL-SCNC: 135 MMOL/L — SIGNIFICANT CHANGE UP (ref 135–145)
SP GR SPEC: 1.02 — SIGNIFICANT CHANGE UP (ref 1.01–1.02)
UROBILINOGEN FLD QL: 4 MG/DL
WBC # BLD: 23.99 K/UL — HIGH (ref 3.8–10.5)
WBC # FLD AUTO: 23.99 K/UL — HIGH (ref 3.8–10.5)

## 2021-02-09 PROCEDURE — 86140 C-REACTIVE PROTEIN: CPT

## 2021-02-09 PROCEDURE — 80074 ACUTE HEPATITIS PANEL: CPT

## 2021-02-09 PROCEDURE — 71250 CT THORAX DX C-: CPT

## 2021-02-09 PROCEDURE — C9113: CPT

## 2021-02-09 PROCEDURE — 80053 COMPREHEN METABOLIC PANEL: CPT

## 2021-02-09 PROCEDURE — A9537: CPT

## 2021-02-09 PROCEDURE — 86160 COMPLEMENT ANTIGEN: CPT

## 2021-02-09 PROCEDURE — 86255 FLUORESCENT ANTIBODY SCREEN: CPT

## 2021-02-09 PROCEDURE — 85027 COMPLETE CBC AUTOMATED: CPT

## 2021-02-09 PROCEDURE — 87389 HIV-1 AG W/HIV-1&-2 AB AG IA: CPT

## 2021-02-09 PROCEDURE — 84155 ASSAY OF PROTEIN SERUM: CPT

## 2021-02-09 PROCEDURE — 85610 PROTHROMBIN TIME: CPT

## 2021-02-09 PROCEDURE — 94640 AIRWAY INHALATION TREATMENT: CPT

## 2021-02-09 PROCEDURE — 99223 1ST HOSP IP/OBS HIGH 75: CPT

## 2021-02-09 PROCEDURE — 78226 HEPATOBILIARY SYSTEM IMAGING: CPT

## 2021-02-09 PROCEDURE — 86225 DNA ANTIBODY NATIVE: CPT

## 2021-02-09 PROCEDURE — 71250 CT THORAX DX C-: CPT | Mod: 26

## 2021-02-09 PROCEDURE — 87507 IADNA-DNA/RNA PROBE TQ 12-25: CPT

## 2021-02-09 PROCEDURE — 85025 COMPLETE CBC W/AUTO DIFF WBC: CPT

## 2021-02-09 PROCEDURE — 82785 ASSAY OF IGE: CPT

## 2021-02-09 PROCEDURE — 71045 X-RAY EXAM CHEST 1 VIEW: CPT | Mod: 26

## 2021-02-09 PROCEDURE — 85652 RBC SED RATE AUTOMATED: CPT

## 2021-02-09 PROCEDURE — 74174 CTA ABD&PLVS W/CONTRAST: CPT

## 2021-02-09 PROCEDURE — 36415 COLL VENOUS BLD VENIPUNCTURE: CPT

## 2021-02-09 PROCEDURE — 86162 COMPLEMENT TOTAL (CH50): CPT

## 2021-02-09 PROCEDURE — 85048 AUTOMATED LEUKOCYTE COUNT: CPT

## 2021-02-09 PROCEDURE — 82570 ASSAY OF URINE CREATININE: CPT

## 2021-02-09 PROCEDURE — 78226 HEPATOBILIARY SYSTEM IMAGING: CPT | Mod: 26

## 2021-02-09 PROCEDURE — 84156 ASSAY OF PROTEIN URINE: CPT

## 2021-02-09 PROCEDURE — 76705 ECHO EXAM OF ABDOMEN: CPT | Mod: 26

## 2021-02-09 PROCEDURE — 83516 IMMUNOASSAY NONANTIBODY: CPT

## 2021-02-09 PROCEDURE — 84165 PROTEIN E-PHORESIS SERUM: CPT

## 2021-02-09 PROCEDURE — 86480 TB TEST CELL IMMUN MEASURE: CPT

## 2021-02-09 PROCEDURE — 85245 CLOT FACTOR VIII VW RISTOCTN: CPT

## 2021-02-09 PROCEDURE — 93975 VASCULAR STUDY: CPT

## 2021-02-09 PROCEDURE — 86039 ANTINUCLEAR ANTIBODIES (ANA): CPT

## 2021-02-09 PROCEDURE — 74176 CT ABD & PELVIS W/O CONTRAST: CPT | Mod: 26

## 2021-02-09 PROCEDURE — 97116 GAIT TRAINING THERAPY: CPT | Mod: GP

## 2021-02-09 PROCEDURE — 97162 PT EVAL MOD COMPLEX 30 MIN: CPT | Mod: GP

## 2021-02-09 PROCEDURE — 82550 ASSAY OF CK (CPK): CPT

## 2021-02-09 PROCEDURE — 82595 ASSAY OF CRYOGLOBULIN: CPT

## 2021-02-09 PROCEDURE — 86334 IMMUNOFIX E-PHORESIS SERUM: CPT

## 2021-02-09 PROCEDURE — 93306 TTE W/DOPPLER COMPLETE: CPT

## 2021-02-09 PROCEDURE — 87205 SMEAR GRAM STAIN: CPT

## 2021-02-09 PROCEDURE — 87493 C DIFF AMPLIFIED PROBE: CPT

## 2021-02-09 PROCEDURE — 81001 URINALYSIS AUTO W/SCOPE: CPT

## 2021-02-09 PROCEDURE — 83735 ASSAY OF MAGNESIUM: CPT

## 2021-02-09 PROCEDURE — 80048 BASIC METABOLIC PNL TOTAL CA: CPT

## 2021-02-09 PROCEDURE — 86431 RHEUMATOID FACTOR QUANT: CPT

## 2021-02-09 PROCEDURE — 93010 ELECTROCARDIOGRAM REPORT: CPT

## 2021-02-09 RX ORDER — ONDANSETRON 8 MG/1
4 TABLET, FILM COATED ORAL EVERY 6 HOURS
Refills: 0 | Status: DISCONTINUED | OUTPATIENT
Start: 2021-02-09 | End: 2021-02-17

## 2021-02-09 RX ORDER — ALBUTEROL 90 UG/1
0 AEROSOL, METERED ORAL
Qty: 150 | Refills: 0 | DISCHARGE

## 2021-02-09 RX ORDER — LEVOTHYROXINE SODIUM 125 MCG
88 TABLET ORAL DAILY
Refills: 0 | Status: DISCONTINUED | OUTPATIENT
Start: 2021-02-09 | End: 2021-02-17

## 2021-02-09 RX ORDER — UBIDECARENONE 100 MG
1 CAPSULE ORAL
Qty: 0 | Refills: 0 | DISCHARGE

## 2021-02-09 RX ORDER — DILTIAZEM HCL 120 MG
240 CAPSULE, EXT RELEASE 24 HR ORAL DAILY
Refills: 0 | Status: DISCONTINUED | OUTPATIENT
Start: 2021-02-09 | End: 2021-02-17

## 2021-02-09 RX ORDER — OXYCODONE HYDROCHLORIDE 5 MG/1
5 TABLET ORAL EVERY 4 HOURS
Refills: 0 | Status: DISCONTINUED | OUTPATIENT
Start: 2021-02-09 | End: 2021-02-16

## 2021-02-09 RX ORDER — DULOXETINE HYDROCHLORIDE 30 MG/1
20 CAPSULE, DELAYED RELEASE ORAL DAILY
Refills: 0 | Status: DISCONTINUED | OUTPATIENT
Start: 2021-02-09 | End: 2021-02-17

## 2021-02-09 RX ORDER — LANOLIN ALCOHOL/MO/W.PET/CERES
3 CREAM (GRAM) TOPICAL AT BEDTIME
Refills: 0 | Status: DISCONTINUED | OUTPATIENT
Start: 2021-02-09 | End: 2021-02-17

## 2021-02-09 RX ORDER — MORPHINE SULFATE 50 MG/1
4 CAPSULE, EXTENDED RELEASE ORAL ONCE
Refills: 0 | Status: DISCONTINUED | OUTPATIENT
Start: 2021-02-09 | End: 2021-02-09

## 2021-02-09 RX ORDER — SUCRALFATE 1 G
1 TABLET ORAL
Refills: 0 | Status: DISCONTINUED | OUTPATIENT
Start: 2021-02-09 | End: 2021-02-17

## 2021-02-09 RX ORDER — FLUCONAZOLE 150 MG/1
100 TABLET ORAL DAILY
Refills: 0 | Status: DISCONTINUED | OUTPATIENT
Start: 2021-02-09 | End: 2021-02-09

## 2021-02-09 RX ORDER — PIPERACILLIN AND TAZOBACTAM 4; .5 G/20ML; G/20ML
3.38 INJECTION, POWDER, LYOPHILIZED, FOR SOLUTION INTRAVENOUS ONCE
Refills: 0 | Status: COMPLETED | OUTPATIENT
Start: 2021-02-09 | End: 2021-02-09

## 2021-02-09 RX ORDER — AMLODIPINE BESYLATE 2.5 MG/1
0 TABLET ORAL
Qty: 90 | Refills: 0 | DISCHARGE

## 2021-02-09 RX ORDER — GABAPENTIN 400 MG/1
600 CAPSULE ORAL THREE TIMES A DAY
Refills: 0 | Status: DISCONTINUED | OUTPATIENT
Start: 2021-02-09 | End: 2021-02-17

## 2021-02-09 RX ORDER — PIPERACILLIN AND TAZOBACTAM 4; .5 G/20ML; G/20ML
3.38 INJECTION, POWDER, LYOPHILIZED, FOR SOLUTION INTRAVENOUS EVERY 8 HOURS
Refills: 0 | Status: DISCONTINUED | OUTPATIENT
Start: 2021-02-09 | End: 2021-02-11

## 2021-02-09 RX ORDER — SODIUM CHLORIDE 9 MG/ML
1000 INJECTION INTRAMUSCULAR; INTRAVENOUS; SUBCUTANEOUS ONCE
Refills: 0 | Status: COMPLETED | OUTPATIENT
Start: 2021-02-09 | End: 2021-02-09

## 2021-02-09 RX ORDER — ONDANSETRON 8 MG/1
4 TABLET, FILM COATED ORAL ONCE
Refills: 0 | Status: COMPLETED | OUTPATIENT
Start: 2021-02-09 | End: 2021-02-09

## 2021-02-09 RX ORDER — CEFTRIAXONE 500 MG/1
1000 INJECTION, POWDER, FOR SOLUTION INTRAMUSCULAR; INTRAVENOUS ONCE
Refills: 0 | Status: COMPLETED | OUTPATIENT
Start: 2021-02-09 | End: 2021-02-09

## 2021-02-09 RX ORDER — MONTELUKAST 4 MG/1
0 TABLET, CHEWABLE ORAL
Qty: 90 | Refills: 0 | DISCHARGE

## 2021-02-09 RX ORDER — FLUTICASONE PROPIONATE AND SALMETEROL 50; 250 UG/1; UG/1
0 POWDER ORAL; RESPIRATORY (INHALATION)
Qty: 60 | Refills: 0 | DISCHARGE

## 2021-02-09 RX ORDER — ACETAMINOPHEN 500 MG
650 TABLET ORAL EVERY 4 HOURS
Refills: 0 | Status: DISCONTINUED | OUTPATIENT
Start: 2021-02-09 | End: 2021-02-17

## 2021-02-09 RX ORDER — CIPROFLOXACIN LACTATE 400MG/40ML
400 VIAL (ML) INTRAVENOUS ONCE
Refills: 0 | Status: DISCONTINUED | OUTPATIENT
Start: 2021-02-09 | End: 2021-02-09

## 2021-02-09 RX ORDER — BUDESONIDE AND FORMOTEROL FUMARATE DIHYDRATE 160; 4.5 UG/1; UG/1
2 AEROSOL RESPIRATORY (INHALATION)
Refills: 0 | Status: DISCONTINUED | OUTPATIENT
Start: 2021-02-09 | End: 2021-02-17

## 2021-02-09 RX ORDER — LEVOTHYROXINE SODIUM 125 MCG
0 TABLET ORAL
Qty: 30 | Refills: 0 | DISCHARGE

## 2021-02-09 RX ORDER — HYDROMORPHONE HYDROCHLORIDE 2 MG/ML
1 INJECTION INTRAMUSCULAR; INTRAVENOUS; SUBCUTANEOUS
Refills: 0 | Status: DISCONTINUED | OUTPATIENT
Start: 2021-02-09 | End: 2021-02-16

## 2021-02-09 RX ORDER — HEPARIN SODIUM 5000 [USP'U]/ML
5000 INJECTION INTRAVENOUS; SUBCUTANEOUS EVERY 8 HOURS
Refills: 0 | Status: DISCONTINUED | OUTPATIENT
Start: 2021-02-09 | End: 2021-02-17

## 2021-02-09 RX ORDER — DIPHENHYDRAMINE HYDROCHLORIDE AND LIDOCAINE HYDROCHLORIDE AND ALUMINUM HYDROXIDE AND MAGNESIUM HYDRO
10 KIT
Refills: 0 | Status: DISCONTINUED | OUTPATIENT
Start: 2021-02-09 | End: 2021-02-17

## 2021-02-09 RX ORDER — SODIUM CHLORIDE 9 MG/ML
1000 INJECTION INTRAMUSCULAR; INTRAVENOUS; SUBCUTANEOUS
Refills: 0 | Status: DISCONTINUED | OUTPATIENT
Start: 2021-02-09 | End: 2021-02-15

## 2021-02-09 RX ORDER — METRONIDAZOLE 500 MG
500 TABLET ORAL ONCE
Refills: 0 | Status: COMPLETED | OUTPATIENT
Start: 2021-02-09 | End: 2021-02-09

## 2021-02-09 RX ORDER — METOCLOPRAMIDE HCL 10 MG
10 TABLET ORAL ONCE
Refills: 0 | Status: DISCONTINUED | OUTPATIENT
Start: 2021-02-09 | End: 2021-02-17

## 2021-02-09 RX ADMIN — Medication 125 MILLIGRAM(S): at 17:35

## 2021-02-09 RX ADMIN — GABAPENTIN 600 MILLIGRAM(S): 400 CAPSULE ORAL at 17:06

## 2021-02-09 RX ADMIN — Medication 3 MILLIGRAM(S): at 21:17

## 2021-02-09 RX ADMIN — Medication 100 MILLIGRAM(S): at 05:09

## 2021-02-09 RX ADMIN — Medication 1 GRAM(S): at 17:07

## 2021-02-09 RX ADMIN — MORPHINE SULFATE 4 MILLIGRAM(S): 50 CAPSULE, EXTENDED RELEASE ORAL at 03:24

## 2021-02-09 RX ADMIN — Medication 60 MILLIGRAM(S): at 17:35

## 2021-02-09 RX ADMIN — HEPARIN SODIUM 5000 UNIT(S): 5000 INJECTION INTRAVENOUS; SUBCUTANEOUS at 21:17

## 2021-02-09 RX ADMIN — SODIUM CHLORIDE 1000 MILLILITER(S): 9 INJECTION INTRAMUSCULAR; INTRAVENOUS; SUBCUTANEOUS at 07:52

## 2021-02-09 RX ADMIN — CEFTRIAXONE 1000 MILLIGRAM(S): 500 INJECTION, POWDER, FOR SOLUTION INTRAMUSCULAR; INTRAVENOUS at 06:50

## 2021-02-09 RX ADMIN — Medication 240 MILLIGRAM(S): at 17:04

## 2021-02-09 RX ADMIN — ONDANSETRON 4 MILLIGRAM(S): 8 TABLET, FILM COATED ORAL at 03:24

## 2021-02-09 RX ADMIN — SODIUM CHLORIDE 75 MILLILITER(S): 9 INJECTION INTRAMUSCULAR; INTRAVENOUS; SUBCUTANEOUS at 20:17

## 2021-02-09 RX ADMIN — SODIUM CHLORIDE 1000 MILLILITER(S): 9 INJECTION INTRAMUSCULAR; INTRAVENOUS; SUBCUTANEOUS at 05:10

## 2021-02-09 RX ADMIN — Medication 500 MILLIGRAM(S): at 06:33

## 2021-02-09 RX ADMIN — GABAPENTIN 600 MILLIGRAM(S): 400 CAPSULE ORAL at 21:17

## 2021-02-09 RX ADMIN — Medication 30 MILLILITER(S): at 13:44

## 2021-02-09 RX ADMIN — SODIUM CHLORIDE 2000 MILLILITER(S): 9 INJECTION INTRAMUSCULAR; INTRAVENOUS; SUBCUTANEOUS at 02:40

## 2021-02-09 RX ADMIN — Medication 10 MILLIGRAM(S): at 17:08

## 2021-02-09 RX ADMIN — PIPERACILLIN AND TAZOBACTAM 25 GRAM(S): 4; .5 INJECTION, POWDER, LYOPHILIZED, FOR SOLUTION INTRAVENOUS at 21:17

## 2021-02-09 RX ADMIN — PIPERACILLIN AND TAZOBACTAM 25 GRAM(S): 4; .5 INJECTION, POWDER, LYOPHILIZED, FOR SOLUTION INTRAVENOUS at 17:07

## 2021-02-09 RX ADMIN — HYDROMORPHONE HYDROCHLORIDE 1 MILLIGRAM(S): 2 INJECTION INTRAMUSCULAR; INTRAVENOUS; SUBCUTANEOUS at 13:45

## 2021-02-09 RX ADMIN — SODIUM CHLORIDE 1000 MILLILITER(S): 9 INJECTION INTRAMUSCULAR; INTRAVENOUS; SUBCUTANEOUS at 08:52

## 2021-02-09 RX ADMIN — DIPHENHYDRAMINE HYDROCHLORIDE AND LIDOCAINE HYDROCHLORIDE AND ALUMINUM HYDROXIDE AND MAGNESIUM HYDRO 10 MILLILITER(S): KIT at 21:18

## 2021-02-09 NOTE — CONSULT NOTE ADULT - SUBJECTIVE AND OBJECTIVE BOX
Pt is a 54 yo F who presents to the ED with complaints of abdominal pain since yesterday night. Reports pain is band like in the upper abdomen, associated with nausea and NBNB emesis. States she had a similar episode of pain for 2-4 weeks prior, and was worked up by GI. Patient recently underwent EGD and was diagnosed with "eosinophilic esophagitis" and gastritis. Denies shortness of breath, fevers or chills, chest pain. Of note, patient has been on steroids for rheum issues recently. At this time, patient is hesitant to undergo surgery.    PMHx: hypothyroidism, HTN, asthma, nephrotic syndrome?  PSHx: right nephrectomy (),  x2    Vital Signs Last 24 Hrs  T(C): 36.6 (2021 06:33), Max: 36.7 (2021 01:54)  T(F): 97.9 (2021 06:33), Max: 98.1 (2021 01:54)  HR: 87 (2021 07:04) (75 - 87)  BP: 155/98 (2021 07:04) (155/85 - 158/94)  BP(mean): 115 (2021 07:04) (103 - 115)  RR: 17 (2021 07:04) (17 - 18)  SpO2: 98% (2021 07:04) (88% - 98%)    Labs:                        11.4   23.99 )-----------( 412      ( 2021 02:31 )             34.7       135  |  102  |  15  ----------------------------<  106<H>  3.5   |  25  |  0.72    Ca    9.2      2021 02:31    TPro  8.0  /  Alb  2.5<L>  /  TBili  0.5  /  DBili  x   /  AST  192<H>  /  ALT  64  /  AlkPhos  111      Physical Exam:  General: AAOx3, in NAD  HEENT: NC/AT, EOMI  Cardio: S1S2, RRR  Pulm: equal air entry b/l, non labored on NC  Abdomen: soft, non distended, tenderness to palpation in RUQ, LUQ and epigastric region, most tender in epigastric region, negative Morris's sign (but pt received pain meds), right nephrectomy scar well-healed in RUQ    Imaging:  < from: CT Abdomen and Pelvis No Cont (21 @ 03:37) >  EXAM:  CT ABDOMEN AND PELVIS                            PROCEDURE DATE:  2021      < end of copied text >  < from: CT Abdomen and Pelvis No Cont (21 @ 03:37) >  IMPRESSION:    Bilateral lower lobe pneumonia.    Small pericardial effusion.    Gallbladder wall thickening/pericholecystic fluid. Abdominal ultrasound suggested for further evaluation.    < end of copied text >  < from: US Abdomen Upper Quadrant Right (21 @ 05:50) >  EXAM:  US ABDOMEN RT UPR QUADRANT                            PROCEDURE DATE:  2021      < end of copied text >  < from: US Abdomen Upper Quadrant Right (21 @ 05:50) >  IMPRESSION:    Limited study.    2 mm hyperechoic nonmobile focus adjacent to anterior wall of gallbladder, likely polyp.    Mild wall thickening measuring up to 5 mm with suggestion of trace pericholecystic edema. Indeterminate sonographic Morris's sign as patient received pain medications. Consider correlation with HIDA scan if there remains concern for acute cholecystitis.    < end of copied text >

## 2021-02-09 NOTE — ED PROVIDER NOTE - OBJECTIVE STATEMENT
54 y/o F with h/o HTN, asthma and recent EGD which showed esophagitis and gastritis by Dr. Burr p/w sudden onset of severe epigastric pain radiating to the back shortly after taking her nighttime meds.  Pt vomited several times with NBNB emesis and is now having sharp stabbing pain. 54 y/o F with h/o HTN, asthma and recent EGD which showed esophagitis and gastritis by Dr. Burr p/w sudden onset of severe epigastric pain radiating to the back shortly after taking her nighttime meds.  Pt vomited several times with NBNB emesis and is now having sharp stabbing pain.  Pt denies f/c/r, cough, sore throat, recent travel. Pt notes pain is worse with movement and palpation.  It is currently 10/10.

## 2021-02-09 NOTE — CONSULT NOTE ADULT - ASSESSMENT
54 yo F with pmh R nephrectomy in 2004, HTN, asthma- with recurrent need for steroids nearly consistent since 8/20- 10-40 mg daily, hypothyroidism, unknown rheumatologic disorder with new onset progressive peripheral neuropathy now in all 4 extr with asymmetrical pauciarthritis (b/l knees, L ankle and b/l hands L > R - functionally struggling to dress/ care for self), purpuric rash, and oral/ nasal ulcerations, and significantly 50 lb wt loss in past 6 m... unintentional.   Was in usual state health with intermittent asthma/ and sinus infection but otherwise healthy till 8/20  Neuropathy started, intermittent arthropathy in asymmetrical pattern and development of purpuric rash and ulcerating nodules on b/l elbows.. progressive malaise, fatigue, and wt loss with intermittent abd pain-   Recent evaluation for mid epigastric abd/ non bloody emesis, pain worse post prandially - w/ pain radiating to RUQ- back intermittently.  No diarrhea..   EGD + gastritis confirmed eosinphilic esophagitis/ gastritis.   CTScan abd in ER Bilateral lower lobe pneumonia. Small pericardial effusion and Gallbladder wall thickening/pericholecystic fluid.  HIDA scan NEG   In past 24 hs developed oral ulcerations tongue/ lips and nose w/ epistaxis.     Hemodynamically stable however she was found to be hypoxic in ED to 88%. Covid negative however CT abd demonstrated bilateral LL pna and GB wall thickening and pericholecystic fluid. U/S or RUQ also redemonstrated GB wall thickening and edema. No stones. LFTS- nl except - ALT/ Alk phos and /Bili normal. Pt is afebrile.  CTscan chest: Diffuse nodular and groundglass opacities bilaterally and areas of septal thickening compatible most likely due to infection. Recommend follow-up CT in 3 months to evaluate for possible resolution.  Otherwise denies fevers, lymphadenopathy, night sweats (till recent use of percocet for severe pain).  No chest pain or interscapular pain, no cough, loss of taste or smell    Extensive w/u neuro/ rheum PTA (over past few mnths)  negative for an AAV... serologically  Use of steroids increased past few wks w/ some but incomplete response, cymbalta started 20 mg for reactive depression and pain along with gabapentin  600 mg BID and PRN percocet..   Labs with elevated APR:  ESR > 80, CRP > 60... 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    Given gabapentin and percocet in past few wks for severe pain - back and neuropathic...    Discussion:   Infection vs. SIRS ... or both.  REcent imaging suggests possible PNA with diffuse GGO- with WBC 23 but hx highly suggestive of possible vasculitic process with high acute phase proteins PTA (ESR > 80/ CRP >60) recurrent vasculitic rash, progressive peripheral polyneuropathy- sudden onset, recent dx eosinophilic esophagitis/ gastritis for persistent abd pain in setting of long hx asthma/ recurrent sinus infections.. now with 50 lb wt loss past 6 m, active mucositis, granulomatous lesion on b/l elbows and eosinophilia on initial differential despite 10 mg pred daily... supportive of EGPA w/ previous rheum eval negative ANCA (which can be seen in this setting).    Has been started on IV antibiotics but would also recommend steroid tx given severity of joint discomfort and neuropathic pain.. if this is r/t to vasculitis will need steroids to address inflammatory changes in lungs as well...   Ideally need tissue diagnosis and recommend bx nodular lesions b/l elbows.  Called to d/w Dr. Landa and  Monalisa as well.  Repeat labs also needed.. and pending.     Plan:   - updated labs to assess for diffuse inflammatory response- will add ACE given nodular lesions as well..   - iV solumedrol 60 mg today, and ongoing steroids based on response.. currently ordered 30 mg BID but should be adjusted as needed..   - skin/ nodular bx now  - recommend neuro eval as well...was followed by Dr. Morgan at [facility], will call him too  - magic mouthwash for oral ulcerations  - continue cymbalta and gabapentin as needed for pain control   - will continue to follow

## 2021-02-09 NOTE — PHARMACOTHERAPY INTERVENTION NOTE - COMMENTS
med history complete, reviewed medications with patients med list and confirmed with doctor first med profile

## 2021-02-09 NOTE — H&P ADULT - NSHPREVIEWOFSYSTEMS_GEN_ALL_CORE
REVIEW OF SYSTEMS:    CONSTITUTIONAL: No weakness, fevers or chills  EYES/ENT: No visual changes;  No vertigo or throat pain   NECK: No pain or stiffness  RESPIRATORY: No cough, wheezing, hemoptysis; No shortness of breath  CARDIOVASCULAR: No chest pain or palpitations  GASTROINTESTINAL: non bloody emesis with epigastric pain  GENITOURINARY: No dysuria, frequency or hematuria  NEUROLOGICAL: No numbness or weakness  SKIN: No itching, burning, rashes, or lesions   All other review of systems is negative unless indicated above

## 2021-02-09 NOTE — CHART NOTE - NSCHARTNOTEFT_GEN_A_CORE
HIDA negative.  Skin biospy can be done as an outpatient.   No acute surgical intervention at this time. Will sign off thank you.     Discussed with Dr. Tian

## 2021-02-09 NOTE — ED PROVIDER NOTE - CLINICAL SUMMARY MEDICAL DECISION MAKING FREE TEXT BOX
Pt p/w waxing and waning epigastric pain radiating to the back worse after taking medication last night with vomiting.  Plan: pain control prn, IV fluids, CBC, CMP, lipase, CTAP.  CTAP incidentally showed multifocal infiltrates so will treat for CAP.  Will also get RUQ US to further evaluate GB wall thickening seen on CT.

## 2021-02-09 NOTE — CONSULT NOTE ADULT - ASSESSMENT
54 yo F with abdominal pain, 2mm gallbladder polyp and questionable wall thickening on US.    Plan:  -medical optimization regarding pneumonia and pulmonary status  -HIDA scan  -IV abx  -will offer patient surgical intervention if warranted  -surgery will follow    Plan discussed with Dr. Tian

## 2021-02-09 NOTE — H&P ADULT - NSHPLABSRESULTS_GEN_ALL_CORE
LABS: All Labs Reviewed:                        11.4   23.99 )-----------( 412      ( 09 Feb 2021 02:31 )             34.7     02-09    135  |  102  |  15  ----------------------------<  106<H>  3.5   |  25  |  0.72    Ca    9.2      09 Feb 2021 02:31    TPro  8.0  /  Alb  2.5<L>  /  TBili  0.5  /  DBili  x   /  AST  192<H>  /  ALT  64  /  AlkPhos  111  02-09        < from: CT Abdomen and Pelvis No Cont (02.09.21 @ 03:37) >    IMPRESSION:    Bilateral lower lobe pneumonia.    Small pericardial effusion.    Gallbladder wall thickening/pericholecystic fluid. Abdominal ultrasound suggested for further evaluation.    < from: US Abdomen Upper Quadrant Right (02.09.21 @ 05:50) >    IMPRESSION:    Limited study.    2 mm hyperechoic nonmobile focus adjacent to anterior wall of gallbladder, likely polyp.    Mild wall thickening measuring up to 5 mm with suggestion of trace pericholecystic edema. Indeterminate sonographic Morris's sign as patient received pain medications. Consider correlation with HIDA scan if there remains concern for acute cholecystitis.        < end of copied text >        < end of copied text >    < from: Xray Chest 1 View-PORTABLE IMMEDIATE (Xray Chest 1 View-PORTABLE IMMEDIATE .) (02.09.21 @ 07:26) >    IMPRESSION: Small basilar infiltrates.      < end of copied text >          Blood Culture:

## 2021-02-09 NOTE — H&P ADULT - ASSESSMENT
#SIRS (POA) secondary to bilateral LL pna in immunocompromised patient  #AHRF 2/2 to pna  #Suspected Acute cholecystitis  #Unknown rheumatologic d/o on chronic steroids. Leukocytosis also in setting of steroid use  -admit to [facility] with pulse ox q8H. Cont supp O2  -s/p ceftriaxone/flagyl in ED  -Normal lactate. Nl lipase. Normal pulse pressure in both arms  -Blood cx/Urine cx collected in ED  -Cont flagyl for now. Would start cefepime for suspected GN edwin pna - will tx as CAP  -Obtain HIDA scan and f/u with surgery for definitive treatment  -If patient to have surgery, will need pulse dose steroids      #Asthma/HTN/Hypothyroidism/History of R nephrectomy  -albuterol prn  -Cont home meds for BP contrrol  -Monitor Scr      DVTpx  IMPROVE VTE Individual Risk Assessment    RISK                                                                Points    [  ] Previous VTE                                                  3    [  ] Thrombophilia                                               2    [  ] Lower limb paralysis                                      2        (unable to hold up >15 seconds)      [  ] Current Cancer                                              2         (within 6 months)    [  ] Immobilization > 24 hrs                                1    [  ] ICU/CCU stay > 24 hours                              1    [  ] Age > 60                                                      1    IMPROVE VTE Score ____0_____ SCDs for now    IMPROVE Score 0-1: Low Risk, No VTE prophylaxis required for most patients, encourage ambulation.   IMPROVE Score 2-3: At risk, pharmacologic VTE prophylaxis is indicated for most patients (in the absence of a contraindication)  IMPROVE Score > or = 4: High Risk, pharmacologic VTE prophylaxis is indicated for most patients (in the absence of a contraindication)      Full code     #SIRS (POA) secondary to bilateral LL pna in immunocompromised patient  #AHRF 2/2 to pna  #Suspected Acute cholecystitis  #Unknown rheumatologic d/o on chronic steroids. Leukocytosis also in setting of steroid use  -admit to MS with pulse ox q8H. Cont supp O2  -s/p ceftriaxone/flagyl in ED  -Normal lactate. Nl lipase. Normal pulse pressure in both arms  -Blood cx/Urine cx collected in ED  -start zosyn for suspected GN edwin pna - will tx as CAP and will cover for GI tract as well  -Obtain HIDA scan and f/u with surgery for definitive treatment  -If patient to have surgery, will need pulse dose steroids      #Asthma/HTN/Hypothyroidism/History of R nephrectomy  -albuterol prn  -Cont home meds for BP contrrol  -Monitor Scr      DVTpx  IMPROVE VTE Individual Risk Assessment    RISK                                                                Points    [  ] Previous VTE                                                  3    [  ] Thrombophilia                                               2    [  ] Lower limb paralysis                                      2        (unable to hold up >15 seconds)      [  ] Current Cancer                                              2         (within 6 months)    [  ] Immobilization > 24 hrs                                1    [  ] ICU/CCU stay > 24 hours                              1    [  ] Age > 60                                                      1    IMPROVE VTE Score ____0_____ SCDs for now    IMPROVE Score 0-1: Low Risk, No VTE prophylaxis required for most patients, encourage ambulation.   IMPROVE Score 2-3: At risk, pharmacologic VTE prophylaxis is indicated for most patients (in the absence of a contraindication)  IMPROVE Score > or = 4: High Risk, pharmacologic VTE prophylaxis is indicated for most patients (in the absence of a contraindication)      Full code     #SIRS (POA) secondary to bilateral LL pna in immunocompromised patient  #AHRF 2/2 to pna  #Suspected Acute cholecystitis  #Unknown rheumatologic d/o on chronic steroids. Leukocytosis also in setting of steroid use  -admit to [facility] with pulse ox q8H. Cont supp O2  -s/p ceftriaxone/flagyl in ED  -Normal lactate. Nl lipase. Normal pulse pressure in both arms  -Blood cx/Urine cx collected in ED  -start zosyn for suspected GN edwin pna - will tx as CAP and will cover for GI tract as well  -Obtain HIDA scan and f/u with surgery for definitive treatment  -If patient to have surgery, will need pulse dose steroids  -send P-ANCA, J LUIS, complements, anti-dsDNA, RF. Rheum consult. Pt states she has not seen Dr Chiu for months and her steroids were being managed by Dr Sheldon      #Asthma/HTN/Hypothyroidism/History of R nephrectomy (? h/o nephrotic syndrome)  -albuterol prn  -Cont home meds for BP contrrol  -Monitor Scr      #Gastritis/Eosinopholic esophagitis  -ppi/carafate    DVTpx  IMPROVE VTE Individual Risk Assessment    RISK                                                                Points    [  ] Previous VTE                                                  3    [  ] Thrombophilia                                               2    [  ] Lower limb paralysis                                      2        (unable to hold up >15 seconds)      [  ] Current Cancer                                              2         (within 6 months)    [  ] Immobilization > 24 hrs                                1    [  ] ICU/CCU stay > 24 hours                              1    [  ] Age > 60                                                      1    IMPROVE VTE Score ____0_____ SCDs for now    IMPROVE Score 0-1: Low Risk, No VTE prophylaxis required for most patients, encourage ambulation.   IMPROVE Score 2-3: At risk, pharmacologic VTE prophylaxis is indicated for most patients (in the absence of a contraindication)  IMPROVE Score > or = 4: High Risk, pharmacologic VTE prophylaxis is indicated for most patients (in the absence of a contraindication)      Full code     #SIRS (POA) secondary to bilateral LL pna in immunocompromised patient  #AHRF 2/2 to pna  #Suspected Acute cholecystitis  #Unknown rheumatologic d/o on chronic steroids. Leukocytosis also in setting of steroid use  -admit to [facility] with pulse ox q8H. Cont supp O2  -s/p ceftriaxone/flagyl in ED  -Normal lactate. Nl lipase. Normal pulse pressure in both arms  -Blood cx/Urine cx collected in ED  -start zosyn for suspected GN edwin pna - will tx as CAP and will cover for GI tract as well  -Obtain HIDA scan and f/u with surgery for definitive treatment  -If patient to have surgery, will need pulse dose steroids  -send P-ANCA, J LUIS, complements, anti-dsDNA, RF. Rheum consult. Pt states she has not seen Dr Chiu for months and her steroids were being managed by Dr Sheldon      #Asthma/HTN/Hypothyroidism/History of R nephrectomy (? h/o nephrotic syndrome)  -albuterol prn  -Cont home meds for BP contrrol  -Monitor Scr      #Gastritis/Eosinopholic esophagitis/Eosinophilia  -ppi/carafate  -PT with granulomatous lesions to elbows-> discussed case with surgery for punch biopsy, resident will d/w attending  -Rheum: recc pulse dose steroids      DVTpx  IMPROVE VTE Individual Risk Assessment    RISK                                                                Points    [  ] Previous VTE                                                  3    [  ] Thrombophilia                                               2    [  ] Lower limb paralysis                                      2        (unable to hold up >15 seconds)      [  ] Current Cancer                                              2         (within 6 months)    [  ] Immobilization > 24 hrs                                1    [  ] ICU/CCU stay > 24 hours                              1    [  ] Age > 60                                                      1    IMPROVE VTE Score ____0_____ SCDs for now    IMPROVE Score 0-1: Low Risk, No VTE prophylaxis required for most patients, encourage ambulation.   IMPROVE Score 2-3: At risk, pharmacologic VTE prophylaxis is indicated for most patients (in the absence of a contraindication)  IMPROVE Score > or = 4: High Risk, pharmacologic VTE prophylaxis is indicated for most patients (in the absence of a contraindication)      Full code

## 2021-02-09 NOTE — H&P ADULT - NSHPPHYSICALEXAM_GEN_ALL_CORE
ICU Vital Signs Last 24 Hrs  T(C): 36.6 (09 Feb 2021 06:33), Max: 36.7 (09 Feb 2021 01:54)  T(F): 97.9 (09 Feb 2021 06:33), Max: 98.1 (09 Feb 2021 01:54)  HR: 87 (09 Feb 2021 07:04) (75 - 87)  BP: 155/98 (09 Feb 2021 07:04) (155/85 - 158/94)  BP(mean): 115 (09 Feb 2021 07:04) (103 - 115)  ABP: --  ABP(mean): --  RR: 17 (09 Feb 2021 07:04) (17 - 18)  SpO2: 98% (09 Feb 2021 07:04) (88% - 98%)      PHYSICAL EXAM:    Constitutional: NAD, awake and alert, well-developed  HEENT: PERR, EOMI, Normal Hearing, MMM  Neck: Soft and supple, No LAD, No JVD  Respiratory: Breath sounds are clear bilaterally, No wheezing, rales or rhonchi  Cardiovascular: S1 and S2, regular rate and rhythm, no Murmurs, gallops or rubs  Gastrointestinal: Bowel Sounds present, soft, nontender, nondistended, no guarding, no rebound  Extremities: No peripheral edema  Vascular: 2+ peripheral pulses  Neurological: A/O x 3, no focal deficits  Musculoskeletal: 5/5 strength b/l upper and lower extremities  Skin: No rashes ICU Vital Signs Last 24 Hrs  T(C): 36.6 (09 Feb 2021 06:33), Max: 36.7 (09 Feb 2021 01:54)  T(F): 97.9 (09 Feb 2021 06:33), Max: 98.1 (09 Feb 2021 01:54)  HR: 87 (09 Feb 2021 07:04) (75 - 87)  BP: 155/98 (09 Feb 2021 07:04) (155/85 - 158/94)  BP(mean): 115 (09 Feb 2021 07:04) (103 - 115)  ABP: --  ABP(mean): --  RR: 17 (09 Feb 2021 07:04) (17 - 18)  SpO2: 98% (09 Feb 2021 07:04) (88% - 98%)      PHYSICAL EXAM:    Constitutional: NAD, awake and alert, well-developed  HEENT: PERR, EOMI, Normal Hearing, MMM; apthous ulcer to left tongue  Neck: Soft and supple, No LAD, No JVD  Respiratory: Breath sounds are clear bilaterally, No wheezing, rales or rhonchi  Cardiovascular: S1 and S2, regular rate and rhythm, no Murmurs, gallops or rubs  Gastrointestinal: Bowel Sounds present, soft, nontender, nondistended, no guarding, no rebound  Extremities: No peripheral edema  Vascular: 2+ peripheral pulses  Neurological: A/O x 3, no focal deficits  Musculoskeletal: 5/5 strength b/l upper and lower extremities  Skin: scatted non blancheable non tender petechiae to b/l anterior leg/shins; dry crusted ulcers to b/l elbows

## 2021-02-09 NOTE — ED PROVIDER NOTE - PROGRESS NOTE DETAILS
Donovan COSTA: Received s/o from Dr. Marte at 6:30AM- patient pending surgery recommendation for pericholecystic fluid, EKG performed: NSR: 77 at 6:53AM; patient reports 2-4 week period of intermittent epigastric pain- post prandial with associated vomiting; patient had EGD performed 1 week ago by Dr. Burr- "esophagitis" per patient; patient denies cough; no denies fever, denies sob; patient reports since September 2020- patient has had weakness in upper/lower extremities; muscle aches, seen by rheum- has been on prednisone since that time also on gabapentin; has been having trouble walking/carrying out ADLs; patient aware of plan for admission- patient's o2 sat: 88% on RA- 3L NC- now 95%; pending surgery eval at this time. Donovan COSTA: per surgery- patient needs to be medically optimized; will order HIDA; will consult; defers to medicine service; endorsed to Dr. Rodrigues for admission.

## 2021-02-09 NOTE — H&P ADULT - NSHPOUTPATIENTPROVIDERS_GEN_ALL_CORE
PCP: RAKEL Gramajo  Pulm: Monalisa PCP/Pulm: Vomero- notified. Awaiting call back  GI: [facility]  Rheum: Rhaminfar

## 2021-02-09 NOTE — ED ADULT NURSE NOTE - OBJECTIVE STATEMENT
pt BIBEMS, A&O x4. c/o abdominal pain. had endoscopy on Monday, reports diagnosis of gastritis. took prescribed medication without relief. pt does not know which medication. denies fever, chills, CP, SOB, n/v/d, blood in stools, urinary symptoms.

## 2021-02-09 NOTE — CONSULT NOTE ADULT - SUBJECTIVE AND OBJECTIVE BOX
PCP: Johnny Sheldon  GI:  Kvng Burr    HPI:  54 yo F with pmh R nephrectomy in , HTN, asthma- with recurrent need for steroids nearly consistent since - 10-40 mg daily, hypothyroidism, unknown rheumatologic disorder with new onset progressive peripheral neuropathy now in all 4 extr with asymmetrical pauciarthritis (b/l knees, L ankle and b/l hands L > R - functionally struggling to dress/ care for self), purpuric rash, and oral/ nasal ulcerations, and significantly 50 lb wt loss in past 6 m... unintentional.   Was in usual state health with intermittent asthma/ and sinus infection but otherwise healthy till   Neuropathy started, intermittent arthropathy in asymmetrical pattern and development of purpuric rash and ulcerating nodules on b/l elbows.. progressive malaise, fatigue, and wt loss with intermittent abd pain-   Recent evaluation for mid epigastric abd/ non bloody emesis, pain worse post prandially - w/ pain radiating to RUQ- back intermittently.  No diarrhea..   EGD + gastritis confirmed eosinphilic esophagitis/ gastritis.   CTScan abd in ER Bilateral lower lobe pneumonia. Small pericardial effusion and Gallbladder wall thickening/pericholecystic fluid.  HIDA scan NEG   In past 24 hs developed oral ulcerations tongue/ lips and nose w/ epistaxis.     Hemodynamically stable however she was found to be hypoxic in ED to 88%. Covid negative however CT abd demonstrated bilateral LL pna and GB wall thickening and pericholecystic fluid. U/S or RUQ also redemonstrated GB wall thickening and edema. No stones. LFTS- nl except - ALT/ Alk phos and /Bili normal. Pt is afebrile.  CTscan chest: Diffuse nodular and groundglass opacities bilaterally and areas of septal thickening compatible most likely due to infection. Recommend follow-up CT in 3 months to evaluate for possible resolution.  Otherwise denies fevers, lymphadenopathy, night sweats (till recent use of percocet for severe pain).  No chest pain or interscapular pain, no cough, loss of taste or smell    Extensive w/u neuro/ rheum PTA (over past few mnths)  negative for an AAV... serologically  Labs with elevated APR:  ESR > 80, CRP > 60... 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    Use of steroids increased past few wks w/ some but incomplete response, cymbalta started 20 mg for reactive depression and pain along with gabapentin  600 mg BID and PRN percocet..      ED course: Nl lactate. WBC 24K. Given ceftriaxone and flagyl. IV morphine plus 2L NS    Social: neg x 3  PMH as noted  Colonocoscopy 3 ys ago neg (strong FH colon ca), mammo 2 ys ago neg     Surg Hx:  R nephrectomy  Csection x2; no h/o VTE or miscarriages and no bleeding/ clotting dyscrasias     Fam Hx:   Father  of cardiomyopathy at age 54 - unknown etiology  Daughter: h/o pericarditis unknown etiology   Mother  of colon ca in her 60s (along with several siblings)   (2021 09:09)       REVIEW OF SYSTEMS:    CONSTITUTIONAL: generalized weakness intermittently significant past fw mnths, requiring wheelchair, but NO, fevers or chills  EYES/ENT: No visual changes;  No vertigo or throat pain; recent nose bleeds and ulcerations   NECK: No pain or stiffness  RESPIRATORY: No cough, wheezing, hemoptysis; No shortness of breath  CARDIOVASCULAR: No chest pain or palpitations  GASTROINTESTINAL: + epigastric pain. + nausea, +vomiting, but no hematemesis; No diarrhea or constipation. No melena or hematochezia.  GENITOURINARY: No dysuria, frequency or hematuria  NEUROLOGICAL: + numbness/ severe pain and loss sensation all four extr or weakness  SKIN: + purpuric rash.. intermittently dependent areas but also abd- see images.     All other review of systems is negative unless indicated above    Vital Signs Last 24 Hrs  T(C): 36.7 (2021 16:44), Max: 36.7 (2021 01:54)  T(F): 98 (2021 16:44), Max: 98.1 (2021 01:54)  HR: 90 (2021 16:44) (75 - 90)  BP: 146/87 (2021 16:44) (146/87 - 158/94)  BP(mean): 106 (2021 16:44) (103 - 115)  RR: 16 (2021 16:44) (16 - 18)  SpO2: 98% (2021 16:44) (88% - 98%)    I&O's Summary    2021 07:01  -  2021 17:09  --------------------------------------------------------  IN: 1000 mL / OUT: 0 mL / NET: 1000 mL    Home Medications:  Cymbalta 20 mg oral delayed release capsule: 1 cap(s) orally once a day (2021 09:42)  dilTIAZem 240 mg/24 hours oral capsule, extended release: 1 cap(s) orally once a day (2021 09:42)  fluconazole 100 mg oral tablet: 1 tab(s) orally once a day (2021 09:42)  gabapentin 300 mg oral capsule: 2 cap(s) orally 3 times a day (2021 09:42)  LEVOTHYROXIN TAB 88MC tab(s) orally once a day (2021 09:42)  predniSONE 10 mg oral tablet: 1 tab(s) orally once a day (2021 09:42)  sucralfate 1 g/10 mL oral suspension: 10 milliliter(s) orally 3 times a day (2021 09:42)  Wixela Inhub 500 mcg-50 mcg inhalation powder: 1 puff(s) inhaled 2 times a day (2021 09:42)    MEDICATIONS  (STANDING):  budesonide 160 MICROgram(s)/formoterol 4.5 MICROgram(s) Inhaler 2 Puff(s) Inhalation two times a day  diltiazem    milliGRAM(s) Oral daily  DULoxetine 20 milliGRAM(s) Oral daily  FIRST- Mouthwash  BLM 10 milliLiter(s) Swish and Swallow four times a day  gabapentin 600 milliGRAM(s) Oral three times a day  heparin   Injectable 5000 Unit(s) SubCutaneous every 8 hours  levothyroxine 88 MICROGram(s) Oral daily  methylPREDNISolone sodium succinate Injectable 60 milliGRAM(s) IV Push once  piperacillin/tazobactam IVPB.. 3.375 Gram(s) IV Intermittent every 8 hours  predniSONE   Tablet 10 milliGRAM(s) Oral daily  sodium chloride 0.9%. 1000 milliLiter(s) (75 mL/Hr) IV Continuous <Continuous>  sucralfate 1 Gram(s) Oral two times a day    MEDICATIONS  (PRN):  acetaminophen   Tablet .. 650 milliGRAM(s) Oral every 4 hours PRN Mild Pain (1 - 3)  aluminum hydroxide/magnesium hydroxide/simethicone Suspension 30 milliLiter(s) Oral every 4 hours PRN Dyspepsia  HYDROmorphone  Injectable 1 milliGRAM(s) IV Push every 3 hours PRN Severe Pain (7 - 10)  metoclopramide Injectable 10 milliGRAM(s) IV Push once PRN Nausea and/or Vomiting  ondansetron Injectable 4 milliGRAM(s) IV Push every 6 hours PRN Nausea  oxyCODONE    IR 5 milliGRAM(s) Oral every 4 hours PRN Moderate Pain (4 - 6)      LABS: All Labs Reviewed:  Lipase nl                         11.4   23.99 )-----------( 412      ( 2021 02:31 )             34.7         135  |  102  |  15  ----------------------------<  106<H>  3.5   |  25  |  0.72    Ca    9.2      2021 02:31    TPro  8.0  /  Alb  2.5<L>  /  TBili  0.5  /  DBili  x   /  AST  192<H>  /  ALT  64  /  AlkPhos  111      UA:  small blood, moderate bacteria/ WBC but initial cx < 10,000 bacteria    PHYSICAL EXAM:  GEN:  uncomfortable  HEENT- large apthous ulcer side tongue, dry cracked lips with ulcerations, ulcerations nose b/l nares;  no lymphadenoapthy noted  Heart- S1 S2 reg  Lungs- CTA b/l- diffuse wheezing throughout   Abd- Soft - ttt worse over RLQ/ RUQ...   Ext- no edema  Skin- + non blanching purpuric lesions few scattered around both ankles, nodular dry scabbed ulcerations b/l elbows, and several ulcerations hands mostly in near full resolution.    MSK:  b/l hands limited ROM- ttt throughout w/ swelling obvious most severely over Lwrist/ limited ROM wrists too- b/l knees pain, stiffness and limited flexion lacking 10-20 b/l but no obvious fluid shift.  Shoulders/ elbows fROM.. ,  Neurological- intact strength upper-  str limited by pain/ stiffness and 5/5 lower extremities but severely guarded movements

## 2021-02-09 NOTE — ED ADULT TRIAGE NOTE - CHIEF COMPLAINT QUOTE
patient brought in by EMS c/o abdominal pain.  patient reports onset of abdominal pain 6 hours ago, had 4 episodes of vomiting earlier.  patient notes 2 episodes of diarrhea today.

## 2021-02-09 NOTE — H&P ADULT - HISTORY OF PRESENT ILLNESS
54 yo F with pmh R nephrectomy in , HTN, asthma, hypothyroidism, unknown rheumatologic disorder with peripheral neuropathy for which she has been on chronic steroids since 2020 (presently on 10mg po qd) now presenting with c/o several weeks of intermittent epigastric pain which radiates to back. Pain worse post prandially. Associated with non bloody emesis. She recently underwent EGD with Dr hernandez approx 1 week ago and findings were indicative of gastritis.   Otherwise denies joint pain, arthralgias, rash, fevers or ulcerations. No chest pain or interscapular pain. Hemodynamically stable however she was found to be hypoxic in ED to 88%. Covid negative however CT abd demonstrated bilateral LL pna and GB wall thickening and pericholecystic fluid. U/S or RUQ also redemonstrated GB wall thickening and edema. No stones. LFTS/Bili normal. Pt is afebrile.    ED course: Nl lactate. WBC 24K. Given ceftriaxone and flagyl. IV morphine plus 2L NS      Social: neg x 2    Surg Hx:  R nephrectomy  Csection x2    Fam Hx:   Father  of cardiomyopathy at age 54   Mother  of colon ca in her 60s    56 yo F with pmh R nephrectomy in , HTN, asthma, hypothyroidism, unknown rheumatologic disorder with peripheral neuropathy for which she has been on chronic steroids since 2020 (presently on 10mg po qd) now presenting with c/o several weeks of intermittent epigastric pain which radiates to back. Pain worse post prandially. Associated with non bloody emesis. She recently underwent EGD with Dr hernandez approx 1 week ago and findings were indicative of gastritis.   Otherwise denies joint pain, arthralgias, rash, fevers or ulcerations. No chest pain or interscapular pain. Hemodynamically stable however she was found to be hypoxic in ED to 88%. Covid negative however CT abd demonstrated bilateral LL pna and GB wall thickening and pericholecystic fluid. U/S or RUQ also redemonstrated GB wall thickening and edema. No stones. LFTS/Bili normal. Pt is afebrile.    ED course: Nl lactate. WBC 24K. Given ceftriaxone and flagyl. IV morphine plus 2L NS      Social: neg x 3    Surg Hx:  R nephrectomy  Csection x2; no h/o VTE or miscarriages    Fam Hx:   Father  of cardiomyopathy at age 54 - unknown etiology  Daughter: h/o pericarditis unknown etiology   Mother  of colon ca in her 60s

## 2021-02-10 DIAGNOSIS — R21 RASH AND OTHER NONSPECIFIC SKIN ERUPTION: ICD-10-CM

## 2021-02-10 DIAGNOSIS — D72.19 OTHER EOSINOPHILIA: ICD-10-CM

## 2021-02-10 DIAGNOSIS — J18.9 PNEUMONIA, UNSPECIFIED ORGANISM: ICD-10-CM

## 2021-02-10 DIAGNOSIS — R06.00 DYSPNEA, UNSPECIFIED: ICD-10-CM

## 2021-02-10 DIAGNOSIS — G62.9 POLYNEUROPATHY, UNSPECIFIED: ICD-10-CM

## 2021-02-10 DIAGNOSIS — R10.9 UNSPECIFIED ABDOMINAL PAIN: ICD-10-CM

## 2021-02-10 DIAGNOSIS — J45.40 MODERATE PERSISTENT ASTHMA, UNCOMPLICATED: ICD-10-CM

## 2021-02-10 LAB
% ALBUMIN: 35 % — SIGNIFICANT CHANGE UP
% ALPHA 1: 4.9 % — SIGNIFICANT CHANGE UP
% ALPHA 2: 23.4 % — SIGNIFICANT CHANGE UP
% BETA: 12.9 % — SIGNIFICANT CHANGE UP
% GAMMA: 23.8 % — SIGNIFICANT CHANGE UP
% M SPIKE: 6.4 % — SIGNIFICANT CHANGE UP
ALBUMIN SERPL ELPH-MCNC: 2.3 G/DL — LOW (ref 3.3–5)
ALBUMIN SERPL ELPH-MCNC: 2.6 G/DL — LOW (ref 3.6–5.5)
ALBUMIN/GLOB SERPL ELPH: 0.5 RATIO — SIGNIFICANT CHANGE UP
ALP SERPL-CCNC: 128 U/L — HIGH (ref 40–120)
ALPHA1 GLOB SERPL ELPH-MCNC: 0.4 G/DL — SIGNIFICANT CHANGE UP (ref 0.1–0.4)
ALPHA2 GLOB SERPL ELPH-MCNC: 1.7 G/DL — HIGH (ref 0.5–1)
ALT FLD-CCNC: 72 U/L — SIGNIFICANT CHANGE UP (ref 12–78)
ANA TITR SER: NEGATIVE — SIGNIFICANT CHANGE UP
ANION GAP SERPL CALC-SCNC: 8 MMOL/L — SIGNIFICANT CHANGE UP (ref 5–17)
AST SERPL-CCNC: 182 U/L — HIGH (ref 15–37)
B-GLOBULIN SERPL ELPH-MCNC: 1 G/DL — SIGNIFICANT CHANGE UP (ref 0.5–1)
BILIRUB SERPL-MCNC: 0.5 MG/DL — SIGNIFICANT CHANGE UP (ref 0.2–1.2)
BUN SERPL-MCNC: 13 MG/DL — SIGNIFICANT CHANGE UP (ref 7–23)
CALCIUM SERPL-MCNC: 9.2 MG/DL — SIGNIFICANT CHANGE UP (ref 8.5–10.1)
CHLORIDE SERPL-SCNC: 105 MMOL/L — SIGNIFICANT CHANGE UP (ref 96–108)
CO2 SERPL-SCNC: 24 MMOL/L — SIGNIFICANT CHANGE UP (ref 22–31)
CREAT SERPL-MCNC: 0.81 MG/DL — SIGNIFICANT CHANGE UP (ref 0.5–1.3)
CRP SERPL-MCNC: 19.65 MG/DL — HIGH (ref 0–0.4)
CULTURE RESULTS: SIGNIFICANT CHANGE UP
DSDNA AB SER-ACNC: 72 IU/ML — HIGH
GAMMA GLOBULIN: 1.8 G/DL — HIGH (ref 0.6–1.6)
GLUCOSE SERPL-MCNC: 180 MG/DL — HIGH (ref 70–99)
HCT VFR BLD CALC: 33.1 % — LOW (ref 34.5–45)
HGB BLD-MCNC: 10.7 G/DL — LOW (ref 11.5–15.5)
M-SPIKE: 0.5 G/DL — HIGH (ref 0–0)
MCHC RBC-ENTMCNC: 28.2 PG — SIGNIFICANT CHANGE UP (ref 27–34)
MCHC RBC-ENTMCNC: 32.3 GM/DL — SIGNIFICANT CHANGE UP (ref 32–36)
MCV RBC AUTO: 87.3 FL — SIGNIFICANT CHANGE UP (ref 80–100)
MITOCHONDRIA AB SER-ACNC: SIGNIFICANT CHANGE UP
PLATELET # BLD AUTO: 400 K/UL — SIGNIFICANT CHANGE UP (ref 150–400)
POTASSIUM SERPL-MCNC: 3.8 MMOL/L — SIGNIFICANT CHANGE UP (ref 3.5–5.3)
POTASSIUM SERPL-SCNC: 3.8 MMOL/L — SIGNIFICANT CHANGE UP (ref 3.5–5.3)
PROT PATTERN SERPL ELPH-IMP: SIGNIFICANT CHANGE UP
PROT SERPL-MCNC: 7.4 G/DL — SIGNIFICANT CHANGE UP (ref 6–8.3)
PROT SERPL-MCNC: 7.4 G/DL — SIGNIFICANT CHANGE UP (ref 6–8.3)
PROT SERPL-MCNC: 7.9 GM/DL — SIGNIFICANT CHANGE UP (ref 6–8.3)
RBC # BLD: 3.79 M/UL — LOW (ref 3.8–5.2)
RBC # FLD: 14.1 % — SIGNIFICANT CHANGE UP (ref 10.3–14.5)
SODIUM SERPL-SCNC: 137 MMOL/L — SIGNIFICANT CHANGE UP (ref 135–145)
SPECIMEN SOURCE: SIGNIFICANT CHANGE UP
VWF:RCO ACT/NOR PPP PL AGG: 338 % — HIGH (ref 45–133)
WBC # BLD: 22.54 K/UL — HIGH (ref 3.8–10.5)
WBC # FLD AUTO: 22.54 K/UL — HIGH (ref 3.8–10.5)

## 2021-02-10 PROCEDURE — 99223 1ST HOSP IP/OBS HIGH 75: CPT

## 2021-02-10 PROCEDURE — 99232 SBSQ HOSP IP/OBS MODERATE 35: CPT

## 2021-02-10 RX ORDER — PREGABALIN 225 MG/1
1000 CAPSULE ORAL ONCE
Refills: 0 | Status: COMPLETED | OUTPATIENT
Start: 2021-02-10 | End: 2021-02-11

## 2021-02-10 RX ADMIN — Medication 240 MILLIGRAM(S): at 09:00

## 2021-02-10 RX ADMIN — Medication 3 MILLIGRAM(S): at 21:27

## 2021-02-10 RX ADMIN — DIPHENHYDRAMINE HYDROCHLORIDE AND LIDOCAINE HYDROCHLORIDE AND ALUMINUM HYDROXIDE AND MAGNESIUM HYDRO 10 MILLILITER(S): KIT at 21:35

## 2021-02-10 RX ADMIN — Medication 88 MICROGRAM(S): at 05:09

## 2021-02-10 RX ADMIN — GABAPENTIN 600 MILLIGRAM(S): 400 CAPSULE ORAL at 21:27

## 2021-02-10 RX ADMIN — DULOXETINE HYDROCHLORIDE 20 MILLIGRAM(S): 30 CAPSULE, DELAYED RELEASE ORAL at 09:00

## 2021-02-10 RX ADMIN — Medication 1 GRAM(S): at 05:09

## 2021-02-10 RX ADMIN — PIPERACILLIN AND TAZOBACTAM 25 GRAM(S): 4; .5 INJECTION, POWDER, LYOPHILIZED, FOR SOLUTION INTRAVENOUS at 05:08

## 2021-02-10 RX ADMIN — DIPHENHYDRAMINE HYDROCHLORIDE AND LIDOCAINE HYDROCHLORIDE AND ALUMINUM HYDROXIDE AND MAGNESIUM HYDRO 10 MILLILITER(S): KIT at 05:09

## 2021-02-10 RX ADMIN — Medication 10 MILLIGRAM(S): at 05:09

## 2021-02-10 RX ADMIN — Medication 1 GRAM(S): at 17:12

## 2021-02-10 RX ADMIN — HEPARIN SODIUM 5000 UNIT(S): 5000 INJECTION INTRAVENOUS; SUBCUTANEOUS at 05:08

## 2021-02-10 RX ADMIN — BUDESONIDE AND FORMOTEROL FUMARATE DIHYDRATE 2 PUFF(S): 160; 4.5 AEROSOL RESPIRATORY (INHALATION) at 20:47

## 2021-02-10 RX ADMIN — DIPHENHYDRAMINE HYDROCHLORIDE AND LIDOCAINE HYDROCHLORIDE AND ALUMINUM HYDROXIDE AND MAGNESIUM HYDRO 10 MILLILITER(S): KIT at 11:58

## 2021-02-10 RX ADMIN — HEPARIN SODIUM 5000 UNIT(S): 5000 INJECTION INTRAVENOUS; SUBCUTANEOUS at 13:33

## 2021-02-10 RX ADMIN — GABAPENTIN 600 MILLIGRAM(S): 400 CAPSULE ORAL at 05:08

## 2021-02-10 RX ADMIN — GABAPENTIN 600 MILLIGRAM(S): 400 CAPSULE ORAL at 13:33

## 2021-02-10 RX ADMIN — PIPERACILLIN AND TAZOBACTAM 25 GRAM(S): 4; .5 INJECTION, POWDER, LYOPHILIZED, FOR SOLUTION INTRAVENOUS at 21:26

## 2021-02-10 RX ADMIN — HEPARIN SODIUM 5000 UNIT(S): 5000 INJECTION INTRAVENOUS; SUBCUTANEOUS at 21:27

## 2021-02-10 RX ADMIN — PIPERACILLIN AND TAZOBACTAM 25 GRAM(S): 4; .5 INJECTION, POWDER, LYOPHILIZED, FOR SOLUTION INTRAVENOUS at 13:33

## 2021-02-10 RX ADMIN — DIPHENHYDRAMINE HYDROCHLORIDE AND LIDOCAINE HYDROCHLORIDE AND ALUMINUM HYDROXIDE AND MAGNESIUM HYDRO 10 MILLILITER(S): KIT at 17:12

## 2021-02-10 NOTE — CONSULT NOTE ADULT - SUBJECTIVE AND OBJECTIVE BOX
Patient is a 55y old  Female who presents with a chief complaint of abd pain (2021 17:08)    HPI:  54yo female reports feeling better today.   Tolerating breakfast without any difficulty.   States abdominal pain is improving.   No SOB.   Recent egd noted EOE and gastritis.     ED course: Nl lactate. WBC 24K. Given ceftriaxone and flagyl. IV morphine plus 2L NS      Social: neg x 3    Surg Hx:  R nephrectomy  Csection x2; no h/o VTE or miscarriages    Fam Hx:   Father  of cardiomyopathy at age 54 - unknown etiology  Daughter: h/o pericarditis unknown etiology   Mother  of colon ca in her 60s    (2021 09:09)    PAST MEDICAL & SURGICAL HISTORY:  Hypothyroid    HTN (hypertension)    Sciatica    Asthma    No significant past surgical history      MEDICATIONS  (STANDING):  budesonide 160 MICROgram(s)/formoterol 4.5 MICROgram(s) Inhaler 2 Puff(s) Inhalation two times a day  diltiazem    milliGRAM(s) Oral daily  DULoxetine 20 milliGRAM(s) Oral daily  FIRST- Mouthwash  BLM 10 milliLiter(s) Swish and Swallow four times a day  gabapentin 600 milliGRAM(s) Oral three times a day  heparin   Injectable 5000 Unit(s) SubCutaneous every 8 hours  levothyroxine 88 MICROGram(s) Oral daily  melatonin 3 milliGRAM(s) Oral at bedtime  piperacillin/tazobactam IVPB.. 3.375 Gram(s) IV Intermittent every 8 hours  predniSONE   Tablet 10 milliGRAM(s) Oral daily  sodium chloride 0.9%. 1000 milliLiter(s) (75 mL/Hr) IV Continuous <Continuous>  sucralfate 1 Gram(s) Oral two times a day    MEDICATIONS  (PRN):  acetaminophen   Tablet .. 650 milliGRAM(s) Oral every 4 hours PRN Mild Pain (1 - 3)  aluminum hydroxide/magnesium hydroxide/simethicone Suspension 30 milliLiter(s) Oral every 4 hours PRN Dyspepsia  HYDROmorphone  Injectable 1 milliGRAM(s) IV Push every 3 hours PRN Severe Pain (7 - 10)  metoclopramide Injectable 10 milliGRAM(s) IV Push once PRN Nausea and/or Vomiting  ondansetron Injectable 4 milliGRAM(s) IV Push every 6 hours PRN Nausea  oxyCODONE    IR 5 milliGRAM(s) Oral every 4 hours PRN Moderate Pain (4 - 6)    Allergies    ciprofloxacin (Unknown)  penicillin (Unknown)    Intolerances      SOCIAL HISTORY:  FAMILY HISTORY:    REVIEW OF SYSTEMS:  CONSTITUTIONAL: No weakness, fevers or chills  EYES/ENT: No visual changes;  No vertigo or throat pain   NECK: No pain or stiffness  RESPIRATORY: No cough, wheezing, hemoptysis; No shortness of breath  CARDIOVASCULAR: No chest pain or palpitations  GASTROINTESTINAL: Per HPI.  GENITOURINARY: No dysuria, frequency or hematuria  NEUROLOGICAL: No numbness or weakness  SKIN: No itching, burning, rashes, or lesions   PSYCH: Normal mood and affect  All other review of systems is negative unless indicated above.  Vital Signs Last 24 Hrs  T(C): 36.3 (10 Feb 2021 08:35), Max: 37.4 (2021 20:12)  T(F): 97.4 (10 Feb 2021 08:35), Max: 99.4 (2021 20:12)  HR: 84 (10 Feb 2021 08:35) (84 - 94)  BP: 153/85 (10 Feb 2021 08:35) (130/59 - 153/85)  BP(mean): 106 (2021 16:44) (106 - 106)  RR: 18 (10 Feb 2021 08:35) (16 - 18)  SpO2: 96% (10 Feb 2021 08:35) (94% - 98%)    PHYSICAL EXAM:  Constitutional: NAD, well-developed  HEENT: MMM  Neck: No LAD  Respiratory: CTAB  Cardiovascular: S1 and S2, RRR, no M/G/R  Gastrointestinal: BS+, soft, NT/ND  Extremities: No peripheral edema  Vascular: 2+ peripheral pulses  Neurological: A/O x 3, no focal deficits  Psychiatric: Normal mood, normal affect  Skin: No rashes    LABS:                        10.7   22.54 )-----------( 400      ( 10 Feb 2021 08:27 )             33.1     02-09    135  |  102  |  15  ----------------------------<  106<H>  3.5   |  25  |  0.72    Ca    9.2      2021 02:31    TPro  7.4  /  Alb  x   /  TBili  x   /  DBili  x   /  AST  x   /  ALT  x   /  AlkPhos  x         LIVER FUNCTIONS - ( 2021 20:33 )  Alb: x     / Pro: 7.4 g/dL / ALK PHOS: x     / ALT: x     / AST: x     / GGT: x             RADIOLOGY & ADDITIONAL STUDIES:

## 2021-02-10 NOTE — CONSULT NOTE ADULT - ASSESSMENT
54 yo female admitted with lower abdominal pain of unclear etiology.   Ct scan noting pna and ?acute livier, however HIDA scan negative.   Pt seems to be improving.   Tolerating diet.   ABX.   No plans for surgical intervention at this time.   Will not follow daily, pt to f/u as outpatient-call if needed.   Discussed with pt, Dr. Prado.  54 yo female who recently underwent an EGD noting gastritis and EOE with Dr. Burr.     Admitted with lower abdominal pain.     WBC elevated possibly r/t steroid use.   Also SED rate significantly elevated ?vasculitis.   Ct scan noting pna and ?acute livier, however HIDA scan negative.   Pt seems to be improving.   Tolerating diet.   ABX.   No plans for surgical intervention at this time.   Will not follow daily, pt to f/u as outpatient-call if needed.   Rheumatology following.     Discussed with pt, Dr. Prado.  56 yo female who recently underwent an EGD noting gastritis and EOE with Dr. Burr.     Admitted with lower abdominal pain.     WBC elevated possibly r/t steroid use vs. infection.   Ct scan noting pna and ?acute livier, however HIDA scan negative.   Pt seems to be improving.   Tolerating diet.   ABX.   Steriods.  No plans for surgical intervention at this time.   Will not follow daily, pt to f/u as outpatient-call if needed.   Rheumatology following for vasculitis w/u.     Discussed with pt, Dr. Prado.

## 2021-02-10 NOTE — CONSULT NOTE ADULT - ATTENDING COMMENTS
Good that she is now tolerating PO intake and feeling better. Has negative hida scan, lft not concerning. Pna can cause abdominla pain but typically not lower. Would complete abx course at this point. Can follow up as outpatient.

## 2021-02-10 NOTE — PROGRESS NOTE ADULT - ASSESSMENT
#bilateral LL pna in immunocompromised patient  #Acute hypoxic resp failure 2/2 pna- now resolved, pt on RA  #Suspected Acute cholecystitis- ruled out as HIDA neg, appreciate gi and surg input  #Unknown rheumatologic d/o on chronic steroids. Leukocytosis also in setting of steroid use\  # Eosinophilia, recent dx of Eosinophilic esophagitis  # asthma- wheezing and breathing improved  # rash  - budesonide  - steroids if asthma exac  - pulm, rheum appreciated- f/u work up  - cs derm - pt may need biopsy   -Blood cx/Urine cx collected in ED- NTD  -started zosyn for suspected GN edwin pna - will tx as CAP and will cover for GI tract as well- c/w same   -Obtain HIDA scan and f/u with surgery for definitive treatment- NEG        #Asthma/HTN/Hypothyroidism/History of R nephrectomy (? h/o nephrotic syndrome)  -albuterol prn  -Cont home meds for BP contrrol  -Monitor Scr      #Gastritis/Eosinopholic esophagitis/Eosinophilia  -ppi/carafate  -PT with granulomatous lesions to elbows-  -Rheum: recc pulse dose steroids

## 2021-02-10 NOTE — CONSULT NOTE ADULT - PROBLEM/RECOMMENDATION-4
[de-identified] : This patient has right hip osteoarthritis.  The patient is not an appropriate candidate for surgical intervention at this time. An extensive discussion was conducted on the natural history of the disease and the variety of surgical and non-surgical options available to the patient including, but not limited to non-steroidal anti-inflammatory medications, steroid injections, physical therapy, maintenance of ideal body weight, and reduction of activity.  She will take Tylenol for pain.  Home exercise program is recommended.  She is to medically high risk for surgery. The patient will schedule an appointment as needed.\par  DISPLAY PLAN FREE TEXT

## 2021-02-10 NOTE — PROGRESS NOTE ADULT - SUBJECTIVE AND OBJECTIVE BOX
Patient is a 55y old  Female who presents with a chief complaint of abd pain (10 Feb 2021 09:01)      SUBJECTIVE / OVERNIGHT EVENTS:    MEDICATIONS  (STANDING):  budesonide 160 MICROgram(s)/formoterol 4.5 MICROgram(s) Inhaler 2 Puff(s) Inhalation two times a day  diltiazem    milliGRAM(s) Oral daily  DULoxetine 20 milliGRAM(s) Oral daily  FIRST- Mouthwash  BLM 10 milliLiter(s) Swish and Swallow four times a day  gabapentin 600 milliGRAM(s) Oral three times a day  heparin   Injectable 5000 Unit(s) SubCutaneous every 8 hours  levothyroxine 88 MICROGram(s) Oral daily  melatonin 3 milliGRAM(s) Oral at bedtime  piperacillin/tazobactam IVPB.. 3.375 Gram(s) IV Intermittent every 8 hours  predniSONE   Tablet 10 milliGRAM(s) Oral daily  sodium chloride 0.9%. 1000 milliLiter(s) (75 mL/Hr) IV Continuous <Continuous>  sucralfate 1 Gram(s) Oral two times a day    MEDICATIONS  (PRN):  acetaminophen   Tablet .. 650 milliGRAM(s) Oral every 4 hours PRN Mild Pain (1 - 3)  aluminum hydroxide/magnesium hydroxide/simethicone Suspension 30 milliLiter(s) Oral every 4 hours PRN Dyspepsia  HYDROmorphone  Injectable 1 milliGRAM(s) IV Push every 3 hours PRN Severe Pain (7 - 10)  metoclopramide Injectable 10 milliGRAM(s) IV Push once PRN Nausea and/or Vomiting  ondansetron Injectable 4 milliGRAM(s) IV Push every 6 hours PRN Nausea  oxyCODONE    IR 5 milliGRAM(s) Oral every 4 hours PRN Moderate Pain (4 - 6)      CAPILLARY BLOOD GLUCOSE        I&O's Summary    2021 07:01  -  10 Feb 2021 07:00  --------------------------------------------------------  IN: 1000 mL / OUT: 0 mL / NET: 1000 mL        PHYSICAL EXAM:  Vital Signs Last 24 Hrs  T(C): 36.3 (10 Feb 2021 08:35), Max: 37.4 (2021 20:12)  T(F): 97.4 (10 Feb 2021 08:35), Max: 99.4 (2021 20:12)  HR: 84 (10 Feb 2021 08:35) (84 - 94)  BP: 153/85 (10 Feb 2021 08:35) (130/59 - 153/85)  BP(mean): 106 (2021 16:44) (106 - 106)  RR: 18 (10 Feb 2021 08:35) (16 - 18)  SpO2: 96% (10 Feb 2021 08:35) (94% - 98%)  GENERAL: NAD, well-developed  HEAD:  Atraumatic, Normocephalic  EYES: EOMI, PERRLA, conjunctiva and sclera clear  NECK: Supple, No JVD, no LAD  CHEST/LUNG: Clear to auscultation bilaterally; No wheeze, resp unlabored  HEART: Regular rate and rhythm; No murmurs, rubs, or gallops  ABDOMEN: Soft, Nontender, Nondistended; Bowel sounds present, no HSM  EXTREMITIES:  2+ Peripheral Pulses, No clubbing, cyanosis, or edema  PSYCH: AAOx3, normal behavior  NEUROLOGY: non-focal, sensory and cn 2-12 intact  SKIN: No visible rashes or lesions    LABS:                        10.7   22.54 )-----------( 400      ( 10 Feb 2021 08:27 )             33.1     02-10    137  |  105  |  13  ----------------------------<  180<H>  3.8   |  24  |  0.81    Ca    9.2      10 Feb 2021 08:27    TPro  7.9  /  Alb  2.3<L>  /  TBili  0.5  /  DBili  x   /  AST  182<H>  /  ALT  72  /  AlkPhos  128<H>  02-10          Urinalysis Basic - ( 2021 02:31 )    Color: Yellow / Appearance: Clear / S.025 / pH: x  Gluc: x / Ketone: Moderate  / Bili: Negative / Urobili: 4 mg/dL   Blood: x / Protein: 30 mg/dL / Nitrite: Negative   Leuk Esterase: Small / RBC: 0-2 /HPF / WBC 6-10   Sq Epi: x / Non Sq Epi: Moderate / Bacteria: Moderate        Culture - Blood (collected 2021 05:06)  Source: .Blood None  Preliminary Report (10 Feb 2021 10:04):    No growth to date.    Culture - Blood (collected 2021 05:06)  Source: .Blood None  Preliminary Report (10 Feb 2021 10:03):    No growth to date.    Culture - Urine (collected 2021 02:31)  Source: .Urine Clean Catch (Midstream)  Final Report (10 Feb 2021 08:29):    <10,000 CFU/mL Normal Urogenital Amy        RADIOLOGY & ADDITIONAL TESTS:    Imaging Personally Reviewed:    Consultant(s) Notes Reviewed:    rheum  Care Discussed with Consultants/Other Providers:   Patient is a 55y old  Female who presents with a chief complaint of abd pain (10 Feb 2021 09:01)  56 yo F with pmh R nephrectomy in 2004, HTN, asthma, hypothyroidism, unknown rheumatologic disorder with peripheral neuropathy for which she has been on chronic steroids since 2020 (presently on 10mg po qd) now presenting with c/o several weeks of intermittent epigastric pain which radiates to back. Pain worse post prandially. Associated with non bloody emesis. She recently underwent EGD with Dr hernandez approx 1 week ago and findings were indicative of gastritis.   Otherwise denies joint pain, arthralgias, rash, fevers or ulcerations. No chest pain or interscapular pain. Hemodynamically stable however she was found to be hypoxic in ED to 88%. Covid negative however CT abd demonstrated bilateral LL pna and GB wall thickening and pericholecystic fluid. U/S or RUQ also redemonstrated GB wall thickening and edema. No stones. LFTS/Bili normal. Pt is afebrile.  ED course: Nl lactate. WBC 24K. Given ceftriaxone and flagyl. IV morphine plus 2L NS    SUBJECTIVE / OVERNIGHT EVENTS:  2/10- pt feels better in terms of her breathing, although still MEAD walking to bathroom.  She has vasculitic rash over her legs which rheum had been consulted, but rash has now progressed up her legs.  Pt is on weeklsy b12 injections, she's receive 7/12 weeks and requesting this week's dose. she feels the ulcers in her mouth are better.     d/w Dr Norton- macy w/ derm cx, tx for pna, rheum work up, h/o eosinophilic esophagitis.     MEDICATIONS  (STANDING):  budesonide 160 MICROgram(s)/formoterol 4.5 MICROgram(s) Inhaler 2 Puff(s) Inhalation two times a day  diltiazem    milliGRAM(s) Oral daily  DULoxetine 20 milliGRAM(s) Oral daily  FIRST- Mouthwash  BLM 10 milliLiter(s) Swish and Swallow four times a day  gabapentin 600 milliGRAM(s) Oral three times a day  heparin   Injectable 5000 Unit(s) SubCutaneous every 8 hours  levothyroxine 88 MICROGram(s) Oral daily  melatonin 3 milliGRAM(s) Oral at bedtime  piperacillin/tazobactam IVPB.. 3.375 Gram(s) IV Intermittent every 8 hours  predniSONE   Tablet 10 milliGRAM(s) Oral daily  sodium chloride 0.9%. 1000 milliLiter(s) (75 mL/Hr) IV Continuous <Continuous>  sucralfate 1 Gram(s) Oral two times a day    MEDICATIONS  (PRN):  acetaminophen   Tablet .. 650 milliGRAM(s) Oral every 4 hours PRN Mild Pain (1 - 3)  aluminum hydroxide/magnesium hydroxide/simethicone Suspension 30 milliLiter(s) Oral every 4 hours PRN Dyspepsia  HYDROmorphone  Injectable 1 milliGRAM(s) IV Push every 3 hours PRN Severe Pain (7 - 10)  metoclopramide Injectable 10 milliGRAM(s) IV Push once PRN Nausea and/or Vomiting  ondansetron Injectable 4 milliGRAM(s) IV Push every 6 hours PRN Nausea  oxyCODONE    IR 5 milliGRAM(s) Oral every 4 hours PRN Moderate Pain (4 - 6)      I&O's Summary    2021 07:01  -  10 Feb 2021 07:00  --------------------------------------------------------  IN: 1000 mL / OUT: 0 mL / NET: 1000 mL        PHYSICAL EXAM:  Vital Signs Last 24 Hrs  T(C): 36.3 (10 Feb 2021 08:35), Max: 37.4 (2021 20:12)  T(F): 97.4 (10 Feb 2021 08:35), Max: 99.4 (2021 20:12)  HR: 84 (10 Feb 2021 08:35) (84 - 94)  BP: 153/85 (10 Feb 2021 08:35) (130/59 - 153/85)  BP(mean): 106 (2021 16:44) (106 - 106)  RR: 18 (10 Feb 2021 08:35) (16 - 18)  SpO2: 96% (10 Feb 2021 08:35) (94% - 98%)  GENERAL: NAD, well-developed  HEAD:  Atraumatic, Normocephalic  EYES: EOMI, PERRLA, conjunctiva and sclera clear  NECK: Supple, No JVD, no LAD  CHEST/LUNG: Clear to auscultation bilaterally; No wheeze, resp unlabored  HEART: Regular rate and rhythm; No murmurs, rubs, or gallops  ABDOMEN: Soft, Nontender, Nondistended; Bowel sounds present, no HSM  EXTREMITIES:  2+ Peripheral Pulses, No clubbing, cyanosis, or edema  PSYCH: AAOx3, normal behavior  NEUROLOGY: non-focal, sensory and cn 2-12 intact  SKIN: No erythematous  maculopapular  rash over lower legs b/l     LABS:                        10.7   22.54 )-----------( 400      ( 10 Feb 2021 08:27 )             33.1     02-10    137  |  105  |  13  ----------------------------<  180<H>  3.8   |  24  |  0.81    Ca    9.2      10 Feb 2021 08:27    TPro  7.9  /  Alb  2.3<L>  /  TBili  0.5  /  DBili  x   /  AST  182<H>  /  ALT  72  /  AlkPhos  128<H>  02-10          Urinalysis Basic - ( 2021 02:31 )    Color: Yellow / Appearance: Clear / S.025 / pH: x  Gluc: x / Ketone: Moderate  / Bili: Negative / Urobili: 4 mg/dL   Blood: x / Protein: 30 mg/dL / Nitrite: Negative   Leuk Esterase: Small / RBC: 0-2 /HPF / WBC 6-10   Sq Epi: x / Non Sq Epi: Moderate / Bacteria: Moderate        Culture - Blood (collected 2021 05:06)  Source: .Blood None  Preliminary Report (10 Feb 2021 10:04):    No growth to date.    Culture - Blood (collected 2021 05:06)  Source: .Blood None  Preliminary Report (10 Feb 2021 10:03):    No growth to date.    Culture - Urine (collected 2021 02:31)  Source: .Urine Clean Catch (Midstream)  Final Report (10 Feb 2021 08:29):    <10,000 CFU/mL Normal Urogenital Amy        RADIOLOGY & ADDITIONAL TESTS:    Imaging Personally Reviewed:    Consultant(s) Notes Reviewed:    rheum, GI, surg   Care Discussed with Consultants/Other Providers:  pulm

## 2021-02-10 NOTE — CONSULT NOTE ADULT - ASSESSMENT
CT reviewed with CT radiologist, agree treat for PNA with IV Abx's.      Pt received solumedrol 185 mg total yesterday, continue on prednisone 10 mg/d.  Follow asthma on symbicort (pt had not received this AM), if active would readd IV steroids.     For rheumatology evaluation. eval for eosinophilic d/o, churg gurwinder (EGPA), CTD.  Consider skin bx.     Recommend echo given findings on CT chest s.o. pulmonary HTN.     For following CT scan in 3 months.  CT reviewed with CT radiologist, agree treat for PNA with IV Abx's.      Pt received solumedrol 185 mg total yesterday, continue on prednisone 10 mg/d.  Follow asthma on symbicort (pt had not received this AM), if active would add IV steroids.     For rheumatology evaluation. eval for possible eosinophilic d/o, churg gurwinder (EGPA), or CTD.  Consider skin bx.   Will order J LUIS, compliments, IgE level, ANCA's.     Recommend echo given findings on CT chest s.o. pulmonary HTN.     For following CT scan in 3 months.  CT reviewed with CT radiologist, agree treat for PNA with IV Abx's.      Pt received solumedrol 185 mg total yesterday, continue on prednisone 10 mg/d.  Follow asthma on symbicort (pt had not received this AM), if active would add IV steroids.     For rheumatology evaluation. eval for possible eosinophilic d/o, churg gurwinder (EGPA), CTD, etc.  Dermatology, skin bx.   Will order J LUIS, compliments, IgE level, ANCA's.     Recommend echo given findings on CT chest s.o. pulmonary HTN.     For following CT scan in 3 months.

## 2021-02-10 NOTE — CONSULT NOTE ADULT - SUBJECTIVE AND OBJECTIVE BOX
HPI:  56 yo F with pmh R nephrectomy in , HTN, asthma, hypothyroidism, unknown rheumatologic disorder with peripheral neuropathy for which she has been on chronic steroids since 2020 (presently on 10mg po qd) now presenting with c/o several weeks of intermittent epigastric pain which radiates to back. Pain worse post prandially. Associated with non bloody emesis. She recently underwent EGD with Dr hernandez approx 1 week ago and findings were indicative of gastritis.   Otherwise denies joint pain, arthralgias, rash, fevers or ulcerations. No chest pain or interscapular pain. Hemodynamically stable however she was found to be hypoxic in ED to 88%. Covid negative however CT abd demonstrated bilateral LL pna and GB wall thickening and pericholecystic fluid. U/S or RUQ also redemonstrated GB wall thickening and edema. No stones. LFTS/Bili normal. Pt is afebrile.    ED course: Nl lactate. WBC 24K. Given ceftriaxone and flagyl. IV morphine plus 2L NS    Pt with h.o. B12 defic, PA, weakness, malaise, peripheral neuropathy, asthma.     2/10/21:  abd pain better, no cough, but senses some SOB walking to BR.  no sputum production or hemopptysis.  On 2 L/min NC O2.       Social: neg x 3    Surg Hx:  R nephrectomy  Csection x2; no h/o VTE or miscarriages    Fam Hx:   Father  of cardiomyopathy at age 54 - unknown etiology  Daughter: h/o pericarditis unknown etiology   Mother  of colon ca in her 60s    (2021 09:09)      PAST MEDICAL & SURGICAL HISTORY:  Hypothyroid    HTN (hypertension)    Sciatica    Asthma    No significant past surgical history        MEDICATIONS  (STANDING):  budesonide 160 MICROgram(s)/formoterol 4.5 MICROgram(s) Inhaler 2 Puff(s) Inhalation two times a day  cyanocobalamin Injectable 1000 MICROGram(s) IntraMuscular once  diltiazem    milliGRAM(s) Oral daily  DULoxetine 20 milliGRAM(s) Oral daily  FIRST- Mouthwash  BLM 10 milliLiter(s) Swish and Swallow four times a day  gabapentin 600 milliGRAM(s) Oral three times a day  heparin   Injectable 5000 Unit(s) SubCutaneous every 8 hours  levothyroxine 88 MICROGram(s) Oral daily  melatonin 3 milliGRAM(s) Oral at bedtime  piperacillin/tazobactam IVPB.. 3.375 Gram(s) IV Intermittent every 8 hours  predniSONE   Tablet 10 milliGRAM(s) Oral daily  sodium chloride 0.9%. 1000 milliLiter(s) (75 mL/Hr) IV Continuous <Continuous>  sucralfate 1 Gram(s) Oral two times a day    MEDICATIONS  (PRN):  acetaminophen   Tablet .. 650 milliGRAM(s) Oral every 4 hours PRN Mild Pain (1 - 3)  aluminum hydroxide/magnesium hydroxide/simethicone Suspension 30 milliLiter(s) Oral every 4 hours PRN Dyspepsia  HYDROmorphone  Injectable 1 milliGRAM(s) IV Push every 3 hours PRN Severe Pain (7 - 10)  metoclopramide Injectable 10 milliGRAM(s) IV Push once PRN Nausea and/or Vomiting  ondansetron Injectable 4 milliGRAM(s) IV Push every 6 hours PRN Nausea  oxyCODONE    IR 5 milliGRAM(s) Oral every 4 hours PRN Moderate Pain (4 - 6)      Allergies    ciprofloxacin (Unknown)  penicillin (Unknown)    Intolerances        SOCIAL HISTORY: Denies tobacco, etoh abuse or illicit drug use    FAMILY HISTORY:      Vital Signs Last 24 Hrs  T(C): 36.7 (10 Feb 2021 17:04), Max: 37.4 (2021 20:12)  T(F): 98 (10 Feb 2021 17:04), Max: 99.4 (2021 20:12)  HR: 81 (10 Feb 2021 17:04) (81 - 94)  BP: 143/74 (10 Feb 2021 17:04) (130/59 - 153/85)  BP(mean): --  RR: 18 (10 Feb 2021 17:04) (18 - 18)  SpO2: 97% (10 Feb 2021 17:04) (94% - 97%)    REVIEW OF SYSTEMS:    CONSTITUTIONAL:  As per HPI.  HEENT:  Eyes:  No diplopia or blurred vision. ENT:  No earache, sore throat or runny nose.  CARDIOVASCULAR:  No pressure, squeezing, tightness, heaviness or aching about the chest, neck, axilla or epigastrium.  RESPIRATORY:  see above  GASTROINTESTINAL:  see above  GENITOURINARY:  No dysuria, frequency or urgency.  MUSCULOSKELETAL:  As per HPI.  SKIN: rash LE's  NEUROLOGIC:  No paresthesias, fasciculations, seizures or weakness.  PSYCHIATRIC:  No disorder of thought or mood.  ENDOCRINE:  No heat or cold intolerance, polyuria or polydipsia.  HEMATOLOGICAL:  No easy bruising or bleedings:  .     PHYSICAL EXAMINATION:    GENERAL APPEARANCE:  Pt. is not currently dyspneic, in no distress. Pt. is alert, oriented, and pleasant.  HEENT:  Pupils are normal and react normally. No icterus. Mucous membranes well colored.  NECK:  Supple. No lymphadenopathy. Jugular venous pressure not elevated. Carotids equal.   HEART:   The cardiac impulse has a normal quality. Regular. Normal S1 and S2. HR 84.   CHEST:  Initial slight wheeze audible, cleared with deep breaths. Normal respiratory effort.  ABDOMEN:  Soft and nontender.   EXTREMITIES:  There is no cyanosis, clubbing or edema.   SKIN:  Isolated red lesions LE's.   Neuro: Alert, awake, and O x 3.      LABS:                        10.7   22.54 )-----------( 400      ( 10 Feb 2021 08:27 )             33.1     02-10    137  |  105  |  13  ----------------------------<  180<H>  3.8   |  24  |  0.81    Ca    9.2      10 Feb 2021 08:27    TPro  7.9  /  Alb  2.3<L>  /  TBili  0.5  /  DBili  x   /  AST  182<H>  /  ALT  72  /  AlkPhos  128<H>  0210    LIVER FUNCTIONS - ( 10 Feb 2021 08:27 )  Alb: 2.3 g/dL / Pro: 7.9 gm/dL / ALK PHOS: 128 U/L / ALT: 72 U/L / AST: 182 U/L / GGT: x                 Urinalysis Basic - ( 2021 02:31 )    Color: Yellow / Appearance: Clear / S.025 / pH: x  Gluc: x / Ketone: Moderate  / Bili: Negative / Urobili: 4 mg/dL   Blood: x / Protein: 30 mg/dL / Nitrite: Negative   Leuk Esterase: Small / RBC: 0-2 /HPF / WBC 6-10   Sq Epi: x / Non Sq Epi: Moderate / Bacteria: Moderate          RADIOLOGY & ADDITIONAL STUDIES:     EXAM:  CT CHEST                            PROCEDURE DATE:  2021          INTERPRETATION:  CLINICAL INFORMATION: Vasculitis. Rule out granulomatous lung disease    COMPARISON: Same day abdominal CT scan was reviewed.    PROCEDURE:  CT of the Chest was performed without intravenous contrast.  Sagittal and coronal reformats were performed.    FINDINGS:    LUNGS AND AIRWAYS: Patent central airways.  Multiple small scattered ill-defined nodules, most pronounced in the right lower lobe. Scattered areas of groundglass opacity and septal thickening bilaterally.  PLEURA: No pleural effusion.  MEDIASTINUM AND JOY: No lymphadenopathy.  VESSELS: Normal caliber aorta. Dilated main pulmonary artery measuring 3.4 cm.  HEART: Heart size is normal. No pericardial effusion.  CHEST WALL AND LOWER NECK: Within normal limits.  VISUALIZED UPPER ABDOMEN: Liver cyst. Status post right nephrectomy.  BONES: Within normal limits.    IMPRESSION:  Diffuse nodular and groundglass opacities bilaterally and areas of septal thickening compatible most likely due to infection. Recommend follow-up CT in 3 months to evaluate for possible resolution.      Enlarged pulmonary artery suggestive of pulmonary hypertension.       HPI:  54 yo F with pmh R nephrectomy in , HTN, asthma, hypothyroidism, unknown rheumatologic disorder with peripheral neuropathy for which she has been on chronic steroids since 2020 (presently on 10mg po qd) now presenting with c/o several weeks of intermittent epigastric pain which radiates to back. Pain worse post prandially. Associated with non bloody emesis. She recently underwent EGD with Dr hernandez approx 1 week ago and findings were indicative of gastritis.   Otherwise denies joint pain, arthralgias, rash, fevers or ulcerations. No chest pain or interscapular pain. Hemodynamically stable however she was found to be hypoxic in ED to 88%. Covid negative however CT abd demonstrated bilateral LL pna and GB wall thickening and pericholecystic fluid. U/S or RUQ also redemonstrated GB wall thickening and edema. No stones. LFTS/Bili normal. Pt is afebrile.    ED course: Nl lactate. WBC 24K. Given ceftriaxone and flagyl. IV morphine plus 2L NS    Pt with h.o. B12 defic, PA, weakness, malaise, peripheral neuropathy, asthma.     2/10/21:  no distress, abd pain better, no cough, but senses some SOB walking to BR.  no sputum production or hemopptysis.  On 2 L/min NC O2.       Social: neg x 3    Surg Hx:  R nephrectomy  Csection x2; no h/o VTE or miscarriages    Fam Hx:   Father  of cardiomyopathy at age 54 - unknown etiology  Daughter: h/o pericarditis unknown etiology   Mother  of colon ca in her 60s    (2021 09:09)      PAST MEDICAL & SURGICAL HISTORY:  Hypothyroid    HTN (hypertension)    Sciatica    Asthma    No significant past surgical history        MEDICATIONS  (STANDING):  budesonide 160 MICROgram(s)/formoterol 4.5 MICROgram(s) Inhaler 2 Puff(s) Inhalation two times a day  cyanocobalamin Injectable 1000 MICROGram(s) IntraMuscular once  diltiazem    milliGRAM(s) Oral daily  DULoxetine 20 milliGRAM(s) Oral daily  FIRST- Mouthwash  BLM 10 milliLiter(s) Swish and Swallow four times a day  gabapentin 600 milliGRAM(s) Oral three times a day  heparin   Injectable 5000 Unit(s) SubCutaneous every 8 hours  levothyroxine 88 MICROGram(s) Oral daily  melatonin 3 milliGRAM(s) Oral at bedtime  piperacillin/tazobactam IVPB.. 3.375 Gram(s) IV Intermittent every 8 hours  predniSONE   Tablet 10 milliGRAM(s) Oral daily  sodium chloride 0.9%. 1000 milliLiter(s) (75 mL/Hr) IV Continuous <Continuous>  sucralfate 1 Gram(s) Oral two times a day    MEDICATIONS  (PRN):  acetaminophen   Tablet .. 650 milliGRAM(s) Oral every 4 hours PRN Mild Pain (1 - 3)  aluminum hydroxide/magnesium hydroxide/simethicone Suspension 30 milliLiter(s) Oral every 4 hours PRN Dyspepsia  HYDROmorphone  Injectable 1 milliGRAM(s) IV Push every 3 hours PRN Severe Pain (7 - 10)  metoclopramide Injectable 10 milliGRAM(s) IV Push once PRN Nausea and/or Vomiting  ondansetron Injectable 4 milliGRAM(s) IV Push every 6 hours PRN Nausea  oxyCODONE    IR 5 milliGRAM(s) Oral every 4 hours PRN Moderate Pain (4 - 6)      Allergies    ciprofloxacin (Unknown)  penicillin (Unknown)    Intolerances        SOCIAL HISTORY: Denies tobacco, etoh abuse or illicit drug use    FAMILY HISTORY:      Vital Signs Last 24 Hrs  T(C): 36.7 (10 Feb 2021 17:04), Max: 37.4 (2021 20:12)  T(F): 98 (10 Feb 2021 17:04), Max: 99.4 (2021 20:12)  HR: 81 (10 Feb 2021 17:04) (81 - 94)  BP: 143/74 (10 Feb 2021 17:04) (130/59 - 153/85)  BP(mean): --  RR: 18 (10 Feb 2021 17:04) (18 - 18)  SpO2: 97% (10 Feb 2021 17:04) (94% - 97%)    REVIEW OF SYSTEMS:    CONSTITUTIONAL:  As per HPI.  HEENT:  Eyes:  No diplopia or blurred vision. ENT:  No earache, sore throat or runny nose.  CARDIOVASCULAR:  No pressure, squeezing, tightness, heaviness or aching about the chest, neck, axilla or epigastrium.  RESPIRATORY:  see above  GASTROINTESTINAL:  see above  GENITOURINARY:  No dysuria, frequency or urgency.  MUSCULOSKELETAL:  As per HPI.  SKIN: rash LE's  NEUROLOGIC:  No paresthesias, fasciculations, seizures or weakness.  PSYCHIATRIC:  No disorder of thought or mood.  ENDOCRINE:  No heat or cold intolerance, polyuria or polydipsia.  HEMATOLOGICAL:  No easy bruising or bleedings:  .     PHYSICAL EXAMINATION:    GENERAL APPEARANCE:  Pt. is not currently dyspneic, in no distress. Pt. is alert, oriented, and pleasant.  HEENT:  Pupils are normal and react normally. No icterus. Mucous membranes well colored.  NECK:  Supple. No lymphadenopathy. Jugular venous pressure not elevated. Carotids equal.   HEART:   The cardiac impulse has a normal quality. Regular. Normal S1 and S2. HR 84.   CHEST:  Initial slight wheeze audible, cleared with deep breaths. Normal respiratory effort.  ABDOMEN:  Soft and nontender.   EXTREMITIES:  There is no cyanosis, clubbing or edema.   SKIN:  Isolated red lesions LE's.   Neuro: Alert, awake, and O x 3.      LABS:                        10.7   22.54 )-----------( 400      ( 10 Feb 2021 08:27 )             33.1     02-10    137  |  105  |  13  ----------------------------<  180<H>  3.8   |  24  |  0.81    Ca    9.2      10 Feb 2021 08:27    TPro  7.9  /  Alb  2.3<L>  /  TBili  0.5  /  DBili  x   /  AST  182<H>  /  ALT  72  /  AlkPhos  128<H>  10    LIVER FUNCTIONS - ( 10 Feb 2021 08:27 )  Alb: 2.3 g/dL / Pro: 7.9 gm/dL / ALK PHOS: 128 U/L / ALT: 72 U/L / AST: 182 U/L / GGT: x                 Urinalysis Basic - ( 2021 02:31 )    Color: Yellow / Appearance: Clear / S.025 / pH: x  Gluc: x / Ketone: Moderate  / Bili: Negative / Urobili: 4 mg/dL   Blood: x / Protein: 30 mg/dL / Nitrite: Negative   Leuk Esterase: Small / RBC: 0-2 /HPF / WBC 6-10   Sq Epi: x / Non Sq Epi: Moderate / Bacteria: Moderate          RADIOLOGY & ADDITIONAL STUDIES:     EXAM:  CT CHEST                            PROCEDURE DATE:  2021          INTERPRETATION:  CLINICAL INFORMATION: Vasculitis. Rule out granulomatous lung disease    COMPARISON: Same day abdominal CT scan was reviewed.    PROCEDURE:  CT of the Chest was performed without intravenous contrast.  Sagittal and coronal reformats were performed.    FINDINGS:    LUNGS AND AIRWAYS: Patent central airways.  Multiple small scattered ill-defined nodules, most pronounced in the right lower lobe. Scattered areas of groundglass opacity and septal thickening bilaterally.  PLEURA: No pleural effusion.  MEDIASTINUM AND JOY: No lymphadenopathy.  VESSELS: Normal caliber aorta. Dilated main pulmonary artery measuring 3.4 cm.  HEART: Heart size is normal. No pericardial effusion.  CHEST WALL AND LOWER NECK: Within normal limits.  VISUALIZED UPPER ABDOMEN: Liver cyst. Status post right nephrectomy.  BONES: Within normal limits.    IMPRESSION:  Diffuse nodular and groundglass opacities bilaterally and areas of septal thickening compatible most likely due to infection. Recommend follow-up CT in 3 months to evaluate for possible resolution.      Enlarged pulmonary artery suggestive of pulmonary hypertension.

## 2021-02-11 ENCOUNTER — APPOINTMENT (OUTPATIENT)
Dept: INTERNAL MEDICINE | Facility: CLINIC | Age: 56
End: 2021-02-11

## 2021-02-11 DIAGNOSIS — R21 RASH AND OTHER NONSPECIFIC SKIN ERUPTION: ICD-10-CM

## 2021-02-11 LAB
ALBUMIN SERPL ELPH-MCNC: 2.3 G/DL — LOW (ref 3.3–5)
ALP SERPL-CCNC: 115 U/L — SIGNIFICANT CHANGE UP (ref 40–120)
ALT FLD-CCNC: 82 U/L — HIGH (ref 12–78)
ANION GAP SERPL CALC-SCNC: 8 MMOL/L — SIGNIFICANT CHANGE UP (ref 5–17)
AST SERPL-CCNC: 192 U/L — HIGH (ref 15–37)
BASOPHILS # BLD AUTO: 0.03 K/UL — SIGNIFICANT CHANGE UP (ref 0–0.2)
BASOPHILS NFR BLD AUTO: 0.2 % — SIGNIFICANT CHANGE UP (ref 0–2)
BILIRUB SERPL-MCNC: 0.4 MG/DL — SIGNIFICANT CHANGE UP (ref 0.2–1.2)
BUN SERPL-MCNC: 16 MG/DL — SIGNIFICANT CHANGE UP (ref 7–23)
CALCIUM SERPL-MCNC: 9 MG/DL — SIGNIFICANT CHANGE UP (ref 8.5–10.1)
CHLORIDE SERPL-SCNC: 106 MMOL/L — SIGNIFICANT CHANGE UP (ref 96–108)
CK SERPL-CCNC: 315 U/L — HIGH (ref 26–192)
CO2 SERPL-SCNC: 25 MMOL/L — SIGNIFICANT CHANGE UP (ref 22–31)
CREAT SERPL-MCNC: 0.9 MG/DL — SIGNIFICANT CHANGE UP (ref 0.5–1.3)
EOSINOPHIL # BLD AUTO: 0.02 K/UL — SIGNIFICANT CHANGE UP (ref 0–0.5)
EOSINOPHIL NFR BLD AUTO: 0.1 % — SIGNIFICANT CHANGE UP (ref 0–6)
GLUCOSE SERPL-MCNC: 165 MG/DL — HIGH (ref 70–99)
HCT VFR BLD CALC: 33 % — LOW (ref 34.5–45)
HGB BLD-MCNC: 10.5 G/DL — LOW (ref 11.5–15.5)
IGE SERPL-ACNC: 1412 KU/L — HIGH
IMM GRANULOCYTES NFR BLD AUTO: 1.1 % — SIGNIFICANT CHANGE UP (ref 0–1.5)
LYMPHOCYTES # BLD AUTO: 1.53 K/UL — SIGNIFICANT CHANGE UP (ref 1–3.3)
LYMPHOCYTES # BLD AUTO: 8.6 % — LOW (ref 13–44)
MCHC RBC-ENTMCNC: 28 PG — SIGNIFICANT CHANGE UP (ref 27–34)
MCHC RBC-ENTMCNC: 31.8 GM/DL — LOW (ref 32–36)
MCV RBC AUTO: 88 FL — SIGNIFICANT CHANGE UP (ref 80–100)
MONOCYTES # BLD AUTO: 0.48 K/UL — SIGNIFICANT CHANGE UP (ref 0–0.9)
MONOCYTES NFR BLD AUTO: 2.7 % — SIGNIFICANT CHANGE UP (ref 2–14)
NEUTROPHILS # BLD AUTO: 15.45 K/UL — HIGH (ref 1.8–7.4)
NEUTROPHILS NFR BLD AUTO: 87.3 % — HIGH (ref 43–77)
PLATELET # BLD AUTO: 412 K/UL — HIGH (ref 150–400)
POTASSIUM SERPL-MCNC: 3.5 MMOL/L — SIGNIFICANT CHANGE UP (ref 3.5–5.3)
POTASSIUM SERPL-SCNC: 3.5 MMOL/L — SIGNIFICANT CHANGE UP (ref 3.5–5.3)
PROT SERPL-MCNC: 7.5 GM/DL — SIGNIFICANT CHANGE UP (ref 6–8.3)
RBC # BLD: 3.75 M/UL — LOW (ref 3.8–5.2)
RBC # FLD: 14.3 % — SIGNIFICANT CHANGE UP (ref 10.3–14.5)
SARS-COV-2 IGG SERPL QL IA: NEGATIVE — SIGNIFICANT CHANGE UP
SARS-COV-2 IGM SERPL IA-ACNC: <0.1 INDEX — SIGNIFICANT CHANGE UP
SODIUM SERPL-SCNC: 139 MMOL/L — SIGNIFICANT CHANGE UP (ref 135–145)
TOTAL HEM COMP BLD-ACNC: 26 U/ML — LOW (ref 42–95)
WBC # BLD: 17.71 K/UL — HIGH (ref 3.8–10.5)
WBC # FLD AUTO: 17.71 K/UL — HIGH (ref 3.8–10.5)

## 2021-02-11 PROCEDURE — 99233 SBSQ HOSP IP/OBS HIGH 50: CPT

## 2021-02-11 PROCEDURE — 11105 PUNCH BX SKIN EA SEP/ADDL: CPT | Mod: 59

## 2021-02-11 PROCEDURE — 11104 PUNCH BX SKIN SINGLE LESION: CPT | Mod: 59

## 2021-02-11 PROCEDURE — 93306 TTE W/DOPPLER COMPLETE: CPT | Mod: 26

## 2021-02-11 PROCEDURE — 99222 1ST HOSP IP/OBS MODERATE 55: CPT | Mod: 25

## 2021-02-11 RX ADMIN — BUDESONIDE AND FORMOTEROL FUMARATE DIHYDRATE 2 PUFF(S): 160; 4.5 AEROSOL RESPIRATORY (INHALATION) at 09:12

## 2021-02-11 RX ADMIN — DIPHENHYDRAMINE HYDROCHLORIDE AND LIDOCAINE HYDROCHLORIDE AND ALUMINUM HYDROXIDE AND MAGNESIUM HYDRO 10 MILLILITER(S): KIT at 17:06

## 2021-02-11 RX ADMIN — Medication 40 MILLIGRAM(S): at 21:16

## 2021-02-11 RX ADMIN — Medication 1 GRAM(S): at 05:31

## 2021-02-11 RX ADMIN — GABAPENTIN 600 MILLIGRAM(S): 400 CAPSULE ORAL at 05:31

## 2021-02-11 RX ADMIN — DULOXETINE HYDROCHLORIDE 20 MILLIGRAM(S): 30 CAPSULE, DELAYED RELEASE ORAL at 09:10

## 2021-02-11 RX ADMIN — HEPARIN SODIUM 5000 UNIT(S): 5000 INJECTION INTRAVENOUS; SUBCUTANEOUS at 06:18

## 2021-02-11 RX ADMIN — HEPARIN SODIUM 5000 UNIT(S): 5000 INJECTION INTRAVENOUS; SUBCUTANEOUS at 14:09

## 2021-02-11 RX ADMIN — Medication 3 MILLIGRAM(S): at 21:16

## 2021-02-11 RX ADMIN — PIPERACILLIN AND TAZOBACTAM 25 GRAM(S): 4; .5 INJECTION, POWDER, LYOPHILIZED, FOR SOLUTION INTRAVENOUS at 14:08

## 2021-02-11 RX ADMIN — HEPARIN SODIUM 5000 UNIT(S): 5000 INJECTION INTRAVENOUS; SUBCUTANEOUS at 21:16

## 2021-02-11 RX ADMIN — Medication 88 MICROGRAM(S): at 05:31

## 2021-02-11 RX ADMIN — PREGABALIN 1000 MICROGRAM(S): 225 CAPSULE ORAL at 09:58

## 2021-02-11 RX ADMIN — DIPHENHYDRAMINE HYDROCHLORIDE AND LIDOCAINE HYDROCHLORIDE AND ALUMINUM HYDROXIDE AND MAGNESIUM HYDRO 10 MILLILITER(S): KIT at 06:18

## 2021-02-11 RX ADMIN — GABAPENTIN 600 MILLIGRAM(S): 400 CAPSULE ORAL at 21:16

## 2021-02-11 RX ADMIN — Medication 1 GRAM(S): at 17:05

## 2021-02-11 RX ADMIN — GABAPENTIN 600 MILLIGRAM(S): 400 CAPSULE ORAL at 14:09

## 2021-02-11 RX ADMIN — PIPERACILLIN AND TAZOBACTAM 25 GRAM(S): 4; .5 INJECTION, POWDER, LYOPHILIZED, FOR SOLUTION INTRAVENOUS at 05:30

## 2021-02-11 RX ADMIN — Medication 40 MILLIGRAM(S): at 09:10

## 2021-02-11 RX ADMIN — Medication 10 MILLIGRAM(S): at 05:31

## 2021-02-11 RX ADMIN — DIPHENHYDRAMINE HYDROCHLORIDE AND LIDOCAINE HYDROCHLORIDE AND ALUMINUM HYDROXIDE AND MAGNESIUM HYDRO 10 MILLILITER(S): KIT at 21:17

## 2021-02-11 RX ADMIN — Medication 240 MILLIGRAM(S): at 09:11

## 2021-02-11 RX ADMIN — BUDESONIDE AND FORMOTEROL FUMARATE DIHYDRATE 2 PUFF(S): 160; 4.5 AEROSOL RESPIRATORY (INHALATION) at 21:01

## 2021-02-11 RX ADMIN — DIPHENHYDRAMINE HYDROCHLORIDE AND LIDOCAINE HYDROCHLORIDE AND ALUMINUM HYDROXIDE AND MAGNESIUM HYDRO 10 MILLILITER(S): KIT at 11:43

## 2021-02-11 NOTE — CONSULT NOTE ADULT - ADDITIONAL PE
Purpuric violaceous plaques with hemorrhagic crust on bl elbows    Erythematous to violaceous purpuric nonblanching macules and few papules on the bilateral lower legs >>> thighs

## 2021-02-11 NOTE — PROGRESS NOTE ADULT - ASSESSMENT
#bilateral LL pna in immunocompromised patient  #Acute hypoxic resp failure 2/2 pna- now resolved, pt on RA  #Suspected Acute cholecystitis- ruled out as HIDA neg, appreciate gi and surg input  #Unknown rheumatologic d/o on chronic steroids. Leukocytosis also in setting of steroid use\  # Eosinophilia, recent dx of Eosinophilic esophagitis  # asthma- wheezing and breathing improved  # rash  - budesonide  - steroids if asthma exac  - pulm, rheum appreciated- f/u work up  - cs derm - pt may need biopsy   -Blood cx/Urine cx collected in ED- NTD  -started zosyn for suspected GN edwin pna - will tx as CAP and will cover for GI tract as well- c/w same   -Obtain HIDA scan and f/u with surgery for definitive treatment- NEG        #Asthma/HTN/Hypothyroidism/History of R nephrectomy (? h/o nephrotic syndrome)  -albuterol prn  -Cont home meds for BP contrrol  -Monitor Scr      #Gastritis/Eosinopholic esophagitis/Eosinophilia  -ppi/carafate  -PT with granulomatous lesions to elbows-  -Rheum: recc pulse dose steroids #bilateral LL pna in immunocompromised patient- doubt PNA, no e/o sepsis, pt afebrile,   - will monitor off abx    #Acute hypoxic resp failure initially suspected 2/2 pna- now resolved, pt on RA, doubt pna, more likely d/t asthma   - pt continues on RA  - breathing improved on IV solumedrol   - will check TTE    # EGPA suspected given  Eosinophilia, recent dx of Eosinophilic esophagitis, asthma, vasculitis, neuropathy  - rheum, derm, pulm f/u   - d/w derm for skin biopsy   - f/u rheum work up   - continue steroids, solumedrol 40 mg bid- consider slow taper  - may need bactrim for PCP prophylaxis- request ID cs   -dermatology consult for skin biopsy today     #Eosinopholic esophagitis/Eosinophilia  -ppi/carafate  -PT with granulomatous lesions to elbows-    #MGUS on recent labs,   - consult oncology     #leukocytosis- likely steroid induced, doubt infection - monitor   -Blood cx/Urine cx collected in ED- NTD  -monitor of IV abx   - if new fevers, consider ID cs     # asthma- wheezing and breathing improved  - budesonide  - IV steroids    #Gallbladder wall thickening, ruled out acute cholecystitis- HIDA neg, appreciate gi and surg input

## 2021-02-11 NOTE — CDI QUERY NOTE - [id number]_GEN_ALL_CORE_HH
ED Provider documentation:  SEPSIS – was this patient treated for sepsis? Yes.     ED nurse:   · HPI Objective Statement: pt BIBEMS, A&O x4. c/o abdominal pain. had endoscopy on Monday, reports diagnosis of gastritis. took prescribed medication without relief. pt does not know which medication. denies fever, chills, CP, SOB, n/v/d, blood in stools, urinary symptoms.    H&P documentation on 2/9/21:  #SIRS (POA) secondary to bilateral LL pna in immunocompromised patient  #AHRF 2/2 to pna  #Suspected Acute cholecystitis  -start zosyn for suspected GN edwin pna - will tx as CAP and will cover for GI tract as well    WBC 23 - HR 90    T(C): 36.6 (09 Feb 2021 06:33), Max: 36.7 (09 Feb 2021 01:54)  T(F): 97.9 (09 Feb 2021 06:33), Max: 98.1 (09 Feb 2021 01:54)  HR: 87 (09 Feb 2021 07:04) (75 - 90)  BP: 155/98 (09 Feb 2021 07:04) (155/85 - 158/94)  BP(mean): 115 (09 Feb 2021 07:04) (103 - 115)  RR: 17 (09 Feb 2021 07:04) (17 - 18)  SpO2: 98% (09 Feb 2021 07:04) (88% - 98%)    Please clarify if Sepsis ruled in or ruled out?   A) Sepsis ruled in and was present on admission and is associated with recent endoscopy and the clinical indicators are as follows.....  B) Sepsis ruled in and was present on admission and is NOT associated with recent endoscopy and the clinical indicators are as follows.....  C) Sepsis ruled out  D) Unable to determine  E) Other ( Please specify condition)

## 2021-02-11 NOTE — PROGRESS NOTE ADULT - SUBJECTIVE AND OBJECTIVE BOX
Patient is a 55y old  Female who presents with a chief complaint of abd pain (10 Feb 2021 09:01)  54 yo F with pmh R nephrectomy in 2004, HTN, asthma, hypothyroidism, unknown rheumatologic disorder with peripheral neuropathy for which she has been on chronic steroids since september of 2020 (presently on 10mg po qd) now presenting with c/o several weeks of intermittent epigastric pain which radiates to back. Pain worse post prandially. Associated with non bloody emesis. She recently underwent EGD with Dr hernandez approx 1 week ago and findings were indicative of gastritis.   Otherwise denies joint pain, arthralgias, rash, fevers or ulcerations. No chest pain or interscapular pain. Hemodynamically stable however she was found to be hypoxic in ED to 88%. Covid negative however CT abd demonstrated bilateral LL pna and GB wall thickening and pericholecystic fluid. U/S or RUQ also redemonstrated GB wall thickening and edema. No stones. LFTS/Bili normal. Pt is afebrile.  ED course: Nl lactate. WBC 24K. Given ceftriaxone and flagyl. IV morphine plus 2L NS    SUBJECTIVE / OVERNIGHT EVENTS:  2/10- pt feels better in terms of her breathing, although still MEAD walking to bathroom.  She has vasculitic rash over her legs which rheum had been consulted, but rash has now progressed up her legs.  Pt is on weeklsy b12 injections, she's receive 7/12 weeks and requesting this week's dose. she feels the ulcers in her mouth are better.     d/w Dr Norton- macy w/ derm cx, tx for pna, rheum work up, h/o eosinophilic esophagitis.     MEDICATIONS  (STANDING):  budesonide 160 MICROgram(s)/formoterol 4.5 MICROgram(s) Inhaler 2 Puff(s) Inhalation two times a day  diltiazem    milliGRAM(s) Oral daily  DULoxetine 20 milliGRAM(s) Oral daily  FIRST- Mouthwash  BLM 10 milliLiter(s) Swish and Swallow four times a day  gabapentin 600 milliGRAM(s) Oral three times a day  heparin   Injectable 5000 Unit(s) SubCutaneous every 8 hours  levothyroxine 88 MICROGram(s) Oral daily  melatonin 3 milliGRAM(s) Oral at bedtime  piperacillin/tazobactam IVPB.. 3.375 Gram(s) IV Intermittent every 8 hours  predniSONE   Tablet 10 milliGRAM(s) Oral daily  sodium chloride 0.9%. 1000 milliLiter(s) (75 mL/Hr) IV Continuous <Continuous>  sucralfate 1 Gram(s) Oral two times a day    MEDICATIONS  (PRN):  acetaminophen   Tablet .. 650 milliGRAM(s) Oral every 4 hours PRN Mild Pain (1 - 3)  aluminum hydroxide/magnesium hydroxide/simethicone Suspension 30 milliLiter(s) Oral every 4 hours PRN Dyspepsia  HYDROmorphone  Injectable 1 milliGRAM(s) IV Push every 3 hours PRN Severe Pain (7 - 10)  metoclopramide Injectable 10 milliGRAM(s) IV Push once PRN Nausea and/or Vomiting  ondansetron Injectable 4 milliGRAM(s) IV Push every 6 hours PRN Nausea  oxyCODONE    IR 5 milliGRAM(s) Oral every 4 hours PRN Moderate Pain (4 - 6)      PHYSICAL EXAM:  Vital Signs Last 24 Hrs  T(C): 36.3 (11 Feb 2021 09:24), Max: 36.7 (10 Feb 2021 17:04)  T(F): 97.4 (11 Feb 2021 09:24), Max: 98 (10 Feb 2021 17:04)  HR: 73 (11 Feb 2021 09:24) (73 - 83)  BP: 143/74 (11 Feb 2021 09:24) (103/80 - 143/74)  BP(mean): --  RR: 18 (11 Feb 2021 09:24) (18 - 18)  SpO2: 99% (11 Feb 2021 09:24) (97% - 99%)  GENERAL: NAD, well-developed  HEAD:  Atraumatic, Normocephalic  EYES: EOMI, PERRLA, conjunctiva and sclera clear  NECK: Supple, No JVD, no LAD  CHEST/LUNG: Clear to auscultation bilaterally; No wheeze, resp unlabored  HEART: Regular rate and rhythm; No murmurs, rubs, or gallops  ABDOMEN: Soft, Nontender, Nondistended; Bowel sounds present, no HSM  EXTREMITIES:  2+ Peripheral Pulses, No clubbing, cyanosis, or edema  PSYCH: AAOx3, normal behavior  NEUROLOGY: non-focal, sensory and cn 2-12 intact  SKIN: No erythematous  maculopapular  rash over lower legs b/l     LABS:             Labs personally reviewed.                          10.5   17.71 )-----------( 412      ( 11 Feb 2021 09:08 )             33.0       02-11    139  |  106  |  16  ----------------------------<  165<H>  3.5   |  25  |  0.90    Ca    9.0      11 Feb 2021 09:08    TPro  7.5  /  Alb  2.3<L>  /  TBili  0.4  /  DBili  x   /  AST  192<H>  /  ALT  82<H>  /  AlkPhos  115  02-11      Lactate Trend  02-09 @ 05:06 Lactate:1.0       Culture Results:   No growth to date. (02-09 @ 05:06)  Culture Results:   No growth to date. (02-09 @ 05:06)  Culture Results:   <10,000 CFU/mL Normal Urogenital Amy (02-09 @ 02:31)      RADIOLOGY & ADDITIONAL TESTS:    Imaging Personally Reviewed:    Consultant(s) Notes Reviewed:    rheum, GI, surg, pulm   Care Discussed with Consultants/Other Providers:   Patient is a 55y old  Female who presents with a chief complaint of abd pain (10 Feb 2021 09:01)  54 yo F with pmh R nephrectomy in 2004, HTN, asthma, hypothyroidism, unknown rheumatologic disorder with peripheral neuropathy for which she has been on chronic steroids since september of 2020 (presently on 10mg po qd) now presenting with c/o several weeks of intermittent epigastric pain which radiates to back. Pain worse post prandially. Associated with non bloody emesis. She recently underwent EGD with Dr hernandez approx 1 week ago and findings were indicative of gastritis.   Otherwise denies joint pain, arthralgias, rash, fevers or ulcerations. No chest pain or interscapular pain. Hemodynamically stable however she was found to be hypoxic in ED to 88%. Covid negative however CT abd demonstrated bilateral LL pna and GB wall thickening and pericholecystic fluid. U/S or RUQ also redemonstrated GB wall thickening and edema. No stones. LFTS/Bili normal. Pt is afebrile.  ED course: Nl lactate. WBC 24K. Given ceftriaxone and flagyl. IV morphine plus 2L NS    SUBJECTIVE / OVERNIGHT EVENTS:  2/10- pt feels better in terms of her breathing, although still MEAD walking to bathroom.  She has vasculitic rash over her legs which rheum had been consulted, but rash has now progressed up her legs.  Pt is on weeklsy b12 injections, she's receive 7/12 weeks and requesting this week's dose. she feels the ulcers in her mouth are better.   d/w Dr Norton- agrees w/ derm cx, tx for pna, rheum work up, h/o eosinophilic esophagitis.     2/11- s/e, breathing better w/ IV steroids, leg lesions stable (no further prgression), afebrile, eating well, no further abd pain  d/w Dr Sheldon- pt's symptoms began w/ neuropathy in Sept, at that time she was evaluated by ortho, neuro and rheum as she also had purura on abd wall- reportedly work up w/out significant findings, purpura attributed to singulair.  neuropathy attributed to b12 def and started on weekly injections.  However, symptoms progressed to Upper abd pain, had EGD 2 weeks ago which revealed eos esoph.  She presented now w/ ongoing upper abd pain, w/ finding of GB wall thicken/aretha fluid.  HIDA neg, no surgery warranted- seen by surgery and GI. found hypoxic, started on IV abx for possible pna as per findings on CT.  While admitted, pt developed vasculitic rash over lower legs yesterday.   d/w Dr Estrada (rheum)- high suspicion of EGPA, awaiting full work up, including tissue biopsy. +MGUS  d/w Dr Asher (derm)- for skin biopsy    MEDICATIONS  (STANDING):  budesonide 160 MICROgram(s)/formoterol 4.5 MICROgram(s) Inhaler 2 Puff(s) Inhalation two times a day  diltiazem    milliGRAM(s) Oral daily  DULoxetine 20 milliGRAM(s) Oral daily  FIRST- Mouthwash  BLM 10 milliLiter(s) Swish and Swallow four times a day  gabapentin 600 milliGRAM(s) Oral three times a day  heparin   Injectable 5000 Unit(s) SubCutaneous every 8 hours  levothyroxine 88 MICROGram(s) Oral daily  melatonin 3 milliGRAM(s) Oral at bedtime  piperacillin/tazobactam IVPB.. 3.375 Gram(s) IV Intermittent every 8 hours  predniSONE   Tablet 10 milliGRAM(s) Oral daily  sodium chloride 0.9%. 1000 milliLiter(s) (75 mL/Hr) IV Continuous <Continuous>  sucralfate 1 Gram(s) Oral two times a day    MEDICATIONS  (PRN):  acetaminophen   Tablet .. 650 milliGRAM(s) Oral every 4 hours PRN Mild Pain (1 - 3)  aluminum hydroxide/magnesium hydroxide/simethicone Suspension 30 milliLiter(s) Oral every 4 hours PRN Dyspepsia  HYDROmorphone  Injectable 1 milliGRAM(s) IV Push every 3 hours PRN Severe Pain (7 - 10)  metoclopramide Injectable 10 milliGRAM(s) IV Push once PRN Nausea and/or Vomiting  ondansetron Injectable 4 milliGRAM(s) IV Push every 6 hours PRN Nausea  oxyCODONE    IR 5 milliGRAM(s) Oral every 4 hours PRN Moderate Pain (4 - 6)      PHYSICAL EXAM:  Vital Signs Last 24 Hrs  T(C): 36.3 (11 Feb 2021 09:24), Max: 36.7 (10 Feb 2021 17:04)  T(F): 97.4 (11 Feb 2021 09:24), Max: 98 (10 Feb 2021 17:04)  HR: 73 (11 Feb 2021 09:24) (73 - 83)  BP: 143/74 (11 Feb 2021 09:24) (103/80 - 143/74)  BP(mean): --  RR: 18 (11 Feb 2021 09:24) (18 - 18)  SpO2: 99% (11 Feb 2021 09:24) (97% - 99%)  GENERAL: NAD, well-developed  HEAD:  Atraumatic, Normocephalic  EYES: EOMI, PERRLA, conjunctiva and sclera clear  NECK: Supple, No JVD, no LAD  CHEST/LUNG: Clear to auscultation bilaterally; No wheeze, resp unlabored  HEART: Regular rate and rhythm; No murmurs, rubs, or gallops  ABDOMEN: Soft, Nontender, Nondistended; Bowel sounds present, no HSM  EXTREMITIES:  2+ Peripheral Pulses, No clubbing, cyanosis, or edema  PSYCH: AAOx3, normal behavior  NEUROLOGY: non-focal, sensory and cn 2-12 intact  SKIN: No erythematous  maculopapular  rash over lower legs b/l     LABS:             Labs personally reviewed.                          10.5   17.71 )-----------( 412      ( 11 Feb 2021 09:08 )             33.0       02-11    139  |  106  |  16  ----------------------------<  165<H>  3.5   |  25  |  0.90    Ca    9.0      11 Feb 2021 09:08    TPro  7.5  /  Alb  2.3<L>  /  TBili  0.4  /  DBili  x   /  AST  192<H>  /  ALT  82<H>  /  AlkPhos  115  02-11      Lactate Trend  02-09 @ 05:06 Lactate:1.0       Culture Results:   No growth to date. (02-09 @ 05:06)  Culture Results:   No growth to date. (02-09 @ 05:06)  Culture Results:   <10,000 CFU/mL Normal Urogenital Amy (02-09 @ 02:31)      RADIOLOGY & ADDITIONAL TESTS:    Imaging Personally Reviewed:    Consultant(s) Notes Reviewed:    rheum, GI, surg, pulm   Care Discussed with Consultants/Other Providers:

## 2021-02-11 NOTE — CDI QUERY NOTE - NSCDIOTHERTXTBX2_GEN_ALL_CORE_FT
ED nurse:   · HPI Objective Statement: pt BIBEMS, A&O x4. c/o abdominal pain. had endoscopy on Monday, reports diagnosis of gastritis. took prescribed medication without relief. pt does not know which medication. denies fever, chills, CP, SOB, n/v/d, blood in stools, urinary symptoms.    H&P documentation on 2/9/21:  #SIRS (POA) secondary to bilateral LL pna in immunocompromised patient  #AHRF 2/2 to pna  #Suspected Acute cholecystitis  -start zosyn for suspected GN edwin pna - will tx as CAP and will cover for GI tract as well    Please clarify the type of Pneumonia based on the above clinical criteria:  A) Gram negative edwin pneumonia which was present on admission associated with recent endoscopy  B) Gram negative edwin pneumonia which was present on admission NOT associated with recent endoscopy  C) Bacterial pneumonia which was present on admission associated with recent endoscopy  D)  Gram negative edwin pneumonia which was present on admission NOT associated with recent endoscopy  E) Unable to determine  F) Other ( Please specify condition)

## 2021-02-11 NOTE — CONSULT NOTE ADULT - ASSESSMENT
#Rash, ongoing since 9/2020 with most recent flare on BLE and concurrent GI/upper respiratory/pulmonary symptoms, as well as eosinophilia.   RULE OUT VASCULITIS (Churg Girma/EGPA vs. IgA vasculitis vs. other)  - Potential diagnoses discussed with patient and primary team  - Punch biopsy x2 (4mm each) taken from R lower leg for H&E (lesional) and DIF (perilesional)  - Systemic steroids per Rheum/Pulm; plan to eventually transition to steroid-sparing agent pending tissue bx results  - Recommend lab workup (agree with Rheum): CBC, CMP, J LUIS, ESR, CRP, serum IgE, ANCAs, Urinalysis, HIV, hepatitis, quant Tb gold    Suture removal in 10-14 days. Wound care discussed with patient (Keep biopsy sites covered for 24 hours, then clean daily with soap/water and apply fresh layer of vaseline and bandaid for 2 weeks)    Will reach out to patient with Biopsy results when available.   Thank you for allowing me to participate in this patient's care and evaluation    Saundra Asher MD FAAD  [facility]  [phone]

## 2021-02-11 NOTE — CONSULT NOTE ADULT - SUBJECTIVE AND OBJECTIVE BOX
54 yo F with PMH R nephrectomy in 2004, HTN, asthma, hypothyroidism, unknown rheumatologic disorder with peripheral neuropathy for which she has been on chronic steroids since September 2020 (presently on 10mg po qd) presented to [facility] on 2/9/21 c/o several weeks of intermittent abdominal pain, non-bloody emesis, and diarrhea. She recently underwent EGD with Dr Burr, with evidence of +gastritis. ROS also notable for SOB, intermittent arthralgias, unintentional weight loss, sinus infections, neuropathy in fingers. Denies fevers, chest pain, headache, change in vision, hemoptysis or bloody diarrhea, dysuria.     Hemodynamically stable however she was found to be hypoxic in ED. Covid test negative however CT abd demonstrated bilateral LL pna and GB wall thickening and pericholecystic fluid. U/S or RUQ also redemonstrated GB wall thickening and edema. No stones. LFTS/Bili normal. Pt is afebrile.  ED course: Nl lactate. WBC 24K. Given ceftriaxone and flagyl. IV morphine plus 2L NS.     Dermatology consulted for below: Rule out vasculitis   Problem: rash  Location: initially on abdomen & elbows in September 2020, but now with new rash on BLE  Duration: Rash started in 9/2020, most recently flared on legs few days ago  Associated symptoms/Aggravating factors: denies pain or itch  +ongoing arthralgias, abdominal pain, URI symptoms, SOB  Modifying factors/Treatments: has been on prednisone since 9/2020, low dose 10-20mg by outside Rheum.

## 2021-02-11 NOTE — PROGRESS NOTE ADULT - ASSESSMENT
54 yo F with pmh R nephrectomy in 2004, HTN, asthma- with recurrent need for steroids nearly consistent since 8/20- 10-40 mg daily, hypothyroidism, unknown rheumatologic disorder with new onset progressive peripheral neuropathy, purpuric rash, and oral/ nasal ulcerations, and significantly 50 lb wt loss Was in usual state health with intermittent asthma/ and sinus infection but otherwise healthy till 8/20  Recent evaluation for mid epigastric abd/ non bloody emesis, EGD + gastritis confirmed eosinphilic esophagitis/ gastritis. Extensive w/u neuro/ rheum work up negative as per patient,     This is my first time meeting the pt, she is significantly better with steroids. She now has new lesions c/w vasculitis on her LE. At this time rheumatologically suspicion for EGPA is high.     Plan-  - continue steroids, solumedrol 40 mg bid  - bactrim for PCP prophylaxis  - will need to complete age appropriate screens reginaldo finding of GB polyp  - noted to have MGUS on recent labs, oncology input required prior to future immunosuppressant use  -dermatology consult for skin biopsy  - labs noted + RF, dsDNA, await other serologies will obtain CPK as well   -please quantify for proteinuria as well   - will follow

## 2021-02-11 NOTE — PROGRESS NOTE ADULT - SUBJECTIVE AND OBJECTIVE BOX
CHIEF COMPLAINT: abd pain    SUBJECTIVE: pt seen and examined, chart reviewed, she states that she is at least 50% better compared to admission, she now has lesions on her legs, They are somewhat painful, the rash on her abd has resolved.   She denies any new sx    REVIEW OF SYSTEMS:    General: denies fevers, chills or night sweats  Skin:+ rash  Ophthalmologic: denies sicca symptoms  Respiratory and Thorax: + SOB  Cardiovascular: denies chest pain or SOB  Gastrointestinal: denies colitis	  Musculoskeletal: denies prolonged morning stiffness  Neurological: denies migraines or seizures    Vital Signs Last 24 Hrs  T(C): 36.1 (10 Feb 2021 21:22), Max: 36.7 (10 Feb 2021 17:04)  T(F): 97 (10 Feb 2021 21:22), Max: 98 (10 Feb 2021 17:04)  HR: 83 (10 Feb 2021 21:22) (81 - 84)  BP: 103/80 (10 Feb 2021 21:22) (103/80 - 153/85)  BP(mean): --  RR: 18 (10 Feb 2021 21:22) (18 - 18)  SpO2: 97% (10 Feb 2021 21:22) (96% - 97%)    I&O's Summary      CAPILLARY BLOOD GLUCOSE          PHYSICAL EXAM:    Constitutional: NAD, awake and alert, well-developed  HEENT- + oral ulcer  Heart- S1 S2 reg  Lungs- reduced BS b/l  Abd- Soft NT  Ext- no edema  Skin- + palpable purpura, lesions on elbows   Musculoskelatal- FROM all joints, no active synovitis noted  Neurological- intact strength upper and lower extremities      MEDICATIONS:  MEDICATIONS  (STANDING):  budesonide 160 MICROgram(s)/formoterol 4.5 MICROgram(s) Inhaler 2 Puff(s) Inhalation two times a day  cyanocobalamin Injectable 1000 MICROGram(s) IntraMuscular once  diltiazem    milliGRAM(s) Oral daily  DULoxetine 20 milliGRAM(s) Oral daily  FIRST- Mouthwash  BLM 10 milliLiter(s) Swish and Swallow four times a day  gabapentin 600 milliGRAM(s) Oral three times a day  heparin   Injectable 5000 Unit(s) SubCutaneous every 8 hours  levothyroxine 88 MICROGram(s) Oral daily  melatonin 3 milliGRAM(s) Oral at bedtime  methylPREDNISolone sodium succinate Injectable 40 milliGRAM(s) IV Push two times a day  piperacillin/tazobactam IVPB.. 3.375 Gram(s) IV Intermittent every 8 hours  predniSONE   Tablet 10 milliGRAM(s) Oral daily  sodium chloride 0.9%. 1000 milliLiter(s) (75 mL/Hr) IV Continuous <Continuous>  sucralfate 1 Gram(s) Oral two times a day      LABS: All Labs Reviewed:                        10.7   22.54 )-----------( 400      ( 10 Feb 2021 08:27 )             33.1     02-10    137  |  105  |  13  ----------------------------<  180<H>  3.8   |  24  |  0.81    Ca    9.2      10 Feb 2021 08:27    TPro  7.9  /  Alb  2.3<L>  /  TBili  0.5  /  DBili  x   /  AST  182<H>  /  ALT  72  /  AlkPhos  128<H>  02-10          Blood Culture: 02-09 @ 05:06  Organism --  Gram Stain Blood -- Gram Stain --  Specimen Source .Blood None  Culture-Blood --    02-09 @ 02:31  Organism --  Gram Stain Blood -- Gram Stain --  Specimen Source .Urine Clean Catch (Midstream)  Culture-Blood --        RADIOLOGY/EKG:    A/P:

## 2021-02-11 NOTE — PROGRESS NOTE ADULT - SUBJECTIVE AND OBJECTIVE BOX
Subjective:    Awake, alert. Much improved with decreased neuropathic pain.     MEDICATIONS  (STANDING):  budesonide 160 MICROgram(s)/formoterol 4.5 MICROgram(s) Inhaler 2 Puff(s) Inhalation two times a day  cyanocobalamin Injectable 1000 MICROGram(s) IntraMuscular once  diltiazem    milliGRAM(s) Oral daily  DULoxetine 20 milliGRAM(s) Oral daily  FIRST- Mouthwash  BLM 10 milliLiter(s) Swish and Swallow four times a day  gabapentin 600 milliGRAM(s) Oral three times a day  heparin   Injectable 5000 Unit(s) SubCutaneous every 8 hours  levothyroxine 88 MICROGram(s) Oral daily  melatonin 3 milliGRAM(s) Oral at bedtime  methylPREDNISolone sodium succinate Injectable 40 milliGRAM(s) IV Push two times a day  piperacillin/tazobactam IVPB.. 3.375 Gram(s) IV Intermittent every 8 hours  sodium chloride 0.9%. 1000 milliLiter(s) (75 mL/Hr) IV Continuous <Continuous>  sucralfate 1 Gram(s) Oral two times a day    MEDICATIONS  (PRN):  acetaminophen   Tablet .. 650 milliGRAM(s) Oral every 4 hours PRN Mild Pain (1 - 3)  aluminum hydroxide/magnesium hydroxide/simethicone Suspension 30 milliLiter(s) Oral every 4 hours PRN Dyspepsia  HYDROmorphone  Injectable 1 milliGRAM(s) IV Push every 3 hours PRN Severe Pain (7 - 10)  metoclopramide Injectable 10 milliGRAM(s) IV Push once PRN Nausea and/or Vomiting  ondansetron Injectable 4 milliGRAM(s) IV Push every 6 hours PRN Nausea  oxyCODONE    IR 5 milliGRAM(s) Oral every 4 hours PRN Moderate Pain (4 - 6)      Allergies    ciprofloxacin (Unknown)  penicillin (Unknown)    Intolerances        Vital Signs Last 24 Hrs  T(C): 36.3 (11 Feb 2021 09:24), Max: 36.7 (10 Feb 2021 17:04)  T(F): 97.4 (11 Feb 2021 09:24), Max: 98 (10 Feb 2021 17:04)  HR: 73 (11 Feb 2021 09:24) (73 - 83)  BP: 143/74 (11 Feb 2021 09:24) (103/80 - 143/74)  BP(mean): --  RR: 18 (11 Feb 2021 09:24) (18 - 18)  SpO2: 99% (11 Feb 2021 09:24) (97% - 99%)    PHYSICAL EXAMINATION:    NECK:  Supple. No lymphadenopathy. Jugular venous pressure not elevated. Carotids equal.   HEART:   The cardiac impulse has a normal quality. Reg., Nl S1 and S2.  There are no murmurs, rubs or gallops noted  CHEST:  Chest is clear to auscultation. Normal respiratory effort.  ABDOMEN:  Soft and nontender.   EXTREMITIES:  There is no edema. Palpable purpura are present on the lower extremities      LABS:                        10.5   17.71 )-----------( 412      ( 11 Feb 2021 09:08 )             33.0     02-11    139  |  106  |  16  ----------------------------<  165<H>  3.5   |  25  |  0.90    Ca    9.0      11 Feb 2021 09:08    TPro  7.5  /  Alb  2.3<L>  /  TBili  0.4  /  DBili  x   /  AST  192<H>  /  ALT  82<H>  /  AlkPhos  115  02-11          RADIOLOGY & ADDITIONAL TESTS:< from: CT Chest No Cont (02.09.21 @ 14:58) >  EXAM:  CT CHEST                            PROCEDURE DATE:  02/09/2021          INTERPRETATION:  CLINICAL INFORMATION: Vasculitis. Rule out granulomatous lung disease    COMPARISON: Same day abdominal CT scan was reviewed.    PROCEDURE:  CT of the Chest was performed without intravenous contrast.  Sagittal and coronal reformats were performed.    FINDINGS:    LUNGS AND AIRWAYS: Patent central airways.  Multiple small scattered ill-defined nodules, most pronounced in the right lower lobe. Scattered areas of groundglass opacity and septal thickening bilaterally.  PLEURA: No pleural effusion.  MEDIASTINUM AND JOY: No lymphadenopathy.  VESSELS: Normal caliber aorta. Dilated main pulmonary artery measuring 3.4 cm.  HEART: Heart size is normal. No pericardial effusion.  CHEST WALL AND LOWER NECK: Within normal limits.  VISUALIZED UPPER ABDOMEN: Liver cyst. Status post right nephrectomy.  BONES: Within normal limits.    IMPRESSION:  Diffuse nodular and groundglass opacities bilaterally and areas of septal thickening compatible most likely due to infection. Recommend follow-up CT in 3 months to evaluate for possible resolution.      Enlarged pulmonary artery suggestive of pulmonary hypertension.      NEREIDA ROLAND JR, MD; Attending Radiologist        Assessment and Recommendation:   · Assessment	  CT reviewed with CT radiologist, agree treat for PNA with IV Abx's.      Solumedrol 40mg Q12H. Follow asthma on symbicort     For rheumatology evaluation. eval for possible eosinophilic d/o, churg gurwinder (EGPA), CTD, etc.  Dermatology, skin bx-today.   Will order J LUIS, compliments, IgE level, ANCA's. Consider Tx with IL-4 or IL-5 antagonists    Recommend echo given findings on CT chest- r/o pulmonary HTN.     For following CT scan in 3 months.   Problem/Recommendation - 1:  Pneumonia.  Problem/Recommendation - 2:  ·  Abdominal pain.   Problem/Recommendation - 3:  ·  Moderate persistent asthma.   Problem/Recommendation - 4:  ·  Dyspnea on exertion.   Problem/Recommendation - 5:  ·  Rash.   Problem/Recommendation - 6:  Peripheral eosinophilia.  Problem/Recommendation - 7:  Peripheral neuropathy.

## 2021-02-12 LAB
ANA TITR SER: NEGATIVE — SIGNIFICANT CHANGE UP
ANION GAP SERPL CALC-SCNC: 5 MMOL/L — SIGNIFICANT CHANGE UP (ref 5–17)
BUN SERPL-MCNC: 22 MG/DL — SIGNIFICANT CHANGE UP (ref 7–23)
C DIFF BY PCR RESULT: SIGNIFICANT CHANGE UP
C DIFF TOX GENS STL QL NAA+PROBE: SIGNIFICANT CHANGE UP
C3 SERPL-MCNC: 139 MG/DL — SIGNIFICANT CHANGE UP (ref 81–157)
C4 SERPL-MCNC: 20 MG/DL — SIGNIFICANT CHANGE UP (ref 13–39)
CALCIUM SERPL-MCNC: 9.7 MG/DL — SIGNIFICANT CHANGE UP (ref 8.5–10.1)
CHLORIDE SERPL-SCNC: 104 MMOL/L — SIGNIFICANT CHANGE UP (ref 96–108)
CO2 SERPL-SCNC: 28 MMOL/L — SIGNIFICANT CHANGE UP (ref 22–31)
CREAT SERPL-MCNC: 0.73 MG/DL — SIGNIFICANT CHANGE UP (ref 0.5–1.3)
CULTURE RESULTS: SIGNIFICANT CHANGE UP
GLUCOSE SERPL-MCNC: 125 MG/DL — HIGH (ref 70–99)
HCT VFR BLD CALC: 35.4 % — SIGNIFICANT CHANGE UP (ref 34.5–45)
HGB BLD-MCNC: 10.9 G/DL — LOW (ref 11.5–15.5)
MAGNESIUM SERPL-MCNC: 2.7 MG/DL — HIGH (ref 1.6–2.6)
MCHC RBC-ENTMCNC: 27.3 PG — SIGNIFICANT CHANGE UP (ref 27–34)
MCHC RBC-ENTMCNC: 30.8 GM/DL — LOW (ref 32–36)
MCV RBC AUTO: 88.5 FL — SIGNIFICANT CHANGE UP (ref 80–100)
MPO AB + PR3 PNL SER: SIGNIFICANT CHANGE UP
MYELOPEROXIDASE AB SER-ACNC: 38.2 UNITS — HIGH
MYELOPEROXIDASE CELLS FLD QL: POSITIVE
PLATELET # BLD AUTO: 398 K/UL — SIGNIFICANT CHANGE UP (ref 150–400)
POTASSIUM SERPL-MCNC: 4 MMOL/L — SIGNIFICANT CHANGE UP (ref 3.5–5.3)
POTASSIUM SERPL-SCNC: 4 MMOL/L — SIGNIFICANT CHANGE UP (ref 3.5–5.3)
PROTEINASE3 AB FLD-ACNC: <5 UNITS — SIGNIFICANT CHANGE UP
PROTEINASE3 AB SER-ACNC: NEGATIVE — SIGNIFICANT CHANGE UP
RBC # BLD: 4 M/UL — SIGNIFICANT CHANGE UP (ref 3.8–5.2)
RBC # FLD: 14.3 % — SIGNIFICANT CHANGE UP (ref 10.3–14.5)
SODIUM SERPL-SCNC: 137 MMOL/L — SIGNIFICANT CHANGE UP (ref 135–145)
SPECIMEN SOURCE: SIGNIFICANT CHANGE UP
WBC # BLD: 16.05 K/UL — HIGH (ref 3.8–10.5)
WBC # FLD AUTO: 16.05 K/UL — HIGH (ref 3.8–10.5)

## 2021-02-12 PROCEDURE — 99233 SBSQ HOSP IP/OBS HIGH 50: CPT

## 2021-02-12 PROCEDURE — 99232 SBSQ HOSP IP/OBS MODERATE 35: CPT

## 2021-02-12 RX ORDER — PANTOPRAZOLE SODIUM 20 MG/1
40 TABLET, DELAYED RELEASE ORAL
Refills: 0 | Status: DISCONTINUED | OUTPATIENT
Start: 2021-02-12 | End: 2021-02-13

## 2021-02-12 RX ORDER — FLUTICASONE PROPIONATE AND SALMETEROL 250; 50 UG/1; UG/1
250-50 POWDER RESPIRATORY (INHALATION)
Refills: 0 | Status: DISCONTINUED | COMMUNITY
End: 2021-02-12

## 2021-02-12 RX ADMIN — Medication 40 MILLIGRAM(S): at 20:53

## 2021-02-12 RX ADMIN — GABAPENTIN 600 MILLIGRAM(S): 400 CAPSULE ORAL at 13:52

## 2021-02-12 RX ADMIN — DIPHENHYDRAMINE HYDROCHLORIDE AND LIDOCAINE HYDROCHLORIDE AND ALUMINUM HYDROXIDE AND MAGNESIUM HYDRO 10 MILLILITER(S): KIT at 17:10

## 2021-02-12 RX ADMIN — Medication 30 MILLILITER(S): at 15:16

## 2021-02-12 RX ADMIN — Medication 3 MILLIGRAM(S): at 22:28

## 2021-02-12 RX ADMIN — DIPHENHYDRAMINE HYDROCHLORIDE AND LIDOCAINE HYDROCHLORIDE AND ALUMINUM HYDROXIDE AND MAGNESIUM HYDRO 10 MILLILITER(S): KIT at 12:09

## 2021-02-12 RX ADMIN — DULOXETINE HYDROCHLORIDE 20 MILLIGRAM(S): 30 CAPSULE, DELAYED RELEASE ORAL at 09:17

## 2021-02-12 RX ADMIN — HEPARIN SODIUM 5000 UNIT(S): 5000 INJECTION INTRAVENOUS; SUBCUTANEOUS at 13:52

## 2021-02-12 RX ADMIN — BUDESONIDE AND FORMOTEROL FUMARATE DIHYDRATE 2 PUFF(S): 160; 4.5 AEROSOL RESPIRATORY (INHALATION) at 21:07

## 2021-02-12 RX ADMIN — Medication 240 MILLIGRAM(S): at 09:17

## 2021-02-12 RX ADMIN — DIPHENHYDRAMINE HYDROCHLORIDE AND LIDOCAINE HYDROCHLORIDE AND ALUMINUM HYDROXIDE AND MAGNESIUM HYDRO 10 MILLILITER(S): KIT at 22:29

## 2021-02-12 RX ADMIN — PANTOPRAZOLE SODIUM 40 MILLIGRAM(S): 20 TABLET, DELAYED RELEASE ORAL at 09:53

## 2021-02-12 RX ADMIN — BUDESONIDE AND FORMOTEROL FUMARATE DIHYDRATE 2 PUFF(S): 160; 4.5 AEROSOL RESPIRATORY (INHALATION) at 08:00

## 2021-02-12 RX ADMIN — Medication 40 MILLIGRAM(S): at 05:44

## 2021-02-12 RX ADMIN — Medication 1 GRAM(S): at 17:10

## 2021-02-12 RX ADMIN — DIPHENHYDRAMINE HYDROCHLORIDE AND LIDOCAINE HYDROCHLORIDE AND ALUMINUM HYDROXIDE AND MAGNESIUM HYDRO 10 MILLILITER(S): KIT at 05:44

## 2021-02-12 RX ADMIN — Medication 88 MICROGRAM(S): at 05:44

## 2021-02-12 RX ADMIN — GABAPENTIN 600 MILLIGRAM(S): 400 CAPSULE ORAL at 20:54

## 2021-02-12 RX ADMIN — Medication 1 GRAM(S): at 05:44

## 2021-02-12 RX ADMIN — GABAPENTIN 600 MILLIGRAM(S): 400 CAPSULE ORAL at 05:44

## 2021-02-12 RX ADMIN — PANTOPRAZOLE SODIUM 40 MILLIGRAM(S): 20 TABLET, DELAYED RELEASE ORAL at 13:33

## 2021-02-12 NOTE — PROGRESS NOTE ADULT - SUBJECTIVE AND OBJECTIVE BOX
Patient is a 55y old  Female who presents with a chief complaint of abd pain (2021 11:32)    HPI:  54yo female with some abdominal pain after eating more then normal today.   Reports it is more epigastric pain.   Stool studies negative.   Diarrhea somewhat improved.   Pt wants to go home.     Social: neg x 3    Surg Hx:  R nephrectomy  Csection x2; no h/o VTE or miscarriages    Fam Hx:   Father  of cardiomyopathy at age 54 - unknown etiology  Daughter: h/o pericarditis unknown etiology   Mother  of colon ca in her 60s    (2021 09:09)    PAST MEDICAL & SURGICAL HISTORY:  Hypothyroid    HTN (hypertension)    Sciatica    Asthma    No significant past surgical history      MEDICATIONS  (STANDING):  budesonide 160 MICROgram(s)/formoterol 4.5 MICROgram(s) Inhaler 2 Puff(s) Inhalation two times a day  diltiazem    milliGRAM(s) Oral daily  DULoxetine 20 milliGRAM(s) Oral daily  FIRST- Mouthwash  BLM 10 milliLiter(s) Swish and Swallow four times a day  gabapentin 600 milliGRAM(s) Oral three times a day  heparin   Injectable 5000 Unit(s) SubCutaneous every 8 hours  levothyroxine 88 MICROGram(s) Oral daily  melatonin 3 milliGRAM(s) Oral at bedtime  methylPREDNISolone sodium succinate Injectable 40 milliGRAM(s) IV Push two times a day  pantoprazole    Tablet 40 milliGRAM(s) Oral two times a day  sodium chloride 0.9%. 1000 milliLiter(s) (75 mL/Hr) IV Continuous <Continuous>  sucralfate 1 Gram(s) Oral two times a day    MEDICATIONS  (PRN):  acetaminophen   Tablet .. 650 milliGRAM(s) Oral every 4 hours PRN Mild Pain (1 - 3)  aluminum hydroxide/magnesium hydroxide/simethicone Suspension 30 milliLiter(s) Oral every 4 hours PRN Dyspepsia  HYDROmorphone  Injectable 1 milliGRAM(s) IV Push every 3 hours PRN Severe Pain (7 - 10)  metoclopramide Injectable 10 milliGRAM(s) IV Push once PRN Nausea and/or Vomiting  ondansetron Injectable 4 milliGRAM(s) IV Push every 6 hours PRN Nausea  oxyCODONE    IR 5 milliGRAM(s) Oral every 4 hours PRN Moderate Pain (4 - 6)    Allergies    ciprofloxacin (Unknown)  penicillin (Unknown)    Intolerances      SOCIAL HISTORY:  FAMILY HISTORY:    REVIEW OF SYSTEMS:  CONSTITUTIONAL: No weakness, fevers or chills  RESPIRATORY: No cough, wheezing, hemoptysis; No shortness of breath  CARDIOVASCULAR: No chest pain or palpitations  GASTROINTESTINAL: Per HPI.     Vital Signs Last 24 Hrs  T(C): 36.6 (2021 08:55), Max: 36.6 (2021 16:37)  T(F): 97.9 (2021 08:55), Max: 97.9 (2021 08:55)  HR: 115 (2021 08:55) (73 - 115)  BP: 163/97 (2021 08:55) (143/74 - 163/97)  BP(mean): --  RR: 18 (2021 08:55) (18 - 18)  SpO2: 95% (2021 08:55) (95% - 99%)    PHYSICAL EXAM:  Constitutional: NAD, well-developed  Respiratory: CTAB  Cardiovascular: S1 and S2, RRR, no M/G/R  Gastrointestinal: Mild epigastric tenderness, ND, +BS    LABS:                        10.9   16.05 )-----------( 398      ( 2021 08:12 )             35.4     02-12    137  |  104  |  22  ----------------------------<  125<H>  4.0   |  28  |  0.73    Ca    9.7      2021 08:12  Mg     2.7     02-12    TPro  7.5  /  Alb  2.3<L>  /  TBili  0.4  /  DBili  x   /  AST  192<H>  /  ALT  82<H>  /  AlkPhos  115  02-11      LIVER FUNCTIONS - ( 2021 09:08 )  Alb: 2.3 g/dL / Pro: 7.5 gm/dL / ALK PHOS: 115 U/L / ALT: 82 U/L / AST: 192 U/L / GGT: x             RADIOLOGY & ADDITIONAL STUDIES:

## 2021-02-12 NOTE — PROGRESS NOTE ADULT - ASSESSMENT
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  Eosinophilia, recent dx of Eosinophilic esophagitis, asthma, vasculitis, neuropathy  - rheum, derm, pulm f/u   - derm for skin biopsy - s/p right lower leg punch biopsy   - f/u rheum work up - complement pending, ANCA and J LUIS pending   - continue steroids, solumedrol 40 mg bid- consider slow taper  - may need bactrim for PCP prophylaxis- request ID cs     #Eosinopholic esophagitis/Eosinophilia  -ppi/carafate/maalox prn   -PT with granulomatous lesions to elbows-    #MGUS on recent labs,   - consult oncology     #leukocytosis- likely steroid induced, doubt infection - monitor   -Blood cx/Urine cx collected in ED- NTD  -monitor of IV abx   - if new fevers, consider ID cs     # asthma- wheezing and breathing improved  - budesonide  - IV steroids    #Gallbladder wall thickening, ruled out acute cholecystitis- HIDA neg, appreciate gi and surg input      above plan discussed with pt, bedside RN, and MD Avalos

## 2021-02-12 NOTE — PROGRESS NOTE ADULT - SUBJECTIVE AND OBJECTIVE BOX
Subjective:    Awake, alert. Much improved. Mild diarrhea.    MEDICATIONS  (STANDING):  budesonide 160 MICROgram(s)/formoterol 4.5 MICROgram(s) Inhaler 2 Puff(s) Inhalation two times a day  diltiazem    milliGRAM(s) Oral daily  DULoxetine 20 milliGRAM(s) Oral daily  FIRST- Mouthwash  BLM 10 milliLiter(s) Swish and Swallow four times a day  gabapentin 600 milliGRAM(s) Oral three times a day  heparin   Injectable 5000 Unit(s) SubCutaneous every 8 hours  levothyroxine 88 MICROGram(s) Oral daily  melatonin 3 milliGRAM(s) Oral at bedtime  methylPREDNISolone sodium succinate Injectable 40 milliGRAM(s) IV Push two times a day  pantoprazole    Tablet 40 milliGRAM(s) Oral two times a day  sodium chloride 0.9%. 1000 milliLiter(s) (75 mL/Hr) IV Continuous <Continuous>  sucralfate 1 Gram(s) Oral two times a day    MEDICATIONS  (PRN):  acetaminophen   Tablet .. 650 milliGRAM(s) Oral every 4 hours PRN Mild Pain (1 - 3)  aluminum hydroxide/magnesium hydroxide/simethicone Suspension 30 milliLiter(s) Oral every 4 hours PRN Dyspepsia  HYDROmorphone  Injectable 1 milliGRAM(s) IV Push every 3 hours PRN Severe Pain (7 - 10)  metoclopramide Injectable 10 milliGRAM(s) IV Push once PRN Nausea and/or Vomiting  ondansetron Injectable 4 milliGRAM(s) IV Push every 6 hours PRN Nausea  oxyCODONE    IR 5 milliGRAM(s) Oral every 4 hours PRN Moderate Pain (4 - 6)      Allergies    ciprofloxacin (Unknown)  penicillin (Unknown)    Intolerances        Vital Signs Last 24 Hrs  T(C): 36.6 (12 Feb 2021 08:55), Max: 36.6 (11 Feb 2021 16:37)  T(F): 97.9 (12 Feb 2021 08:55), Max: 97.9 (12 Feb 2021 08:55)  HR: 115 (12 Feb 2021 08:55) (74 - 115)  BP: 163/97 (12 Feb 2021 08:55) (155/82 - 163/97)  BP(mean): --  RR: 18 (12 Feb 2021 08:55) (18 - 18)  SpO2: 95% (12 Feb 2021 08:55) (95% - 98%)    PHYSICAL EXAMINATION:    NECK:  Supple. No lymphadenopathy. Jugular venous pressure not elevated. Carotids equal.   HEART:   The cardiac impulse has a normal quality. Reg., Nl S1 and S2.  There are no murmurs, rubs or gallops noted  CHEST:  Chest is clear to auscultation. Normal respiratory effort.  ABDOMEN:  Soft and nontender.   EXTREMITIES:  There is no edema. Palpable purpura noted on LE's      LABS:                        10.9   16.05 )-----------( 398      ( 12 Feb 2021 08:12 )             35.4     02-12    137  |  104  |  22  ----------------------------<  125<H>  4.0   |  28  |  0.73    Ca    9.7      12 Feb 2021 08:12  Mg     2.7     02-12    TPro  7.5  /  Alb  2.3<L>  /  TBili  0.4  /  DBili  x   /  AST  192<H>  /  ALT  82<H>  /  AlkPhos  115  02-11          RADIOLOGY & ADDITIONAL TESTS:    Assessment and Recommendation:   · Assessment	  CT reviewed with CT radiologist, agree treat for PNA with IV Abx's.      Solumedrol 40mg Q12H. Follow asthma on [facility]     Rheumatology F/U- eval for possible eosinophilic r/o, churg gurwinder (EGPA), CTD, etc.  Dermatology, skin bx-done.   Will order J LUIS, compliments, IgE level, ANCA's. Consider Tx with IL-4 or IL-5 antagonists    Steroid taper as per rheum. Await serologies    Recommend echo given findings on CT chest- r/o pulmonary HTN.     For following CT scan in 3 months.   Problem/Recommendation - 1:  Pneumonia.  Problem/Recommendation - 2:  ·  Abdominal pain.   Problem/Recommendation - 3:  ·  Moderate persistent asthma.   Problem/Recommendation - 4:  ·  Dyspnea on exertion.   Problem/Recommendation - 5:  ·  Rash.   Problem/Recommendation - 6:  Peripheral eosinophilia.  Problem/Recommendation - 7:  Peripheral neuropathy.

## 2021-02-12 NOTE — PROGRESS NOTE ADULT - SUBJECTIVE AND OBJECTIVE BOX
Patient is a 55y old  Female who presents with a chief complaint of abd pain (10 Feb 2021 09:01)  56 yo F with pmh R nephrectomy in 2004, HTN, asthma, hypothyroidism, unknown rheumatologic disorder with peripheral neuropathy for which she has been on chronic steroids since september of 2020 (presently on 10mg po qd) now presenting with c/o several weeks of intermittent epigastric pain which radiates to back. Pain worse post prandially. Associated with non bloody emesis. She recently underwent EGD with Dr hernandez approx 1 week ago and findings were indicative of gastritis.   Otherwise denies joint pain, arthralgias, rash, fevers or ulcerations. No chest pain or interscapular pain. Hemodynamically stable however she was found to be hypoxic in ED to 88%. Covid negative however CT abd demonstrated bilateral LL pna and GB wall thickening and pericholecystic fluid. U/S or RUQ also redemonstrated GB wall thickening and edema. No stones. LFTS/Bili normal. Pt is afebrile.  ED course: Nl lactate. WBC 24K. Given ceftriaxone and flagyl. IV morphine plus 2L NS    SUBJECTIVE / OVERNIGHT EVENTS:  2/10- pt feels better in terms of her breathing, although still MEAD walking to bathroom.  She has vasculitic rash over her legs which rheum had been consulted, but rash has now progressed up her legs.  Pt is on weeklsy b12 injections, she's receive 7/12 weeks and requesting this week's dose. she feels the ulcers in her mouth are better.   d/w Dr Norton- agrees w/ derm cx, tx for pna, rheum work up, h/o eosinophilic esophagitis.    2/11- s/e, breathing better w/ IV steroids, leg lesions stable (no further prgression), afebrile, eating well, no further abd pain  d/w Dr Sheldon- pt's symptoms began w/ neuropathy in Sept, at that time she was evaluated by ortho, neuro and rheum as she also had purura on abd wall- reportedly work up w/out significant findings, purpura attributed to singulair.  neuropathy attributed to b12 def and started on weekly injections.  However, symptoms progressed to Upper abd pain, had EGD 2 weeks ago which revealed eos esoph.  She presented now w/ ongoing upper abd pain, w/ finding of GB wall thicken/aretha fluid.  HIDA neg, no surgery warranted- seen by surgery and GI. found hypoxic, started on IV abx for possible pna as per findings on CT.  While admitted, pt developed vasculitic rash over lower legs yesterday.   d/w Dr Estrada (rheum)- high suspicion of EGPA, awaiting full work up, including tissue biopsy. +MGUS  d/w Dr Asher (derm)- for skin biopsy    2/12: pt seen and examined at bedside. off supplemental O2. c/o epigastric pain and discomfort after eating. diarrhea improving. overall weak, still has neuropathy to upper and lower extremities, no dizziness or vision change. s/p bedside punch biopsy, complement/rheum work up still pending     MEDICATIONS  (STANDING):  budesonide 160 MICROgram(s)/formoterol 4.5 MICROgram(s) Inhaler 2 Puff(s) Inhalation two times a day  diltiazem    milliGRAM(s) Oral daily  DULoxetine 20 milliGRAM(s) Oral daily  FIRST- Mouthwash  BLM 10 milliLiter(s) Swish and Swallow four times a day  gabapentin 600 milliGRAM(s) Oral three times a day  heparin   Injectable 5000 Unit(s) SubCutaneous every 8 hours  levothyroxine 88 MICROGram(s) Oral daily  melatonin 3 milliGRAM(s) Oral at bedtime  methylPREDNISolone sodium succinate Injectable 40 milliGRAM(s) IV Push two times a day  pantoprazole    Tablet 40 milliGRAM(s) Oral two times a day  sodium chloride 0.9%. 1000 milliLiter(s) (75 mL/Hr) IV Continuous <Continuous>  sucralfate 1 Gram(s) Oral two times a day    MEDICATIONS  (PRN):  acetaminophen   Tablet .. 650 milliGRAM(s) Oral every 4 hours PRN Mild Pain (1 - 3)  aluminum hydroxide/magnesium hydroxide/simethicone Suspension 30 milliLiter(s) Oral every 4 hours PRN Dyspepsia  HYDROmorphone  Injectable 1 milliGRAM(s) IV Push every 3 hours PRN Severe Pain (7 - 10)  metoclopramide Injectable 10 milliGRAM(s) IV Push once PRN Nausea and/or Vomiting  ondansetron Injectable 4 milliGRAM(s) IV Push every 6 hours PRN Nausea  oxyCODONE    IR 5 milliGRAM(s) Oral every 4 hours PRN Moderate Pain (4 - 6)    PHYSICAL EXAM:  Vital Signs Last 24 Hrs  T(C): 36.6 (12 Feb 2021 08:55), Max: 36.6 (11 Feb 2021 16:37)  T(F): 97.9 (12 Feb 2021 08:55), Max: 97.9 (12 Feb 2021 08:55)  HR: 115 (12 Feb 2021 08:55) (74 - 115)  BP: 163/97 (12 Feb 2021 08:55) (155/82 - 163/97)  BP(mean): --  RR: 18 (12 Feb 2021 08:55) (18 - 18)  SpO2: 95% (12 Feb 2021 08:55) (95% - 98%) --- room air     GENERAL: NAD, well-developed  HEAD:  Atraumatic, Normocephalic  EYES: EOMI, PERRLA, conjunctiva and sclera clear  NECK: Supple, No JVD, no LAD  CHEST/LUNG: Clear to auscultation bilaterally; No wheeze, resp unlabored  HEART: Regular rate and rhythm; No murmurs, rubs, or gallops  ABDOMEN: Soft, Nontender, Nondistended; Bowel sounds present, no HSM  EXTREMITIES:  2+ Peripheral Pulses, No clubbing, cyanosis, or edema  PSYCH: AAOx3, normal behavior  NEUROLOGY: non-focal, sensory and cn 2-12 intact  SKIN: No erythematous  maculopapular  rash over lower legs b/l and bilat elbows     LABS:             Labs personally reviewed.                        10.9   16.05 )-----------( 398      ( 12 Feb 2021 08:12 )             35.4     02-12    137  |  104  |  22  ----------------------------<  125<H>  4.0   |  28  |  0.73    Ca    9.7      12 Feb 2021 08:12  Mg     2.7     02-12    TPro  7.5  /  Alb  2.3<L>  /  TBili  0.4  /  DBili  x   /  AST  192<H>  /  ALT  82<H>  /  AlkPhos  115  02-11    CARDIAC MARKERS ( 11 Feb 2021 12:24 )  x     / x     / 315 U/L / x     / x          LIVER FUNCTIONS - ( 11 Feb 2021 09:08 )  Alb: 2.3 g/dL / Pro: 7.5 gm/dL / ALK PHOS: 115 U/L / ALT: 82 U/L / AST: 192 U/L / GGT: x           Culture Results:   No growth to date. (02-09 @ 05:06)  Culture Results:   No growth to date. (02-09 @ 05:06)  Culture Results:   <10,000 CFU/mL Normal Urogenital Amy (02-09 @ 02:31)      RADIOLOGY & ADDITIONAL TESTS:    Imaging Personally Reviewed:    < from: TTE Echo Complete w/o Contrast w/ Doppler (02.11.21 @ 14:17) >   Summary     The left ventricle is normal in size, wall thickness, wall motion and   contractility.   Estimated left ventricular ejection fraction is 70 %.   Normal appearing left atrium.   Normal appearing right atrium.   Normal appearing right ventricle structure and function.   The aortic valve is trileaflet with thin pliable leaflets.   Trivial aortic regurgitation is present.   Normal mitral valve structure and function.   EA reversal of the mitral inflow consistent with reduced compliance of the   left ventricle.   No evidence of pericardial effusion.     Signature     ----------------------------------------------------------------    < end of copied text >      Consultant(s) Notes Reviewed:    rheum, GI, surg, pulm   Care Discussed with Consultants/Other Providers:

## 2021-02-12 NOTE — PROGRESS NOTE ADULT - ASSESSMENT
56 yo female who recently underwent an EGD noting gastritis and EOE with Dr. Burr.     Admitted with lower abdominal pain which seems to be slowly improving, now with some epigastric pain after eating larger sized breakfast and some diarrhea.     I believe this is likely multifactorial as pt on high dose steroids making gastritis worse, and EOE.     Will continue carafate but add PPI now.   This is not biliary related-HIDA scan is negative.   Stool studies negative.     WBC elevated possibly r/t steroid use for vasculitis, doubt acute infection.   Continue steroids will need taper per Dr. Landa.     No plans for surgical intervention at this time.   Will not follow daily, pt to f/u as outpatient-call if needed.   Rheumatology following for vasculitis w/u.   Also recommend dietitian to review a gerd/EOE diet with patient-spoke with RN.     Discussed with pt, Dr. Prado, Dr. Sheldon, and nurse.

## 2021-02-13 LAB
ALBUMIN SERPL ELPH-MCNC: 2.5 G/DL — LOW (ref 3.3–5)
ALP SERPL-CCNC: 115 U/L — SIGNIFICANT CHANGE UP (ref 40–120)
ALT FLD-CCNC: 172 U/L — HIGH (ref 12–78)
ANION GAP SERPL CALC-SCNC: 7 MMOL/L — SIGNIFICANT CHANGE UP (ref 5–17)
APPEARANCE UR: CLEAR — SIGNIFICANT CHANGE UP
AST SERPL-CCNC: 224 U/L — HIGH (ref 15–37)
BILIRUB SERPL-MCNC: 0.4 MG/DL — SIGNIFICANT CHANGE UP (ref 0.2–1.2)
BILIRUB UR-MCNC: NEGATIVE — SIGNIFICANT CHANGE UP
BUN SERPL-MCNC: 20 MG/DL — SIGNIFICANT CHANGE UP (ref 7–23)
CALCIUM SERPL-MCNC: 9.1 MG/DL — SIGNIFICANT CHANGE UP (ref 8.5–10.1)
CHLORIDE SERPL-SCNC: 105 MMOL/L — SIGNIFICANT CHANGE UP (ref 96–108)
CO2 SERPL-SCNC: 25 MMOL/L — SIGNIFICANT CHANGE UP (ref 22–31)
COLOR SPEC: YELLOW — SIGNIFICANT CHANGE UP
CREAT ?TM UR-MCNC: 111 MG/DL — SIGNIFICANT CHANGE UP
CREAT SERPL-MCNC: 0.7 MG/DL — SIGNIFICANT CHANGE UP (ref 0.5–1.3)
DIFF PNL FLD: ABNORMAL
EOSINOPHIL # BLD: 0 /UL — LOW (ref 50–350)
GLUCOSE SERPL-MCNC: 120 MG/DL — HIGH (ref 70–99)
GLUCOSE UR QL: NEGATIVE MG/DL — SIGNIFICANT CHANGE UP
HCT VFR BLD CALC: 34.3 % — LOW (ref 34.5–45)
HGB BLD-MCNC: 11 G/DL — LOW (ref 11.5–15.5)
INR BLD: 1.12 RATIO — SIGNIFICANT CHANGE UP (ref 0.88–1.16)
KETONES UR-MCNC: NEGATIVE — SIGNIFICANT CHANGE UP
LEUKOCYTE ESTERASE UR-ACNC: NEGATIVE — SIGNIFICANT CHANGE UP
MCHC RBC-ENTMCNC: 28.1 PG — SIGNIFICANT CHANGE UP (ref 27–34)
MCHC RBC-ENTMCNC: 32.1 GM/DL — SIGNIFICANT CHANGE UP (ref 32–36)
MCV RBC AUTO: 87.5 FL — SIGNIFICANT CHANGE UP (ref 80–100)
NITRITE UR-MCNC: NEGATIVE — SIGNIFICANT CHANGE UP
PH UR: 5 — SIGNIFICANT CHANGE UP (ref 5–8)
PLATELET # BLD AUTO: 435 K/UL — HIGH (ref 150–400)
POTASSIUM SERPL-MCNC: 4.1 MMOL/L — SIGNIFICANT CHANGE UP (ref 3.5–5.3)
POTASSIUM SERPL-SCNC: 4.1 MMOL/L — SIGNIFICANT CHANGE UP (ref 3.5–5.3)
PROT ?TM UR-MCNC: 33 MG/DL — HIGH (ref 0–12)
PROT SERPL-MCNC: 7.4 GM/DL — SIGNIFICANT CHANGE UP (ref 6–8.3)
PROT UR-MCNC: 30 MG/DL
PROT/CREAT UR-RTO: 0.3 RATIO — HIGH (ref 0–0.2)
PROTHROM AB SERPL-ACNC: 13 SEC — SIGNIFICANT CHANGE UP (ref 10.6–13.6)
RBC # BLD: 3.92 M/UL — SIGNIFICANT CHANGE UP (ref 3.8–5.2)
RBC # FLD: 14.3 % — SIGNIFICANT CHANGE UP (ref 10.3–14.5)
SODIUM SERPL-SCNC: 137 MMOL/L — SIGNIFICANT CHANGE UP (ref 135–145)
SP GR SPEC: 1.02 — SIGNIFICANT CHANGE UP (ref 1.01–1.02)
UROBILINOGEN FLD QL: NEGATIVE MG/DL — SIGNIFICANT CHANGE UP
WBC # BLD: 15.43 K/UL — HIGH (ref 3.8–10.5)
WBC # FLD AUTO: 15.43 K/UL — HIGH (ref 3.8–10.5)

## 2021-02-13 PROCEDURE — 99232 SBSQ HOSP IP/OBS MODERATE 35: CPT

## 2021-02-13 PROCEDURE — 99233 SBSQ HOSP IP/OBS HIGH 50: CPT

## 2021-02-13 PROCEDURE — 99223 1ST HOSP IP/OBS HIGH 75: CPT

## 2021-02-13 RX ORDER — PANTOPRAZOLE SODIUM 20 MG/1
40 TABLET, DELAYED RELEASE ORAL EVERY 12 HOURS
Refills: 0 | Status: DISCONTINUED | OUTPATIENT
Start: 2021-02-13 | End: 2021-02-13

## 2021-02-13 RX ORDER — PANTOPRAZOLE SODIUM 20 MG/1
40 TABLET, DELAYED RELEASE ORAL EVERY 12 HOURS
Refills: 0 | Status: COMPLETED | OUTPATIENT
Start: 2021-02-13 | End: 2021-02-14

## 2021-02-13 RX ADMIN — GABAPENTIN 600 MILLIGRAM(S): 400 CAPSULE ORAL at 22:29

## 2021-02-13 RX ADMIN — HEPARIN SODIUM 5000 UNIT(S): 5000 INJECTION INTRAVENOUS; SUBCUTANEOUS at 13:46

## 2021-02-13 RX ADMIN — PANTOPRAZOLE SODIUM 40 MILLIGRAM(S): 20 TABLET, DELAYED RELEASE ORAL at 10:51

## 2021-02-13 RX ADMIN — DIPHENHYDRAMINE HYDROCHLORIDE AND LIDOCAINE HYDROCHLORIDE AND ALUMINUM HYDROXIDE AND MAGNESIUM HYDRO 10 MILLILITER(S): KIT at 17:42

## 2021-02-13 RX ADMIN — DIPHENHYDRAMINE HYDROCHLORIDE AND LIDOCAINE HYDROCHLORIDE AND ALUMINUM HYDROXIDE AND MAGNESIUM HYDRO 10 MILLILITER(S): KIT at 13:36

## 2021-02-13 RX ADMIN — PANTOPRAZOLE SODIUM 40 MILLIGRAM(S): 20 TABLET, DELAYED RELEASE ORAL at 05:40

## 2021-02-13 RX ADMIN — Medication 40 MILLIGRAM(S): at 10:51

## 2021-02-13 RX ADMIN — Medication 88 MICROGRAM(S): at 05:39

## 2021-02-13 RX ADMIN — OXYCODONE HYDROCHLORIDE 5 MILLIGRAM(S): 5 TABLET ORAL at 17:47

## 2021-02-13 RX ADMIN — GABAPENTIN 600 MILLIGRAM(S): 400 CAPSULE ORAL at 13:46

## 2021-02-13 RX ADMIN — OXYCODONE HYDROCHLORIDE 5 MILLIGRAM(S): 5 TABLET ORAL at 11:25

## 2021-02-13 RX ADMIN — HYDROMORPHONE HYDROCHLORIDE 1 MILLIGRAM(S): 2 INJECTION INTRAMUSCULAR; INTRAVENOUS; SUBCUTANEOUS at 22:29

## 2021-02-13 RX ADMIN — Medication 1 GRAM(S): at 17:42

## 2021-02-13 RX ADMIN — Medication 40 MILLIGRAM(S): at 17:42

## 2021-02-13 RX ADMIN — DULOXETINE HYDROCHLORIDE 20 MILLIGRAM(S): 30 CAPSULE, DELAYED RELEASE ORAL at 22:29

## 2021-02-13 RX ADMIN — BUDESONIDE AND FORMOTEROL FUMARATE DIHYDRATE 2 PUFF(S): 160; 4.5 AEROSOL RESPIRATORY (INHALATION) at 21:21

## 2021-02-13 RX ADMIN — GABAPENTIN 600 MILLIGRAM(S): 400 CAPSULE ORAL at 05:40

## 2021-02-13 RX ADMIN — DIPHENHYDRAMINE HYDROCHLORIDE AND LIDOCAINE HYDROCHLORIDE AND ALUMINUM HYDROXIDE AND MAGNESIUM HYDRO 10 MILLILITER(S): KIT at 05:40

## 2021-02-13 RX ADMIN — DIPHENHYDRAMINE HYDROCHLORIDE AND LIDOCAINE HYDROCHLORIDE AND ALUMINUM HYDROXIDE AND MAGNESIUM HYDRO 10 MILLILITER(S): KIT at 22:42

## 2021-02-13 RX ADMIN — PANTOPRAZOLE SODIUM 40 MILLIGRAM(S): 20 TABLET, DELAYED RELEASE ORAL at 22:31

## 2021-02-13 RX ADMIN — Medication 1 GRAM(S): at 05:40

## 2021-02-13 RX ADMIN — BUDESONIDE AND FORMOTEROL FUMARATE DIHYDRATE 2 PUFF(S): 160; 4.5 AEROSOL RESPIRATORY (INHALATION) at 09:19

## 2021-02-13 RX ADMIN — Medication 3 MILLIGRAM(S): at 22:29

## 2021-02-13 RX ADMIN — Medication 240 MILLIGRAM(S): at 13:36

## 2021-02-13 RX ADMIN — HEPARIN SODIUM 5000 UNIT(S): 5000 INJECTION INTRAVENOUS; SUBCUTANEOUS at 22:30

## 2021-02-13 NOTE — CONSULT NOTE ADULT - ASSESSMENT
56 yo F with pmh R nephrectomy in 2004, HTN, asthma, hypothyroidism, unknown rheumatologic disorder with peripheral neuropathy for which she has been on chronic steroids since september of 2020 (presently on 10mg po qd) now presenting with c/o several weeks of intermittent epigastric pain which radiates to back. Pain worse post prandially. Associated with non bloody emesis. She recently underwent EGD with Dr hernandez approx 1 week ago and findings were indicative of gastritis.   Otherwise denies joint pain, arthralgias, rash, fevers or ulcerations. No chest pain or interscapular pain. Hemodynamically stable however she was found to be hypoxic in ED to 88%. Covid negative however CT abd demonstrated bilateral LL pna and GB wall thickening and pericholecystic fluid. U/S or RUQ also redemonstrated GB wall thickening and edema. No stones. LFTS/Bili normal. Pt is afebrile.    ED course: Nl lactate. WBC 24K. Given ceftriaxone and flagyl. IV morphine plus 2L NS    Above from Chart   Pt admitted with abd pain and h/o rheumatologic disorder w periph neuropathy  S/p R nephrectomy, Nephrologist  Mild eosinophilia noted on admission, resolved  has been on po steroids  S/P skin bx of rash  UA with minimal sediment, may be contaminant  Will repeat  Rheum w/u By Dr Landa   54 yo F with pmh R nephrectomy in 2004, HTN, asthma, hypothyroidism, unknown rheumatologic disorder with peripheral neuropathy for which she has been on chronic steroids since september of 2020 (presently on 10mg po qd) now presenting with c/o several weeks of intermittent epigastric pain which radiates to back. Pain worse post prandially. Associated with non bloody emesis. She recently underwent EGD with Dr hernandez approx 1 week ago and findings were indicative of gastritis.   Otherwise denies joint pain, arthralgias, rash, fevers or ulcerations. No chest pain or interscapular pain. Hemodynamically stable however she was found to be hypoxic in ED to 88%. Covid negative however CT abd demonstrated bilateral LL pna and GB wall thickening and pericholecystic fluid. U/S or RUQ also redemonstrated GB wall thickening and edema. No stones. LFTS/Bili normal. Pt is afebrile.    ED course: Nl lactate. WBC 24K. Given ceftriaxone and flagyl. IV morphine plus 2L NS    Above from Chart   Pt admitted with abd pain and h/o rheumatologic disorder w periph neuropathy  S/p R nephrectomy, Nephrologist  Mild eosinophilia noted on admission, resolved  has been on po steroids  S/P skin bx of rash  UA with minimal sediment, may be contaminant  Will repeat  Rheum w/u By Dr Landa  If needed MRI w [facility], IVC OK   But the pt should be well hydrated 125 ml/ hr 4 hours prior to and post procedure

## 2021-02-13 NOTE — DIETITIAN INITIAL EVALUATION ADULT. - PERTINENT MEDS FT
MEDICATIONS  (STANDING):  budesonide 160 MICROgram(s)/formoterol 4.5 MICROgram(s) Inhaler 2 Puff(s) Inhalation two times a day  diltiazem    milliGRAM(s) Oral daily  DULoxetine 20 milliGRAM(s) Oral daily  FIRST- Mouthwash  BLM 10 milliLiter(s) Swish and Swallow four times a day  gabapentin 600 milliGRAM(s) Oral three times a day  heparin   Injectable 5000 Unit(s) SubCutaneous every 8 hours  levothyroxine 88 MICROGram(s) Oral daily  melatonin 3 milliGRAM(s) Oral at bedtime  methylPREDNISolone sodium succinate Injectable 40 milliGRAM(s) IV Push two times a day  pantoprazole  Injectable 40 milliGRAM(s) IV Push every 12 hours  sodium chloride 0.9%. 1000 milliLiter(s) (75 mL/Hr) IV Continuous <Continuous>  sucralfate 1 Gram(s) Oral two times a day    MEDICATIONS  (PRN):  acetaminophen   Tablet .. 650 milliGRAM(s) Oral every 4 hours PRN Mild Pain (1 - 3)  aluminum hydroxide/magnesium hydroxide/simethicone Suspension 30 milliLiter(s) Oral every 4 hours PRN Dyspepsia  HYDROmorphone  Injectable 1 milliGRAM(s) IV Push every 3 hours PRN Severe Pain (7 - 10)  metoclopramide Injectable 10 milliGRAM(s) IV Push once PRN Nausea and/or Vomiting  ondansetron Injectable 4 milliGRAM(s) IV Push every 6 hours PRN Nausea  oxyCODONE    IR 5 milliGRAM(s) Oral every 4 hours PRN Moderate Pain (4 - 6)

## 2021-02-13 NOTE — PROGRESS NOTE ADULT - ASSESSMENT
54 yo female with complicated medical history including myopathy, neuropathy, B12 deficiency, vasculitic rash. Concerned is raised given findings about possibility of mesenteric vasculitis. Prior CT scan was without contrast. Patient is agreeing to MRI, but will need renal clearance and possible sedation. Will follow - thanks

## 2021-02-13 NOTE — DIETITIAN INITIAL EVALUATION ADULT. - PERTINENT LABORATORY DATA
02-13    137  |  105  |  20  ----------------------------<  120<H>  4.1   |  25  |  0.70    Ca    9.1      13 Feb 2021 08:45  Mg     2.7     02-12    TPro  7.4  /  Alb  2.5<L>  /  TBili  0.4  /  DBili  x   /  AST  224<H>  /  ALT  172<H>  /  AlkPhos  115  02-13

## 2021-02-13 NOTE — CONSULT NOTE ADULT - SUBJECTIVE AND OBJECTIVE BOX
NEPHROLOGY CONSULT  HPI:  56 yo F with pmh R nephrectomy in , HTN, asthma, hypothyroidism, unknown rheumatologic disorder with peripheral neuropathy for which she has been on chronic steroids since 2020 (presently on 10mg po qd) now presenting with c/o several weeks of intermittent epigastric pain which radiates to back. Pain worse post prandially. Associated with non bloody emesis. She recently underwent EGD with Dr hernandez approx 1 week ago and findings were indicative of gastritis.   Otherwise denies joint pain, arthralgias, rash, fevers or ulcerations. No chest pain or interscapular pain. Hemodynamically stable however she was found to be hypoxic in ED to 88%. Covid negative however CT abd demonstrated bilateral LL pna and GB wall thickening and pericholecystic fluid. U/S or RUQ also redemonstrated GB wall thickening and edema. No stones. LFTS/Bili normal. Pt is afebrile.    ED course: Nl lactate. WBC 24K. Given ceftriaxone and flagyl. IV morphine plus 2L NS    Above from Chart   Pt admitted with abd pain and h/o rheumatologic disorder w periph neuropathy  S/p R nephrectomy, Nephrologist  Mild eosinophilia noted on admission, resolved  has been on po steroids      Social: neg x 3    Surg Hx:  R nephrectomy  Csection x2; no h/o VTE or miscarriages    Fam Hx:   Father  of cardiomyopathy at age 54 - unknown etiology  Daughter: h/o pericarditis unknown etiology   Mother  of colon ca in her 60s    (2021 09:09)      PAST MEDICAL & SURGICAL HISTORY:  Hypothyroid    HTN (hypertension)    Sciatica    Asthma    No significant past surgical history        FAMILY HISTORY:      MEDICATIONS  (STANDING):  budesonide 160 MICROgram(s)/formoterol 4.5 MICROgram(s) Inhaler 2 Puff(s) Inhalation two times a day  diltiazem    milliGRAM(s) Oral daily  DULoxetine 20 milliGRAM(s) Oral daily  FIRST- Mouthwash  BLM 10 milliLiter(s) Swish and Swallow four times a day  gabapentin 600 milliGRAM(s) Oral three times a day  heparin   Injectable 5000 Unit(s) SubCutaneous every 8 hours  levothyroxine 88 MICROGram(s) Oral daily  melatonin 3 milliGRAM(s) Oral at bedtime  methylPREDNISolone sodium succinate Injectable 40 milliGRAM(s) IV Push two times a day  pantoprazole  Injectable 40 milliGRAM(s) IV Push every 12 hours  sodium chloride 0.9%. 1000 milliLiter(s) (75 mL/Hr) IV Continuous <Continuous>  sucralfate 1 Gram(s) Oral two times a day    MEDICATIONS  (PRN):  acetaminophen   Tablet .. 650 milliGRAM(s) Oral every 4 hours PRN Mild Pain (1 - 3)  aluminum hydroxide/magnesium hydroxide/simethicone Suspension 30 milliLiter(s) Oral every 4 hours PRN Dyspepsia  HYDROmorphone  Injectable 1 milliGRAM(s) IV Push every 3 hours PRN Severe Pain (7 - 10)  metoclopramide Injectable 10 milliGRAM(s) IV Push once PRN Nausea and/or Vomiting  ondansetron Injectable 4 milliGRAM(s) IV Push every 6 hours PRN Nausea  oxyCODONE    IR 5 milliGRAM(s) Oral every 4 hours PRN Moderate Pain (4 - 6)      Allergies    ciprofloxacin (Unknown)  penicillin (Unknown)    Intolerances        I&O's Summary        REVIEW OF SYSTEMS:    CONSTITUTIONAL:  As per HPI.  CONSTITUTIONAL: No weakness, fevers or chills  EYES/ENT: No visual changes;  No vertigo or throat pain   NECK: No pain or stiffness  CARDIOVASCULAR: No chest pain or palpitations  GASTROINTESTINAL: No abdominal or epigastric pain. No nausea, vomiting, or hematemesis; No diarrhea or constipation. No melena or hematochezia.  GENITOURINARY: No dysuria, frequency or hematuria  NEUROLOGICAL: No numbness or weakness  SKIN: No itching, burning, rashes, or lesions   All other review of systems is negative unless indicated above      Vital Signs Last 24 Hrs  T(C): 36.6 (2021 08:04), Max: 36.6 (2021 16:15)  T(F): 97.8 (2021 08:04), Max: 97.8 (2021 16:15)  HR: 70 (2021 09:20) (59 - 78)  BP: 148/88 (2021 08:04) (148/88 - 153/81)  BP(mean): --  RR: 18 (2021 08:04) (18 - 18)  SpO2: 96% (2021 08:04) (96% - 98%)  Daily     Daily   I&O's Summary      PHYSICAL EXAM:    General:  Alert, well-developed ,No acute distress.    Neuro:  Alert and oriented to person, place, and time. Able to communicate  well. Cranial nerves 2-12 grossly intact. 5/5 strength in all  extremities bilaterally. Sensation intact in all extremities.  Appropriate affect.     HEENT:  No JVD, no masses, Eyes anicteric, No carotid bruits.No lymphadenopathy,    Cardiovascular:  Regular rate and rhythm, with normal S1 and S2. No murmurs, rubs,  or gallops. No JVD.     Lungs:  clear. no rales, no wheezing, .    Abdomen:  Normoactive bowel sounds. Soft, flat, non-tender, and non-distended.  No hepatosplenomegaly, positive bowel sounds    Skin:  Warm, dry, well-perfused. No rashes or other lesions.     Extremities:  2+ pulses in upper and lower extremities. No lower extremity pain or  edema; legs are symmetric in appearance.    LABS:                        11.0   15.43 )-----------( 435      ( 2021 08:45 )             34.3     02-    137  |  105  |  20  ----------------------------<  120<H>  4.1   |  25  |  0.70    Ca    9.1      2021 08:45  Mg     2.7     02-12    TPro  7.4  /  Alb  2.5<L>  /  TBili  0.4  /  DBili  x   /  AST  224<H>  /  ALT  172<H>  /  AlkPhos  115  02-13    PT/INR - ( 2021 08:45 )   PT: 13.0 sec;   INR: 1.12 ratio                  NEPHROLOGY CONSULT  HPI:  54 yo F with pmh R nephrectomy in , HTN, asthma, hypothyroidism, unknown rheumatologic disorder with peripheral neuropathy for which she has been on chronic steroids since 2020 (presently on 10mg po qd) now presenting with c/o several weeks of intermittent epigastric pain which radiates to back. Pain worse post prandially. Associated with non bloody emesis. She recently underwent EGD with Dr hernandez approx 1 week ago and findings were indicative of gastritis.   Otherwise denies joint pain, arthralgias, rash, fevers or ulcerations. No chest pain or interscapular pain. Hemodynamically stable however she was found to be hypoxic in ED to 88%. Covid negative however CT abd demonstrated bilateral LL pna and GB wall thickening and pericholecystic fluid. U/S or RUQ also redemonstrated GB wall thickening and edema. No stones. LFTS/Bili normal. Pt is afebrile.    ED course: Nl lactate. WBC 24K. Given ceftriaxone and flagyl. IV morphine plus 2L NS    Above from Chart   Pt admitted with abd pain and h/o rheumatologic disorder w periph neuropathy  S/p R nephrectomy due to unilateral hydro, Nephrologist no privileges here  Mild eosinophilia noted on admission, resolved  has been on po steroids  developed Rash in September and has been having episodes since then        Social: neg x 3    Surg Hx:  R nephrectomy  Csection x2; no h/o VTE or miscarriages    Fam Hx:   Father  of cardiomyopathy at age 54 - unknown etiology  Daughter: h/o pericarditis unknown etiology   Mother  of colon ca in her 60s    (2021 09:09)      PAST MEDICAL & SURGICAL HISTORY:  Hypothyroid    HTN (hypertension)    Sciatica    Asthma    No significant past surgical history        FAMILY HISTORY:      MEDICATIONS  (STANDING):  budesonide 160 MICROgram(s)/formoterol 4.5 MICROgram(s) Inhaler 2 Puff(s) Inhalation two times a day  diltiazem    milliGRAM(s) Oral daily  DULoxetine 20 milliGRAM(s) Oral daily  FIRST- Mouthwash  BLM 10 milliLiter(s) Swish and Swallow four times a day  gabapentin 600 milliGRAM(s) Oral three times a day  heparin   Injectable 5000 Unit(s) SubCutaneous every 8 hours  levothyroxine 88 MICROGram(s) Oral daily  melatonin 3 milliGRAM(s) Oral at bedtime  methylPREDNISolone sodium succinate Injectable 40 milliGRAM(s) IV Push two times a day  pantoprazole  Injectable 40 milliGRAM(s) IV Push every 12 hours  sodium chloride 0.9%. 1000 milliLiter(s) (75 mL/Hr) IV Continuous <Continuous>  sucralfate 1 Gram(s) Oral two times a day    MEDICATIONS  (PRN):  acetaminophen   Tablet .. 650 milliGRAM(s) Oral every 4 hours PRN Mild Pain (1 - 3)  aluminum hydroxide/magnesium hydroxide/simethicone Suspension 30 milliLiter(s) Oral every 4 hours PRN Dyspepsia  HYDROmorphone  Injectable 1 milliGRAM(s) IV Push every 3 hours PRN Severe Pain (7 - 10)  metoclopramide Injectable 10 milliGRAM(s) IV Push once PRN Nausea and/or Vomiting  ondansetron Injectable 4 milliGRAM(s) IV Push every 6 hours PRN Nausea  oxyCODONE    IR 5 milliGRAM(s) Oral every 4 hours PRN Moderate Pain (4 - 6)      Allergies    ciprofloxacin (Unknown)  penicillin (Unknown)    Intolerances        I&O's Summary        REVIEW OF SYSTEMS:    CONSTITUTIONAL:  As per HPI.  CONSTITUTIONAL: No weakness, fevers or chills  EYES/ENT: No visual changes;  No vertigo or throat pain   NECK: No pain or stiffness  CARDIOVASCULAR: No chest pain or palpitations  GASTROINTESTINAL: No abdominal or epigastric pain. No nausea, vomiting, or hematemesis; No diarrhea or constipation. No melena or hematochezia.  GENITOURINARY: No dysuria, frequency or hematuria  NEUROLOGICAL: No numbness or weakness  SKIN: No itching, burning, rashes, or lesions   All other review of systems is negative unless indicated above      Vital Signs Last 24 Hrs  T(C): 36.6 (2021 08:04), Max: 36.6 (2021 16:15)  T(F): 97.8 (2021 08:04), Max: 97.8 (2021 16:15)  HR: 70 (2021 09:20) (59 - 78)  BP: 148/88 (2021 08:04) (148/88 - 153/81)  BP(mean): --  RR: 18 (2021 08:04) (18 - 18)  SpO2: 96% (2021 08:04) (96% - 98%)  Daily     Daily   I&O's Summary      PHYSICAL EXAM:    General:  Alert, well-developed ,No acute distress.    Neuro:  Alert and oriented to person, place, and time. Able to communicate  well. Cranial nerves 2-12 grossly intact. 5/5 strength in all  extremities bilaterally. Sensation intact in all extremities.  Appropriate affect.     HEENT:  No JVD, no masses, Eyes anicteric, No carotid bruits.No lymphadenopathy,    Cardiovascular:  Regular rate and rhythm, with normal S1 and S2. No murmurs, rubs,  or gallops. No JVD.     Lungs:  clear. no rales, no wheezing, .    Abdomen:  Normoactive bowel sounds. Soft, flat, non-tender, and non-distended.  No hepatosplenomegaly, positive bowel sounds    Skin:  Warm, dry, well-perfused. No rashes or other lesions.     Extremities:  2+ pulses in upper and lower extremities. No lower extremity pain or  edema; legs are symmetric in appearance.    LABS:                        11.0   15.43 )-----------( 435      ( 2021 08:45 )             34.3     02-13    137  |  105  |  20  ----------------------------<  120<H>  4.1   |  25  |  0.70    Ca    9.1      2021 08:45  Mg     2.7     02-12    TPro  7.4  /  Alb  2.5<L>  /  TBili  0.4  /  DBili  x   /  AST  224<H>  /  ALT  172<H>  /  AlkPhos  115  02-13    PT/INR - ( 2021 08:45 )   PT: 13.0 sec;   INR: 1.12 ratio

## 2021-02-13 NOTE — DIETITIAN NUTRITION RISK NOTIFICATION - ADDITIONAL COMMENTS/DIETITIAN RECOMMENDATIONS
· Additional Recommendations  1) liberalize diet to regular   2) encourage protein enriched foods w/ all meals   3) MVI w/ minerals daily to meet RDI;s   4) Ensure max 8oz po x 1 daily (30gm protein) and Gelatein plus jello po BID (40gm protein)

## 2021-02-13 NOTE — CONSULT NOTE ADULT - ASSESSMENT
54 yo F with pmh R nephrectomy in 2004, HTN, asthma- with recurrent need for steroids nearly consistent since 8/20- 10-40 mg daily, hypothyroidism, unknown rheumatologic disorder with new onset progressive peripheral neuropathy, purpuric rash, and oral/ nasal ulcerations, and significantly 50 lb wt loss Was in usual state health with intermittent asthma/ and sinus infection but otherwise healthy till 8/20  Recent evaluation for mid epigastric abd/ non bloody emesis, EGD + gastritis confirmed eosinphilic esophagitis/ gastritis. Extensive w/u neuro/ rheum work up negative as per patient,      She now has new lesions c/w vasculitis on her LE. At this time rheumatologically suspicion for EGPA is high.     Rheumatology evaluating patient She  has new lesions c/w vasculitis on her LE. At this time rheumatologically suspicion for EGPA is high.   On solumedrol 40 mg bid and bactrim for PCP prophylaxis    Hematology consulted for MGUS noted to have MGUS on recent labs, and immunosuppressant use   SPEP with Immunofixation with IgG Kappa, M spike at 0.5   NO anemia/ No Renal dysfunction/ No hypercalcemia/ no bony lesions on CT chest  - Send Quantitative immunoglobins with IgM, Ig G, IgA, , Serum free light chains with ratio to evaluate   - Will await above testing   - NO contraindication to Immunosuppressant use with MGUS

## 2021-02-13 NOTE — PROGRESS NOTE ADULT - ASSESSMENT
#Acute hypoxic resp failure initially suspected 2/2 pna- now resolved, pt on RA, doubt pna, more likely d/t asthma   - breathing improved on IV solumedrol   - TTE - EF 70%, mild Pulm HTN     # EGPA suspected given  Eosinophilia, recent dx of Eosinophilic esophagitis, asthma, vasculitis, neuropathy  - rheum, derm, pulm f/u   - S/P skin biopsy - s/p right lower leg punch biopsy: follow report  - f/u rheum work up - complement, ANCA and J LUIS pending   - continue steroids, solumedrol 40 mg bid  - GI follow up for abd pain  -Check abd US     #Eosinopholic esophagitis/Eosinophilia  -ppi/carafate/maalox prn     #MGUS on recent labs,   - consult oncology     #leukocytosis- likely steroid induced, doubt infection - monitor   -Blood cx/Urine cx collected in ED- NTD  - if new fevers, consider ID cs     # asthma- wheezing and breathing improved  - budesonide  - IV steroids    #Gallbladder wall thickening, ruled out acute cholecystitis- HIDA neg, appreciate gi and surg input

## 2021-02-13 NOTE — PROGRESS NOTE ADULT - SUBJECTIVE AND OBJECTIVE BOX
CHIEF COMPLAINT: abd pain    SUBJECTIVE: pt henry and examined last night c/o abd pain she is upset because abd pain has returned. states pain is typically worse after food. she had the skin biopsy chart reviewed,     REVIEW OF SYSTEMS:    General: denies fevers, chills or night sweats  Skin: + rash  Respiratory and Thorax: denies asthma, chest pain or SOB  Cardiovascular: denies chest pain or SOB  Gastrointestinal: + abd pain  Musculoskeletal: denies prolonged morning stiffness  Neurological: denies migraines or seizures    Vital Signs Last 24 Hrs  T(C): 36.4 (12 Feb 2021 22:26), Max: 36.6 (12 Feb 2021 08:55)  T(F): 97.6 (12 Feb 2021 22:26), Max: 97.9 (12 Feb 2021 08:55)  HR: 70 (12 Feb 2021 22:26) (70 - 115)  BP: 153/81 (12 Feb 2021 22:26) (149/86 - 163/97)  BP(mean): --  RR: 18 (12 Feb 2021 22:26) (18 - 18)  SpO2: 98% (12 Feb 2021 22:26) (95% - 98%)    I&O's Summary      CAPILLARY BLOOD GLUCOSE          PHYSICAL EXAM:    Constitutional: NAD, awake and alert, well-developed  HEENT- no oral lesions noted, no lymphadenoapthy noted  Heart- S1 S2 reg  Lungs- CTA b/l  Abd- Soft NT  Ext- no edema  Skin- + vascultic lesions Le  Musculoskelatal- FROM all joints, no active synovitis noted  Neurological- intact strength upper and lower extremities      MEDICATIONS:  MEDICATIONS  (STANDING):  budesonide 160 MICROgram(s)/formoterol 4.5 MICROgram(s) Inhaler 2 Puff(s) Inhalation two times a day  diltiazem    milliGRAM(s) Oral daily  DULoxetine 20 milliGRAM(s) Oral daily  FIRST- Mouthwash  BLM 10 milliLiter(s) Swish and Swallow four times a day  gabapentin 600 milliGRAM(s) Oral three times a day  heparin   Injectable 5000 Unit(s) SubCutaneous every 8 hours  levothyroxine 88 MICROGram(s) Oral daily  melatonin 3 milliGRAM(s) Oral at bedtime  methylPREDNISolone sodium succinate Injectable 40 milliGRAM(s) IV Push two times a day  pantoprazole    Tablet 40 milliGRAM(s) Oral two times a day  sodium chloride 0.9%. 1000 milliLiter(s) (75 mL/Hr) IV Continuous <Continuous>  sucralfate 1 Gram(s) Oral two times a day      LABS: All Labs Reviewed:                        10.9   16.05 )-----------( 398      ( 12 Feb 2021 08:12 )             35.4     02-12    137  |  104  |  22  ----------------------------<  125<H>  4.0   |  28  |  0.73    Ca    9.7      12 Feb 2021 08:12  Mg     2.7     02-12    TPro  7.5  /  Alb  2.3<L>  /  TBili  0.4  /  DBili  x   /  AST  192<H>  /  ALT  82<H>  /  AlkPhos  115  02-11      CARDIAC MARKERS ( 11 Feb 2021 12:24 )  x     / x     / 315 U/L / x     / x          Blood Culture: 02-11 @ 13:55  Organism --  Gram Stain Blood -- Gram Stain --  Specimen Source .Stool Feces  Culture-Blood --    02-09 @ 05:06  Organism --  Gram Stain Blood -- Gram Stain --  Specimen Source .Blood None  Culture-Blood --    02-09 @ 02:31  Organism --  Gram Stain Blood -- Gram Stain --  Specimen Source .Urine Clean Catch (Midstream)  Culture-Blood --        RADIOLOGY/EKG:    A/P:

## 2021-02-13 NOTE — CONSULT NOTE ADULT - SUBJECTIVE AND OBJECTIVE BOX
REASON FOR CONSULTATION    HPI:  56 yo F with pmh R nephrectomy in , HTN, asthma, hypothyroidism, unknown rheumatologic disorder with peripheral neuropathy for which she has been on chronic steroids since 2020 (presently on 10mg po qd) now presenting with c/o several weeks of intermittent epigastric pain which radiates to back. Pain worse post prandially. Associated with non bloody emesis. She recently underwent EGD with Dr hernandez approx 1 week ago and findings were indicative of gastritis.   Otherwise denies joint pain, arthralgias, rash, fevers or ulcerations. No chest pain or interscapular pain. Hemodynamically stable however she was found to be hypoxic in ED to 88%. Covid negative however CT abd demonstrated bilateral LL pna and GB wall thickening and pericholecystic fluid. U/S or RUQ also redemonstrated GB wall thickening and edema. No stones. LFTS/Bili normal. Pt is afebrile.    ED course: Nl lactate. WBC 24K. Given ceftriaxone and flagyl. IV morphine plus 2L NS    Rheumatology evaluating patient She  has new lesions c/w vasculitis on her LE. At this time rheumatologically suspicion for EGPA is high.   On solumedrol 40 mg bid and bactrim for PCP prophylaxis    Hematology consulted for MGUS noted to have MGUS on recent labs, and immunosuppressant use       Social: neg x 3    Surg Hx:  R nephrectomy  Csection x2; no h/o VTE or miscarriages    Fam Hx:   Father  of cardiomyopathy at age 54 - unknown etiology  Daughter: h/o pericarditis unknown etiology   Mother  of colon ca in her 60s    (2021 09:09)      REVIEW OF SYSTEMS:as per HPI    PAST MEDICAL & SURGICAL HISTORY:  Hypothyroid    HTN (hypertension)    Sciatica    Asthma    No significant past surgical history        SOCIAL HISTORY:    FAMILY HISTORY:      SOCIAL HISTORY:    Allergies    ciprofloxacin (Unknown)  penicillin (Unknown)    Intolerances        MEDICATIONS  (STANDING):  budesonide 160 MICROgram(s)/formoterol 4.5 MICROgram(s) Inhaler 2 Puff(s) Inhalation two times a day  diltiazem    milliGRAM(s) Oral daily  DULoxetine 20 milliGRAM(s) Oral daily  FIRST- Mouthwash  BLM 10 milliLiter(s) Swish and Swallow four times a day  gabapentin 600 milliGRAM(s) Oral three times a day  heparin   Injectable 5000 Unit(s) SubCutaneous every 8 hours  levothyroxine 88 MICROGram(s) Oral daily  melatonin 3 milliGRAM(s) Oral at bedtime  methylPREDNISolone sodium succinate Injectable 40 milliGRAM(s) IV Push two times a day  pantoprazole  Injectable 40 milliGRAM(s) IV Push every 12 hours  sodium chloride 0.9%. 1000 milliLiter(s) (75 mL/Hr) IV Continuous <Continuous>  sucralfate 1 Gram(s) Oral two times a day    MEDICATIONS  (PRN):  acetaminophen   Tablet .. 650 milliGRAM(s) Oral every 4 hours PRN Mild Pain (1 - 3)  aluminum hydroxide/magnesium hydroxide/simethicone Suspension 30 milliLiter(s) Oral every 4 hours PRN Dyspepsia  HYDROmorphone  Injectable 1 milliGRAM(s) IV Push every 3 hours PRN Severe Pain (7 - 10)  metoclopramide Injectable 10 milliGRAM(s) IV Push once PRN Nausea and/or Vomiting  ondansetron Injectable 4 milliGRAM(s) IV Push every 6 hours PRN Nausea  oxyCODONE    IR 5 milliGRAM(s) Oral every 4 hours PRN Moderate Pain (4 - 6)      Vital Signs Last 24 Hrs  T(C): 36.6 (2021 08:04), Max: 36.6 (2021 16:15)  T(F): 97.8 (2021 08:04), Max: 97.8 (2021 16:15)  HR: 70 (2021 09:20) (59 - 78)  BP: 148/88 (2021 08:04) (148/88 - 153/81)  BP(mean): --  RR: 18 (2021 08:04) (18 - 18)  SpO2: 96% (2021 08:04) (96% - 98%)    PHYSICAL EXAM:    GENERAL: NAD, well-groomed, well-developed  HEAD:  Atraumatic, Normocephalic  EYES: EOMI, PERRLA, conjunctiva and sclera clear  ENMT: No tonsillar erythema, exudates, or enlargement; Moist mucous membranes, Good dentition, No lesions  NECK: Supple, No JVD, Normal thyroid  NERVOUS SYSTEM:  Alert & Oriented X3, Good concentration; Motor Strength 5/5 B/L upper and lower extremities; DTRs 2+ intact and symmetric  CHEST/LUNG: Clear to percussion bilaterally; No rales, rhonchi, wheezing, or rubs  HEART: Regular rate and rhythm; No murmurs, rubs, or gallops  ABDOMEN: Soft, Nontender, Nondistended; Bowel sounds present  EXTREMITIES:  2+ Peripheral Pulses, No clubbing, cyanosis, or edema      LABS:                        11.0   15.43 )-----------( 435      ( 2021 08:45 )             34.3     02-    137  |  105  |  20  ----------------------------<  120<H>  4.1   |  25  |  0.70    Ca    9.1      2021 08:45  Mg     2.7     02-12    TPro  7.4  /  Alb  2.5<L>  /  TBili  0.4  /  DBili  x   /  AST  224<H>  /  ALT  172<H>  /  AlkPhos  115  02-13    PT/INR - ( 2021 08:45 )   PT: 13.0 sec;   INR: 1.12 ratio                 RADIOLOGY & ADDITIONAL STUDIES:    PATHOLOGY:

## 2021-02-13 NOTE — DIETITIAN INITIAL EVALUATION ADULT. - ADD RECOMMEND
1) liberalize diet to regular 2) encourage protein enriched foods w/ all meals 3) MVI w/ minerals daily to meet RDI;s 4) Ensure max 8oz po x 1 daily (30gm protein) and Gelatein plus jello po BID (40gm protein)

## 2021-02-13 NOTE — PROGRESS NOTE ADULT - SUBJECTIVE AND OBJECTIVE BOX
Subjective:    Awake, alert. Patient developed increasing abdominal pain yesterday after being seen by myself. Pain is mid-epigasteic and intermittent. Appears to worsen after eating. No dyspnea.    MEDICATIONS  (STANDING):  budesonide 160 MICROgram(s)/formoterol 4.5 MICROgram(s) Inhaler 2 Puff(s) Inhalation two times a day  diltiazem    milliGRAM(s) Oral daily  DULoxetine 20 milliGRAM(s) Oral daily  FIRST- Mouthwash  BLM 10 milliLiter(s) Swish and Swallow four times a day  gabapentin 600 milliGRAM(s) Oral three times a day  heparin   Injectable 5000 Unit(s) SubCutaneous every 8 hours  levothyroxine 88 MICROGram(s) Oral daily  melatonin 3 milliGRAM(s) Oral at bedtime  methylPREDNISolone sodium succinate Injectable 40 milliGRAM(s) IV Push two times a day  pantoprazole    Tablet 40 milliGRAM(s) Oral two times a day  sodium chloride 0.9%. 1000 milliLiter(s) (75 mL/Hr) IV Continuous <Continuous>  sucralfate 1 Gram(s) Oral two times a day    MEDICATIONS  (PRN):  acetaminophen   Tablet .. 650 milliGRAM(s) Oral every 4 hours PRN Mild Pain (1 - 3)  aluminum hydroxide/magnesium hydroxide/simethicone Suspension 30 milliLiter(s) Oral every 4 hours PRN Dyspepsia  HYDROmorphone  Injectable 1 milliGRAM(s) IV Push every 3 hours PRN Severe Pain (7 - 10)  metoclopramide Injectable 10 milliGRAM(s) IV Push once PRN Nausea and/or Vomiting  ondansetron Injectable 4 milliGRAM(s) IV Push every 6 hours PRN Nausea  oxyCODONE    IR 5 milliGRAM(s) Oral every 4 hours PRN Moderate Pain (4 - 6)      Allergies    ciprofloxacin (Unknown)  penicillin (Unknown)    Intolerances        Vital Signs Last 24 Hrs  T(C): 36.6 (13 Feb 2021 08:04), Max: 36.6 (12 Feb 2021 16:15)  T(F): 97.8 (13 Feb 2021 08:04), Max: 97.8 (12 Feb 2021 16:15)  HR: 59 (13 Feb 2021 08:04) (59 - 78)  BP: 148/88 (13 Feb 2021 08:04) (148/88 - 153/81)  BP(mean): --  RR: 18 (13 Feb 2021 08:04) (18 - 18)  SpO2: 96% (13 Feb 2021 08:04) (96% - 98%)    PHYSICAL EXAMINATION:    NECK:  Supple. No lymphadenopathy. Jugular venous pressure not elevated.   HEART:   The cardiac impulse has a normal quality. Reg., Nl S1 and S2.    CHEST:  Chest is clear to auscultation. Normal respiratory effort.  ABDOMEN:  Soft with minimal mid-epigastric tenderness.   EXTREMITIES:  There is no edema. The palpable purpura are fading slightly.      LABS:                        11.0   15.43 )-----------( 435      ( 13 Feb 2021 08:45 )             34.3     02-13    137  |  105  |  20  ----------------------------<  120<H>  4.1   |  25  |  0.70    Ca    9.1      13 Feb 2021 08:45  Mg     2.7     02-12    TPro  7.4  /  Alb  2.5<L>  /  TBili  0.4  /  DBili  x   /  AST  224<H>  /  ALT  172<H>  /  AlkPhos  115  02-13    PT/INR - ( 13 Feb 2021 08:45 )   PT: 13.0 sec;   INR: 1.12 ratio               RADIOLOGY & ADDITIONAL TESTS:    Assessment and Recommendation:   · Assessment	  CT reviewed with CT radiologist-abnormal areas in lung may be inflammatory and not "infectious"      Solumedrol 40mg Q12H for treatment of vasculitis    Follow asthma on [facility]     Rheumatology F/U- ? churg gurwinder (EGPA), CTD, etc.  Dermatology, skin bx-done.  J LUIS, compliments, IgE level, ANCA's-noted. Consider Tx with IL-4 or IL-5 antagonists    Check total eosinophil count    Concern about abdominal pain-? mesenteric vasculitis, ?intestinal angina-celiac artery stenosis/thrombosis. ?whether it is related to eosinophilic esophagitis. Consider need for arteriogram-however patient has only one kidney. Will need to re-consult GI. Will also consult vascular surgery and renal.    Heme evaluation for M-spike    Problem/Recommendation - 1:  Pneumonia.  Problem/Recommendation - 2:  ·  Abdominal pain.   Problem/Recommendation - 3:  ·  Moderate persistent asthma.   Problem/Recommendation - 4:  ·  Dyspnea on exertion.   Problem/Recommendation - 5:  ·  Rash.   Problem/Recommendation - 6:  Peripheral eosinophilia.  Problem/Recommendation - 7:  Peripheral neuropathy.

## 2021-02-13 NOTE — PROGRESS NOTE ADULT - ASSESSMENT
56 yo F with pmh R nephrectomy in 2004, HTN, asthma- with recurrent need for steroids nearly consistent since 8/20- 10-40 mg daily, hypothyroidism, unknown rheumatologic disorder with new onset progressive peripheral neuropathy, purpuric rash, and oral/ nasal ulcerations, and significantly 50 lb wt loss Was in usual state health with intermittent asthma/ and sinus infection but otherwise healthy till 8/20  Recent evaluation for mid epigastric abd/ non bloody emesis, EGD + gastritis confirmed eosinphilic esophagitis/ gastritis. Extensive w/u neuro/ rheum work up negative as per patient,      She now has new lesions c/w vasculitis on her LE. At this time rheumatologically suspicion for EGPA is high.     Plan-  - continue steroids, solumedrol 40 mg bid  - bactrim for PCP prophylaxis  - will need to complete age appropriate screens reginaldo finding of GB polyp  - noted to have MGUS on recent labs, oncology input required prior to future immunosuppressant use  -s/p skin biopsy  - labs noted + RF, dsDNA, + MPO level   - main concern is for abd pain, GI following, ? abdominal vasculitis, will need imaging but states that she has only 1 kidney, will d/w GI  - will follow

## 2021-02-13 NOTE — DIETITIAN INITIAL EVALUATION ADULT. - OTHER INFO
54 yo F with pmh R nephrectomy in 2004, HTN, asthma, hypothyroidism, unknown rheumatologic disorder with peripheral neuropathy for which she has been on chronic steroids since september of 2020 (presently on 10mg po qd) now presenting with c/o several weeks of intermittent epigastric pain which radiates to back. Pain worse post prandially. Associated with non bloody emesis. She recently underwent EGD with Dr. Boyer approx 1 week ago and findings were indicative of gastritis. Recent evaluation for mid epigastric abd/ non bloody emesis, EGD + gastritis confirmed eosinophilic esophagitis/ gastritis. Extensive w/u neuro/ rheum work up negative as per patient. Rheumatology evaluating patient She  has new lesions c/w vasculitis on her LE. At this time rheumatologically suspicion for EGPA is high. Pt weepy at visit and has been following a gluten free diet with the whole family (son has gluten intolerance). Pt stated she doesn't need to but it is easier with meals at home to cook with no gluten due to her son. Observed lunch tray with ~75% consumed. As per patient s/p pain medication gave her the ability to consume lunch 2/2 decreased pain. Renal restriction not warranted due to lab values. As per Nephrology able to liberalize diet to regular (renal restriction no necessary). Poor po intake x 1 year as per patient. Pt reports large clinically significant weight loss x 7 months ~50# 2/2 poor intake/appetite. Significant non intentional weight loss- July 2020 -230# by admission weight 2/9/21~180# (22% of UBW). Pt willing to trial gelatein plus jello for additional protein intake. NFPE indicates severe/moderate muscle/fat wasting. Pt meets criteria for severe protein/energy malnutrition.  Will continue to monitor and follow up prn.

## 2021-02-13 NOTE — PROGRESS NOTE ADULT - SUBJECTIVE AND OBJECTIVE BOX
Patient is a 55y old  Female who presents with a chief complaint of abd pain (10 Feb 2021 09:01)  56 yo F with pmh R nephrectomy in 2004, HTN, asthma, hypothyroidism, unknown rheumatologic disorder with peripheral neuropathy for which she has been on chronic steroids since september of 2020 (presently on 10mg po qd) now presenting with c/o several weeks of intermittent epigastric pain which radiates to back. Pain worse post prandially. Associated with non bloody emesis. She recently underwent EGD with Dr hernandez approx 1 week ago and findings were indicative of gastritis.   Otherwise denies joint pain, arthralgias, rash, fevers or ulcerations. No chest pain or interscapular pain. Hemodynamically stable however she was found to be hypoxic in ED to 88%. Covid negative however CT abd demonstrated bilateral LL pna and GB wall thickening and pericholecystic fluid. U/S or RUQ also redemonstrated GB wall thickening and edema. No stones. LFTS/Bili normal. Pt is afebrile.  ED course: Nl lactate. WBC 24K. Given ceftriaxone and flagyl. IV morphine plus 2L NS    SUBJECTIVE / OVERNIGHT EVENTS:  2/10- pt feels better in terms of her breathing, although still MEAD walking to bathroom.  She has vasculitic rash over her legs which rheum had been consulted, but rash has now progressed up her legs.  Pt is on weeklsy b12 injections, she's receive 7/12 weeks and requesting this week's dose. she feels the ulcers in her mouth are better.   d/w Dr Norton- agrees w/ derm cx, tx for pna, rheum work up, h/o eosinophilic esophagitis.    2/11- s/e, breathing better w/ IV steroids, leg lesions stable (no further prgression), afebrile, eating well, no further abd pain  d/w Dr Sheldon- pt's symptoms began w/ neuropathy in Sept, at that time she was evaluated by ortho, neuro and rheum as she also had purura on abd wall- reportedly work up w/out significant findings, purpura attributed to singulair.  neuropathy attributed to b12 def and started on weekly injections.  However, symptoms progressed to Upper abd pain, had EGD 2 weeks ago which revealed eos esoph.  She presented now w/ ongoing upper abd pain, w/ finding of GB wall thicken/aretha fluid.  HIDA neg, no surgery warranted- seen by surgery and GI. found hypoxic, started on IV abx for possible pna as per findings on CT.  While admitted, pt developed vasculitic rash over lower legs yesterday.   d/w Dr Estrada (rheum)- high suspicion of EGPA, awaiting full work up, including tissue biopsy. +MGUS  d/w Dr Asher (derm)- for skin biopsy  2/12: pt seen and examined at bedside. off supplemental O2. c/o epigastric pain and discomfort after eating. diarrhea improving. overall weak, still has neuropathy to upper and lower extremities, no dizziness or vision change. s/p bedside punch biopsy, complement/rheum work up still pending.  02/13/21: Patient seen and examined. Complaining of abd pain. Discussed with patient in length regarding management plan. Discussed with Dr Sheldon and Dr Winchester.        Vital Signs Last 24 Hrs  T(C): 36.6 (13 Feb 2021 08:04), Max: 36.6 (12 Feb 2021 16:15)  T(F): 97.8 (13 Feb 2021 08:04), Max: 97.8 (12 Feb 2021 16:15)  HR: 59 (13 Feb 2021 08:04) (59 - 78)  BP: 148/88 (13 Feb 2021 08:04) (148/88 - 153/81)  BP(mean): --  RR: 18 (13 Feb 2021 08:04) (18 - 18)  SpO2: 96% (13 Feb 2021 08:04) (96% - 98%)        PHYSICAL EXAM:    GENERAL: NAD, well-developed  HEAD:  Atraumatic, Normocephalic  EYES: EOMI, PERRLA, conjunctiva and sclera clear  NECK: Supple, No JVD, no LAD  CHEST/LUNG: Clear to auscultation bilaterally; No wheeze, resp unlabored  HEART: Regular rate and rhythm; No murmurs, rubs, or gallops  ABDOMEN: Soft, Nontender, Nondistended; Bowel sounds present, no HSM  EXTREMITIES:  2+ Peripheral Pulses, No clubbing, cyanosis, or edema  PSYCH: AAOx3, normal behavior  NEUROLOGY: non-focal, sensory and cn 2-12 intact  SKIN: No erythematous  maculopapular  rash over lower legs b/l and bilat elbows         LABS:                            11.0   15.43 )-----------( 435      ( 13 Feb 2021 08:45 )             34.3     13 Feb 2021 08:45    137    |  105    |  20     ----------------------------<  120    4.1     |  25     |  0.70     Ca    9.1        13 Feb 2021 08:45  Mg     2.7       12 Feb 2021 08:12    TPro  7.4    /  Alb  2.5    /  TBili  0.4    /  DBili  x      /  AST  224    /  ALT  172    /  AlkPhos  115    13 Feb 2021 08:45    LIVER FUNCTIONS - ( 13 Feb 2021 08:45 )  Alb: 2.5 g/dL / Pro: 7.4 gm/dL / ALK PHOS: 115 U/L / ALT: 172 U/L / AST: 224 U/L / GGT: x           PT/INR - ( 13 Feb 2021 08:45 )   PT: 13.0 sec;   INR: 1.12 ratio           CAPILLARY BLOOD GLUCOSE        CARDIAC MARKERS ( 11 Feb 2021 12:24 )  x     / x     / 315 U/L / x     / x

## 2021-02-14 LAB
ANION GAP SERPL CALC-SCNC: 6 MMOL/L — SIGNIFICANT CHANGE UP (ref 5–17)
BUN SERPL-MCNC: 28 MG/DL — HIGH (ref 7–23)
CALCIUM SERPL-MCNC: 9.1 MG/DL — SIGNIFICANT CHANGE UP (ref 8.5–10.1)
CHLORIDE SERPL-SCNC: 108 MMOL/L — SIGNIFICANT CHANGE UP (ref 96–108)
CO2 SERPL-SCNC: 26 MMOL/L — SIGNIFICANT CHANGE UP (ref 22–31)
CREAT SERPL-MCNC: 0.71 MG/DL — SIGNIFICANT CHANGE UP (ref 0.5–1.3)
CULTURE RESULTS: SIGNIFICANT CHANGE UP
CULTURE RESULTS: SIGNIFICANT CHANGE UP
EOSINOPHIL NFR URNS MANUAL: NEGATIVE — SIGNIFICANT CHANGE UP
GLUCOSE SERPL-MCNC: 118 MG/DL — HIGH (ref 70–99)
HCT VFR BLD CALC: 36.1 % — SIGNIFICANT CHANGE UP (ref 34.5–45)
HGB BLD-MCNC: 11.3 G/DL — LOW (ref 11.5–15.5)
MCHC RBC-ENTMCNC: 27.8 PG — SIGNIFICANT CHANGE UP (ref 27–34)
MCHC RBC-ENTMCNC: 31.3 GM/DL — LOW (ref 32–36)
MCV RBC AUTO: 88.9 FL — SIGNIFICANT CHANGE UP (ref 80–100)
PLATELET # BLD AUTO: 428 K/UL — HIGH (ref 150–400)
POTASSIUM SERPL-MCNC: 4.4 MMOL/L — SIGNIFICANT CHANGE UP (ref 3.5–5.3)
POTASSIUM SERPL-SCNC: 4.4 MMOL/L — SIGNIFICANT CHANGE UP (ref 3.5–5.3)
RBC # BLD: 4.06 M/UL — SIGNIFICANT CHANGE UP (ref 3.8–5.2)
RBC # FLD: 14.5 % — SIGNIFICANT CHANGE UP (ref 10.3–14.5)
SODIUM SERPL-SCNC: 140 MMOL/L — SIGNIFICANT CHANGE UP (ref 135–145)
SPECIMEN SOURCE: SIGNIFICANT CHANGE UP
SPECIMEN SOURCE: SIGNIFICANT CHANGE UP
WBC # BLD: 17.76 K/UL — HIGH (ref 3.8–10.5)
WBC # FLD AUTO: 17.76 K/UL — HIGH (ref 3.8–10.5)

## 2021-02-14 PROCEDURE — 93975 VASCULAR STUDY: CPT | Mod: 26

## 2021-02-14 PROCEDURE — 99232 SBSQ HOSP IP/OBS MODERATE 35: CPT

## 2021-02-14 PROCEDURE — 99233 SBSQ HOSP IP/OBS HIGH 50: CPT

## 2021-02-14 RX ADMIN — OXYCODONE HYDROCHLORIDE 5 MILLIGRAM(S): 5 TABLET ORAL at 17:07

## 2021-02-14 RX ADMIN — HYDROMORPHONE HYDROCHLORIDE 1 MILLIGRAM(S): 2 INJECTION INTRAMUSCULAR; INTRAVENOUS; SUBCUTANEOUS at 22:09

## 2021-02-14 RX ADMIN — PANTOPRAZOLE SODIUM 40 MILLIGRAM(S): 20 TABLET, DELAYED RELEASE ORAL at 10:40

## 2021-02-14 RX ADMIN — Medication 40 MILLIGRAM(S): at 05:18

## 2021-02-14 RX ADMIN — GABAPENTIN 600 MILLIGRAM(S): 400 CAPSULE ORAL at 05:18

## 2021-02-14 RX ADMIN — DIPHENHYDRAMINE HYDROCHLORIDE AND LIDOCAINE HYDROCHLORIDE AND ALUMINUM HYDROXIDE AND MAGNESIUM HYDRO 10 MILLILITER(S): KIT at 10:41

## 2021-02-14 RX ADMIN — BUDESONIDE AND FORMOTEROL FUMARATE DIHYDRATE 2 PUFF(S): 160; 4.5 AEROSOL RESPIRATORY (INHALATION) at 22:22

## 2021-02-14 RX ADMIN — OXYCODONE HYDROCHLORIDE 5 MILLIGRAM(S): 5 TABLET ORAL at 10:38

## 2021-02-14 RX ADMIN — Medication 88 MICROGRAM(S): at 05:18

## 2021-02-14 RX ADMIN — DIPHENHYDRAMINE HYDROCHLORIDE AND LIDOCAINE HYDROCHLORIDE AND ALUMINUM HYDROXIDE AND MAGNESIUM HYDRO 10 MILLILITER(S): KIT at 05:23

## 2021-02-14 RX ADMIN — Medication 1 GRAM(S): at 17:08

## 2021-02-14 RX ADMIN — GABAPENTIN 600 MILLIGRAM(S): 400 CAPSULE ORAL at 14:25

## 2021-02-14 RX ADMIN — Medication 1 GRAM(S): at 05:18

## 2021-02-14 RX ADMIN — DIPHENHYDRAMINE HYDROCHLORIDE AND LIDOCAINE HYDROCHLORIDE AND ALUMINUM HYDROXIDE AND MAGNESIUM HYDRO 10 MILLILITER(S): KIT at 22:11

## 2021-02-14 RX ADMIN — HEPARIN SODIUM 5000 UNIT(S): 5000 INJECTION INTRAVENOUS; SUBCUTANEOUS at 14:25

## 2021-02-14 RX ADMIN — BUDESONIDE AND FORMOTEROL FUMARATE DIHYDRATE 2 PUFF(S): 160; 4.5 AEROSOL RESPIRATORY (INHALATION) at 09:22

## 2021-02-14 RX ADMIN — GABAPENTIN 600 MILLIGRAM(S): 400 CAPSULE ORAL at 22:09

## 2021-02-14 RX ADMIN — HEPARIN SODIUM 5000 UNIT(S): 5000 INJECTION INTRAVENOUS; SUBCUTANEOUS at 22:10

## 2021-02-14 RX ADMIN — Medication 240 MILLIGRAM(S): at 10:38

## 2021-02-14 RX ADMIN — DULOXETINE HYDROCHLORIDE 20 MILLIGRAM(S): 30 CAPSULE, DELAYED RELEASE ORAL at 22:09

## 2021-02-14 RX ADMIN — Medication 3 MILLIGRAM(S): at 22:09

## 2021-02-14 RX ADMIN — HEPARIN SODIUM 5000 UNIT(S): 5000 INJECTION INTRAVENOUS; SUBCUTANEOUS at 05:18

## 2021-02-14 RX ADMIN — DIPHENHYDRAMINE HYDROCHLORIDE AND LIDOCAINE HYDROCHLORIDE AND ALUMINUM HYDROXIDE AND MAGNESIUM HYDRO 10 MILLILITER(S): KIT at 17:08

## 2021-02-14 RX ADMIN — HYDROMORPHONE HYDROCHLORIDE 1 MILLIGRAM(S): 2 INJECTION INTRAMUSCULAR; INTRAVENOUS; SUBCUTANEOUS at 14:25

## 2021-02-14 NOTE — PROGRESS NOTE ADULT - ASSESSMENT
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 Consult vascular     #Eosinopholic esophagitis/Eosinophilia  -ppi/carafate/maalox prn     #MGUS on recent labs,   -Oncology eval appreciated    # H/O right nephrectomy in 2003  Solitary kidney  Renal function normal and stable  Dr Reyes consult appreciated    #leukocytosis- likely steroid induced, doubt infection - monitor   -Blood cx/Urine cx collected in ED- NTD    # asthma- wheezing and breathing improved  - budesonide  - IV steroids    #Gallbladder wall thickening, ruled out acute cholecystitis- HIDA neg, appreciate gi and surg input

## 2021-02-14 NOTE — PROGRESS NOTE ADULT - ASSESSMENT
· Assessment	  56 yo female with complicated medical history including myopathy, neuropathy, B12 deficiency, vasculitic rash. Concerned is raised given findings about possibility of mesenteric vasculitis. Prior CT scan was without contrast. Patient is agreeing to MRI, but will need renal clearance and possible sedation.         DW pt

## 2021-02-14 NOTE — PROGRESS NOTE ADULT - SUBJECTIVE AND OBJECTIVE BOX
Patient is a 55y old  Female who presents with a chief complaint of abd pain (2021 10:12)      HPI:  56 yo F with pmh R nephrectomy in , HTN, asthma, hypothyroidism, unknown rheumatologic disorder with peripheral neuropathy for which she has been on chronic steroids since 2020 (presently on 10mg po qd) now presenting with c/o several weeks of intermittent epigastric pain which radiates to back. Pain worse post prandially. Associated with non bloody emesis. She recently underwent EGD with Dr hernandez approx 1 week ago and findings were indicative of gastritis.   Otherwise denies joint pain, arthralgias, rash, fevers or ulcerations. No chest pain or interscapular pain. Hemodynamically stable however she was found to be hypoxic in ED to 88%. Covid negative however CT abd demonstrated bilateral LL pna and GB wall thickening and pericholecystic fluid. U/S or RUQ also redemonstrated GB wall thickening and edema. No stones. LFTS/Bili normal. Pt is afebrile.    ED course: Nl lactate. WBC 24K. Given ceftriaxone and flagyl. IV morphine plus 2L NS      Patient comfortable.  Tolerating diet.  Has some upper abdominal pain.  Negative nausea vomiting.        Surg Hx:  R nephrectomy  Csection x2; no h/o VTE or miscarriages    Fam Hx:   Father  of cardiomyopathy at age 54 - unknown etiology  Daughter: h/o pericarditis unknown etiology   Mother  of colon ca in her 60s    (2021 09:09)      PAST MEDICAL & SURGICAL HISTORY:  Hypothyroid    HTN (hypertension)    Sciatica    Asthma    No significant past surgical history        MEDICATIONS  (STANDING):  budesonide 160 MICROgram(s)/formoterol 4.5 MICROgram(s) Inhaler 2 Puff(s) Inhalation two times a day  diltiazem    milliGRAM(s) Oral daily  DULoxetine 20 milliGRAM(s) Oral daily  FIRST- Mouthwash  BLM 10 milliLiter(s) Swish and Swallow four times a day  gabapentin 600 milliGRAM(s) Oral three times a day  heparin   Injectable 5000 Unit(s) SubCutaneous every 8 hours  levothyroxine 88 MICROGram(s) Oral daily  melatonin 3 milliGRAM(s) Oral at bedtime  sodium chloride 0.9%. 1000 milliLiter(s) (75 mL/Hr) IV Continuous <Continuous>  sucralfate 1 Gram(s) Oral two times a day    MEDICATIONS  (PRN):  acetaminophen   Tablet .. 650 milliGRAM(s) Oral every 4 hours PRN Mild Pain (1 - 3)  aluminum hydroxide/magnesium hydroxide/simethicone Suspension 30 milliLiter(s) Oral every 4 hours PRN Dyspepsia  HYDROmorphone  Injectable 1 milliGRAM(s) IV Push every 3 hours PRN Severe Pain (7 - 10)  metoclopramide Injectable 10 milliGRAM(s) IV Push once PRN Nausea and/or Vomiting  ondansetron Injectable 4 milliGRAM(s) IV Push every 6 hours PRN Nausea  oxyCODONE    IR 5 milliGRAM(s) Oral every 4 hours PRN Moderate Pain (4 - 6)      Allergies    ciprofloxacin (Unknown)  penicillin (Unknown)    Intolerances        SOCIAL HISTORY:NC    FAMILY HISTORY:NC      REVIEW OF SYSTEMS:    CONSTITUTIONAL: No weakness, fevers or chills  EYES/ENT: No visual changes;  No vertigo or throat pain   NECK: No pain or stiffness  RESPIRATORY: No cough, wheezing, hemoptysis; No shortness of breath  CARDIOVASCULAR: No chest pain or palpitations  GENITOURINARY: No dysuria, frequency or hematuria  NEUROLOGICAL: No numbness or weakness  SKIN: No itching, burning, rashes, or lesions   All other review of systems is negative unless indicated above.    Vital Signs Last 24 Hrs  T(C): 36.3 (2021 07:40), Max: 36.8 (2021 22:12)  T(F): 97.4 (2021 07:40), Max: 98.3 (2021 22:12)  HR: 59 (2021 07:40) (59 - 79)  BP: 151/88 (2021 07:40) (139/80 - 151/92)  BP(mean): --  RR: 17 (2021 07:40) (17 - 18)  SpO2: 96% (2021 07:40) (96% - 98%)    PHYSICAL EXAM:    Constitutional: NAD, well-developed  HEENT: EOMI, throat clear  Neck: No LAD, supple  Respiratory: CTA and P  Cardiovascular: S1 and S2, RRR, no M  Gastrointestinal: BS+, soft, NT/ND, neg HSM,  Extremities: No peripheral edema, neg clubing, cyanosis  Vascular: 2+ peripheral pulses  Neurological: A/O x 3, no focal deficits  Psychiatric: Normal mood, normal affect  Skin: No rashes    LABS:  CBC Full  -  ( 2021 08:48 )  WBC Count : 17.76 K/uL  RBC Count : 4.06 M/uL  Hemoglobin : 11.3 g/dL  Hematocrit : 36.1 %  Platelet Count - Automated : 428 K/uL  Mean Cell Volume : 88.9 fl  Mean Cell Hemoglobin : 27.8 pg  Mean Cell Hemoglobin Concentration : 31.3 gm/dL  Auto Neutrophil # : x  Auto Lymphocyte # : x  Auto Monocyte # : x  Auto Eosinophil # : x  Auto Basophil # : x  Auto Neutrophil % : x  Auto Lymphocyte % : x  Auto Monocyte % : x  Auto Eosinophil % : x  Auto Basophil % : x        140  |  108  |  28<H>  ----------------------------<  118<H>  4.4   |  26  |  0.71    Ca    9.1      2021 08:48    TPro  7.4  /  Alb  2.5<L>  /  TBili  0.4  /  DBili  x   /  AST  224<H>  /  ALT  172<H>  /  AlkPhos  115  02-    PT/INR - ( 2021 08:45 )   PT: 13.0 sec;   INR: 1.12 ratio              @ 11:39  Hep A Igm Nonreact  Hep A total ab, IgA and M --  Hep B core Ab total --  Hep B core IgM Nonreact  Hep B DNA PCR --  Hep BSAg --  Hep BSAb --  Hep BSAb --  HCV Ab --  HCV RNA Log --  HCV RNA interp --                RADIOLOGY & ADDITIONAL STUDIES:  < from: US Doppler Mesenteric (21 @ 12:53) >    EXAM:  US DPLX MESENTERIC                            PROCEDURE DATE:  2021          INTERPRETATION:  CLINICAL INFORMATION: Abdominal pain.    COMPARISON: None available.    TECHNIQUE: Sonography of the abdomen. Color and spectral Doppler evaluation of the mesenteric vasculature was performed.    FINDINGS:    Aorta: Aorta measures 1.8 cm with peak systolic velocity of 84 cm/s.    Celiac artery: Peak systolic velocity 406 cm/s.    Superior mesenteric artery: Peak systolic velocity 168 cm/s    Inferior mesenteric artery: Peak systolic velocity: 71 cm/s.    IMPRESSION:  Patent mesenteric arteries. Elevated velocities within the celiac artery, suspicious for hemodynamically significant stenosis.            DEBORAH FLEMING MD; Attending Radiologist  This document has been electronically signed. 2021  1:56PM    < end of copied text >

## 2021-02-14 NOTE — PROGRESS NOTE ADULT - SUBJECTIVE AND OBJECTIVE BOX
Patient is a 55y old  Female who presents with a chief complaint of abd pain (10 Feb 2021 09:01)  54 yo F with pmh R nephrectomy in , HTN, asthma, hypothyroidism, unknown rheumatologic disorder with peripheral neuropathy for which she has been on chronic steroids since 2020 (presently on 10mg po qd) now presenting with c/o several weeks of intermittent epigastric pain which radiates to back. Pain worse post prandially. Associated with non bloody emesis. She recently underwent EGD with Dr hernandez approx 1 week ago and findings were indicative of gastritis.   Otherwise denies joint pain, arthralgias, rash, fevers or ulcerations. No chest pain or interscapular pain. Hemodynamically stable however she was found to be hypoxic in ED to 88%. Covid negative however CT abd demonstrated bilateral LL pna and GB wall thickening and pericholecystic fluid. U/S or RUQ also redemonstrated GB wall thickening and edema. No stones. LFTS/Bili normal. Pt is afebrile.  ED course: Nl lactate. WBC 24K. Given ceftriaxone and flagyl. IV morphine plus 2L NS    SUBJECTIVE / OVERNIGHT EVENTS:  2/10- pt feels better in terms of her breathing, although still MEAD walking to bathroom.  She has vasculitic rash over her legs which rheum had been consulted, but rash has now progressed up her legs.  Pt is on weeklsy b12 injections, she's receive 7/12 weeks and requesting this week's dose. she feels the ulcers in her mouth are better.   d/w Dr Norton- agrees w/ derm cx, tx for pna, rheum work up, h/o eosinophilic esophagitis.    - s/e, breathing better w/ IV steroids, leg lesions stable (no further prgression), afebrile, eating well, no further abd pain  d/w Dr Sheldon- pt's symptoms began w/ neuropathy in Sept, at that time she was evaluated by ortho, neuro and rheum as she also had purura on abd wall- reportedly work up w/out significant findings, purpura attributed to singulair.  neuropathy attributed to b12 def and started on weekly injections.  However, symptoms progressed to Upper abd pain, had EGD 2 weeks ago which revealed eos esoph.  She presented now w/ ongoing upper abd pain, w/ finding of GB wall thicken/aretha fluid.  HIDA neg, no surgery warranted- seen by surgery and GI. found hypoxic, started on IV abx for possible pna as per findings on CT.  While admitted, pt developed vasculitic rash over lower legs yesterday.   d/w Dr Estrada (rheum)- high suspicion of EGPA, awaiting full work up, including tissue biopsy. +MGUS  d/w Dr Asher (derm)- for skin biopsy  : pt seen and examined at bedside. off supplemental O2. c/o epigastric pain and discomfort after eating. diarrhea improving. overall weak, still has neuropathy to upper and lower extremities, no dizziness or vision change. s/p bedside punch biopsy, complement/rheum work up still pending.  21: Patient seen and examined. Complaining of abd pain. Discussed with patient in length regarding management plan. Discussed with Dr Sheldon and Dr Winchester.    21: Patient seen and examined. Still with abd pain. Just back from abd US.      Vital Signs Last 24 Hrs  T(C): 36.3 (2021 07:40), Max: 36.8 (2021 22:12)  T(F): 97.4 (2021 07:40), Max: 98.3 (2021 22:12)  HR: 59 (2021 07:40) (59 - 79)  BP: 151/88 (2021 07:40) (139/80 - 151/92)  BP(mean): --  RR: 17 (2021 07:40) (17 - 18)  SpO2: 96% (2021 07:40) (96% - 98%)        PHYSICAL EXAM:    GENERAL: NAD, well-developed  HEAD:  Atraumatic, Normocephalic  EYES: EOMI, PERRLA, conjunctiva and sclera clear  NECK: Supple, No JVD, no LAD  CHEST/LUNG: Clear to auscultation bilaterally; No wheeze, resp unlabored  HEART: Regular rate and rhythm; No murmurs, rubs, or gallops  ABDOMEN: Soft, Nontender, Nondistended; Bowel sounds present, no HSM  EXTREMITIES:  2+ Peripheral Pulses, No clubbing, cyanosis, or edema  PSYCH: AAOx3, normal behavior  NEUROLOGY: non-focal, sensory and cn 2-12 intact  SKIN: No erythematous  maculopapular  rash over lower legs b/l and bilat elbows         LABS:                                       11.3   17.76 )-----------( 428      ( 2021 08:48 )             36.1     2021 08:48    140    |  108    |  28     ----------------------------<  118    4.4     |  26     |  0.71     Ca    9.1        2021 08:48    TPro  7.4    /  Alb  2.5    /  TBili  0.4    /  DBili  x      /  AST  224    /  ALT  172    /  AlkPhos  115    2021 08:45    LIVER FUNCTIONS - ( 2021 08:45 )  Alb: 2.5 g/dL / Pro: 7.4 gm/dL / ALK PHOS: 115 U/L / ALT: 172 U/L / AST: 224 U/L / GGT: x           PT/INR - ( 2021 08:45 )   PT: 13.0 sec;   INR: 1.12 ratio           CAPILLARY BLOOD GLUCOSE            Urinalysis Basic - ( 2021 23:00 )    Color: Yellow / Appearance: Clear / S.025 / pH: x  Gluc: x / Ketone: Negative  / Bili: Negative / Urobili: Negative mg/dL   Blood: x / Protein: 30 mg/dL / Nitrite: Negative   Leuk Esterase: Negative / RBC: 3-5 /HPF / WBC 3-5   Sq Epi: x / Non Sq Epi: Few / Bacteria: Few

## 2021-02-14 NOTE — PROGRESS NOTE ADULT - SUBJECTIVE AND OBJECTIVE BOX
Subjective:    Awake, alert. +abd pain last PM. Above noted.    MEDICATIONS  (STANDING):  budesonide 160 MICROgram(s)/formoterol 4.5 MICROgram(s) Inhaler 2 Puff(s) Inhalation two times a day  diltiazem    milliGRAM(s) Oral daily  DULoxetine 20 milliGRAM(s) Oral daily  FIRST- Mouthwash  BLM 10 milliLiter(s) Swish and Swallow four times a day  gabapentin 600 milliGRAM(s) Oral three times a day  heparin   Injectable 5000 Unit(s) SubCutaneous every 8 hours  levothyroxine 88 MICROGram(s) Oral daily  melatonin 3 milliGRAM(s) Oral at bedtime  pantoprazole  Injectable 40 milliGRAM(s) IV Push every 12 hours  sodium chloride 0.9%. 1000 milliLiter(s) (75 mL/Hr) IV Continuous <Continuous>  sucralfate 1 Gram(s) Oral two times a day    MEDICATIONS  (PRN):  acetaminophen   Tablet .. 650 milliGRAM(s) Oral every 4 hours PRN Mild Pain (1 - 3)  aluminum hydroxide/magnesium hydroxide/simethicone Suspension 30 milliLiter(s) Oral every 4 hours PRN Dyspepsia  HYDROmorphone  Injectable 1 milliGRAM(s) IV Push every 3 hours PRN Severe Pain (7 - 10)  metoclopramide Injectable 10 milliGRAM(s) IV Push once PRN Nausea and/or Vomiting  ondansetron Injectable 4 milliGRAM(s) IV Push every 6 hours PRN Nausea  oxyCODONE    IR 5 milliGRAM(s) Oral every 4 hours PRN Moderate Pain (4 - 6)      Allergies    ciprofloxacin (Unknown)  penicillin (Unknown)    Intolerances        Vital Signs Last 24 Hrs  T(C): 36.3 (2021 07:40), Max: 36.8 (2021 22:12)  T(F): 97.4 (2021 07:40), Max: 98.3 (2021 22:12)  HR: 59 (2021 07:40) (59 - 79)  BP: 151/88 (2021 07:40) (139/80 - 151/92)  BP(mean): --  RR: 17 (2021 07:40) (17 - 18)  SpO2: 96% (2021 07:40) (96% - 98%)    PHYSICAL EXAMINATION:    NECK:  Supple. No lymphadenopathy. Jugular venous pressure not elevated.   HEART:   The cardiac impulse has a normal quality. Reg., Nl S1 and S2.    CHEST:  Chest is clear to auscultation. Normal respiratory effort.  ABDOMEN:  Soft and nontender.   EXTREMITIES:  There is no edema. +palpable purpura.       LABS:                        11.3   17.76 )-----------( 428      ( 2021 08:48 )             36.1     02-14    140  |  108  |  28<H>  ----------------------------<  118<H>  4.4   |  26  |  0.71    Ca    9.1      2021 08:48    TPro  7.4  /  Alb  2.5<L>  /  TBili  0.4  /  DBili  x   /  AST  224<H>  /  ALT  172<H>  /  AlkPhos  115  02-13    PT/INR - ( 2021 08:45 )   PT: 13.0 sec;   INR: 1.12 ratio           Urinalysis Basic - ( 2021 23:00 )    Color: Yellow / Appearance: Clear / S.025 / pH: x  Gluc: x / Ketone: Negative  / Bili: Negative / Urobili: Negative mg/dL   Blood: x / Protein: 30 mg/dL / Nitrite: Negative   Leuk Esterase: Negative / RBC: 3-5 /HPF / WBC 3-5   Sq Epi: x / Non Sq Epi: Few / Bacteria: Few        RADIOLOGY & ADDITIONAL TESTS:    Assessment and Recommendation:   · Assessment	  CT reviewed with CT radiologist-abnormal areas in lung may be inflammatory and not "infectious"      Solumedrol 40mg Q12H for treatment of vasculitis    Follow asthma on symbicort     Rheumatology F/U- ? churg gurwinder (EGPA), CTD, etc.  Dermatology, skin bx-check results.  J LUIS, compliments, IgE level, ANCA's-noted. Consider Tx with IL-4 or IL-5 antagonists    Total eosinophil count=0    Concern about abdominal pain-? mesenteric vasculitis, ?intestinal angina-celiac artery stenosis/thrombosis. ?whether it is related to eosinophilic esophagitis. Consider need for arteriogram-however patient has only one kidney. Will need to re-consult GI. Renal consult noted.    Heme evaluation for M-spike    Problem/Recommendation - 1:  Pneumonia.  Problem/Recommendation - 2:  ·  Abdominal pain.   Problem/Recommendation - 3:  ·  Moderate persistent asthma.   Problem/Recommendation - 4:  ·  Dyspnea on exertion.   Problem/Recommendation - 5:  ·  Rash.   Problem/Recommendation - 6:  Peripheral eosinophilia.  Problem/Recommendation - 7:  Peripheral neuropathy.

## 2021-02-15 LAB
ANION GAP SERPL CALC-SCNC: 8 MMOL/L — SIGNIFICANT CHANGE UP (ref 5–17)
BUN SERPL-MCNC: 32 MG/DL — HIGH (ref 7–23)
CALCIUM SERPL-MCNC: 8.9 MG/DL — SIGNIFICANT CHANGE UP (ref 8.5–10.1)
CHLORIDE SERPL-SCNC: 104 MMOL/L — SIGNIFICANT CHANGE UP (ref 96–108)
CO2 SERPL-SCNC: 27 MMOL/L — SIGNIFICANT CHANGE UP (ref 22–31)
CREAT SERPL-MCNC: 0.75 MG/DL — SIGNIFICANT CHANGE UP (ref 0.5–1.3)
ERYTHROCYTE [SEDIMENTATION RATE] IN BLOOD: 32 MM/HR — HIGH (ref 0–20)
GLUCOSE SERPL-MCNC: 103 MG/DL — HIGH (ref 70–99)
HCT VFR BLD CALC: 36.9 % — SIGNIFICANT CHANGE UP (ref 34.5–45)
HGB BLD-MCNC: 11.7 G/DL — SIGNIFICANT CHANGE UP (ref 11.5–15.5)
MCHC RBC-ENTMCNC: 27.9 PG — SIGNIFICANT CHANGE UP (ref 27–34)
MCHC RBC-ENTMCNC: 31.7 GM/DL — LOW (ref 32–36)
MCV RBC AUTO: 88.1 FL — SIGNIFICANT CHANGE UP (ref 80–100)
PLATELET # BLD AUTO: 470 K/UL — HIGH (ref 150–400)
POTASSIUM SERPL-MCNC: 3.6 MMOL/L — SIGNIFICANT CHANGE UP (ref 3.5–5.3)
POTASSIUM SERPL-SCNC: 3.6 MMOL/L — SIGNIFICANT CHANGE UP (ref 3.5–5.3)
RBC # BLD: 4.19 M/UL — SIGNIFICANT CHANGE UP (ref 3.8–5.2)
RBC # FLD: 14.7 % — HIGH (ref 10.3–14.5)
SODIUM SERPL-SCNC: 139 MMOL/L — SIGNIFICANT CHANGE UP (ref 135–145)
WBC # BLD: 16.11 K/UL — HIGH (ref 3.8–10.5)
WBC # FLD AUTO: 16.11 K/UL — HIGH (ref 3.8–10.5)

## 2021-02-15 PROCEDURE — 99232 SBSQ HOSP IP/OBS MODERATE 35: CPT

## 2021-02-15 PROCEDURE — 99233 SBSQ HOSP IP/OBS HIGH 50: CPT

## 2021-02-15 RX ORDER — TUBERCULIN PURIFIED PROTEIN DERIVATIVE 5 [IU]/.1ML
5 INJECTION, SOLUTION INTRADERMAL ONCE
Refills: 0 | Status: COMPLETED | OUTPATIENT
Start: 2021-02-15 | End: 2021-02-15

## 2021-02-15 RX ADMIN — OXYCODONE HYDROCHLORIDE 5 MILLIGRAM(S): 5 TABLET ORAL at 08:05

## 2021-02-15 RX ADMIN — BUDESONIDE AND FORMOTEROL FUMARATE DIHYDRATE 2 PUFF(S): 160; 4.5 AEROSOL RESPIRATORY (INHALATION) at 07:48

## 2021-02-15 RX ADMIN — BUDESONIDE AND FORMOTEROL FUMARATE DIHYDRATE 2 PUFF(S): 160; 4.5 AEROSOL RESPIRATORY (INHALATION) at 20:33

## 2021-02-15 RX ADMIN — Medication 30 MILLIGRAM(S): at 21:53

## 2021-02-15 RX ADMIN — Medication 1 GRAM(S): at 17:36

## 2021-02-15 RX ADMIN — OXYCODONE HYDROCHLORIDE 5 MILLIGRAM(S): 5 TABLET ORAL at 17:36

## 2021-02-15 RX ADMIN — Medication 1 GRAM(S): at 05:18

## 2021-02-15 RX ADMIN — HEPARIN SODIUM 5000 UNIT(S): 5000 INJECTION INTRAVENOUS; SUBCUTANEOUS at 21:53

## 2021-02-15 RX ADMIN — HEPARIN SODIUM 5000 UNIT(S): 5000 INJECTION INTRAVENOUS; SUBCUTANEOUS at 05:18

## 2021-02-15 RX ADMIN — DULOXETINE HYDROCHLORIDE 20 MILLIGRAM(S): 30 CAPSULE, DELAYED RELEASE ORAL at 21:52

## 2021-02-15 RX ADMIN — Medication 240 MILLIGRAM(S): at 08:05

## 2021-02-15 RX ADMIN — Medication 3 MILLIGRAM(S): at 21:52

## 2021-02-15 RX ADMIN — DIPHENHYDRAMINE HYDROCHLORIDE AND LIDOCAINE HYDROCHLORIDE AND ALUMINUM HYDROXIDE AND MAGNESIUM HYDRO 10 MILLILITER(S): KIT at 05:18

## 2021-02-15 RX ADMIN — HEPARIN SODIUM 5000 UNIT(S): 5000 INJECTION INTRAVENOUS; SUBCUTANEOUS at 12:27

## 2021-02-15 RX ADMIN — Medication 88 MICROGRAM(S): at 05:18

## 2021-02-15 RX ADMIN — TUBERCULIN PURIFIED PROTEIN DERIVATIVE 5 UNIT(S): 5 INJECTION, SOLUTION INTRADERMAL at 17:37

## 2021-02-15 RX ADMIN — GABAPENTIN 600 MILLIGRAM(S): 400 CAPSULE ORAL at 21:52

## 2021-02-15 RX ADMIN — Medication 30 MILLIGRAM(S): at 12:26

## 2021-02-15 RX ADMIN — OXYCODONE HYDROCHLORIDE 5 MILLIGRAM(S): 5 TABLET ORAL at 12:27

## 2021-02-15 RX ADMIN — GABAPENTIN 600 MILLIGRAM(S): 400 CAPSULE ORAL at 05:18

## 2021-02-15 RX ADMIN — HYDROMORPHONE HYDROCHLORIDE 1 MILLIGRAM(S): 2 INJECTION INTRAMUSCULAR; INTRAVENOUS; SUBCUTANEOUS at 21:57

## 2021-02-15 RX ADMIN — GABAPENTIN 600 MILLIGRAM(S): 400 CAPSULE ORAL at 12:27

## 2021-02-15 RX ADMIN — DIPHENHYDRAMINE HYDROCHLORIDE AND LIDOCAINE HYDROCHLORIDE AND ALUMINUM HYDROXIDE AND MAGNESIUM HYDRO 10 MILLILITER(S): KIT at 17:36

## 2021-02-15 NOTE — PROGRESS NOTE ADULT - SUBJECTIVE AND OBJECTIVE BOX
CHIEF COMPLAINT: abd pain    SUBJECTIVE: pt rachel nd examined, states abd pain is better with oxy, still waiting for MRi. She c/o weakness. No other sx, skin is better     REVIEW OF SYSTEMS:    General: denies fevers, chills or night sweats  Skin: denies rashes or photosensitivity  Ophthalmologic: denies sicca symptoms  ENMT: denies frequent sinus infections, or ear infections, denies nasal perforation	  Respiratory and Thorax: denies asthma, chest pain or SOB  Cardiovascular: denies chest pain or SOB  ENMT: denies frequent sinus infections, or ear infections, denies nasal perforation	  Respiratory and Thorax: denies asthma, chest pain or SOB  Cardiovascular: denies chest pain or SOB	  Gastrointestinal: denies colitis	  Musculoskeletal: denies prolonged morning stiffness  Neurological: denies migraines or seizures    Vital Signs Last 24 Hrs  T(C): 36.7 (15 Feb 2021 07:53), Max: 36.9 (14 Feb 2021 22:01)  T(F): 98.1 (15 Feb 2021 07:53), Max: 98.4 (14 Feb 2021 22:01)  HR: 79 (15 Feb 2021 07:53) (68 - 79)  BP: 143/90 (15 Feb 2021 07:53) (140/87 - 148/79)  BP(mean): --  RR: 17 (15 Feb 2021 07:53) (17 - 18)  SpO2: 100% (15 Feb 2021 07:53) (96% - 100%)    I&O's Summary      CAPILLARY BLOOD GLUCOSE          PHYSICAL EXAM:    Constitutional: NAD, awake and alert, well-developed  HEENT- no oral lesions noted, no lymphadenoapthy noted  Heart- S1 S2 reg  Lungs- CTA b/l  Abd- Soft NT  Ext- no edema  Skin- lesions improved   Musculoskelatal- FROM all joints, no active synovitis noted  Neurological- intact strength upper and lower extremities      MEDICATIONS:  MEDICATIONS  (STANDING):  budesonide 160 MICROgram(s)/formoterol 4.5 MICROgram(s) Inhaler 2 Puff(s) Inhalation two times a day  diltiazem    milliGRAM(s) Oral daily  DULoxetine 20 milliGRAM(s) Oral daily  FIRST- Mouthwash  BLM 10 milliLiter(s) Swish and Swallow four times a day  gabapentin 600 milliGRAM(s) Oral three times a day  heparin   Injectable 5000 Unit(s) SubCutaneous every 8 hours  levothyroxine 88 MICROGram(s) Oral daily  melatonin 3 milliGRAM(s) Oral at bedtime  methylPREDNISolone sodium succinate Injectable 30 milliGRAM(s) IV Push two times a day  PPD  5 Tuberculin Unit(s) Injectable 5 Unit(s) IntraDermal once  sucralfate 1 Gram(s) Oral two times a day      LABS: All Labs Reviewed:                        11.7   16.11 )-----------( 470      ( 15 Feb 2021 08:59 )             36.9     02-15    139  |  104  |  32<H>  ----------------------------<  103<H>  3.6   |  27  |  0.75    Ca    8.9      15 Feb 2021 08:59            Blood Culture: 02-11 @ 13:55  Organism --  Gram Stain Blood -- Gram Stain --  Specimen Source .Stool Feces  Culture-Blood --        RADIOLOGY/EKG:    A/P:

## 2021-02-15 NOTE — PROGRESS NOTE ADULT - SUBJECTIVE AND OBJECTIVE BOX
NEPHROLOGY INTERVAL HPI/OVERNIGHT EVENTS:    Date of Service: 02-15-21 @ 15:26    2/15--Resting comfortably.  No distress.  Attempting to eat a little better.  HPI:  54 yo F with pmh R nephrectomy in , HTN, asthma, hypothyroidism, unknown rheumatologic disorder with peripheral neuropathy for which she has been on chronic steroids since 2020 (presently on 10mg po qd) now presenting with c/o several weeks of intermittent epigastric pain which radiates to back. Pain worse post prandially. Associated with non bloody emesis. She recently underwent EGD with Dr hernandez approx 1 week ago and findings were indicative of gastritis.   Otherwise denies joint pain, arthralgias, rash, fevers or ulcerations. No chest pain or interscapular pain. Hemodynamically stable however she was found to be hypoxic in ED to 88%. Covid negative however CT abd demonstrated bilateral LL pna and GB wall thickening and pericholecystic fluid. U/S or RUQ also redemonstrated GB wall thickening and edema. No stones. LFTS/Bili normal. Pt is afebrile.    ED course: Nl lactate. WBC 24K. Given ceftriaxone and flagyl. IV morphine plus 2L NS    Above from Chart   Pt admitted with abd pain and h/o rheumatologic disorder w periph neuropathy  S/p R nephrectomy due to unilateral hydro, Nephrologist no privileges here  Mild eosinophilia noted on admission, resolved  has been on po steroids  developed Rash in September and has been having episodes since then    MEDICATIONS  (STANDING):  budesonide 160 MICROgram(s)/formoterol 4.5 MICROgram(s) Inhaler 2 Puff(s) Inhalation two times a day  diltiazem    milliGRAM(s) Oral daily  DULoxetine 20 milliGRAM(s) Oral daily  FIRST- Mouthwash  BLM 10 milliLiter(s) Swish and Swallow four times a day  gabapentin 600 milliGRAM(s) Oral three times a day  heparin   Injectable 5000 Unit(s) SubCutaneous every 8 hours  levothyroxine 88 MICROGram(s) Oral daily  melatonin 3 milliGRAM(s) Oral at bedtime  methylPREDNISolone sodium succinate Injectable 30 milliGRAM(s) IV Push two times a day  PPD  5 Tuberculin Unit(s) Injectable 5 Unit(s) IntraDermal once  sucralfate 1 Gram(s) Oral two times a day    MEDICATIONS  (PRN):  acetaminophen   Tablet .. 650 milliGRAM(s) Oral every 4 hours PRN Mild Pain (1 - 3)  aluminum hydroxide/magnesium hydroxide/simethicone Suspension 30 milliLiter(s) Oral every 4 hours PRN Dyspepsia  HYDROmorphone  Injectable 1 milliGRAM(s) IV Push every 3 hours PRN Severe Pain (7 - 10)  metoclopramide Injectable 10 milliGRAM(s) IV Push once PRN Nausea and/or Vomiting  ondansetron Injectable 4 milliGRAM(s) IV Push every 6 hours PRN Nausea  oxyCODONE    IR 5 milliGRAM(s) Oral every 4 hours PRN Moderate Pain (4 - 6)    Vital Signs Last 24 Hrs  T(C): 36.7 (15 Feb 2021 07:53), Max: 36.9 (2021 22:01)  T(F): 98.1 (15 Feb 2021 07:53), Max: 98.4 (2021 22:01)  HR: 79 (15 Feb 2021 07:53) (68 - 79)  BP: 143/90 (15 Feb 2021 07:53) (140/87 - 148/79)  BP(mean): --  RR: 17 (15 Feb 2021 07:53) (17 - 18)  SpO2: 100% (15 Feb 2021 07:53) (96% - 100%)    PHYSICAL EXAM:  GENERAL: comfortable.  CHEST/LUNG: clear to aus  HEART: S1S2 RRR  ABDOMEN: soft  EXTREMITIES: no edema  SKIN:     LABS:                        11.7   16.11 )-----------( 470      ( 15 Feb 2021 08:59 )             36.9     02-15    139  |  104  |  32<H>  ----------------------------<  103<H>  3.6   |  27  |  0.75    Ca    8.9      15 Feb 2021 08:59        Urinalysis Basic - ( 2021 23:00 )    Color: Yellow / Appearance: Clear / S.025 / pH: x  Gluc: x / Ketone: Negative  / Bili: Negative / Urobili: Negative mg/dL   Blood: x / Protein: 30 mg/dL / Nitrite: Negative   Leuk Esterase: Negative / RBC: 3-5 /HPF / WBC 3-5   Sq Epi: x / Non Sq Epi: Few / Bacteria: Few              RADIOLOGY & ADDITIONAL TESTS:

## 2021-02-15 NOTE — PROVIDER CONTACT NOTE (OTHER) - REASON
notified pcp
anthony thompson
anthony thompson
Vascular Consult
anthony thompson
Dermatology Consult
anthony thompson

## 2021-02-15 NOTE — PROGRESS NOTE ADULT - ASSESSMENT
56 yo F with pmh R nephrectomy in 2004, HTN, asthma- with recurrent need for steroids nearly consistent since 8/20- 10-40 mg daily, hypothyroidism, unknown rheumatologic disorder with new onset progressive peripheral neuropathy, purpuric rash, and oral/ nasal ulcerations, and significantly 50 lb wt loss Was in usual state health with intermittent asthma/ and sinus infection but otherwise healthy till 8/20  Recent evaluation for mid epigastric abd/ non bloody emesis, EGD + gastritis confirmed eosinphilic esophagitis/ gastritis. Extensive w/u neuro/ rheum work up negative as per patient,      She now has new lesions c/w vasculitis on her LE. At this time rheumatologically suspicion for EGPA is high.     Plan-  - lowered solumedrol to 30 mg bid  - bactrim for PCP prophylaxis  - noted to have MGUS on recent labs, cleared by hematology  - qunatiferon indeterminate, please place PPD  -pending MR abd for now  - PT consult  -s/p skin biopsy  - labs noted + RF, dsDNA, + MPO level   - will follow

## 2021-02-15 NOTE — PROGRESS NOTE ADULT - ASSESSMENT
54 yo F with pmh R nephrectomy in 2004, HTN, asthma, hypothyroidism, unknown rheumatologic disorder with peripheral neuropathy for which she has been on chronic steroids since september of 2020 (presently on 10mg po qd) now presenting with c/o several weeks of intermittent epigastric pain which radiates to back. Pain worse post prandially. Associated with non bloody emesis. She recently underwent EGD with Dr hernandez approx 1 week ago and findings were indicative of gastritis.   Otherwise denies joint pain, arthralgias, rash, fevers or ulcerations. No chest pain or interscapular pain. Hemodynamically stable however she was found to be hypoxic in ED to 88%. Covid negative however CT abd demonstrated bilateral LL pna and GB wall thickening and pericholecystic fluid. U/S or RUQ also redemonstrated GB wall thickening and edema. No stones. LFTS/Bili normal. Pt is afebrile.    ED course: Nl lactate. WBC 24K. Given ceftriaxone and flagyl. IV morphine plus 2L NS    Above from Chart   Pt admitted with abd pain and h/o rheumatologic disorder w periph neuropathy  S/p R nephrectomy, Nephrologist  Mild eosinophilia noted on admission, resolved  has been on po steroids  S/P skin bx of rash  UA with minimal sediment, may be contaminant  Will repeat  Rheum w/u By Dr Landa  If needed MRI w [facility], IVC OK   But the pt should be well hydrated 125 ml/ hr 4 hours prior to and post procedure    2/15 SY  --Solitary Kidney : stable renal function.  --Mesenteric ischemia : to further assess with contrast study.   Ok for CT with IVC vs MRI with [facility] : Rec IVF aretha- procedure :

## 2021-02-15 NOTE — PROVIDER CONTACT NOTE (OTHER) - SITUATION
Tele service spoke with Sumaya to request routine consult.  Per Dr Lnada advise MD to bring biopsy kit to r/o rash.
notified Dr Reyes's service of consult spoke to Saray
notified Dr Mcnulty's service of consult spoke to Olga
notified Dr Brur of consult
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
notified Dr Aiken's service spoke to Arabella
notified Dr Sheldon's office of admission

## 2021-02-15 NOTE — PROVIDER CONTACT NOTE (OTHER) - DATE AND TIME:
11-Feb-2021 13:49
14-Feb-2021 23:08
15-Feb-2021 08:27
10-Feb-2021 11:32
10-Feb-2021 19:30
13-Feb-2021 12:32
13-Feb-2021 12:38

## 2021-02-15 NOTE — PROGRESS NOTE ADULT - SUBJECTIVE AND OBJECTIVE BOX
CC: Abd pain  Subjective and Objective:   Patient is a 55y old  Female who presents with a chief complaint of abd pain (10 Feb 2021 09:01)  54 yo F with pmh R nephrectomy in , HTN, asthma, hypothyroidism, unknown rheumatologic disorder with peripheral neuropathy for which she has been on chronic steroids since 2020 (presently on 10mg po qd) now presenting with c/o several weeks of intermittent epigastric pain which radiates to back. Pain worse post prandially. Associated with non bloody emesis. She recently underwent EGD with Dr hernandez approx 1 week ago and findings were indicative of gastritis.   Otherwise denies joint pain, arthralgias, rash, fevers or ulcerations. No chest pain or interscapular pain. Hemodynamically stable however she was found to be hypoxic in ED to 88%. Covid negative however CT abd demonstrated bilateral LL pna and GB wall thickening and pericholecystic fluid. U/S or RUQ also redemonstrated GB wall thickening and edema. No stones. LFTS/Bili normal. Pt is afebrile.  ED course: Nl lactate. WBC 24K. Given ceftriaxone and flagyl. IV morphine plus 2L NS    SUBJECTIVE / OVERNIGHT EVENTS:  2/10- pt feels better in terms of her breathing, although still MEAD walking to bathroom.  She has vasculitic rash over her legs which rheum had been consulted, but rash has now progressed up her legs.  Pt is on weeklsy b12 injections, she's receive 7/12 weeks and requesting this week's dose. she feels the ulcers in her mouth are better.   d/w Dr Norton- agrees w/ derm cx, tx for pna, rheum work up, h/o eosinophilic esophagitis.    - s/e, breathing better w/ IV steroids, leg lesions stable (no further prgression), afebrile, eating well, no further abd pain  d/w Dr Sheldon- pt's symptoms began w/ neuropathy in Sept, at that time she was evaluated by ortho, neuro and rheum as she also had purura on abd wall- reportedly work up w/out significant findings, purpura attributed to singulair.  neuropathy attributed to b12 def and started on weekly injections.  However, symptoms progressed to Upper abd pain, had EGD 2 weeks ago which revealed eos esoph.  She presented now w/ ongoing upper abd pain, w/ finding of GB wall thicken/aretha fluid.  HIDA neg, no surgery warranted- seen by surgery and GI. found hypoxic, started on IV abx for possible pna as per findings on CT.  While admitted, pt developed vasculitic rash over lower legs yesterday.   d/w Dr Estrada (rheum)- high suspicion of EGPA, awaiting full work up, including tissue biopsy. +MGUS  d/w Dr Asher (derm)- for skin biopsy  : pt seen and examined at bedside. off supplemental O2. c/o epigastric pain and discomfort after eating. diarrhea improving. overall weak, still has neuropathy to upper and lower extremities, no dizziness or vision change. s/p bedside punch biopsy, complement/rheum work up still pending.  21: Patient seen and examined. Complaining of abd pain. Discussed with patient in length regarding management plan. Discussed with Dr Sheldon and Dr Winchester.    21: Patient seen and examined. Still with abd pain. Just back from [facility] [place].  2/15: Sitting in chair, admits to weakness in bilateral legs.  Feeling off, states has not been in chair in a while.  Denies fever, chills, N, V, abd pain, CP, SOB.    REVIEW OF SYSTEMS: All other review of systems is negative unless indicated above.    Vital Signs Last 24 Hrs  T(C): 36.7 (15 Feb 2021 07:53), Max: 36.9 (2021 22:01)  T(F): 98.1 (15 Feb 2021 07:53), Max: 98.4 (2021 22:01)  HR: 79 (15 Feb 2021 07:53) (68 - 79)  BP: 143/90 (15 Feb 2021 07:53) (140/87 - 148/79)  BP(mean): --  RR: 17 (15 Feb 2021 07:53) (17 - 18)  SpO2: 100% (15 Feb 2021 07:53) (96% - 100%)        PHYSICAL EXAM:  GENERAL: NAD, weak appearing  HEAD:  Atraumatic, Normocephalic  EYES: EOMI, PERRLA, conjunctiva and sclera clear  NECK: Supple, No JVD, no LAD  CHEST/LUNG: Clear to auscultation bilaterally; No wheeze, resp unlabored  HEART: Regular rate and rhythm; No murmurs, rubs, or gallops  ABDOMEN: Soft, Nontender, Nondistended; Bowel sounds present, no HSM  EXTREMITIES:  2+ Peripheral Pulses, No clubbing, cyanosis, or edema  PSYCH: AAOx3, normal behavior  NEUROLOGY: non-focal, sensory and cn 2-12 intact  SKIN: No erythematous  maculopapular  rash over lower legs b/l and bilat elbows         MEDICATIONS  (STANDING):  budesonide 160 MICROgram(s)/formoterol 4.5 MICROgram(s) Inhaler 2 Puff(s) Inhalation two times a day  diltiazem    milliGRAM(s) Oral daily  DULoxetine 20 milliGRAM(s) Oral daily  FIRST- Mouthwash  BLM 10 milliLiter(s) Swish and Swallow four times a day  gabapentin 600 milliGRAM(s) Oral three times a day  heparin   Injectable 5000 Unit(s) SubCutaneous every 8 hours  levothyroxine 88 MICROGram(s) Oral daily  melatonin 3 milliGRAM(s) Oral at bedtime  methylPREDNISolone sodium succinate Injectable 30 milliGRAM(s) IV Push two times a day  PPD  5 Tuberculin Unit(s) Injectable 5 Unit(s) IntraDermal once  sucralfate 1 Gram(s) Oral two times a day    MEDICATIONS  (PRN):  acetaminophen   Tablet .. 650 milliGRAM(s) Oral every 4 hours PRN Mild Pain (1 - 3)  aluminum hydroxide/magnesium hydroxide/simethicone Suspension 30 milliLiter(s) Oral every 4 hours PRN Dyspepsia  HYDROmorphone  Injectable 1 milliGRAM(s) IV Push every 3 hours PRN Severe Pain (7 - 10)  metoclopramide Injectable 10 milliGRAM(s) IV Push once PRN Nausea and/or Vomiting  ondansetron Injectable 4 milliGRAM(s) IV Push every 6 hours PRN Nausea  oxyCODONE    IR 5 milliGRAM(s) Oral every 4 hours PRN Moderate Pain (4 - 6)                                             11.7   16.11 )-----------( 470      ( 15 Feb 2021 08:59 )             36.9     02-15    139  |  104  |  32<H>  ----------------------------<  103<H>  3.6   |  27  |  0.75    Ca    8.9      15 Feb 2021 08:59      CAPILLARY BLOOD GLUCOSE            Urinalysis Basic - ( 2021 23:00 )    Color: Yellow / Appearance: Clear / S.025 / pH: x  Gluc: x / Ketone: Negative  / Bili: Negative / Urobili: Negative mg/dL   Blood: x / Protein: 30 mg/dL / Nitrite: Negative   Leuk Esterase: Negative / RBC: 3-5 /HPF / WBC 3-5   Sq Epi: x / Non Sq Epi: Few / Bacteria: Few          Assessment and Plan:  55 year old woman with abdominal pain.      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  Vascular consulted for further assessment and f/u imaging studies  -IVFs 4 hours prior and post IV contrast studies per nephro   #Gallbladder wall thickening,  HIDA neg: No cholecystitis   -GI following --> c/w ppi/carafate/maalox prn   -leukocytosis due to steroids  -PT eval    #MGUS on recent labs: Asymptomatic  -Heme following    # H/O right nephrectomy in  / Solitary kidney:  Renal function normal and stable, continue to monitor closely    severe protein calorie malnutrition:  -encourage oral intake  -supplementation    DVT PPX:  -Heparin Subc

## 2021-02-15 NOTE — PROGRESS NOTE ADULT - SUBJECTIVE AND OBJECTIVE BOX
Subjective:    Awake, alert. Still w/ abd pain after meals.    MEDICATIONS  (STANDING):  budesonide 160 MICROgram(s)/formoterol 4.5 MICROgram(s) Inhaler 2 Puff(s) Inhalation two times a day  diltiazem    milliGRAM(s) Oral daily  DULoxetine 20 milliGRAM(s) Oral daily  FIRST- Mouthwash  BLM 10 milliLiter(s) Swish and Swallow four times a day  gabapentin 600 milliGRAM(s) Oral three times a day  heparin   Injectable 5000 Unit(s) SubCutaneous every 8 hours  levothyroxine 88 MICROGram(s) Oral daily  melatonin 3 milliGRAM(s) Oral at bedtime  sodium chloride 0.9%. 1000 milliLiter(s) (75 mL/Hr) IV Continuous <Continuous>  sucralfate 1 Gram(s) Oral two times a day    MEDICATIONS  (PRN):  acetaminophen   Tablet .. 650 milliGRAM(s) Oral every 4 hours PRN Mild Pain (1 - 3)  aluminum hydroxide/magnesium hydroxide/simethicone Suspension 30 milliLiter(s) Oral every 4 hours PRN Dyspepsia  HYDROmorphone  Injectable 1 milliGRAM(s) IV Push every 3 hours PRN Severe Pain (7 - 10)  metoclopramide Injectable 10 milliGRAM(s) IV Push once PRN Nausea and/or Vomiting  ondansetron Injectable 4 milliGRAM(s) IV Push every 6 hours PRN Nausea  oxyCODONE    IR 5 milliGRAM(s) Oral every 4 hours PRN Moderate Pain (4 - 6)      Allergies    ciprofloxacin (Unknown)  penicillin (Unknown)    Intolerances        Vital Signs Last 24 Hrs  T(C): 36.7 (15 Feb 2021 07:53), Max: 36.9 (2021 22:01)  T(F): 98.1 (15 Feb 2021 07:53), Max: 98.4 (2021 22:01)  HR: 79 (15 Feb 2021 07:53) (68 - 79)  BP: 143/90 (15 Feb 2021 07:53) (140/87 - 148/79)  BP(mean): --  RR: 17 (15 Feb 2021 07:53) (17 - 18)  SpO2: 100% (15 Feb 2021 07:53) (96% - 100%)    PHYSICAL EXAMINATION:    NECK:  Supple. No lymphadenopathy. Jugular venous pressure not elevated.   HEART:   The cardiac impulse has a normal quality. Reg., Nl S1 and S2.    CHEST:  Chest is clear to auscultation. Normal respiratory effort.  ABDOMEN:  Soft and nontender.   EXTREMITIES:  There is no edema. Palpable purpura-improving      LABS:                        11.3   17.76 )-----------( 428      ( 2021 08:48 )             36.1     02-    140  |  108  |  28<H>  ----------------------------<  118<H>  4.4   |  26  |  0.71    Ca    9.1      2021 08:48        Urinalysis Basic - ( 2021 23:00 )    Color: Yellow / Appearance: Clear / S.025 / pH: x  Gluc: x / Ketone: Negative  / Bili: Negative / Urobili: Negative mg/dL   Blood: x / Protein: 30 mg/dL / Nitrite: Negative   Leuk Esterase: Negative / RBC: 3-5 /HPF / WBC 3-5   Sq Epi: x / Non Sq Epi: Few / Bacteria: Few        RADIOLOGY & ADDITIONAL TESTS:< from: US Doppler Mesenteric (21 @ 12:53) >    EXAM:  US DPLX MESENTERIC                            PROCEDURE DATE:  2021          INTERPRETATION:  CLINICAL INFORMATION: Abdominal pain.    COMPARISON: None available.    TECHNIQUE: Sonography of the abdomen. Color and spectral Doppler evaluation of the mesenteric vasculature was performed.    FINDINGS:    Aorta: Aorta measures 1.8 cm with peak systolic velocity of 84 cm/s.    Celiac artery: Peak systolic velocity 406 cm/s.    Superior mesenteric artery: Peak systolic velocity 168 cm/s    Inferior mesenteric artery: Peak systolic velocity: 71 cm/s.    IMPRESSION:  Patent mesenteric arteries. Elevated velocities within the celiac artery, suspicious for hemodynamically significant stenosis.      DEBORAH FLEMING MD; Attending Radiologist      Assessment and Recommendation:   · Assessment	  CT reviewed with CT radiologist-abnormal areas in lung may be inflammatory and not "infectious"      Solumedrol 40mg Q12H for treatment of vasculitis-as per Rheum    Follow asthma on symbicort     Rheumatology F/U- ? churg gurwinder (EGPA), CTD, etc.  Dermatology, skin bx-check results.      Concern about abdominal pain-? mesenteric vasculitis, ?intestinal angina-celiac artery stenosis/thrombosis. ?whether it is related to eosinophilic esophagitis. Consider need for arteriogram-however patient has only one kidney. Patient now agreeing to MRI of Abdomen w/gadolinium--will need aggressive hydration before and after the procedure.    Heme evaluation for M-spike    Problem/Recommendation - 1:  Pneumonia.  Problem/Recommendation - 2:  ·  Abdominal pain.   Problem/Recommendation - 3:  ·  Moderate persistent asthma.   Problem/Recommendation - 4:  ·  Dyspnea on exertion.   Problem/Recommendation - 5:  ·  Rash.   Problem/Recommendation - 6:  Peripheral eosinophilia.  Problem/Recommendation - 7:  Peripheral neuropathy.

## 2021-02-16 ENCOUNTER — APPOINTMENT (OUTPATIENT)
Dept: INTERNAL MEDICINE | Facility: CLINIC | Age: 56
End: 2021-02-16

## 2021-02-16 LAB
ALBUMIN SERPL ELPH-MCNC: 2.7 G/DL — LOW (ref 3.3–5)
ALP SERPL-CCNC: 100 U/L — SIGNIFICANT CHANGE UP (ref 40–120)
ALT FLD-CCNC: 144 U/L — HIGH (ref 12–78)
ANION GAP SERPL CALC-SCNC: 8 MMOL/L — SIGNIFICANT CHANGE UP (ref 5–17)
AST SERPL-CCNC: 152 U/L — HIGH (ref 15–37)
BILIRUB SERPL-MCNC: 0.3 MG/DL — SIGNIFICANT CHANGE UP (ref 0.2–1.2)
BUN SERPL-MCNC: 29 MG/DL — HIGH (ref 7–23)
CALCIUM SERPL-MCNC: 9 MG/DL — SIGNIFICANT CHANGE UP (ref 8.5–10.1)
CHLORIDE SERPL-SCNC: 105 MMOL/L — SIGNIFICANT CHANGE UP (ref 96–108)
CK SERPL-CCNC: 229 U/L — HIGH (ref 26–192)
CO2 SERPL-SCNC: 25 MMOL/L — SIGNIFICANT CHANGE UP (ref 22–31)
CREAT SERPL-MCNC: 0.72 MG/DL — SIGNIFICANT CHANGE UP (ref 0.5–1.3)
ERYTHROCYTE [SEDIMENTATION RATE] IN BLOOD: 41 MM/HR — HIGH (ref 0–20)
GLUCOSE SERPL-MCNC: 146 MG/DL — HIGH (ref 70–99)
HCT VFR BLD CALC: 37.9 % — SIGNIFICANT CHANGE UP (ref 34.5–45)
HGB BLD-MCNC: 11.8 G/DL — SIGNIFICANT CHANGE UP (ref 11.5–15.5)
MCHC RBC-ENTMCNC: 27.6 PG — SIGNIFICANT CHANGE UP (ref 27–34)
MCHC RBC-ENTMCNC: 31.1 GM/DL — LOW (ref 32–36)
MCV RBC AUTO: 88.6 FL — SIGNIFICANT CHANGE UP (ref 80–100)
PLATELET # BLD AUTO: 497 K/UL — HIGH (ref 150–400)
POTASSIUM SERPL-MCNC: 4.4 MMOL/L — SIGNIFICANT CHANGE UP (ref 3.5–5.3)
POTASSIUM SERPL-SCNC: 4.4 MMOL/L — SIGNIFICANT CHANGE UP (ref 3.5–5.3)
PROT SERPL-MCNC: 7.1 GM/DL — SIGNIFICANT CHANGE UP (ref 6–8.3)
RBC # BLD: 4.28 M/UL — SIGNIFICANT CHANGE UP (ref 3.8–5.2)
RBC # FLD: 14.9 % — HIGH (ref 10.3–14.5)
SODIUM SERPL-SCNC: 138 MMOL/L — SIGNIFICANT CHANGE UP (ref 135–145)
WBC # BLD: 18.07 K/UL — HIGH (ref 3.8–10.5)
WBC # FLD AUTO: 18.07 K/UL — HIGH (ref 3.8–10.5)

## 2021-02-16 PROCEDURE — 99233 SBSQ HOSP IP/OBS HIGH 50: CPT

## 2021-02-16 PROCEDURE — 99232 SBSQ HOSP IP/OBS MODERATE 35: CPT

## 2021-02-16 PROCEDURE — 74174 CTA ABD&PLVS W/CONTRAST: CPT | Mod: 26

## 2021-02-16 RX ORDER — SODIUM CHLORIDE 9 MG/ML
1000 INJECTION, SOLUTION INTRAVENOUS
Refills: 0 | Status: DISCONTINUED | OUTPATIENT
Start: 2021-02-16 | End: 2021-02-17

## 2021-02-16 RX ORDER — SODIUM CHLORIDE 9 MG/ML
1000 INJECTION, SOLUTION INTRAVENOUS
Refills: 0 | Status: DISCONTINUED | OUTPATIENT
Start: 2021-02-16 | End: 2021-02-16

## 2021-02-16 RX ADMIN — OXYCODONE HYDROCHLORIDE 5 MILLIGRAM(S): 5 TABLET ORAL at 11:39

## 2021-02-16 RX ADMIN — DIPHENHYDRAMINE HYDROCHLORIDE AND LIDOCAINE HYDROCHLORIDE AND ALUMINUM HYDROXIDE AND MAGNESIUM HYDRO 10 MILLILITER(S): KIT at 11:39

## 2021-02-16 RX ADMIN — DIPHENHYDRAMINE HYDROCHLORIDE AND LIDOCAINE HYDROCHLORIDE AND ALUMINUM HYDROXIDE AND MAGNESIUM HYDRO 10 MILLILITER(S): KIT at 05:52

## 2021-02-16 RX ADMIN — Medication 1 GRAM(S): at 05:52

## 2021-02-16 RX ADMIN — BUDESONIDE AND FORMOTEROL FUMARATE DIHYDRATE 2 PUFF(S): 160; 4.5 AEROSOL RESPIRATORY (INHALATION) at 19:29

## 2021-02-16 RX ADMIN — HEPARIN SODIUM 5000 UNIT(S): 5000 INJECTION INTRAVENOUS; SUBCUTANEOUS at 21:55

## 2021-02-16 RX ADMIN — OXYCODONE HYDROCHLORIDE 5 MILLIGRAM(S): 5 TABLET ORAL at 07:42

## 2021-02-16 RX ADMIN — HEPARIN SODIUM 5000 UNIT(S): 5000 INJECTION INTRAVENOUS; SUBCUTANEOUS at 05:52

## 2021-02-16 RX ADMIN — Medication 1 GRAM(S): at 16:32

## 2021-02-16 RX ADMIN — DULOXETINE HYDROCHLORIDE 20 MILLIGRAM(S): 30 CAPSULE, DELAYED RELEASE ORAL at 21:55

## 2021-02-16 RX ADMIN — DIPHENHYDRAMINE HYDROCHLORIDE AND LIDOCAINE HYDROCHLORIDE AND ALUMINUM HYDROXIDE AND MAGNESIUM HYDRO 10 MILLILITER(S): KIT at 17:59

## 2021-02-16 RX ADMIN — Medication 240 MILLIGRAM(S): at 09:57

## 2021-02-16 RX ADMIN — GABAPENTIN 600 MILLIGRAM(S): 400 CAPSULE ORAL at 21:55

## 2021-02-16 RX ADMIN — HYDROMORPHONE HYDROCHLORIDE 1 MILLIGRAM(S): 2 INJECTION INTRAMUSCULAR; INTRAVENOUS; SUBCUTANEOUS at 21:55

## 2021-02-16 RX ADMIN — GABAPENTIN 600 MILLIGRAM(S): 400 CAPSULE ORAL at 14:13

## 2021-02-16 RX ADMIN — DIPHENHYDRAMINE HYDROCHLORIDE AND LIDOCAINE HYDROCHLORIDE AND ALUMINUM HYDROXIDE AND MAGNESIUM HYDRO 10 MILLILITER(S): KIT at 21:56

## 2021-02-16 RX ADMIN — Medication 30 MILLIGRAM(S): at 09:57

## 2021-02-16 RX ADMIN — Medication 3 MILLIGRAM(S): at 21:55

## 2021-02-16 RX ADMIN — Medication 88 MICROGRAM(S): at 05:53

## 2021-02-16 RX ADMIN — GABAPENTIN 600 MILLIGRAM(S): 400 CAPSULE ORAL at 05:53

## 2021-02-16 RX ADMIN — Medication 30 MILLIGRAM(S): at 21:54

## 2021-02-16 RX ADMIN — HEPARIN SODIUM 5000 UNIT(S): 5000 INJECTION INTRAVENOUS; SUBCUTANEOUS at 14:11

## 2021-02-16 RX ADMIN — OXYCODONE HYDROCHLORIDE 5 MILLIGRAM(S): 5 TABLET ORAL at 16:31

## 2021-02-16 RX ADMIN — BUDESONIDE AND FORMOTEROL FUMARATE DIHYDRATE 2 PUFF(S): 160; 4.5 AEROSOL RESPIRATORY (INHALATION) at 08:15

## 2021-02-16 RX ADMIN — SODIUM CHLORIDE 125 MILLILITER(S): 9 INJECTION, SOLUTION INTRAVENOUS at 17:59

## 2021-02-16 RX ADMIN — SODIUM CHLORIDE 125 MILLILITER(S): 9 INJECTION, SOLUTION INTRAVENOUS at 11:39

## 2021-02-16 NOTE — PROGRESS NOTE ADULT - PROVIDER SPECIALTY LIST ADULT
Hospitalist
Nephrology
Hospitalist
Pulmonology
Rheumatology
Gastroenterology
Hospitalist
Nephrology
Pulmonology
Rheumatology
Gastroenterology
Gastroenterology
Hospitalist
Hospitalist
Pulmonology
Rheumatology

## 2021-02-16 NOTE — CONSULT NOTE ADULT - CONSULT REASON
MGUS
r/o cholecystitis
abd pain
r/o vasculitis- purpuric rash
nephrectomy
r/o mesenteric vasculitis
rule out vasculitis
asthma

## 2021-02-16 NOTE — PHYSICAL THERAPY INITIAL EVALUATION ADULT - ADDITIONAL COMMENTS
Pt has had bouts of weakness due to recent significant weight loss and has used a RW intermittently 24-Jan-2020

## 2021-02-16 NOTE — CONSULT NOTE ADULT - SUBJECTIVE AND OBJECTIVE BOX
54 yo F with PMH R nephrectomy in , HTN, asthma, hypothyroidism, unknown rheumatologic disorder with peripheral neuropathy for which she has been on chronic steroids since 2020 (presently on 10mg po qd) now presenting with c/o several weeks of intermittent epigastric pain which radiates to back. Pain worse post prandially. Associated with non bloody emesis. She recently underwent EGD with Dr Boyer approx 1 week ago and findings were indicative of eosinophilic esophagitis and gastritis. Vascular surgery was consulted to evaluate patient for mesenteric vasculitis.    PMHx: Hashimoto's, HTN, asthma, nephrotic syndrome, gastritis, eosinophilic esophagitis, cholelithiasis  PSHx: right nephrectomy (),  x2        Vital Signs Last 24 Hrs  T(C): 36.7 (2021 07:10), Max: 36.7 (15 Feb 2021 22:17)  T(F): 98 (2021 07:10), Max: 98.1 (15 Feb 2021 22:17)  HR: 78 (2021 08:16) (64 - 81)  BP: 131/85 (2021 07:10) (131/85 - 151/98)  BP(mean): --  RR: 18 (2021 07:10) (18 - 18)  SpO2: 96% (2021 07:10) (96% - 98%)    Labs:      CARDIAC MARKERS ( 2021 08:34 )  x     / x     / 229 U/L / x     / x                                11.8   18.07 )-----------( 497      ( 2021 08:34 )             37.9     CBC Full  -  ( 2021 08:34 )  WBC Count : 18.07 K/uL  RBC Count : 4.28 M/uL  Hemoglobin : 11.8 g/dL  Hematocrit : 37.9 %  Platelet Count - Automated : 497 K/uL  Mean Cell Volume : 88.6 fl  Mean Cell Hemoglobin : 27.6 pg  Mean Cell Hemoglobin Concentration : 31.1 gm/dL  Auto Neutrophil # : x  Auto Lymphocyte # : x  Auto Monocyte # : x  Auto Eosinophil # : x  Auto Basophil # : x  Auto Neutrophil % : x  Auto Lymphocyte % : x  Auto Monocyte % : x  Auto Eosinophil % : x  Auto Basophil % : x    02-16    138  |  105  |  29<H>  ----------------------------<  146<H>  4.4   |  25  |  0.72    Ca    9.0      2021 08:34    TPro  7.1  /  Alb  2.7<L>  /  TBili  0.3  /  DBili  x   /  AST  152<H>  /  ALT  144<H>  /  AlkPhos  100      LIVER FUNCTIONS - ( 2021 08:34 )  Alb: 2.7 g/dL / Pro: 7.1 gm/dL / ALK PHOS: 100 U/L / ALT: 144 U/L / AST: 152 U/L / GGT: x                 Meds:  acetaminophen   Tablet .. 650 milliGRAM(s) Oral every 4 hours PRN  aluminum hydroxide/magnesium hydroxide/simethicone Suspension 30 milliLiter(s) Oral every 4 hours PRN  budesonide 160 MICROgram(s)/formoterol 4.5 MICROgram(s) Inhaler 2 Puff(s) Inhalation two times a day  diltiazem    milliGRAM(s) Oral daily  DULoxetine 20 milliGRAM(s) Oral daily  FIRST- Mouthwash  BLM 10 milliLiter(s) Swish and Swallow four times a day  gabapentin 600 milliGRAM(s) Oral three times a day  heparin   Injectable 5000 Unit(s) SubCutaneous every 8 hours  HYDROmorphone  Injectable 1 milliGRAM(s) IV Push every 3 hours PRN  lactated ringers. 1000 milliLiter(s) IV Continuous <Continuous>  levothyroxine 88 MICROGram(s) Oral daily  melatonin 3 milliGRAM(s) Oral at bedtime  methylPREDNISolone sodium succinate Injectable 30 milliGRAM(s) IV Push two times a day  metoclopramide Injectable 10 milliGRAM(s) IV Push once PRN  ondansetron Injectable 4 milliGRAM(s) IV Push every 6 hours PRN  oxyCODONE    IR 5 milliGRAM(s) Oral every 4 hours PRN  sucralfate 1 Gram(s) Oral two times a day      Physical Exam:  General: AAOx3, in NAD  HEENT: NC/AT, EOMI  Cardio: S1S2, RRR  Pulm: equal air entry b/l, non labored on NC  Abdomen: soft, non distended, tenderness to palpation in RUQ, LUQ and epigastric region, most tender in epigastric region, negative Morris's sign, right nephrectomy scar well-healed in RUQ  EXTREMITIES:  2+ Peripheral Pulses, No clubbing, cyanosis, or edema    Imaging:  EXAM: US DPLX MESENTERIC      PROCEDURE DATE: 2021        INTERPRETATION: CLINICAL INFORMATION: Abdominal pain.    COMPARISON: None available.    TECHNIQUE: Sonography of the abdomen. Color and spectral Doppler evaluation of the mesenteric vasculature was performed.    FINDINGS:    Aorta: Aorta measures 1.8 cm with peak systolic velocity of 84 cm/s.    Celiac artery: Peak systolic velocity 406 cm/s.    Superior mesenteric artery: Peak systolic velocity 168 cm/s    Inferior mesenteric artery: Peak systolic velocity: 71 cm/s.    IMPRESSION:  Patent mesenteric arteries. Elevated velocities within the celiac artery, suspicious for hemodynamically significant stenosis.            DEBORAH FLEMING MD; Attending Radiologist  This document has been electronically signed. 2021 1:56PM

## 2021-02-16 NOTE — CONSULT NOTE ADULT - ASSESSMENT
56 yo F with post prandial abdominal pain and mesenteric duplex showing increased velocities in celiac artery suspicious for stenosis.    -recommend CT angiogram abdomen/pelvis to evaluate for mesenteric ischemia  -IVF prior to contrast due to hx of nephrectomy  -pain control      Plan discussed with Dr. Church.

## 2021-02-16 NOTE — PROGRESS NOTE ADULT - NUTRITIONAL ASSESSMENT
This patient has been assessed with a concern for Malnutrition and has been determined to have a diagnosis/diagnoses of Severe protein-calorie malnutrition.    This patient is being managed with:   Diet Regular-  Entered: Feb 13 2021  2:13PM    
This patient has been assessed with a concern for Malnutrition and has been determined to have a diagnosis/diagnoses of Severe protein-calorie malnutrition.    This patient is being managed with:   Diet Regular-  Entered: Feb 13 2021  2:13PM

## 2021-02-16 NOTE — PHYSICAL THERAPY INITIAL EVALUATION ADULT - GENERAL OBSERVATIONS, REHAB EVAL
Pt rec'd sitting up in bedside chair, no acute complaints, reports feeling better, cooperative with PT.

## 2021-02-16 NOTE — PROGRESS NOTE ADULT - SUBJECTIVE AND OBJECTIVE BOX
Subjective:    Awake, alert. Above noted. Less abd pain this AM.    MEDICATIONS  (STANDING):  budesonide 160 MICROgram(s)/formoterol 4.5 MICROgram(s) Inhaler 2 Puff(s) Inhalation two times a day  diltiazem    milliGRAM(s) Oral daily  DULoxetine 20 milliGRAM(s) Oral daily  FIRST- Mouthwash  BLM 10 milliLiter(s) Swish and Swallow four times a day  gabapentin 600 milliGRAM(s) Oral three times a day  heparin   Injectable 5000 Unit(s) SubCutaneous every 8 hours  levothyroxine 88 MICROGram(s) Oral daily  melatonin 3 milliGRAM(s) Oral at bedtime  methylPREDNISolone sodium succinate Injectable 30 milliGRAM(s) IV Push two times a day  sucralfate 1 Gram(s) Oral two times a day    MEDICATIONS  (PRN):  acetaminophen   Tablet .. 650 milliGRAM(s) Oral every 4 hours PRN Mild Pain (1 - 3)  aluminum hydroxide/magnesium hydroxide/simethicone Suspension 30 milliLiter(s) Oral every 4 hours PRN Dyspepsia  HYDROmorphone  Injectable 1 milliGRAM(s) IV Push every 3 hours PRN Severe Pain (7 - 10)  metoclopramide Injectable 10 milliGRAM(s) IV Push once PRN Nausea and/or Vomiting  ondansetron Injectable 4 milliGRAM(s) IV Push every 6 hours PRN Nausea  oxyCODONE    IR 5 milliGRAM(s) Oral every 4 hours PRN Moderate Pain (4 - 6)      Allergies    ciprofloxacin (Unknown)  penicillin (Unknown)    Intolerances        Vital Signs Last 24 Hrs  T(C): 36.7 (16 Feb 2021 07:10), Max: 36.7 (15 Feb 2021 22:17)  T(F): 98 (16 Feb 2021 07:10), Max: 98.1 (15 Feb 2021 22:17)  HR: 78 (16 Feb 2021 08:16) (64 - 81)  BP: 131/85 (16 Feb 2021 07:10) (131/85 - 151/98)  BP(mean): --  RR: 18 (16 Feb 2021 07:10) (18 - 18)  SpO2: 96% (16 Feb 2021 07:10) (96% - 98%)    PHYSICAL EXAMINATION:    NECK:  Supple. No lymphadenopathy. Jugular venous pressure not elevated.   HEART:   The cardiac impulse has a normal quality. Reg., Nl S1 and S2.    CHEST:  Chest is clear to auscultation. Normal respiratory effort.  ABDOMEN:  Soft and nontender.   EXTREMITIES:  There is no edema. Purpura are resolving.       LABS:                        11.8   18.07 )-----------( 497      ( 16 Feb 2021 08:34 )             37.9     02-15    139  |  104  |  32<H>  ----------------------------<  103<H>  3.6   |  27  |  0.75    Ca    8.9      15 Feb 2021 08:59            RADIOLOGY & ADDITIONAL TESTS:    Assessment and Recommendation:   · Assessment	  CT reviewed with CT radiologist-abnormal areas in lung may be inflammatory and not "infectious"      Solumedrol 30mg Q12H for treatment of vasculitis-as per Rheum. Now on Bactrim for PCP prophylaxis    Follow asthma on symbicort     Rheumatology F/U- ? churg gurwinder (EGPA), CTD, etc.  Dermatology, skin bx-await results.      Concern about abdominal pain-? mesenteric vasculitis, ?intestinal angina-celiac artery stenosis/thrombosis. ?whether it is related to eosinophilic esophagitis. Consider need for arteriogram-however patient has only one kidney. Patient now agreeing to MRI of Abdomen w/gadolinium--will need aggressive hydration before and after the procedure.    Heme evaluation for M-spike    Problem/Recommendation - 1:  Pneumonia.  Problem/Recommendation - 2:  ·  Abdominal pain.   Problem/Recommendation - 3:  ·  Moderate persistent asthma.   Problem/Recommendation - 4:  ·  Dyspnea on exertion.   Problem/Recommendation - 5:  ·  Rash.   Problem/Recommendation - 6:  Peripheral eosinophilia.  Problem/Recommendation - 7:  Peripheral neuropathy.

## 2021-02-16 NOTE — PROGRESS NOTE ADULT - SUBJECTIVE AND OBJECTIVE BOX
NEPHROLOGY INTERVAL HPI/OVERNIGHT EVENTS:    Date of Service: 02-15-21 @ 15:26    2/15--Resting comfortably.  No distress.  Attempting to eat a little better.  HPI:  54 yo F with pmh R nephrectomy in , HTN, asthma, hypothyroidism, unknown rheumatologic disorder with peripheral neuropathy for which she has been on chronic steroids since 2020 (presently on 10mg po qd) now presenting with c/o several weeks of intermittent epigastric pain which radiates to back. Pain worse post prandially. Associated with non bloody emesis. She recently underwent EGD with Dr hernandez approx 1 week ago and findings were indicative of gastritis.   Otherwise denies joint pain, arthralgias, rash, fevers or ulcerations. No chest pain or interscapular pain. Hemodynamically stable however she was found to be hypoxic in ED to 88%. Covid negative however CT abd demonstrated bilateral LL pna and GB wall thickening and pericholecystic fluid. U/S or RUQ also redemonstrated GB wall thickening and edema. No stones. LFTS/Bili normal. Pt is afebrile.    ED course: Nl lactate. WBC 24K. Given ceftriaxone and flagyl. IV morphine plus 2L NS    Above from Chart   Pt admitted with abd pain and h/o rheumatologic disorder w periph neuropathy  S/p R nephrectomy due to unilateral hydro, Nephrologist no privileges here  Mild eosinophilia noted on admission, resolved  has been on po steroids  developed Rash in September and has been having episodes since then      for MRI w Kemar abd vasculare  On ivf @ 125 ml/hr pre and post Kemar  possible Churg Girma, + hemauria      MEDICATIONS  (STANDING):  budesonide 160 MICROgram(s)/formoterol 4.5 MICROgram(s) Inhaler 2 Puff(s) Inhalation two times a day  diltiazem    milliGRAM(s) Oral daily  DULoxetine 20 milliGRAM(s) Oral daily  FIRST- Mouthwash  BLM 10 milliLiter(s) Swish and Swallow four times a day  gabapentin 600 milliGRAM(s) Oral three times a day  heparin   Injectable 5000 Unit(s) SubCutaneous every 8 hours  lactated ringers. 1000 milliLiter(s) (125 mL/Hr) IV Continuous <Continuous>  levothyroxine 88 MICROGram(s) Oral daily  melatonin 3 milliGRAM(s) Oral at bedtime  methylPREDNISolone sodium succinate Injectable 30 milliGRAM(s) IV Push two times a day  sucralfate 1 Gram(s) Oral two times a day    MEDICATIONS  (PRN):  acetaminophen   Tablet .. 650 milliGRAM(s) Oral every 4 hours PRN Mild Pain (1 - 3)  aluminum hydroxide/magnesium hydroxide/simethicone Suspension 30 milliLiter(s) Oral every 4 hours PRN Dyspepsia  HYDROmorphone  Injectable 1 milliGRAM(s) IV Push every 3 hours PRN Severe Pain (7 - 10)  metoclopramide Injectable 10 milliGRAM(s) IV Push once PRN Nausea and/or Vomiting  ondansetron Injectable 4 milliGRAM(s) IV Push every 6 hours PRN Nausea  oxyCODONE    IR 5 milliGRAM(s) Oral every 4 hours PRN Moderate Pain (4 - 6)    Vital Signs Last 24 Hrs  T(C): 36.7 (2021 07:10), Max: 36.7 (15 Feb 2021 22:17)  T(F): 98 (2021 07:10), Max: 98.1 (15 Feb 2021 22:17)  HR: 78 (2021 08:16) (64 - 81)  BP: 131/85 (2021 07:10) (131/85 - 151/98)      BP(mean): --  RR: 18 (2021 07:10) (18 - 18)  SpO2: 96% (2021 07:10) (96% - 98%)  PHYSICAL EXAM:  GENERAL: comfortable.  CHEST/LUNG: clear to aus  HEART: S1S2 RRR  ABDOMEN: soft  EXTREMITIES: no edema  SKIN:     LABS:                          11.8   18.07 )-----------( 497      ( 2021 08:34 )             37.9                           11.7   16.11 )-----------( 470      ( 15 Feb 2021 08:59 )             36.9       02-16    138  |  105  |  29<H>  ----------------------------<  146<H>  4.4   |  25  |  0.72    Ca    9.0      2021 08:34    TPro  7.1  /  Alb  2.7<L>  /  TBili  0.3  /  DBili  x   /  AST  152<H>  /  ALT  144<H>  /  AlkPhos  100  02-16    02-15    139  |  104  |  32<H>  ----------------------------<  103<H>  3.6   |  27  |  0.75    Ca    8.9      15 Feb 2021 08:59        Urinalysis Basic - ( 2021 23:00 )    Color: Yellow / Appearance: Clear / S.025 / pH: x  Gluc: x / Ketone: Negative  / Bili: Negative / Urobili: Negative mg/dL   Blood: x / Protein: 30 mg/dL / Nitrite: Negative   Leuk Esterase: Negative / RBC: 3-5 /HPF / WBC 3-5   Sq Epi: x / Non Sq Epi: Few / Bacteria: Few              RADIOLOGY & ADDITIONAL TESTS:   NEPHROLOGY INTERVAL HPI/OVERNIGHT EVENTS:    Date of Service: 02-15-21 @ 15:26    2/15--Resting comfortably.  No distress.  Attempting to eat a little better.  HPI:  56 yo F with pmh R nephrectomy in , HTN, asthma, hypothyroidism, unknown rheumatologic disorder with peripheral neuropathy for which she has been on chronic steroids since 2020 (presently on 10mg po qd) now presenting with c/o several weeks of intermittent epigastric pain which radiates to back. Pain worse post prandially. Associated with non bloody emesis. She recently underwent EGD with Dr hernandez approx 1 week ago and findings were indicative of gastritis.   Otherwise denies joint pain, arthralgias, rash, fevers or ulcerations. No chest pain or interscapular pain. Hemodynamically stable however she was found to be hypoxic in ED to 88%. Covid negative however CT abd demonstrated bilateral LL pna and GB wall thickening and pericholecystic fluid. U/S or RUQ also redemonstrated GB wall thickening and edema. No stones. LFTS/Bili normal. Pt is afebrile.    ED course: Nl lactate. WBC 24K. Given ceftriaxone and flagyl. IV morphine plus 2L NS    Above from Chart   Pt admitted with abd pain and h/o rheumatologic disorder w periph neuropathy  S/p R nephrectomy due to unilateral hydro, Nephrologist no privileges here  Mild eosinophilia noted on admission, resolved  has been on po steroids  developed Rash in September and has been having episodes since then      for CT angio  On ivf @ 125 ml/hr pre and post ivc  possible Churg Girma, + hematuria      MEDICATIONS  (STANDING):  budesonide 160 MICROgram(s)/formoterol 4.5 MICROgram(s) Inhaler 2 Puff(s) Inhalation two times a day  diltiazem    milliGRAM(s) Oral daily  DULoxetine 20 milliGRAM(s) Oral daily  FIRST- Mouthwash  BLM 10 milliLiter(s) Swish and Swallow four times a day  gabapentin 600 milliGRAM(s) Oral three times a day  heparin   Injectable 5000 Unit(s) SubCutaneous every 8 hours  lactated ringers. 1000 milliLiter(s) (125 mL/Hr) IV Continuous <Continuous>  levothyroxine 88 MICROGram(s) Oral daily  melatonin 3 milliGRAM(s) Oral at bedtime  methylPREDNISolone sodium succinate Injectable 30 milliGRAM(s) IV Push two times a day  sucralfate 1 Gram(s) Oral two times a day    MEDICATIONS  (PRN):  acetaminophen   Tablet .. 650 milliGRAM(s) Oral every 4 hours PRN Mild Pain (1 - 3)  aluminum hydroxide/magnesium hydroxide/simethicone Suspension 30 milliLiter(s) Oral every 4 hours PRN Dyspepsia  HYDROmorphone  Injectable 1 milliGRAM(s) IV Push every 3 hours PRN Severe Pain (7 - 10)  metoclopramide Injectable 10 milliGRAM(s) IV Push once PRN Nausea and/or Vomiting  ondansetron Injectable 4 milliGRAM(s) IV Push every 6 hours PRN Nausea  oxyCODONE    IR 5 milliGRAM(s) Oral every 4 hours PRN Moderate Pain (4 - 6)    Vital Signs Last 24 Hrs  T(C): 36.7 (2021 07:10), Max: 36.7 (15 Feb 2021 22:17)  T(F): 98 (2021 07:10), Max: 98.1 (15 Feb 2021 22:17)  HR: 78 (2021 08:16) (64 - 81)  BP: 131/85 (2021 07:10) (131/85 - 151/98)      BP(mean): --  RR: 18 (2021 07:10) (18 - 18)  SpO2: 96% (2021 07:10) (96% - 98%)  PHYSICAL EXAM:  GENERAL: comfortable.  CHEST/LUNG: clear to aus  HEART: S1S2 RRR  ABDOMEN: soft  EXTREMITIES: no edema  SKIN:     LABS:                          11.8   18.07 )-----------( 497      ( 2021 08:34 )             37.9                           11.7   16.11 )-----------( 470      ( 15 Feb 2021 08:59 )             36.9       02-16    138  |  105  |  29<H>  ----------------------------<  146<H>  4.4   |  25  |  0.72    Ca    9.0      2021 08:34    TPro  7.1  /  Alb  2.7<L>  /  TBili  0.3  /  DBili  x   /  AST  152<H>  /  ALT  144<H>  /  AlkPhos  100  02-16    02-15    139  |  104  |  32<H>  ----------------------------<  103<H>  3.6   |  27  |  0.75    Ca    8.9      15 Feb 2021 08:59        Urinalysis Basic - ( 2021 23:00 )    Color: Yellow / Appearance: Clear / S.025 / pH: x  Gluc: x / Ketone: Negative  / Bili: Negative / Urobili: Negative mg/dL   Blood: x / Protein: 30 mg/dL / Nitrite: Negative   Leuk Esterase: Negative / RBC: 3-5 /HPF / WBC 3-5   Sq Epi: x / Non Sq Epi: Few / Bacteria: Few              RADIOLOGY & ADDITIONAL TESTS:

## 2021-02-16 NOTE — CONSULT NOTE ADULT - CONSULT REQUESTED DATE/TIME
09-Feb-2021 08:50
10-Feb-2021 09:01
09-Feb-2021 08:50
09-Feb-2021 17:08
13-Feb-2021 13:24
13-Feb-2021 13:41
10-Feb-2021 18:33
11-Feb-2021 08:30

## 2021-02-16 NOTE — PROGRESS NOTE ADULT - SUBJECTIVE AND OBJECTIVE BOX
CC: Abd pain  Subjective and Objective:   Patient is a 55y old  Female who presents with a chief complaint of abd pain (10 Feb 2021 09:01)  56 yo F with pmh R nephrectomy in , HTN, asthma, hypothyroidism, unknown rheumatologic disorder with peripheral neuropathy for which she has been on chronic steroids since 2020 (presently on 10mg po qd) now presenting with c/o several weeks of intermittent epigastric pain which radiates to back. Pain worse post prandially. Associated with non bloody emesis. She recently underwent EGD with Dr hernandez approx 1 week ago and findings were indicative of gastritis.   Otherwise denies joint pain, arthralgias, rash, fevers or ulcerations. No chest pain or interscapular pain. Hemodynamically stable however she was found to be hypoxic in ED to 88%. Covid negative however CT abd demonstrated bilateral LL pna and GB wall thickening and pericholecystic fluid. U/S or RUQ also redemonstrated GB wall thickening and edema. No stones. LFTS/Bili normal. Pt is afebrile.  ED course: Nl lactate. WBC 24K. Given ceftriaxone and flagyl. IV morphine plus 2L NS    SUBJECTIVE / OVERNIGHT EVENTS:  2/10- pt feels better in terms of her breathing, although still MEAD walking to bathroom.  She has vasculitic rash over her legs which rheum had been consulted, but rash has now progressed up her legs.  Pt is on weeklsy b12 injections, she's receive 7/12 weeks and requesting this week's dose. she feels the ulcers in her mouth are better.   d/w Dr Norton- agrees w/ derm cx, tx for pna, rheum work up, h/o eosinophilic esophagitis.    - s/e, breathing better w/ IV steroids, leg lesions stable (no further prgression), afebrile, eating well, no further abd pain  d/w Dr Sheldon- pt's symptoms began w/ neuropathy in Sept, at that time she was evaluated by ortho, neuro and rheum as she also had purura on abd wall- reportedly work up w/out significant findings, purpura attributed to singulair.  neuropathy attributed to b12 def and started on weekly injections.  However, symptoms progressed to Upper abd pain, had EGD 2 weeks ago which revealed eos esoph.  She presented now w/ ongoing upper abd pain, w/ finding of GB wall thicken/aretha fluid.  HIDA neg, no surgery warranted- seen by surgery and GI. found hypoxic, started on IV abx for possible pna as per findings on CT.  While admitted, pt developed vasculitic rash over lower legs yesterday.   d/w Dr Estrada (rheum)- high suspicion of EGPA, awaiting full work up, including tissue biopsy. +MGUS  d/w Dr Asher (derm)- for skin biopsy  : pt seen and examined at bedside. off supplemental O2. c/o epigastric pain and discomfort after eating. diarrhea improving. overall weak, still has neuropathy to upper and lower extremities, no dizziness or vision change. s/p bedside punch biopsy, complement/rheum work up still pending.  21: Patient seen and examined. Complaining of abd pain. Discussed with patient in length regarding management plan. Discussed with Dr Sheldon and Dr Winchester.    21: Patient seen and examined. Still with abd pain. Just back from abd US.  2/15: Sitting in chair, admits to weakness in bilateral legs.  Feeling off, states has not been in chair in a while.  Denies fever, chills, N, V, abd pain, CP, SOB.  : pt seen and examined sitting up in bed. explained rationale for Ct angio versus MRA. pt agreeable to plan. continues with intermittent abd pain     REVIEW OF SYSTEMS: All other review of systems is negative unless indicated above.    Vital Signs Last 24 Hrs  T(C): 36.7 (2021 07:10), Max: 36.7 (15 Feb 2021 22:17)  T(F): 98 (2021 07:10), Max: 98.1 (15 Feb 2021 22:17)  HR: 78 (2021 08:16) (64 - 81)  BP: 131/85 (2021 07:10) (131/85 - 151/98)  BP(mean): --  RR: 18 (2021 07:10) (18 - 18)  SpO2: 96% (2021 07:10) (96% - 98%) --- room air         PHYSICAL EXAM:  GENERAL: NAD, weak appearing  HEAD:  Atraumatic, Normocephalic  EYES: EOMI, PERRLA, conjunctiva and sclera clear  NECK: Supple, No JVD, no LAD  CHEST/LUNG: Clear to auscultation bilaterally; No wheeze, resp unlabored  HEART: Regular rate and rhythm; No murmurs, rubs, or gallops  ABDOMEN: Soft, Nontender, Nondistended; Bowel sounds present, no HSM  EXTREMITIES:  2+ Peripheral Pulses, No clubbing, cyanosis, or edema  PSYCH: AAOx3, normal behavior  NEUROLOGY: non-focal, sensory and cn 2-12 intact  SKIN: erythematous  maculopapular  rash over lower legs b/l and bilat elbows       MEDICATIONS  (STANDING):  budesonide 160 MICROgram(s)/formoterol 4.5 MICROgram(s) Inhaler 2 Puff(s) Inhalation two times a day  diltiazem    milliGRAM(s) Oral daily  DULoxetine 20 milliGRAM(s) Oral daily  FIRST- Mouthwash  BLM 10 milliLiter(s) Swish and Swallow four times a day  gabapentin 600 milliGRAM(s) Oral three times a day  heparin   Injectable 5000 Unit(s) SubCutaneous every 8 hours  lactated ringers. 1000 milliLiter(s) (125 mL/Hr) IV Continuous <Continuous>  levothyroxine 88 MICROGram(s) Oral daily  melatonin 3 milliGRAM(s) Oral at bedtime  methylPREDNISolone sodium succinate Injectable 30 milliGRAM(s) IV Push two times a day  sucralfate 1 Gram(s) Oral two times a day    MEDICATIONS  (PRN):  acetaminophen   Tablet .. 650 milliGRAM(s) Oral every 4 hours PRN Mild Pain (1 - 3)  aluminum hydroxide/magnesium hydroxide/simethicone Suspension 30 milliLiter(s) Oral every 4 hours PRN Dyspepsia  HYDROmorphone  Injectable 1 milliGRAM(s) IV Push every 3 hours PRN Severe Pain (7 - 10)  metoclopramide Injectable 10 milliGRAM(s) IV Push once PRN Nausea and/or Vomiting  ondansetron Injectable 4 milliGRAM(s) IV Push every 6 hours PRN Nausea  oxyCODONE    IR 5 milliGRAM(s) Oral every 4 hours PRN Moderate Pain (4 - 6)      LABS              11.8   18.07 )-----------( 497      ( 2021 08:34 )             37.9     02-16    138  |  105  |  29<H>  ----------------------------<  146<H>  4.4   |  25  |  0.72    Ca    9.0      2021 08:34    TPro  7.1  /  Alb  2.7<L>  /  TBili  0.3  /  DBili  x   /  AST  152<H>  /  ALT  144<H>  /  AlkPhos  100  02    CARDIAC MARKERS ( 2021 08:34 )  x     / x     / 229 U/L / x     / x          LIVER FUNCTIONS - ( 2021 08:34 )  Alb: 2.7 g/dL / Pro: 7.1 gm/dL / ALK PHOS: 100 U/L / ALT: 144 U/L / AST: 152 U/L / GGT: x             Urinalysis Basic - ( 2021 23:00 )    Color: Yellow / Appearance: Clear / S.025 / pH: x  Gluc: x / Ketone: Negative  / Bili: Negative / Urobili: Negative mg/dL   Blood: x / Protein: 30 mg/dL / Nitrite: Negative   Leuk Esterase: Negative / RBC: 3-5 /HPF / WBC 3-5   Sq Epi: x / Non Sq Epi: Few / Bacteria: Few          Assessment and Plan:  55 year old woman with abdominal pain.      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  Vascular consulted for further assessment and f/u imaging studies  -IVFs 4 hours prior and post IV contrast studies per nephro - CT  Angio pending today   #Gallbladder wall thickening,  HIDA neg: No cholecystitis   -GI following --> c/w ppi/carafate/maalox prn   -leukocytosis due to steroids  -PT eval    #MGUS on recent labs: Asymptomatic  -Heme following    # H/O right nephrectomy in  / Solitary kidney:  Renal function normal and stable, continue to monitor closely    severe protein calorie malnutrition:  -encourage oral intake  -supplementation    DVT PPX:  -Heparin Subc

## 2021-02-16 NOTE — PHYSICAL THERAPY INITIAL EVALUATION ADULT - PERTINENT HX OF CURRENT PROBLEM, REHAB EVAL
56 yo F with pmh R nephrectomy in 2004, HTN, asthma, hypothyroidism, unknown rheumatologic disorder with peripheral neuropathy for which she has been on chronic steroids since september of 2020 (presently on 10mg po qd) now presenting with c/o several weeks of intermittent epigastric pain which radiates to back. Pain worse post prandially. Associated with non bloody emesis. She recently underwent EGD with Dr. Burr approx 1 week ago and findings were indicative of gastritis.

## 2021-02-16 NOTE — PROGRESS NOTE ADULT - ASSESSMENT
54 yo F with pmh R nephrectomy in 2004, HTN, asthma, hypothyroidism, unknown rheumatologic disorder with peripheral neuropathy for which she has been on chronic steroids since september of 2020 (presently on 10mg po qd) now presenting with c/o several weeks of intermittent epigastric pain which radiates to back. Pain worse post prandially. Associated with non bloody emesis. She recently underwent EGD with Dr hernandez approx 1 week ago and findings were indicative of gastritis.   Otherwise denies joint pain, arthralgias, rash, fevers or ulcerations. No chest pain or interscapular pain. Hemodynamically stable however she was found to be hypoxic in ED to 88%. Covid negative however CT abd demonstrated bilateral LL pna and GB wall thickening and pericholecystic fluid. U/S or RUQ also redemonstrated GB wall thickening and edema. No stones. LFTS/Bili normal. Pt is afebrile.    ED course: Nl lactate. WBC 24K. Given ceftriaxone and flagyl. IV morphine plus 2L NS    Above from Chart   Pt admitted with abd pain and h/o rheumatologic disorder w periph neuropathy  S/p R nephrectomy, Nephrologist  Mild eosinophilia noted on admission, resolved  has been on po steroids  S/P skin bx of rash  UA with minimal sediment, may be contaminant  Will repeat  Rheum w/u By Dr Landa  If needed MRI w Kemar, IVC OK   But the pt should be well hydrated 125 ml/ hr 4 hours prior to and post procedure    2/15 SY  --Solitary Kidney : stable renal function.  --Mesenteric ischemia : to further assess with contrast study.   Ok for CT with IVC vs MRI with Kemar : Rec IVF aretha- procedure :    2/16  mesenteric ischemia  for MRI w Kemar today on IVF   pre and post MRI     54 yo F with pmh R nephrectomy in 2004, HTN, asthma, hypothyroidism, unknown rheumatologic disorder with peripheral neuropathy for which she has been on chronic steroids since september of 2020 (presently on 10mg po qd) now presenting with c/o several weeks of intermittent epigastric pain which radiates to back. Pain worse post prandially. Associated with non bloody emesis. She recently underwent EGD with Dr hernandez approx 1 week ago and findings were indicative of gastritis.   Otherwise denies joint pain, arthralgias, rash, fevers or ulcerations. No chest pain or interscapular pain. Hemodynamically stable however she was found to be hypoxic in ED to 88%. Covid negative however CT abd demonstrated bilateral LL pna and GB wall thickening and pericholecystic fluid. U/S or RUQ also redemonstrated GB wall thickening and edema. No stones. LFTS/Bili normal. Pt is afebrile.    ED course: Nl lactate. WBC 24K. Given ceftriaxone and flagyl. IV morphine plus 2L NS    Above from Chart   Pt admitted with abd pain and h/o rheumatologic disorder w periph neuropathy  S/p R nephrectomy, Nephrologist  Mild eosinophilia noted on admission, resolved  has been on po steroids  S/P skin bx of rash  UA with minimal sediment, may be contaminant  Will repeat  Rheum w/u By Dr Landa  If needed MRI w Kemar, IVC OK   But the pt should be well hydrated 125 ml/ hr 4 hours prior to and post procedure    2/15 SY  --Solitary Kidney : stable renal function.  --Mesenteric ischemia : to further assess with contrast study.   Ok for CT with IVC vs MRI with Kemar : Rec IVF aretha- procedure :    2/16  mesenteric ischemia  forCT angio  today on IVF   pre and post CT angio      Addendum 10 18 PM  CT angio  IMPRESSION:  Unremarkable evaluation of the mesenteric vessels including the celiac axis.  Status post nephrectomy on the right  Normal appearance of the lung bases at this time.  Minimal pericardial effusion is decreased from the prior study

## 2021-02-17 ENCOUNTER — TRANSCRIPTION ENCOUNTER (OUTPATIENT)
Age: 56
End: 2021-02-17

## 2021-02-17 VITALS
SYSTOLIC BLOOD PRESSURE: 136 MMHG | OXYGEN SATURATION: 97 % | TEMPERATURE: 97 F | DIASTOLIC BLOOD PRESSURE: 83 MMHG | RESPIRATION RATE: 18 BRPM | HEART RATE: 61 BPM

## 2021-02-17 LAB
ALBUMIN SERPL ELPH-MCNC: 2.6 G/DL — LOW (ref 3.3–5)
ALP SERPL-CCNC: 103 U/L — SIGNIFICANT CHANGE UP (ref 40–120)
ALT FLD-CCNC: 113 U/L — HIGH (ref 12–78)
ANION GAP SERPL CALC-SCNC: 6 MMOL/L — SIGNIFICANT CHANGE UP (ref 5–17)
AST SERPL-CCNC: 129 U/L — HIGH (ref 15–37)
BILIRUB SERPL-MCNC: 0.4 MG/DL — SIGNIFICANT CHANGE UP (ref 0.2–1.2)
BUN SERPL-MCNC: 25 MG/DL — HIGH (ref 7–23)
CALCIUM SERPL-MCNC: 9.4 MG/DL — SIGNIFICANT CHANGE UP (ref 8.5–10.1)
CHLORIDE SERPL-SCNC: 103 MMOL/L — SIGNIFICANT CHANGE UP (ref 96–108)
CO2 SERPL-SCNC: 27 MMOL/L — SIGNIFICANT CHANGE UP (ref 22–31)
CREAT SERPL-MCNC: 0.73 MG/DL — SIGNIFICANT CHANGE UP (ref 0.5–1.3)
GLUCOSE SERPL-MCNC: 120 MG/DL — HIGH (ref 70–99)
POTASSIUM SERPL-MCNC: 4.4 MMOL/L — SIGNIFICANT CHANGE UP (ref 3.5–5.3)
POTASSIUM SERPL-SCNC: 4.4 MMOL/L — SIGNIFICANT CHANGE UP (ref 3.5–5.3)
PROT SERPL-MCNC: 7.1 GM/DL — SIGNIFICANT CHANGE UP (ref 6–8.3)
SODIUM SERPL-SCNC: 136 MMOL/L — SIGNIFICANT CHANGE UP (ref 135–145)

## 2021-02-17 PROCEDURE — 99239 HOSP IP/OBS DSCHRG MGMT >30: CPT

## 2021-02-17 RX ORDER — PREGABALIN 225 MG/1
1000 CAPSULE ORAL ONCE
Refills: 0 | Status: COMPLETED | OUTPATIENT
Start: 2021-02-17 | End: 2021-02-17

## 2021-02-17 RX ORDER — OXYCODONE HYDROCHLORIDE 5 MG/1
1 TABLET ORAL
Qty: 56 | Refills: 0
Start: 2021-02-17 | End: 2021-03-02

## 2021-02-17 RX ORDER — AZTREONAM 2 G
1 VIAL (EA) INJECTION
Qty: 14 | Refills: 0
Start: 2021-02-17 | End: 2021-03-02

## 2021-02-17 RX ORDER — PANTOPRAZOLE SODIUM 20 MG/1
1 TABLET, DELAYED RELEASE ORAL
Qty: 30 | Refills: 0
Start: 2021-02-17 | End: 2021-03-18

## 2021-02-17 RX ORDER — OXYCODONE HYDROCHLORIDE 5 MG/1
1 TABLET ORAL
Qty: 0 | Refills: 0 | DISCHARGE
Start: 2021-02-17 | End: 2021-03-02

## 2021-02-17 RX ORDER — OXYCODONE HYDROCHLORIDE 5 MG/1
5 TABLET ORAL EVERY 4 HOURS
Refills: 0 | Status: DISCONTINUED | OUTPATIENT
Start: 2021-02-17 | End: 2021-02-17

## 2021-02-17 RX ORDER — PANTOPRAZOLE SODIUM 20 MG/1
40 TABLET, DELAYED RELEASE ORAL DAILY
Refills: 0 | Status: DISCONTINUED | OUTPATIENT
Start: 2021-02-17 | End: 2021-02-17

## 2021-02-17 RX ADMIN — GABAPENTIN 600 MILLIGRAM(S): 400 CAPSULE ORAL at 05:41

## 2021-02-17 RX ADMIN — Medication 240 MILLIGRAM(S): at 08:24

## 2021-02-17 RX ADMIN — PANTOPRAZOLE SODIUM 40 MILLIGRAM(S): 20 TABLET, DELAYED RELEASE ORAL at 10:26

## 2021-02-17 RX ADMIN — BUDESONIDE AND FORMOTEROL FUMARATE DIHYDRATE 2 PUFF(S): 160; 4.5 AEROSOL RESPIRATORY (INHALATION) at 08:22

## 2021-02-17 RX ADMIN — OXYCODONE HYDROCHLORIDE 5 MILLIGRAM(S): 5 TABLET ORAL at 12:17

## 2021-02-17 RX ADMIN — HEPARIN SODIUM 5000 UNIT(S): 5000 INJECTION INTRAVENOUS; SUBCUTANEOUS at 05:40

## 2021-02-17 RX ADMIN — TUBERCULIN PURIFIED PROTEIN DERIVATIVE 5 UNIT(S): 5 INJECTION, SOLUTION INTRADERMAL at 17:00

## 2021-02-17 RX ADMIN — Medication 1 GRAM(S): at 05:40

## 2021-02-17 RX ADMIN — HEPARIN SODIUM 5000 UNIT(S): 5000 INJECTION INTRAVENOUS; SUBCUTANEOUS at 12:18

## 2021-02-17 RX ADMIN — OXYCODONE HYDROCHLORIDE 5 MILLIGRAM(S): 5 TABLET ORAL at 08:19

## 2021-02-17 RX ADMIN — PREGABALIN 1000 MICROGRAM(S): 225 CAPSULE ORAL at 16:57

## 2021-02-17 RX ADMIN — DIPHENHYDRAMINE HYDROCHLORIDE AND LIDOCAINE HYDROCHLORIDE AND ALUMINUM HYDROXIDE AND MAGNESIUM HYDRO 10 MILLILITER(S): KIT at 08:29

## 2021-02-17 RX ADMIN — Medication 88 MICROGRAM(S): at 05:41

## 2021-02-17 RX ADMIN — Medication 30 MILLIGRAM(S): at 08:24

## 2021-02-17 RX ADMIN — GABAPENTIN 600 MILLIGRAM(S): 400 CAPSULE ORAL at 12:18

## 2021-02-17 RX ADMIN — DIPHENHYDRAMINE HYDROCHLORIDE AND LIDOCAINE HYDROCHLORIDE AND ALUMINUM HYDROXIDE AND MAGNESIUM HYDRO 10 MILLILITER(S): KIT at 12:33

## 2021-02-17 NOTE — DISCHARGE NOTE NURSING/CASE MANAGEMENT/SOCIAL WORK - PATIENT PORTAL LINK FT
You can access the FollowMyHealth Patient Portal offered by [facility] by registering at the following website: [email or web address]. By joining [employer]’s FollowMyHealth portal, you will also be able to view your health information using other applications (apps) compatible with our system.

## 2021-02-17 NOTE — DISCHARGE NOTE PROVIDER - NSDCMRMEDTOKEN_GEN_ALL_CORE_FT
Bactrim  mg-160 mg oral tablet: 1 tab(s) orally Monday, Wednesday, and Friday   Cymbalta 20 mg oral delayed release capsule: 1 cap(s) orally once a day  dilTIAZem 240 mg/24 hours oral capsule, extended release: 1 cap(s) orally once a day  gabapentin 300 mg oral capsule: 2 cap(s) orally 3 times a day  LEVOTHYROXIN TAB 88MC tab(s) orally once a day  oxyCODONE 5 mg oral tablet: 1 tab(s) orally every 6 hours, As Needed -Moderate Pain (4 - 6) - for severe pain MDD:20mg  pantoprazole 40 mg oral delayed release tablet: 1 tab(s) orally once a day  predniSONE 20 mg oral tablet: 3 tab(s) orally once a day   sucralfate 1 g/10 mL oral suspension: 10 milliliter(s) orally 3 times a day  Wixela Inhub 500 mcg-50 mcg inhalation powder: 1 puff(s) inhaled 2 times a day

## 2021-02-17 NOTE — DISCHARGE NOTE PROVIDER - CARE PROVIDER_API CALL
Santhosh Landa (DO)  Internal Medicine; Rheumatology  [address] [place] [address]  Phone: [phone]  Fax: [phone]  Follow Up Time: 1 week    follow up pcp 1 week,   Phone: (   )    -  Fax: (   )    -  Follow Up Time:

## 2021-02-17 NOTE — DISCHARGE NOTE PROVIDER - NSDCFUSCHEDAPPT_GEN_ALL_CORE_FT
[facility] ; 02/23/2021 ; NPP IntMed [address] [facility] ; 02/24/2021 ; NPP Rheum [address]  [facility] ; 03/01/2021 ; NPP Neuro [address] [facility] ; 03/04/2021 ; NPP Gastro [address] [facility] ; 03/05/2021 ; NPP IntMed [address] [facility] ; 03/31/2021 ; NPP Derm [address] [facility]

## 2021-02-17 NOTE — DISCHARGE NOTE PROVIDER - HOSPITAL COURSE
CC: Abd pain  HPI/Hospital course:  56 yo F with pmh R nephrectomy in 2004, HTN, asthma, hypothyroidism, unknown rheumatologic disorder with peripheral neuropathy for which she has been on chronic steroids since september of 2020 (presently on 10mg po qd) now presenting with c/o several weeks of intermittent epigastric pain which radiates to back. Pain worse post prandially. Associated with non bloody emesis. She recently underwent EGD with Dr hernandez approx 1 week ago and findings were indicative of gastritis.   Otherwise denies joint pain, arthralgias, rash, fevers or ulcerations. No chest pain or interscapular pain. Hemodynamically stable however she was found to be hypoxic in ED to 88%. Covid negative however CT abd demonstrated bilateral LL pna and GB wall thickening and pericholecystic fluid. U/S or RUQ also redemonstrated GB wall thickening and edema. No stones. LFTS/Bili normal. Pt is afebrile.  ED course: Nl lactate. WBC 24K. Given ceftriaxone and flagyl. IV morphine plus 2L NS.    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 She was treated with course of IV antibiotics and placed on IV steroid couse.  Her respiratory failure completely resolved, with pulmonary following.   Pt developed a progressive vasculitic rash over her legs which rheum and derm was consulted.  Skin lesion was biopsied and results pending.  Hospital course complicated by ongoing abdominal pain, worse with eating.  She was ruled out for acute choleycystitis, and CTA abd/pelvis negative for acute pathology.  LIkely etiology due to vasculitis.  Per rheuamtology plan to discharge on 60mg prednisone with pcp ppx until rheum follow up 2/24.  Pt denies fever, chills, n, v.  Rash improved.        REVIEW OF SYSTEMS: All other review of systems is negative unless indicated above.    Vital Signs Last 24 Hrs  T(C): 36.8 (17 Feb 2021 09:49), Max: 36.9 (16 Feb 2021 17:14)  T(F): 98.2 (17 Feb 2021 09:49), Max: 98.5 (16 Feb 2021 17:14)  HR: 74 (17 Feb 2021 09:49) (68 - 78)  BP: 141/90 (17 Feb 2021 09:49) (141/90 - 154/84)  BP(mean): --  RR: 17 (17 Feb 2021 09:49) (17 - 18)  SpO2: 99% (17 Feb 2021 09:49) (97% - 99%)    PHYSICAL EXAM:  GENERAL: NAD  HEAD:  Atraumatic, Normocephalic  EYES: EOMI, PERRLA, conjunctiva and sclera clear  NECK: Supple, No JVD, no LAD  CHEST/LUNG: Clear to auscultation bilaterally; No wheeze, resp unlabored  HEART: Regular rate and rhythm; No murmurs, rubs, or gallops  ABDOMEN: Soft, Nontender, Nondistended; Bowel sounds present, no HSM  EXTREMITIES:  2+ Peripheral Pulses, No clubbing, cyanosis, or edema  PSYCH: AAOx3, normal behavior  NEUROLOGY: non-focal, sensory and cn 2-12 intact    med/labs: Reviewed and interpreted     Assessment and Plan:  55 year old woman with abdominal pain.      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  -s/p skin biopsy or rash by derm --> f/u pathology outpatient.   - GI follow up appreciated  -Check abd US: suspected celiac stenosis however CTA negative for acute pathology.  -HIDA neg: No cholecystitis   -GI following --> c/w ppi/carafate/maalox prn   -leukocytosis due to steroids  -PT     #MGUS on recent labs: Asymptomatic  -Heme following, can follow up non-urgently as out patient.     # H/O right nephrectomy in 2003 / Solitary kidney:  Renal function normal and stable    severe protein calorie malnutrition:  -encourage oral intake  -supplementation    DVT PPX:  -Heparin Subc    Attending Statement: 40 minutes spent on total encounter and discharge planning.

## 2021-02-17 NOTE — DISCHARGE NOTE PROVIDER - NSDCCPCAREPLAN_GEN_ALL_CORE_FT
PRINCIPAL DISCHARGE DIAGNOSIS  Diagnosis: Eosinophilic granulomatosis with polyangiitis (EGPA)  Assessment and Plan of Treatment: Abdominal due to likely underlying vasculitis - take prednisone 60mg daily until follow up 1 week with Dr. Landa rheumatologist.  Avoid large meals.  take pain meds as needed for symptomatic control.  Follow up with your pcp, and rheumatologis.  Follow up skin biopsy, your rheumatologist can do this for you.   Continue protonix for esophagitis/gastritis  Take bactrim prophylactically while on steroids (sent to pharmacy)

## 2021-02-17 NOTE — DISCHARGE NOTE PROVIDER - DETAILS OF MALNUTRITION DIAGNOSIS/DIAGNOSES
This patient has been assessed with a concern for Malnutrition and was treated during this hospitalization for the following Nutrition diagnosis/diagnoses:     -  02/13/2021: Severe protein-calorie malnutrition

## 2021-02-17 NOTE — DISCHARGE NOTE PROVIDER - PROVIDER TOKENS
PROVIDER:[TOKEN:[[id number]],FOLLOWUP:[1 week]],FREE:[LAST:[follow up pcp 1 week],PHONE:[(   )    -],FAX:[(   )    -]]

## 2021-02-18 ENCOUNTER — APPOINTMENT (OUTPATIENT)
Dept: ENDOCRINOLOGY | Facility: CLINIC | Age: 56
End: 2021-02-18

## 2021-02-18 ENCOUNTER — TRANSCRIPTION ENCOUNTER (OUTPATIENT)
Age: 56
End: 2021-02-18

## 2021-02-19 ENCOUNTER — INPATIENT (INPATIENT)
Facility: HOSPITAL | Age: 56
LOS: 5 days | Discharge: HOME HEALTH | DRG: 221 | End: 2021-02-25
Attending: SURGERY | Admitting: SURGERY
Payer: MEDICAID

## 2021-02-19 ENCOUNTER — RESULT REVIEW (OUTPATIENT)
Age: 56
End: 2021-02-19

## 2021-02-19 VITALS
TEMPERATURE: 98 F | WEIGHT: 166.89 LBS | HEIGHT: 64 IN | RESPIRATION RATE: 18 BRPM | SYSTOLIC BLOOD PRESSURE: 157 MMHG | DIASTOLIC BLOOD PRESSURE: 89 MMHG | HEART RATE: 82 BPM | OXYGEN SATURATION: 100 %

## 2021-02-19 DIAGNOSIS — Z90.5 ACQUIRED ABSENCE OF KIDNEY: Chronic | ICD-10-CM

## 2021-02-19 DIAGNOSIS — K63.1 PERFORATION OF INTESTINE (NONTRAUMATIC): ICD-10-CM

## 2021-02-19 DIAGNOSIS — Z98.891 HISTORY OF UTERINE SCAR FROM PREVIOUS SURGERY: Chronic | ICD-10-CM

## 2021-02-19 LAB
ALBUMIN SERPL ELPH-MCNC: 2.7 G/DL — LOW (ref 3.3–5)
ALP SERPL-CCNC: 95 U/L — SIGNIFICANT CHANGE UP (ref 40–120)
ALT FLD-CCNC: 84 U/L — HIGH (ref 12–78)
ANION GAP SERPL CALC-SCNC: 7 MMOL/L — SIGNIFICANT CHANGE UP (ref 5–17)
APTT BLD: 26.2 SEC — LOW (ref 27.5–35.5)
AST SERPL-CCNC: 120 U/L — HIGH (ref 15–37)
BASOPHILS # BLD AUTO: 0.03 K/UL — SIGNIFICANT CHANGE UP (ref 0–0.2)
BASOPHILS NFR BLD AUTO: 0.1 % — SIGNIFICANT CHANGE UP (ref 0–2)
BILIRUB SERPL-MCNC: 0.4 MG/DL — SIGNIFICANT CHANGE UP (ref 0.2–1.2)
BUN SERPL-MCNC: 27 MG/DL — HIGH (ref 7–23)
CALCIUM SERPL-MCNC: 9.2 MG/DL — SIGNIFICANT CHANGE UP (ref 8.5–10.1)
CHLORIDE SERPL-SCNC: 103 MMOL/L — SIGNIFICANT CHANGE UP (ref 96–108)
CO2 SERPL-SCNC: 28 MMOL/L — SIGNIFICANT CHANGE UP (ref 22–31)
CREAT SERPL-MCNC: 0.62 MG/DL — SIGNIFICANT CHANGE UP (ref 0.5–1.3)
EOSINOPHIL # BLD AUTO: 0.05 K/UL — SIGNIFICANT CHANGE UP (ref 0–0.5)
EOSINOPHIL NFR BLD AUTO: 0.2 % — SIGNIFICANT CHANGE UP (ref 0–6)
GLUCOSE SERPL-MCNC: 84 MG/DL — SIGNIFICANT CHANGE UP (ref 70–99)
HCT VFR BLD CALC: 39.6 % — SIGNIFICANT CHANGE UP (ref 34.5–45)
HGB BLD-MCNC: 12.7 G/DL — SIGNIFICANT CHANGE UP (ref 11.5–15.5)
IMM GRANULOCYTES NFR BLD AUTO: 0.6 % — SIGNIFICANT CHANGE UP (ref 0–1.5)
INR BLD: 1.08 RATIO — SIGNIFICANT CHANGE UP (ref 0.88–1.16)
LACTATE SERPL-SCNC: 1.1 MMOL/L — SIGNIFICANT CHANGE UP (ref 0.7–2)
LIDOCAIN IGE QN: 274 U/L — SIGNIFICANT CHANGE UP (ref 73–393)
LYMPHOCYTES # BLD AUTO: 13.6 % — SIGNIFICANT CHANGE UP (ref 13–44)
LYMPHOCYTES # BLD AUTO: 3.03 K/UL — SIGNIFICANT CHANGE UP (ref 1–3.3)
MCHC RBC-ENTMCNC: 28.2 PG — SIGNIFICANT CHANGE UP (ref 27–34)
MCHC RBC-ENTMCNC: 32.1 GM/DL — SIGNIFICANT CHANGE UP (ref 32–36)
MCV RBC AUTO: 87.8 FL — SIGNIFICANT CHANGE UP (ref 80–100)
MONOCYTES # BLD AUTO: 1.24 K/UL — HIGH (ref 0–0.9)
MONOCYTES NFR BLD AUTO: 5.5 % — SIGNIFICANT CHANGE UP (ref 2–14)
NEUTROPHILS # BLD AUTO: 17.87 K/UL — HIGH (ref 1.8–7.4)
NEUTROPHILS NFR BLD AUTO: 80 % — HIGH (ref 43–77)
PLATELET # BLD AUTO: 466 K/UL — HIGH (ref 150–400)
POTASSIUM SERPL-MCNC: 4 MMOL/L — SIGNIFICANT CHANGE UP (ref 3.5–5.3)
POTASSIUM SERPL-SCNC: 4 MMOL/L — SIGNIFICANT CHANGE UP (ref 3.5–5.3)
PROT SERPL-MCNC: 6.7 GM/DL — SIGNIFICANT CHANGE UP (ref 6–8.3)
PROTHROM AB SERPL-ACNC: 12.6 SEC — SIGNIFICANT CHANGE UP (ref 10.6–13.6)
RBC # BLD: 4.51 M/UL — SIGNIFICANT CHANGE UP (ref 3.8–5.2)
RBC # FLD: 15.7 % — HIGH (ref 10.3–14.5)
SODIUM SERPL-SCNC: 138 MMOL/L — SIGNIFICANT CHANGE UP (ref 135–145)
TROPONIN I SERPL-MCNC: 0.05 NG/ML — HIGH (ref 0.01–0.04)
TROPONIN I SERPL-MCNC: 0.06 NG/ML — HIGH (ref 0.01–0.04)
TROPONIN I SERPL-MCNC: 0.08 NG/ML — HIGH (ref 0.01–0.04)
WBC # BLD: 22.35 K/UL — HIGH (ref 3.8–10.5)
WBC # FLD AUTO: 22.35 K/UL — HIGH (ref 3.8–10.5)

## 2021-02-19 PROCEDURE — 80048 BASIC METABOLIC PNL TOTAL CA: CPT

## 2021-02-19 PROCEDURE — 74177 CT ABD & PELVIS W/CONTRAST: CPT

## 2021-02-19 PROCEDURE — 85652 RBC SED RATE AUTOMATED: CPT

## 2021-02-19 PROCEDURE — C1889: CPT

## 2021-02-19 PROCEDURE — 71045 X-RAY EXAM CHEST 1 VIEW: CPT | Mod: 26

## 2021-02-19 PROCEDURE — 86146 BETA-2 GLYCOPROTEIN ANTIBODY: CPT

## 2021-02-19 PROCEDURE — 84484 ASSAY OF TROPONIN QUANT: CPT

## 2021-02-19 PROCEDURE — 86850 RBC ANTIBODY SCREEN: CPT

## 2021-02-19 PROCEDURE — 83605 ASSAY OF LACTIC ACID: CPT

## 2021-02-19 PROCEDURE — 84100 ASSAY OF PHOSPHORUS: CPT

## 2021-02-19 PROCEDURE — 86900 BLOOD TYPING SEROLOGIC ABO: CPT

## 2021-02-19 PROCEDURE — 86147 CARDIOLIPIN ANTIBODY EA IG: CPT

## 2021-02-19 PROCEDURE — 86901 BLOOD TYPING SEROLOGIC RH(D): CPT

## 2021-02-19 PROCEDURE — 74176 CT ABD & PELVIS W/O CONTRAST: CPT | Mod: 26,59

## 2021-02-19 PROCEDURE — 36415 COLL VENOUS BLD VENIPUNCTURE: CPT

## 2021-02-19 PROCEDURE — 87040 BLOOD CULTURE FOR BACTERIA: CPT

## 2021-02-19 PROCEDURE — 86769 SARS-COV-2 COVID-19 ANTIBODY: CPT

## 2021-02-19 PROCEDURE — 80053 COMPREHEN METABOLIC PANEL: CPT

## 2021-02-19 PROCEDURE — 74177 CT ABD & PELVIS W/CONTRAST: CPT | Mod: 26

## 2021-02-19 PROCEDURE — 85027 COMPLETE CBC AUTOMATED: CPT

## 2021-02-19 PROCEDURE — 88307 TISSUE EXAM BY PATHOLOGIST: CPT

## 2021-02-19 PROCEDURE — C9113: CPT

## 2021-02-19 PROCEDURE — C9290: CPT

## 2021-02-19 PROCEDURE — 97162 PT EVAL MOD COMPLEX 30 MIN: CPT | Mod: GP

## 2021-02-19 PROCEDURE — 88307 TISSUE EXAM BY PATHOLOGIST: CPT | Mod: 26

## 2021-02-19 PROCEDURE — 97116 GAIT TRAINING THERAPY: CPT | Mod: GP

## 2021-02-19 PROCEDURE — 93010 ELECTROCARDIOGRAM REPORT: CPT

## 2021-02-19 PROCEDURE — 86140 C-REACTIVE PROTEIN: CPT

## 2021-02-19 PROCEDURE — 83516 IMMUNOASSAY NONANTIBODY: CPT

## 2021-02-19 PROCEDURE — 94640 AIRWAY INHALATION TREATMENT: CPT

## 2021-02-19 PROCEDURE — 97530 THERAPEUTIC ACTIVITIES: CPT | Mod: GP

## 2021-02-19 PROCEDURE — 99223 1ST HOSP IP/OBS HIGH 75: CPT

## 2021-02-19 PROCEDURE — 86256 FLUORESCENT ANTIBODY TITER: CPT

## 2021-02-19 PROCEDURE — 86255 FLUORESCENT ANTIBODY SCREEN: CPT

## 2021-02-19 PROCEDURE — 83735 ASSAY OF MAGNESIUM: CPT

## 2021-02-19 RX ORDER — METRONIDAZOLE 500 MG
500 TABLET ORAL EVERY 8 HOURS
Refills: 0 | Status: DISCONTINUED | OUTPATIENT
Start: 2021-02-20 | End: 2021-02-25

## 2021-02-19 RX ORDER — FAMOTIDINE 10 MG/ML
20 INJECTION INTRAVENOUS ONCE
Refills: 0 | Status: COMPLETED | OUTPATIENT
Start: 2021-02-19 | End: 2021-02-19

## 2021-02-19 RX ORDER — HYDROMORPHONE HYDROCHLORIDE 2 MG/ML
0.5 INJECTION INTRAMUSCULAR; INTRAVENOUS; SUBCUTANEOUS EVERY 4 HOURS
Refills: 0 | Status: DISCONTINUED | OUTPATIENT
Start: 2021-02-19 | End: 2021-02-19

## 2021-02-19 RX ORDER — SODIUM CHLORIDE 9 MG/ML
1000 INJECTION INTRAMUSCULAR; INTRAVENOUS; SUBCUTANEOUS ONCE
Refills: 0 | Status: COMPLETED | OUTPATIENT
Start: 2021-02-19 | End: 2021-02-19

## 2021-02-19 RX ORDER — METRONIDAZOLE 500 MG
500 TABLET ORAL ONCE
Refills: 0 | Status: COMPLETED | OUTPATIENT
Start: 2021-02-19 | End: 2021-02-19

## 2021-02-19 RX ORDER — LEVOTHYROXINE SODIUM 125 MCG
88 TABLET ORAL DAILY
Refills: 0 | Status: DISCONTINUED | OUTPATIENT
Start: 2021-02-19 | End: 2021-02-25

## 2021-02-19 RX ORDER — GABAPENTIN 400 MG/1
600 CAPSULE ORAL THREE TIMES A DAY
Refills: 0 | Status: DISCONTINUED | OUTPATIENT
Start: 2021-02-19 | End: 2021-02-25

## 2021-02-19 RX ORDER — SODIUM CHLORIDE 9 MG/ML
1000 INJECTION, SOLUTION INTRAVENOUS
Refills: 0 | Status: DISCONTINUED | OUTPATIENT
Start: 2021-02-19 | End: 2021-02-19

## 2021-02-19 RX ORDER — DULOXETINE HYDROCHLORIDE 30 MG/1
20 CAPSULE, DELAYED RELEASE ORAL DAILY
Refills: 0 | Status: DISCONTINUED | OUTPATIENT
Start: 2021-02-19 | End: 2021-02-19

## 2021-02-19 RX ORDER — FOLIC ACID/VIT B COMPLEX AND C 400 MCG
10000 TABLET ORAL DAILY
Refills: 0 | Status: DISCONTINUED | OUTPATIENT
Start: 2021-02-19 | End: 2021-02-19

## 2021-02-19 RX ORDER — DILTIAZEM HCL 120 MG
240 CAPSULE, EXT RELEASE 24 HR ORAL DAILY
Refills: 0 | Status: DISCONTINUED | OUTPATIENT
Start: 2021-02-19 | End: 2021-02-19

## 2021-02-19 RX ORDER — NALOXONE HYDROCHLORIDE 4 MG/.1ML
0.1 SPRAY NASAL
Refills: 0 | Status: DISCONTINUED | OUTPATIENT
Start: 2021-02-19 | End: 2021-02-25

## 2021-02-19 RX ORDER — DILTIAZEM HCL 120 MG
240 CAPSULE, EXT RELEASE 24 HR ORAL DAILY
Refills: 0 | Status: DISCONTINUED | OUTPATIENT
Start: 2021-02-19 | End: 2021-02-20

## 2021-02-19 RX ORDER — HEPARIN SODIUM 5000 [USP'U]/ML
5000 INJECTION INTRAVENOUS; SUBCUTANEOUS EVERY 8 HOURS
Refills: 0 | Status: DISCONTINUED | OUTPATIENT
Start: 2021-02-19 | End: 2021-02-19

## 2021-02-19 RX ORDER — SODIUM CHLORIDE 9 MG/ML
1000 INJECTION INTRAMUSCULAR; INTRAVENOUS; SUBCUTANEOUS
Refills: 0 | Status: DISCONTINUED | OUTPATIENT
Start: 2021-02-19 | End: 2021-02-24

## 2021-02-19 RX ORDER — ONDANSETRON 8 MG/1
4 TABLET, FILM COATED ORAL EVERY 6 HOURS
Refills: 0 | Status: DISCONTINUED | OUTPATIENT
Start: 2021-02-19 | End: 2021-02-19

## 2021-02-19 RX ORDER — MORPHINE SULFATE 50 MG/1
4 CAPSULE, EXTENDED RELEASE ORAL ONCE
Refills: 0 | Status: DISCONTINUED | OUTPATIENT
Start: 2021-02-19 | End: 2021-02-19

## 2021-02-19 RX ORDER — HYDROMORPHONE HYDROCHLORIDE 2 MG/ML
30 INJECTION INTRAMUSCULAR; INTRAVENOUS; SUBCUTANEOUS
Refills: 0 | Status: DISCONTINUED | OUTPATIENT
Start: 2021-02-19 | End: 2021-02-22

## 2021-02-19 RX ORDER — SODIUM CHLORIDE 9 MG/ML
1000 INJECTION INTRAMUSCULAR; INTRAVENOUS; SUBCUTANEOUS
Refills: 0 | Status: DISCONTINUED | OUTPATIENT
Start: 2021-02-19 | End: 2021-02-19

## 2021-02-19 RX ORDER — CEFOTETAN DISODIUM 1 G
2 VIAL (EA) INJECTION ONCE
Refills: 0 | Status: COMPLETED | OUTPATIENT
Start: 2021-02-19 | End: 2021-02-19

## 2021-02-19 RX ORDER — HYDROMORPHONE HYDROCHLORIDE 2 MG/ML
0.5 INJECTION INTRAMUSCULAR; INTRAVENOUS; SUBCUTANEOUS
Refills: 0 | Status: DISCONTINUED | OUTPATIENT
Start: 2021-02-19 | End: 2021-02-22

## 2021-02-19 RX ORDER — ACETAMINOPHEN 500 MG
1000 TABLET ORAL EVERY 6 HOURS
Refills: 0 | Status: DISCONTINUED | OUTPATIENT
Start: 2021-02-19 | End: 2021-02-19

## 2021-02-19 RX ORDER — ACETAMINOPHEN 500 MG
1000 TABLET ORAL EVERY 6 HOURS
Refills: 0 | Status: DISCONTINUED | OUTPATIENT
Start: 2021-02-19 | End: 2021-02-25

## 2021-02-19 RX ORDER — HYDROMORPHONE HYDROCHLORIDE 2 MG/ML
0.5 INJECTION INTRAMUSCULAR; INTRAVENOUS; SUBCUTANEOUS
Refills: 0 | Status: DISCONTINUED | OUTPATIENT
Start: 2021-02-19 | End: 2021-02-19

## 2021-02-19 RX ORDER — FOLIC ACID/VIT B COMPLEX AND C 400 MCG
10000 TABLET ORAL DAILY
Refills: 0 | Status: DISCONTINUED | OUTPATIENT
Start: 2021-02-19 | End: 2021-02-25

## 2021-02-19 RX ORDER — METRONIDAZOLE 500 MG
500 TABLET ORAL EVERY 8 HOURS
Refills: 0 | Status: DISCONTINUED | OUTPATIENT
Start: 2021-02-19 | End: 2021-02-19

## 2021-02-19 RX ORDER — GABAPENTIN 400 MG/1
600 CAPSULE ORAL THREE TIMES A DAY
Refills: 0 | Status: DISCONTINUED | OUTPATIENT
Start: 2021-02-19 | End: 2021-02-19

## 2021-02-19 RX ORDER — METRONIDAZOLE 500 MG
TABLET ORAL
Refills: 0 | Status: DISCONTINUED | OUTPATIENT
Start: 2021-02-19 | End: 2021-02-19

## 2021-02-19 RX ORDER — PANTOPRAZOLE SODIUM 20 MG/1
40 TABLET, DELAYED RELEASE ORAL DAILY
Refills: 0 | Status: DISCONTINUED | OUTPATIENT
Start: 2021-02-19 | End: 2021-02-25

## 2021-02-19 RX ORDER — FLUTICASONE PROPIONATE AND SALMETEROL 50; 250 UG/1; UG/1
1 POWDER ORAL; RESPIRATORY (INHALATION)
Qty: 0 | Refills: 0 | DISCHARGE

## 2021-02-19 RX ORDER — DULOXETINE HYDROCHLORIDE 30 MG/1
20 CAPSULE, DELAYED RELEASE ORAL DAILY
Refills: 0 | Status: DISCONTINUED | OUTPATIENT
Start: 2021-02-19 | End: 2021-02-25

## 2021-02-19 RX ORDER — ONDANSETRON 8 MG/1
4 TABLET, FILM COATED ORAL EVERY 6 HOURS
Refills: 0 | Status: DISCONTINUED | OUTPATIENT
Start: 2021-02-19 | End: 2021-02-25

## 2021-02-19 RX ORDER — METRONIDAZOLE 500 MG
TABLET ORAL
Refills: 0 | Status: DISCONTINUED | OUTPATIENT
Start: 2021-02-19 | End: 2021-02-25

## 2021-02-19 RX ORDER — ONDANSETRON 8 MG/1
4 TABLET, FILM COATED ORAL ONCE
Refills: 0 | Status: DISCONTINUED | OUTPATIENT
Start: 2021-02-19 | End: 2021-02-19

## 2021-02-19 RX ORDER — PANTOPRAZOLE SODIUM 20 MG/1
40 TABLET, DELAYED RELEASE ORAL DAILY
Refills: 0 | Status: DISCONTINUED | OUTPATIENT
Start: 2021-02-19 | End: 2021-02-19

## 2021-02-19 RX ORDER — LEVOTHYROXINE SODIUM 125 MCG
88 TABLET ORAL DAILY
Refills: 0 | Status: DISCONTINUED | OUTPATIENT
Start: 2021-02-19 | End: 2021-02-19

## 2021-02-19 RX ADMIN — MORPHINE SULFATE 4 MILLIGRAM(S): 50 CAPSULE, EXTENDED RELEASE ORAL at 10:46

## 2021-02-19 RX ADMIN — Medication 100 MILLIGRAM(S): at 11:46

## 2021-02-19 RX ADMIN — HYDROMORPHONE HYDROCHLORIDE 0.5 MILLIGRAM(S): 2 INJECTION INTRAMUSCULAR; INTRAVENOUS; SUBCUTANEOUS at 12:02

## 2021-02-19 RX ADMIN — SODIUM CHLORIDE 1000 MILLILITER(S): 9 INJECTION INTRAMUSCULAR; INTRAVENOUS; SUBCUTANEOUS at 10:02

## 2021-02-19 RX ADMIN — SODIUM CHLORIDE 100 MILLILITER(S): 9 INJECTION INTRAMUSCULAR; INTRAVENOUS; SUBCUTANEOUS at 22:32

## 2021-02-19 RX ADMIN — SODIUM CHLORIDE 1000 MILLILITER(S): 9 INJECTION INTRAMUSCULAR; INTRAVENOUS; SUBCUTANEOUS at 10:46

## 2021-02-19 RX ADMIN — SODIUM CHLORIDE 1000 MILLILITER(S): 9 INJECTION INTRAMUSCULAR; INTRAVENOUS; SUBCUTANEOUS at 08:52

## 2021-02-19 RX ADMIN — HYDROMORPHONE HYDROCHLORIDE 0.5 MILLIGRAM(S): 2 INJECTION INTRAMUSCULAR; INTRAVENOUS; SUBCUTANEOUS at 19:25

## 2021-02-19 RX ADMIN — SODIUM CHLORIDE 1000 MILLILITER(S): 9 INJECTION INTRAMUSCULAR; INTRAVENOUS; SUBCUTANEOUS at 11:53

## 2021-02-19 RX ADMIN — Medication 30 MILLIGRAM(S): at 22:31

## 2021-02-19 RX ADMIN — SODIUM CHLORIDE 100 MILLILITER(S): 9 INJECTION INTRAMUSCULAR; INTRAVENOUS; SUBCUTANEOUS at 11:47

## 2021-02-19 RX ADMIN — HYDROMORPHONE HYDROCHLORIDE 30 MILLILITER(S): 2 INJECTION INTRAMUSCULAR; INTRAVENOUS; SUBCUTANEOUS at 20:21

## 2021-02-19 RX ADMIN — SODIUM CHLORIDE 75 MILLILITER(S): 9 INJECTION, SOLUTION INTRAVENOUS at 19:26

## 2021-02-19 RX ADMIN — FAMOTIDINE 20 MILLIGRAM(S): 10 INJECTION INTRAVENOUS at 08:51

## 2021-02-19 RX ADMIN — Medication 100 MILLIGRAM(S): at 22:30

## 2021-02-19 RX ADMIN — Medication 100 GRAM(S): at 11:33

## 2021-02-19 RX ADMIN — HYDROMORPHONE HYDROCHLORIDE 0.5 MILLIGRAM(S): 2 INJECTION INTRAMUSCULAR; INTRAVENOUS; SUBCUTANEOUS at 19:49

## 2021-02-19 RX ADMIN — Medication 100 MILLIGRAM(S): at 22:32

## 2021-02-19 RX ADMIN — MORPHINE SULFATE 4 MILLIGRAM(S): 50 CAPSULE, EXTENDED RELEASE ORAL at 10:02

## 2021-02-19 NOTE — H&P ADULT - NSICDXPASTMEDICALHX_GEN_ALL_CORE_FT
PAST MEDICAL HISTORY:  Asthma     H/O autoimmune disorder     HTN (hypertension)     Hypothyroid     Sciatica

## 2021-02-19 NOTE — H&P ADULT - ASSESSMENT
56 yo F with on high dose steroids, found to have small bowel perforation.    Plan:  -admit under Dr. Stanley to stepdown  -pain and nausea control prn  -NPO  -IVF  -IV abx   -serial abdominal exams  -repeat CT with PO/IV contrast  -hospitalist on consult for steroid taper and medical optimization  -will monitor for need for surgical intervention if clinically worsens  -GI/DVT ppx    Plan discussed with Dr. Stanley

## 2021-02-19 NOTE — H&P ADULT - HISTORY OF PRESENT ILLNESS
Pt is a 56 yo F who presents to  ED with complaints of abdominal pain that woke her up from her sleep around 3am this morning. Pt was here on 2/9/21 with complaints of abdominal pain and was discharged home on 2/17/21. Only new medications on discharge was protonix, Bactrim, and prednisone. Acute cholecystitis and mesenteric ischemia was ruled out. Pt states this pain is different and is in the lower abdomen. States the pain comes and goes but when it is present, it is severe. Denies fevers, chills, chest pain, shortness of breath, nausea or vomiting. Reports last BM was yesterday.

## 2021-02-19 NOTE — ED PROVIDER NOTE - PROGRESS NOTE DETAILS
Lizabeth Lopez for attending Dr. Man: Pt with leukocytosis, unchanged from previous labs. Pt with mildly elevated ALT and AST, unchanged from previous. Pt with mildly elevated troponin ordered in setting of epigastric/abd pain. Pt to be admitted for elevated troponin and further cardiac workup. Lizabeth Lopez for attending Dr. Man: Surgery resident contacted. Lizabeth Lopez for attending Dr. Man: Received critical from Dr. Olson. Lizabeth Lopez for attending Dr. Man: Received critical from Dr. Cano. Lizabeth Lopez for attending Dr. Man: Surgery resident at bedside. Lizabeth Lopez for attending Dr. Man: Received critical from Dr. Cano. patient with bowel perforation. Donovan DO: Admit to surgery. Donovan COSTA: Dr. Stanley at bedside- would like CT A/P with PO/IV contrast ordered; additional IV NS bolus ordered; okay for IV contrast study per radiology. Donovan COSTA: CT A/p with po and iv contrast- enteritis; seen and evaluated by Dr. Stanley; to be admitted to medicine service; endorsed to Dr. Paez for admission; GI consult per Dr. Stanley. Donovan DO: CT A/p with po and iv contrast read- to be admitted to Dr. Stanley's service.

## 2021-02-19 NOTE — CONSULT NOTE ADULT - SUBJECTIVE AND OBJECTIVE BOX
REASON FOR CONSULT: abd pain    CHIEF COMPLAINT:abd pain    HPI:  Pt is a 56 yo F who presents to  ED with complaints of abdominal pain that woke her up from her sleep around 3am this morning. Pt was here on 2/9/21 with complaints of abdominal pain and was discharged home on 2/17/21. Only new medications on discharge was protonix, Bactrim, and prednisone. Acute cholecystitis and mesenteric ischemia was ruled out. Pt states this pain is different and is in the lower abdomen. States the pain comes and goes but when it is present, it is severe. Denies fevers, chills, chest pain, shortness of breath, nausea or vomiting. Reports last BM was yesterday.    She does well known to rheumatology service, she was just discharged from the hospital 2 days ago, she has a current working diagnosis of presumed EGPA, based on current clinical picture which includes a history of asthma, eosinophilia, pulmonary infiltrates, vasculitic lesions on the legs, biopsy which was positive for small vessel vasculitis, granulomatous lesions on the elbows, she was started on prednisone high dose in the hospital, prior to that she has been on low-dose steroids for many months now. She was just discharged 2 days ago with a workup for the abdominal pain. She came in today complaining of abdominal pain noted to have acute abdomen and CAT scan findings of pneumoperitoneum    Medical history/surgical history/allergies/medication history/social history-reviewed    REVIEW OF SYSTEMS:    CONSTITUTIONAL: No weakness, fevers or chills  EYES/ENT: No visual changes;  No vertigo or throat pain   NECK: No pain or stiffness  RESPIRATORY: No cough, wheezing, hemoptysis; No shortness of breath  CARDIOVASCULAR: No chest pain or palpitations  GASTROINTESTINAL: abd pain  GENITOURINARY: No dysuria, frequency or hematuria  NEUROLOGICAL: No numbness or weakness  SKIN: No itching, burning, rashes, or lesions   All other review of systems is negative unless indicated above    Vital Signs Last 24 Hrs  T(C): 36.3 (19 Feb 2021 15:07), Max: 36.9 (19 Feb 2021 11:15)  T(F): 97.3 (19 Feb 2021 15:07), Max: 98.4 (19 Feb 2021 11:15)  HR: 80 (19 Feb 2021 15:07) (63 - 82)  BP: 156/75 (19 Feb 2021 15:07) (105/69 - 157/89)  BP(mean): 99 (19 Feb 2021 15:07) (99 - 99)  RR: 18 (19 Feb 2021 15:07) (18 - 18)  SpO2: 99% (19 Feb 2021 15:07) (95% - 100%)    I&O's Summary    19 Feb 2021 07:01  -  19 Feb 2021 19:43  --------------------------------------------------------  IN: 2700 mL / OUT: 560 mL / NET: 2140 mL        CAPILLARY BLOOD GLUCOSE          PHYSICAL EXAM:    Heart- S1 S2 reg  Lungs- CTA b/l  Abd- tender  Ext- no edema  Skin- vasculitic lesions have resolved  Musculoskelatal- FROM all joints, no active synovitis noted  Neurological- intact strength upper and lower extremities    MEDICATIONS:  MEDICATIONS  (STANDING):  lactated ringers. 1000 milliLiter(s) (75 mL/Hr) IV Continuous <Continuous>  LORazepam   Injectable 1 milliGRAM(s) IV Push once      LABS: All Labs Reviewed:                        12.7   22.35 )-----------( 466      ( 19 Feb 2021 08:20 )             39.6     02-19    138  |  103  |  27<H>  ----------------------------<  84  4.0   |  28  |  0.62    Ca    9.2      19 Feb 2021 08:20    TPro  6.7  /  Alb  2.7<L>  /  TBili  0.4  /  DBili  x   /  AST  120<H>  /  ALT  84<H>  /  AlkPhos  95  02-19    PT/INR - ( 19 Feb 2021 08:20 )   PT: 12.6 sec;   INR: 1.08 ratio         PTT - ( 19 Feb 2021 08:20 )  PTT:26.2 sec  CARDIAC MARKERS ( 19 Feb 2021 14:33 )  0.077 ng/mL / x     / x     / x     / x      CARDIAC MARKERS ( 19 Feb 2021 10:37 )  0.047 ng/mL / x     / x     / x     / x      CARDIAC MARKERS ( 19 Feb 2021 08:20 )  0.055 ng/mL / x     / x     / x     / x          Blood Culture:     RADIOLOGY/EKG:    A/P

## 2021-02-19 NOTE — H&P ADULT - NSHPPHYSICALEXAM_GEN_ALL_CORE
Vital Signs Last 24 Hrs  T(C): 36.6 (19 Feb 2021 07:56), Max: 36.6 (19 Feb 2021 07:56)  T(F): 97.9 (19 Feb 2021 07:56), Max: 97.9 (19 Feb 2021 07:56)  HR: 80 (19 Feb 2021 10:00) (80 - 82)  BP: 156/77 (19 Feb 2021 10:00) (156/77 - 157/89)  BP(mean): --  RR: 18 (19 Feb 2021 10:00) (18 - 18)  SpO2: 100% (19 Feb 2021 10:00) (100% - 100%)    Physical Exam:  General: AAOx3, in moderate distress 2/2 diagnosis  HEENT: NC/AT, EOMI  Cardio: S1S2, RRR  Pulm: equal air entry b/l, non labored on RA  Abdomen: soft, non distended, tenderness to palpation in bilateral lower quadrants and suprapubic region, non peritoneal  Extremities: FROM x4

## 2021-02-19 NOTE — H&P ADULT - NSHPLABSRESULTS_GEN_ALL_CORE
Labs:                        12.7   22.35 )-----------( 466      ( 19 Feb 2021 08:20 )             39.6   02-19    138  |  103  |  27<H>  ----------------------------<  84  4.0   |  28  |  0.62    Ca    9.2      19 Feb 2021 08:20    TPro  6.7  /  Alb  2.7<L>  /  TBili  0.4  /  DBili  x   /  AST  120<H>  /  ALT  84<H>  /  AlkPhos  95  02-19      Imaging:  < from: CT Abdomen and Pelvis No Cont (02.19.21 @ 09:32) >    EXAM:  CT ABDOMEN AND PELVIS                            PROCEDURE DATE:  02/19/2021      < end of copied text >    < from: CT Abdomen and Pelvis No Cont (02.19.21 @ 09:32) >    IMPRESSION:  Perforation of a loop of pelvic small bowel, without definable etiology.    Scattered small pockets of pneumoperitoneum.    No intraperitoneal fluid collection.    < end of copied text >

## 2021-02-19 NOTE — BRIEF OPERATIVE NOTE - NSICDXBRIEFPROCEDURE_GEN_ALL_CORE_FT
PROCEDURES:  Primary intestinal anastomosis 19-Feb-2021 22:25:01  Fawn Newby  Small bowel resection 19-Feb-2021 22:24:54  Fawn Newby  Lysis of pelvic adhesions 19-Feb-2021 22:24:43  Fawn Newby  Diagnostic laparoscopy 19-Feb-2021 22:24:33  Fawn Newby

## 2021-02-19 NOTE — ED PROVIDER NOTE - CLINICAL SUMMARY MEDICAL DECISION MAKING FREE TEXT BOX
56 y/o female with abd pain. Plan: EKG, chest XR, labs, urine reeval. 56 y/o female with abd pain- recent admission- dxed with vasculitis; worsening pain this AM. Plan: EKG, chest XR, labs, ct a/p urine reeval.

## 2021-02-19 NOTE — ED PROVIDER NOTE - OBJECTIVE STATEMENT
54 y/o female with a PMHx of presents to the ED c/o abd pain.   d/c 2 days ago admittedf or PNA and abd pain. diagnesoe with asvcultitis. on HIgh dose prednsione. Willard rolle woke upw ifth diffuse abd pain. deneis n/v/d, urinary symptoms, CP,SOB. Laid in fetal postion for 3 hours and could not get comfortbale. 56 y/o female with a PMHx of asthma, HTN, hypothyroid, sciatica presents to the ED c/o abd pain. Pt admitted for PNA and abd pain, diagnosed with vasculitis and was d/c 2 days ago. Pt on high dose Prednisone. Pt reports this morning she woke up with diffuse abd pain. Denies n/v/d, urinary symptoms, CP, SOB. Pt laid in fetal position for 3 hours and could not get comfortable. No other complaints at this time.

## 2021-02-19 NOTE — PHARMACOTHERAPY INTERVENTION NOTE - COMMENTS
Patient and   requesting a pharmacist to complete  a medication education prior to discharge.
verified out patient medications with  Kartik Tom and completed the medication reconciliation.

## 2021-02-19 NOTE — ED ADULT NURSE NOTE - OBJECTIVE STATEMENT
Patient came to ED after recent hospital admission c/o diffuse, sharp abdominal pain. Denies N/V/D, denies constipation, denies any other pain or complaints.

## 2021-02-19 NOTE — ED PROVIDER NOTE - CPE EDP MUSC NORM
Pts BP @ this time 85/52, pt sitting up eating dinner at the moment. MD Martín Grove notified, stated will put in order to increase fluids; notify if pressure becomes any lower. Will carry out orders.    normal...

## 2021-02-19 NOTE — H&P ADULT - ATTENDING COMMENTS
Patient seen & examined bedside with abdominal pain since this morning. Review of imaging with contrast reveals extravasation of oral contrast form small bowel. Patient also currently taking Prednisone 60mg daily since Sept. 2020. Risk & benefits discussed with patient in laymen's of surgical intervention with chronic steroid use. Patient wishes to proceed with surgical intervention    Diagnosis and plan D/W Kartik ()

## 2021-02-19 NOTE — ED ADULT TRIAGE NOTE - CHIEF COMPLAINT QUOTE
pt presents to ED brought in by ambulance  from home due to abdominal pain pt mike she was seen in  ED 2 days ago for gastritis

## 2021-02-19 NOTE — ED ADULT NURSE NOTE - CAS EDN DISCHARGE ASSESSMENT
HPI Comments: 60 y.o. female with extensive past medical history, please see list, significant for CAD, diastolic heart failure, RA, aortic aneurysm, CKD, ovarian cancer, sleep apnea, OA, esophageal stricture, DM, diabetic gastroparesis, ILD, and angina who presents to the ED with chief complaint of cough. Pt reports a productive cough onset about 3-4 days ago accompanied by SOB, nasal congestion, wheezing, and rib pain secondary to coughing that radiates to her back. Pt states she also has chronic leg swelling that is unchanged from baseline. Pt states she was seen by her PCP for her sx and was started on Bactrim to treat sinusitis which she has been taking without relief. Pt states she has also tried Mucinex, nasal spray, and nebulizer treatments without relief. Pt denies taking steroids. Pt states she has hx of asthmatic bronchitis. Pt denies fever. There are no other acute medical complaints voiced at this time. PCP: None    Note written by Jonathon Medina, as dictated by Adolph Padilla MD 4:46 PM     The history is provided by the patient.         Past Medical History:   Diagnosis Date     Sleep Apnea 2/16/2011    Angina, class III ([address] 8/9/2013    Aortic aneurysm ([address]     CAD (coronary Artery Disease) Native Artery 2/16/2011    CKD (chronic kidney disease) stage 3, GFR 30-59 ml/min 10/5/2012    Diabetic gastroparesis (HCC)     Diastolic heart failure ([address] 10/5/2012    Esophageal stricture 2012     dialted Dr. Olga Acevedo G6PD deficiency ([address]      trait    GERD (gastroesophageal reflux disease)     Hypertensive Cardiovasc Dis Benign, No CHF 2/16/2011    ILD (interstitial lung disease) ([address]      open lung bx CJW 2010    OA (osteoarthritis)     Obesity 2/16/2011    Ovarian cancer ([address]      cervical and uterine    Rheumatoid arteritis ([address]     T2DM (type 2 diabetes mellitus) ([address] 8/9/2013    Tobacco use disorder 3/21/2012    Uterine cervix cancer (HCC)     Vitamin D deficiency 8/9/2013       Past Surgical History:   Procedure Laterality Date    Hx orthopaedic  11/12/12     right knee replacement    Hx hysterectomy      Hx cholecystectomy      Hx appendectomy      Hx hernia repair      Hx carpal tunnel release       bilateral    Hx tonsil and adenoidectomy      Pr cardiac surg procedure unlist       stents    Upper gi endoscopy,dilatn w guide  6/24/2016          Colonoscopy N/A 6/24/2016     COLONOSCOPY performed by Sam Petersen MD at [facility] ENDOSCOPY         Family History:   Problem Relation Age of Onset    Heart Disease Mother     Heart Disease Brother        Social History     Social History    Marital status:      Spouse name: N/A    Number of children: N/A    Years of education: N/A     Occupational History    Not on file. Social History Main Topics    Smoking status: Former Smoker     Packs/day: 0.50     Years: 41.00     Types: Cigarettes     Quit date: 11/9/2014    Smokeless tobacco: Never Used    Alcohol use No    Drug use: No    Sexual activity: Not on file     Other Topics Concern    Not on file     Social History Narrative         ALLERGIES: Contrast dye [iodine]; Levaquin [levofloxacin]; and Morphine    Review of Systems   Constitutional: Negative. Negative for appetite change, fever and unexpected weight change. HENT: Positive for congestion (nasal). Negative for ear pain, hearing loss, nosebleeds, rhinorrhea, sore throat and trouble swallowing. Respiratory: Positive for cough (productive), shortness of breath and wheezing. Negative for chest tightness. Cardiovascular: Positive for leg swelling (chronic). Negative for chest pain and palpitations. Gastrointestinal: Negative. Negative for abdominal distention, abdominal pain, blood in stool and vomiting. Endocrine: Negative. Genitourinary: Negative for dysuria and hematuria. Musculoskeletal: Negative. Negative for myalgias.         +rib pain secondary to coughing radiating to back   Skin: Negative. Negative for rash. Allergic/Immunologic: Negative. Neurological: Negative. Negative for dizziness, syncope, weakness and numbness. Hematological: Negative. Psychiatric/Behavioral: Negative. All other systems reviewed and are negative. Vitals:    01/25/17 1618   BP: 164/74   Pulse: 75   Resp: 22   Temp: 98.7 °F (37.1 °C)   SpO2: 96%   Weight: 148.8 kg (328 lb)   Height: 5' 10\" (1.778 m)            Physical Exam   Constitutional: She is oriented to person, place, and time. She appears well-developed and well-nourished. No distress. HENT:   Head: Atraumatic. Right Ear: External ear normal.   Left Ear: External ear normal.   Mouth/Throat: Oropharynx is clear and moist.   Nasal congestion. Frequent cough. Eyes: Conjunctivae and EOM are normal. Pupils are equal, round, and reactive to light. Neck: Normal range of motion. Neck supple. No JVD present. No thyromegaly present. Cardiovascular: Normal rate, regular rhythm, normal heart sounds and intact distal pulses. No murmur heard. Pulmonary/Chest: Effort normal. No respiratory distress. She has wheezes (mild expiratory). She has no rales. No significant prolonged expiratory phase. No accessory muscle use. No retractions. Abdominal: Soft. Bowel sounds are normal. She exhibits no distension. There is no tenderness. Musculoskeletal: Normal range of motion. She exhibits no edema. Neurological: She is alert and oriented to person, place, and time. No cranial nerve deficit. Skin: Skin is warm and dry. No rash noted. Psychiatric: She has a normal mood and affect. Her behavior is normal. Thought content normal.   Nursing note and vitals reviewed.      Note written by Jonathon Sloan, as dictated by Erik Camarena MD 4:47 PM     [facility]  ED Course       Procedures    PROGRESS NOTE:  5:51 PM   Chest x-ray shows slightly increased patchy bilateral airspace opacities and interstitial thickening. PROGRESS NOTE:  6:09 PM   Will have pt stop bactrim and switch to levaquin. Will have pt do Q4hr nebs at home and give 5 day burst of steroids. Alert and oriented to person, place and time

## 2021-02-20 LAB
ANION GAP SERPL CALC-SCNC: 7 MMOL/L — SIGNIFICANT CHANGE UP (ref 5–17)
APPEARANCE UR: CLEAR — SIGNIFICANT CHANGE UP
BILIRUB UR-MCNC: NEGATIVE — SIGNIFICANT CHANGE UP
BUN SERPL-MCNC: 15 MG/DL — SIGNIFICANT CHANGE UP (ref 7–23)
CALCIUM SERPL-MCNC: 8.2 MG/DL — LOW (ref 8.5–10.1)
CHLORIDE SERPL-SCNC: 107 MMOL/L — SIGNIFICANT CHANGE UP (ref 96–108)
CO2 SERPL-SCNC: 26 MMOL/L — SIGNIFICANT CHANGE UP (ref 22–31)
COLOR SPEC: YELLOW — SIGNIFICANT CHANGE UP
CREAT SERPL-MCNC: 0.42 MG/DL — LOW (ref 0.5–1.3)
DIFF PNL FLD: ABNORMAL
GLUCOSE SERPL-MCNC: 103 MG/DL — HIGH (ref 70–99)
GLUCOSE UR QL: NEGATIVE MG/DL — SIGNIFICANT CHANGE UP
HCT VFR BLD CALC: 33 % — LOW (ref 34.5–45)
HGB BLD-MCNC: 10.2 G/DL — LOW (ref 11.5–15.5)
KETONES UR-MCNC: NEGATIVE — SIGNIFICANT CHANGE UP
LEUKOCYTE ESTERASE UR-ACNC: ABNORMAL
MAGNESIUM SERPL-MCNC: 2 MG/DL — SIGNIFICANT CHANGE UP (ref 1.6–2.6)
MCHC RBC-ENTMCNC: 28.1 PG — SIGNIFICANT CHANGE UP (ref 27–34)
MCHC RBC-ENTMCNC: 30.9 GM/DL — LOW (ref 32–36)
MCV RBC AUTO: 90.9 FL — SIGNIFICANT CHANGE UP (ref 80–100)
NITRITE UR-MCNC: NEGATIVE — SIGNIFICANT CHANGE UP
PH UR: 5 — SIGNIFICANT CHANGE UP (ref 5–8)
PHOSPHATE SERPL-MCNC: 3.9 MG/DL — SIGNIFICANT CHANGE UP (ref 2.5–4.5)
PLATELET # BLD AUTO: 384 K/UL — SIGNIFICANT CHANGE UP (ref 150–400)
POTASSIUM SERPL-MCNC: 4.1 MMOL/L — SIGNIFICANT CHANGE UP (ref 3.5–5.3)
POTASSIUM SERPL-SCNC: 4.1 MMOL/L — SIGNIFICANT CHANGE UP (ref 3.5–5.3)
PROT UR-MCNC: 15 MG/DL
RBC # BLD: 3.63 M/UL — LOW (ref 3.8–5.2)
RBC # FLD: 15.9 % — HIGH (ref 10.3–14.5)
SARS-COV-2 IGG SERPL QL IA: NEGATIVE — SIGNIFICANT CHANGE UP
SARS-COV-2 IGM SERPL IA-ACNC: 0.06 INDEX — SIGNIFICANT CHANGE UP
SODIUM SERPL-SCNC: 140 MMOL/L — SIGNIFICANT CHANGE UP (ref 135–145)
SP GR SPEC: 1.02 — SIGNIFICANT CHANGE UP (ref 1.01–1.02)
UROBILINOGEN FLD QL: NEGATIVE MG/DL — SIGNIFICANT CHANGE UP
WBC # BLD: 23.78 K/UL — HIGH (ref 3.8–10.5)
WBC # FLD AUTO: 23.78 K/UL — HIGH (ref 3.8–10.5)

## 2021-02-20 RX ORDER — CALCIUM GLUCONATE 100 MG/ML
2 VIAL (ML) INTRAVENOUS ONCE
Refills: 0 | Status: COMPLETED | OUTPATIENT
Start: 2021-02-20 | End: 2021-02-20

## 2021-02-20 RX ORDER — DILTIAZEM HCL 120 MG
25 CAPSULE, EXT RELEASE 24 HR ORAL EVERY 6 HOURS
Refills: 0 | Status: DISCONTINUED | OUTPATIENT
Start: 2021-02-20 | End: 2021-02-23

## 2021-02-20 RX ORDER — HEPARIN SODIUM 5000 [USP'U]/ML
5000 INJECTION INTRAVENOUS; SUBCUTANEOUS EVERY 8 HOURS
Refills: 0 | Status: DISCONTINUED | OUTPATIENT
Start: 2021-02-20 | End: 2021-02-25

## 2021-02-20 RX ADMIN — GABAPENTIN 600 MILLIGRAM(S): 400 CAPSULE ORAL at 15:04

## 2021-02-20 RX ADMIN — HEPARIN SODIUM 5000 UNIT(S): 5000 INJECTION INTRAVENOUS; SUBCUTANEOUS at 22:25

## 2021-02-20 RX ADMIN — Medication 100 MILLIGRAM(S): at 05:30

## 2021-02-20 RX ADMIN — Medication 100 MILLIGRAM(S): at 22:22

## 2021-02-20 RX ADMIN — GABAPENTIN 600 MILLIGRAM(S): 400 CAPSULE ORAL at 22:25

## 2021-02-20 RX ADMIN — HEPARIN SODIUM 5000 UNIT(S): 5000 INJECTION INTRAVENOUS; SUBCUTANEOUS at 15:06

## 2021-02-20 RX ADMIN — SODIUM CHLORIDE 100 MILLILITER(S): 9 INJECTION INTRAMUSCULAR; INTRAVENOUS; SUBCUTANEOUS at 11:51

## 2021-02-20 RX ADMIN — Medication 30 MILLIGRAM(S): at 22:25

## 2021-02-20 RX ADMIN — PANTOPRAZOLE SODIUM 40 MILLIGRAM(S): 20 TABLET, DELAYED RELEASE ORAL at 11:52

## 2021-02-20 RX ADMIN — Medication 30 MILLIGRAM(S): at 11:52

## 2021-02-20 RX ADMIN — Medication 100 MILLIGRAM(S): at 15:04

## 2021-02-20 RX ADMIN — Medication 200 GRAM(S): at 11:51

## 2021-02-20 RX ADMIN — Medication 100 MILLIGRAM(S): at 22:25

## 2021-02-20 RX ADMIN — Medication 10000 UNIT(S): at 12:00

## 2021-02-20 RX ADMIN — ONDANSETRON 4 MILLIGRAM(S): 8 TABLET, FILM COATED ORAL at 08:34

## 2021-02-20 RX ADMIN — Medication 100 MILLIGRAM(S): at 16:06

## 2021-02-20 RX ADMIN — Medication 100 MILLIGRAM(S): at 05:32

## 2021-02-20 NOTE — PROGRESS NOTE ADULT - ASSESSMENT
54 yo F on high dose steroids, found to have a small bowel perforation, is POD1 s/p diagnostic laparoscopy, lysis of adhesions, small bowel resection with primary anastomosis.    Plan:  -Monitor vitals  -Diet: NPO  -NGT to LWS, monitor output  -Pain control prn  -Anti emetic prn  -Continue IVF  -Continue IV Abx  -Serial abd exams  -Monitor daily labs   -Monitor SOHAM drain output  -will d/c Tavarez today, void check  -Rheumatology and Medicine on consult  -Encourage oob/ambulation  -Encourage IS use  -DVT/GI ppx    Plan discussed with Dr. Stanley

## 2021-02-20 NOTE — PROGRESS NOTE ADULT - ATTENDING COMMENTS
Patient seen & examined at bedside resting comfortably in chair with improving abdominal pain. NGT currently to LWS with bilious output, WBC 23 secondary t post surgery and intraoperative stress steroids. Patient Cr. normal. No GI fxn.    Exam  Gen: NAD, AAOx3  Abd: Soft, mild distention, appropriately tender, incisions c/d/i, Bill drain seropurulent    Plan  - D/C trivedi  - Electrolyte repletion  - Trend WBC  - C/W abx  - Steroids per Dr. Landa & Monalisa, need's Vit-A 10,000 IU daily (liquid)  - C/W NPO/NGT until return of bowel function

## 2021-02-21 LAB
ANION GAP SERPL CALC-SCNC: 6 MMOL/L — SIGNIFICANT CHANGE UP (ref 5–17)
BUN SERPL-MCNC: 15 MG/DL — SIGNIFICANT CHANGE UP (ref 7–23)
CALCIUM SERPL-MCNC: 8.3 MG/DL — LOW (ref 8.5–10.1)
CHLORIDE SERPL-SCNC: 107 MMOL/L — SIGNIFICANT CHANGE UP (ref 96–108)
CO2 SERPL-SCNC: 28 MMOL/L — SIGNIFICANT CHANGE UP (ref 22–31)
CREAT SERPL-MCNC: 0.46 MG/DL — LOW (ref 0.5–1.3)
GLUCOSE SERPL-MCNC: 93 MG/DL — SIGNIFICANT CHANGE UP (ref 70–99)
HCT VFR BLD CALC: 30.6 % — LOW (ref 34.5–45)
HGB BLD-MCNC: 9.6 G/DL — LOW (ref 11.5–15.5)
MAGNESIUM SERPL-MCNC: 2 MG/DL — SIGNIFICANT CHANGE UP (ref 1.6–2.6)
MCHC RBC-ENTMCNC: 28.4 PG — SIGNIFICANT CHANGE UP (ref 27–34)
MCHC RBC-ENTMCNC: 31.4 GM/DL — LOW (ref 32–36)
MCV RBC AUTO: 90.5 FL — SIGNIFICANT CHANGE UP (ref 80–100)
PHOSPHATE SERPL-MCNC: 2.7 MG/DL — SIGNIFICANT CHANGE UP (ref 2.5–4.5)
PLATELET # BLD AUTO: 361 K/UL — SIGNIFICANT CHANGE UP (ref 150–400)
POTASSIUM SERPL-MCNC: 4.1 MMOL/L — SIGNIFICANT CHANGE UP (ref 3.5–5.3)
POTASSIUM SERPL-SCNC: 4.1 MMOL/L — SIGNIFICANT CHANGE UP (ref 3.5–5.3)
RBC # BLD: 3.38 M/UL — LOW (ref 3.8–5.2)
RBC # FLD: 15.9 % — HIGH (ref 10.3–14.5)
SODIUM SERPL-SCNC: 141 MMOL/L — SIGNIFICANT CHANGE UP (ref 135–145)
WBC # BLD: 18.52 K/UL — HIGH (ref 3.8–10.5)
WBC # FLD AUTO: 18.52 K/UL — HIGH (ref 3.8–10.5)

## 2021-02-21 PROCEDURE — 99223 1ST HOSP IP/OBS HIGH 75: CPT

## 2021-02-21 RX ORDER — CALCIUM GLUCONATE 100 MG/ML
2 VIAL (ML) INTRAVENOUS ONCE
Refills: 0 | Status: COMPLETED | OUTPATIENT
Start: 2021-02-21 | End: 2021-02-21

## 2021-02-21 RX ADMIN — DULOXETINE HYDROCHLORIDE 20 MILLIGRAM(S): 30 CAPSULE, DELAYED RELEASE ORAL at 09:21

## 2021-02-21 RX ADMIN — HEPARIN SODIUM 5000 UNIT(S): 5000 INJECTION INTRAVENOUS; SUBCUTANEOUS at 15:44

## 2021-02-21 RX ADMIN — Medication 30 MILLIGRAM(S): at 21:39

## 2021-02-21 RX ADMIN — Medication 100 MILLIGRAM(S): at 17:22

## 2021-02-21 RX ADMIN — PANTOPRAZOLE SODIUM 40 MILLIGRAM(S): 20 TABLET, DELAYED RELEASE ORAL at 09:22

## 2021-02-21 RX ADMIN — Medication 100 MILLIGRAM(S): at 21:40

## 2021-02-21 RX ADMIN — GABAPENTIN 600 MILLIGRAM(S): 400 CAPSULE ORAL at 06:21

## 2021-02-21 RX ADMIN — GABAPENTIN 600 MILLIGRAM(S): 400 CAPSULE ORAL at 21:39

## 2021-02-21 RX ADMIN — Medication 88 MICROGRAM(S): at 06:21

## 2021-02-21 RX ADMIN — Medication 100 MILLIGRAM(S): at 21:38

## 2021-02-21 RX ADMIN — HEPARIN SODIUM 5000 UNIT(S): 5000 INJECTION INTRAVENOUS; SUBCUTANEOUS at 06:21

## 2021-02-21 RX ADMIN — Medication 10000 UNIT(S): at 09:22

## 2021-02-21 RX ADMIN — Medication 100 MILLIGRAM(S): at 06:21

## 2021-02-21 RX ADMIN — HEPARIN SODIUM 5000 UNIT(S): 5000 INJECTION INTRAVENOUS; SUBCUTANEOUS at 21:39

## 2021-02-21 RX ADMIN — Medication 200 GRAM(S): at 09:21

## 2021-02-21 RX ADMIN — GABAPENTIN 600 MILLIGRAM(S): 400 CAPSULE ORAL at 15:45

## 2021-02-21 RX ADMIN — Medication 100 MILLIGRAM(S): at 15:45

## 2021-02-21 RX ADMIN — Medication 30 MILLIGRAM(S): at 09:22

## 2021-02-21 NOTE — PROGRESS NOTE ADULT - ASSESSMENT
54 yo F on high dose steroids, found to have a small bowel perforation, is POD2 s/p diagnostic laparoscopy, lysis of adhesions, small bowel resection with primary anastomosis.    Plan:  -Monitor vitals  -Diet: NPO  -NGT to LWS, monitor output  -Pain control prn  -Anti emetic prn  -Continue IVF  -Continue IV Abx  -Serial abd exams  -Monitor daily labs   -Monitor SOHAM drain output  -Rheumatology and Medicine on consult  -Encourage oob/ambulation  -Encourage IS use  -DVT/GI ppx    Plan discussed with Dr. Stanley

## 2021-02-21 NOTE — PROGRESS NOTE ADULT - ATTENDING COMMENTS
Patient seen & examined at bedside resting comfortably in chair. Patient has decreasing leukocytosis, improvement of her abdominal pain, but no GI fxn yet.    Exam  Gen: NAD, AAOx3  Abd: Soft, ND, appropriately tender, incisions c/d/i    Plan  - C/W npo/ivfs with NGT decompression  - Will c/w NGT until GI fxn  - Steroids per Rheumatology  - C/W abx  - GI ppx  - DVT ppx

## 2021-02-22 DIAGNOSIS — J45.909 UNSPECIFIED ASTHMA, UNCOMPLICATED: ICD-10-CM

## 2021-02-22 DIAGNOSIS — I77.6 ARTERITIS, UNSPECIFIED: ICD-10-CM

## 2021-02-22 DIAGNOSIS — K63.1 PERFORATION OF INTESTINE (NONTRAUMATIC): ICD-10-CM

## 2021-02-22 DIAGNOSIS — G62.9 POLYNEUROPATHY, UNSPECIFIED: ICD-10-CM

## 2021-02-22 LAB
ALBUMIN SERPL ELPH-MCNC: 2 G/DL — LOW (ref 3.3–5)
ALP SERPL-CCNC: 72 U/L — SIGNIFICANT CHANGE UP (ref 40–120)
ALT FLD-CCNC: 68 U/L — SIGNIFICANT CHANGE UP (ref 12–78)
ANION GAP SERPL CALC-SCNC: 8 MMOL/L — SIGNIFICANT CHANGE UP (ref 5–17)
AST SERPL-CCNC: 148 U/L — HIGH (ref 15–37)
BILIRUB SERPL-MCNC: 0.3 MG/DL — SIGNIFICANT CHANGE UP (ref 0.2–1.2)
BUN SERPL-MCNC: 14 MG/DL — SIGNIFICANT CHANGE UP (ref 7–23)
CALCIUM SERPL-MCNC: 8.4 MG/DL — LOW (ref 8.5–10.1)
CHLORIDE SERPL-SCNC: 105 MMOL/L — SIGNIFICANT CHANGE UP (ref 96–108)
CO2 SERPL-SCNC: 27 MMOL/L — SIGNIFICANT CHANGE UP (ref 22–31)
CREAT SERPL-MCNC: 0.43 MG/DL — LOW (ref 0.5–1.3)
CRP SERPL-MCNC: 7.71 MG/DL — HIGH (ref 0–0.4)
ERYTHROCYTE [SEDIMENTATION RATE] IN BLOOD: 56 MM/HR — HIGH (ref 0–20)
GLUCOSE SERPL-MCNC: 94 MG/DL — SIGNIFICANT CHANGE UP (ref 70–99)
HCT VFR BLD CALC: 30.7 % — LOW (ref 34.5–45)
HGB BLD-MCNC: 9.7 G/DL — LOW (ref 11.5–15.5)
MAGNESIUM SERPL-MCNC: 1.9 MG/DL — SIGNIFICANT CHANGE UP (ref 1.6–2.6)
MCHC RBC-ENTMCNC: 28.3 PG — SIGNIFICANT CHANGE UP (ref 27–34)
MCHC RBC-ENTMCNC: 31.6 GM/DL — LOW (ref 32–36)
MCV RBC AUTO: 89.5 FL — SIGNIFICANT CHANGE UP (ref 80–100)
PHOSPHATE SERPL-MCNC: 3.1 MG/DL — SIGNIFICANT CHANGE UP (ref 2.5–4.5)
PLATELET # BLD AUTO: 370 K/UL — SIGNIFICANT CHANGE UP (ref 150–400)
POTASSIUM SERPL-MCNC: 3.8 MMOL/L — SIGNIFICANT CHANGE UP (ref 3.5–5.3)
POTASSIUM SERPL-SCNC: 3.8 MMOL/L — SIGNIFICANT CHANGE UP (ref 3.5–5.3)
PROT SERPL-MCNC: 5.5 GM/DL — LOW (ref 6–8.3)
RBC # BLD: 3.43 M/UL — LOW (ref 3.8–5.2)
RBC # FLD: 16.3 % — HIGH (ref 10.3–14.5)
SODIUM SERPL-SCNC: 140 MMOL/L — SIGNIFICANT CHANGE UP (ref 135–145)
WBC # BLD: 15.07 K/UL — HIGH (ref 3.8–10.5)
WBC # FLD AUTO: 15.07 K/UL — HIGH (ref 3.8–10.5)

## 2021-02-22 PROCEDURE — 99223 1ST HOSP IP/OBS HIGH 75: CPT

## 2021-02-22 RX ORDER — DIPHENHYDRAMINE HCL 50 MG
25 CAPSULE ORAL ONCE
Refills: 0 | Status: COMPLETED | OUTPATIENT
Start: 2021-02-22 | End: 2021-02-22

## 2021-02-22 RX ORDER — BUDESONIDE AND FORMOTEROL FUMARATE DIHYDRATE 160; 4.5 UG/1; UG/1
2 AEROSOL RESPIRATORY (INHALATION)
Refills: 0 | Status: DISCONTINUED | OUTPATIENT
Start: 2021-02-22 | End: 2021-02-25

## 2021-02-22 RX ORDER — LANOLIN ALCOHOL/MO/W.PET/CERES
5 CREAM (GRAM) TOPICAL ONCE
Refills: 0 | Status: COMPLETED | OUTPATIENT
Start: 2021-02-22 | End: 2021-02-22

## 2021-02-22 RX ORDER — CALCIUM GLUCONATE 100 MG/ML
2 VIAL (ML) INTRAVENOUS ONCE
Refills: 0 | Status: COMPLETED | OUTPATIENT
Start: 2021-02-22 | End: 2021-02-22

## 2021-02-22 RX ORDER — HYDROMORPHONE HYDROCHLORIDE 2 MG/ML
0.5 INJECTION INTRAMUSCULAR; INTRAVENOUS; SUBCUTANEOUS EVERY 4 HOURS
Refills: 0 | Status: DISCONTINUED | OUTPATIENT
Start: 2021-02-22 | End: 2021-02-25

## 2021-02-22 RX ADMIN — Medication 100 MILLIGRAM(S): at 05:32

## 2021-02-22 RX ADMIN — Medication 100 MILLIGRAM(S): at 05:31

## 2021-02-22 RX ADMIN — Medication 5 MILLIGRAM(S): at 21:30

## 2021-02-22 RX ADMIN — Medication 100 MILLIGRAM(S): at 15:02

## 2021-02-22 RX ADMIN — GABAPENTIN 600 MILLIGRAM(S): 400 CAPSULE ORAL at 21:30

## 2021-02-22 RX ADMIN — SODIUM CHLORIDE 100 MILLILITER(S): 9 INJECTION INTRAMUSCULAR; INTRAVENOUS; SUBCUTANEOUS at 05:33

## 2021-02-22 RX ADMIN — Medication 10000 UNIT(S): at 10:40

## 2021-02-22 RX ADMIN — Medication 100 MILLIGRAM(S): at 17:22

## 2021-02-22 RX ADMIN — Medication 30 MILLIGRAM(S): at 10:40

## 2021-02-22 RX ADMIN — Medication 100 MILLIGRAM(S): at 21:31

## 2021-02-22 RX ADMIN — HEPARIN SODIUM 5000 UNIT(S): 5000 INJECTION INTRAVENOUS; SUBCUTANEOUS at 21:30

## 2021-02-22 RX ADMIN — PANTOPRAZOLE SODIUM 40 MILLIGRAM(S): 20 TABLET, DELAYED RELEASE ORAL at 10:42

## 2021-02-22 RX ADMIN — GABAPENTIN 600 MILLIGRAM(S): 400 CAPSULE ORAL at 05:31

## 2021-02-22 RX ADMIN — Medication 30 MILLIGRAM(S): at 21:30

## 2021-02-22 RX ADMIN — Medication 25 MILLIGRAM(S): at 21:31

## 2021-02-22 RX ADMIN — HYDROMORPHONE HYDROCHLORIDE 0.5 MILLIGRAM(S): 2 INJECTION INTRAMUSCULAR; INTRAVENOUS; SUBCUTANEOUS at 21:45

## 2021-02-22 RX ADMIN — HEPARIN SODIUM 5000 UNIT(S): 5000 INJECTION INTRAVENOUS; SUBCUTANEOUS at 05:32

## 2021-02-22 RX ADMIN — BUDESONIDE AND FORMOTEROL FUMARATE DIHYDRATE 2 PUFF(S): 160; 4.5 AEROSOL RESPIRATORY (INHALATION) at 20:37

## 2021-02-22 RX ADMIN — HEPARIN SODIUM 5000 UNIT(S): 5000 INJECTION INTRAVENOUS; SUBCUTANEOUS at 15:02

## 2021-02-22 RX ADMIN — GABAPENTIN 600 MILLIGRAM(S): 400 CAPSULE ORAL at 15:02

## 2021-02-22 RX ADMIN — Medication 88 MICROGRAM(S): at 05:32

## 2021-02-22 RX ADMIN — DULOXETINE HYDROCHLORIDE 20 MILLIGRAM(S): 30 CAPSULE, DELAYED RELEASE ORAL at 10:41

## 2021-02-22 RX ADMIN — Medication 200 GRAM(S): at 16:38

## 2021-02-22 NOTE — CONSULT NOTE ADULT - ASSESSMENT
Assessment/Plan    - Maintain IV steroids as per rheum  - Await results of path specimen--spoke w/ Dr. SILVIA Ni  - Needs Vit B12 on 2/23 or 2/24  - Incentive spirometry  - Start Symbicort  - OOB to chair/ambulate as myranda  - Monitor renal fxn carefully

## 2021-02-22 NOTE — PROGRESS NOTE ADULT - ASSESSMENT
56 yo F on high dose steroids, found to have a small bowel perforation, is POD3 s/p diagnostic laparoscopy, lysis of adhesions, small bowel resection with primary anastomosis.    Plan:  -Monitor vitals  -Diet: NPO, possible CLD later  -NGT clamped this AM, possibly to pull later  -Pain control prn  -Anti emetic prn  -Continue IVF  -Continue IV Abx  -Serial abd exams  -Monitor daily labs   -Monitor SOHAM drain output  -Rheumatology and Medicine on consult  -Encourage oob/ambulation  -Encourage IS use  -DVT/GI ppx    Plan discussed with Dr. Stanley

## 2021-02-22 NOTE — CONSULT NOTE ADULT - SUBJECTIVE AND OBJECTIVE BOX
HPI:  Pt is a 54 yo F who presents to  ED with complaints of abdominal pain that woke her up from her sleep around 3am this morning. Pt was here on 21 with complaints of abdominal pain and was discharged home on 21. Only new medications on discharge was protonix, Bactrim, and prednisone. Acute cholecystitis and mesenteric ischemia was ruled out. Pt states this pain is different and is in the lower abdomen. States the pain comes and goes but when it is present, it is severe. Denies fevers, chills, chest pain, shortness of breath, nausea or vomiting. Reports last BM was yesterday.  (2021 11:09)    Pt noted to have free air in abdomen on CT scan. In retrospect CTA of Abdomen performed on , may have revealed subtle evidence for vasculitis. She underwent a laparotomy on . She is slowly improving post-op. She denies cough or wheeze. She is using Incentive Spirometer. Now back on solumedrol 30mg Q12H.      PAST MEDICAL & SURGICAL HISTORY:  H/O autoimmune disorder    Hypothyroid    HTN (hypertension)    Sciatica    Asthma    H/O  section    H/O right nephrectomy        MEDICATIONS  (STANDING):  acetaminophen  IVPB .. 1000 milliGRAM(s) IV Intermittent every 6 hours  calcium gluconate IVPB 2 Gram(s) IV Intermittent once  clindamycin IVPB 300 milliGRAM(s) IV Intermittent every 8 hours  clindamycin IVPB      diltiazem Injectable 25 milliGRAM(s) IV Push every 6 hours  DULoxetine 20 milliGRAM(s) Oral daily  gabapentin Oral Tab/Cap - Peds 600 milliGRAM(s) Oral three times a day  heparin   Injectable 5000 Unit(s) SubCutaneous every 8 hours  HYDROmorphone PCA (1 mG/mL) 30 milliLiter(s) PCA Continuous PCA Continuous  levothyroxine 88 MICROGram(s) Oral daily  LORazepam   Injectable 1 milliGRAM(s) IV Push once  methylPREDNISolone sodium succinate Injectable 30 milliGRAM(s) IV Push two times a day  metroNIDAZOLE  IVPB      metroNIDAZOLE  IVPB 500 milliGRAM(s) IV Intermittent every 8 hours  pantoprazole  Injectable 40 milliGRAM(s) IV Push daily  sodium chloride 0.9%. 1000 milliLiter(s) (100 mL/Hr) IV Continuous <Continuous>  vitamin A 94683 Unit(s) Oral daily    MEDICATIONS  (PRN):  HYDROmorphone PCA (1 mG/mL) Rescue Clinician Bolus 0.5 milliGRAM(s) IV Push every 15 minutes PRN for Pain Scale GREATER THAN 6  LORazepam   Injectable 0.5 milliGRAM(s) IV Push every 6 hours PRN Anxiety  LORazepam   Injectable 0.5 milliGRAM(s) IV Push every 6 hours PRN Anxiety  naloxone Injectable 0.1 milliGRAM(s) IV Push every 3 minutes PRN For ANY of the following changes in patient status:  A. RR LESS THAN 10 breaths per minute, B. Oxygen saturation LESS THAN 90%, C. Sedation score of 6  ondansetron Injectable 4 milliGRAM(s) IV Push every 6 hours PRN Nausea      Allergies    ciprofloxacin (Unknown)  penicillin (Unknown)    Intolerances        SOCIAL HISTORY: Denies tobacco, etoh abuse or illicit drug use    FAMILY HISTORY:      Vital Signs Last 24 Hrs  T(C): 36.9 (2021 05:41), Max: 37.2 (2021 14:23)  T(F): 98.4 (2021 05:41), Max: 99 (2021 14:23)  HR: 53 (2021 08:00) (53 - 78)  BP: 112/75 (2021 08:00) (92/65 - 164/88)  BP(mean): 87 (2021 08:00) (74 - 114)  RR: 11 (2021 08:00) (9 - 18)  SpO2: 92% (2021 08:00) (91% - 99%)    REVIEW OF SYSTEMS:    CONSTITUTIONAL:  As per HPI.  HEENT:  Eyes:  No diplopia or blurred vision. ENT:  No earache, sore throat or runny nose.  CARDIOVASCULAR:  No pressure, squeezing, tightness, heaviness or aching about the chest, neck, axilla or epigastrium.  RESPIRATORY:  No cough, shortness of breath, PND or orthopnea.  GASTROINTESTINAL:  See HPI  GENITOURINARY:  No dysuria, frequency or urgency.  MUSCULOSKELETAL:  As per HPI.  SKIN:  No change in skin, hair or nails.  NEUROLOGIC:  No paresthesias, fasciculations, seizures or weakness.  PSYCHIATRIC:  No disorder of thought or mood.  ENDOCRINE:  No heat or cold intolerance, polyuria or polydipsia.  HEMATOLOGICAL:  No easy bruising or bleedings:  .     PHYSICAL EXAMINATION:    GENERAL APPEARANCE:  Pt. is not currently dyspneic, in no distress. Pt. is alert, oriented, and pleasant.  HEENT:  Pupils are normal and react normally. No icterus. Mucous membranes well colored.  NECK:  Supple. No lymphadenopathy. Jugular venous pressure not elevated. Carotids equal.   HEART:   The cardiac impulse has a normal quality. Regular. Normal S1 and S2. There are no murmurs, rubs or gallops noted  CHEST:  Chest is clear to auscultation. Normal respiratory effort.  ABDOMEN:  Soft and tender with few bowel sounds.   EXTREMITIES:  There is no cyanosis, clubbing or edema.   SKIN:  No rash or significant lesions are noted.    LABS:                        9.7    15.07 )-----------( 370      ( 2021 05:52 )             30.7         140  |  105  |  14  ----------------------------<  94  3.8   |  27  |  0.43<L>    Ca    8.4<L>      2021 05:52  Phos  3.1       Mg     1.9         TPro  5.5<L>  /  Alb  2.0<L>  /  TBili  0.3  /  DBili  x   /  AST  148<H>  /  ALT  68  /  AlkPhos  72  -    LIVER FUNCTIONS - ( 2021 05:52 )  Alb: 2.0 g/dL / Pro: 5.5 gm/dL / ALK PHOS: 72 U/L / ALT: 68 U/L / AST: 148 U/L / GGT: x                       RADIOLOGY & ADDITIONAL STUDIES: < from: CT Abdomen and Pelvis w/ Oral Cont and w/ IV Cont (21 @ 12:40) >  EXAM:  CT ABDOMEN AND PELVIS OC IC                            PROCEDURE DATE:  2021          INTERPRETATION:  CLINICAL INFORMATION: Abdominal pain, perforation.    COMPARISON: Same day unenhanced CT.    PROCEDURE:  CT of the Abdomen and Pelvis was performed with intravenous contrast.  Intravenous contrast: 90 ml Omnipaque 350. 10 ml discarded.  Oral contrast: positive contrast was administered.  Sagittal and coronal reformats were performed.    FINDINGS:  LOWER CHEST: Within normal limits.    LIVER: Left hepatic lobe cyst.  BILE DUCTS: Normal caliber.  GALLBLADDER: Within normal limits.  SPLEEN: Within normal limits.  PANCREAS: Within normal limits.  ADRENALS: Within normal limits.  KIDNEYS/URETERS: Unremarkable left kidney. Status post right nephrectomy.    BLADDER: Within normal limits.  REPRODUCTIVE ORGANS: Uterus and adnexa within normal limits.    BOWEL/PERITONEUM: Wall thickening involving a short segment of pelvic small bowel with adjacent inflammatory changes in the small bowel mesentery. Small pockets of gas in the small bowel mesentery and left upper quadrant again noted. Small amount of high attenuation fluid in the posterior cul-de-sac and adjacent to the right adnexa.    VESSELS: Within normal limits.  RETROPERITONEUM/LYMPH NODES: No lymphadenopathy.  ABDOMINAL WALL: Within normal limits.  BONES: No acute abnormality.    IMPRESSION:  Enteritis involving a loop of pelvic small bowel with unchanged amount of small pneumoperitoneum.    Small amount of oral contrast extravasation with pockets of high density fluid in the pelvis.        NEREIDA ROLAND JR, MD; Attending Radiologist

## 2021-02-23 ENCOUNTER — APPOINTMENT (OUTPATIENT)
Dept: INTERNAL MEDICINE | Facility: CLINIC | Age: 56
End: 2021-02-23

## 2021-02-23 LAB
ANION GAP SERPL CALC-SCNC: 6 MMOL/L — SIGNIFICANT CHANGE UP (ref 5–17)
BUN SERPL-MCNC: 14 MG/DL — SIGNIFICANT CHANGE UP (ref 7–23)
CALCIUM SERPL-MCNC: 8.5 MG/DL — SIGNIFICANT CHANGE UP (ref 8.5–10.1)
CHLORIDE SERPL-SCNC: 106 MMOL/L — SIGNIFICANT CHANGE UP (ref 96–108)
CO2 SERPL-SCNC: 28 MMOL/L — SIGNIFICANT CHANGE UP (ref 22–31)
CREAT SERPL-MCNC: 0.42 MG/DL — LOW (ref 0.5–1.3)
GLUCOSE SERPL-MCNC: 98 MG/DL — SIGNIFICANT CHANGE UP (ref 70–99)
HCT VFR BLD CALC: 30 % — LOW (ref 34.5–45)
HGB BLD-MCNC: 9.5 G/DL — LOW (ref 11.5–15.5)
MAGNESIUM SERPL-MCNC: 1.9 MG/DL — SIGNIFICANT CHANGE UP (ref 1.6–2.6)
MCHC RBC-ENTMCNC: 27.9 PG — SIGNIFICANT CHANGE UP (ref 27–34)
MCHC RBC-ENTMCNC: 31.7 GM/DL — LOW (ref 32–36)
MCV RBC AUTO: 88.2 FL — SIGNIFICANT CHANGE UP (ref 80–100)
MPO AB + PR3 PNL SER: SIGNIFICANT CHANGE UP
PHOSPHATE SERPL-MCNC: 3.4 MG/DL — SIGNIFICANT CHANGE UP (ref 2.5–4.5)
PLATELET # BLD AUTO: 336 K/UL — SIGNIFICANT CHANGE UP (ref 150–400)
POTASSIUM SERPL-MCNC: 3.6 MMOL/L — SIGNIFICANT CHANGE UP (ref 3.5–5.3)
POTASSIUM SERPL-SCNC: 3.6 MMOL/L — SIGNIFICANT CHANGE UP (ref 3.5–5.3)
RBC # BLD: 3.4 M/UL — LOW (ref 3.8–5.2)
RBC # FLD: 16 % — HIGH (ref 10.3–14.5)
SODIUM SERPL-SCNC: 140 MMOL/L — SIGNIFICANT CHANGE UP (ref 135–145)
WBC # BLD: 9.87 K/UL — SIGNIFICANT CHANGE UP (ref 3.8–10.5)
WBC # FLD AUTO: 9.87 K/UL — SIGNIFICANT CHANGE UP (ref 3.8–10.5)

## 2021-02-23 PROCEDURE — 99233 SBSQ HOSP IP/OBS HIGH 50: CPT

## 2021-02-23 RX ORDER — LANOLIN ALCOHOL/MO/W.PET/CERES
5 CREAM (GRAM) TOPICAL ONCE
Refills: 0 | Status: COMPLETED | OUTPATIENT
Start: 2021-02-23 | End: 2021-02-23

## 2021-02-23 RX ORDER — PREGABALIN 225 MG/1
1000 CAPSULE ORAL ONCE
Refills: 0 | Status: COMPLETED | OUTPATIENT
Start: 2021-02-23 | End: 2021-02-23

## 2021-02-23 RX ORDER — DILTIAZEM HCL 120 MG
120 CAPSULE, EXT RELEASE 24 HR ORAL DAILY
Refills: 0 | Status: DISCONTINUED | OUTPATIENT
Start: 2021-02-23 | End: 2021-02-25

## 2021-02-23 RX ORDER — DIPHENHYDRAMINE HCL 50 MG
25 CAPSULE ORAL ONCE
Refills: 0 | Status: DISCONTINUED | OUTPATIENT
Start: 2021-02-23 | End: 2021-02-25

## 2021-02-23 RX ADMIN — Medication 100 MILLIGRAM(S): at 15:12

## 2021-02-23 RX ADMIN — Medication 88 MICROGRAM(S): at 05:02

## 2021-02-23 RX ADMIN — GABAPENTIN 600 MILLIGRAM(S): 400 CAPSULE ORAL at 15:12

## 2021-02-23 RX ADMIN — DULOXETINE HYDROCHLORIDE 20 MILLIGRAM(S): 30 CAPSULE, DELAYED RELEASE ORAL at 10:28

## 2021-02-23 RX ADMIN — HYDROMORPHONE HYDROCHLORIDE 0.5 MILLIGRAM(S): 2 INJECTION INTRAMUSCULAR; INTRAVENOUS; SUBCUTANEOUS at 07:22

## 2021-02-23 RX ADMIN — HEPARIN SODIUM 5000 UNIT(S): 5000 INJECTION INTRAVENOUS; SUBCUTANEOUS at 20:33

## 2021-02-23 RX ADMIN — HEPARIN SODIUM 5000 UNIT(S): 5000 INJECTION INTRAVENOUS; SUBCUTANEOUS at 05:01

## 2021-02-23 RX ADMIN — Medication 100 MILLIGRAM(S): at 17:33

## 2021-02-23 RX ADMIN — Medication 100 MILLIGRAM(S): at 20:34

## 2021-02-23 RX ADMIN — PREGABALIN 1000 MICROGRAM(S): 225 CAPSULE ORAL at 17:33

## 2021-02-23 RX ADMIN — Medication 100 MILLIGRAM(S): at 05:01

## 2021-02-23 RX ADMIN — GABAPENTIN 600 MILLIGRAM(S): 400 CAPSULE ORAL at 20:34

## 2021-02-23 RX ADMIN — Medication 30 MILLIGRAM(S): at 20:34

## 2021-02-23 RX ADMIN — GABAPENTIN 600 MILLIGRAM(S): 400 CAPSULE ORAL at 05:06

## 2021-02-23 RX ADMIN — Medication 5 MILLIGRAM(S): at 20:34

## 2021-02-23 RX ADMIN — PANTOPRAZOLE SODIUM 40 MILLIGRAM(S): 20 TABLET, DELAYED RELEASE ORAL at 10:28

## 2021-02-23 RX ADMIN — Medication 10000 UNIT(S): at 10:28

## 2021-02-23 RX ADMIN — HEPARIN SODIUM 5000 UNIT(S): 5000 INJECTION INTRAVENOUS; SUBCUTANEOUS at 15:12

## 2021-02-23 RX ADMIN — Medication 30 MILLIGRAM(S): at 10:28

## 2021-02-23 NOTE — PROGRESS NOTE ADULT - ASSESSMENT
56 yo F on high dose steroids, found to have a small bowel perforation, is POD4 s/p diagnostic laparoscopy, lysis of adhesions, small bowel resection with primary anastomosis.    Plan:  -Monitor vitals  -CLD today, ADAT  -Pain control prn  -d/c PCA, PRN meds  -Anti emetic prn  -Continue IVF  -Continue IV Abx  -Serial abd exams  -Monitor daily labs   -Monitor SOHAM drain output  -Rheumatology and Medicine on consult appreciatedd  -Encourage oob/ambulation  -Encourage IS use  -DVT/GI ppx    Plan discussed with Dr. Stanley

## 2021-02-23 NOTE — PROVIDER CONTACT NOTE (OTHER) - SITUATION
Pt upset, stating she was told by surgery SOHAM drain to come out today but no one took it out. Also having multiple BM with small amount of blood. VSS. Denies pain.

## 2021-02-23 NOTE — PROGRESS NOTE ADULT - ASSESSMENT
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 Extensive w/u neuro/ rheum work up negative as per patient in the past. Here she is noted to have + MPO; +P-ANCA; +RF; +DS-DNA.     She was initially admitted with abdominal pain and new vasculitis lesions on her skin, granulomatous elbows on last admission she was noted to have a positive J LUIS, positive ANCA antibody positive MPO antibody, elevated inflammatory markers, status post skin biopsy consistent with vasculitis. Now comes in urgently to the ER with acute abdomen noted to have pneumoperitoneum status post surgery, He also has neuropathy consistent with vasculitis  Clinically high suspicion for EGPA, with surgeon, reporting to have inflammatory findings during the procedure, tissue was sent out.    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  Bump in ESR/CRP from last admission likely from perforation.    -Maintain Solu-Medrol 30 mg IV BID.  Appears to be stable, labs overall improving.    -Maintain Bactrim for PCP prophylaxis  -Given current and recent clinical picture rheumatologically the patient will need pulsed steroids, 500 mg IV for 3 days but since she is postop and status post acute abdomen and pneumoperitoneum will hold off on high-dose steroids until she heals.   Await surgical input on this.     -Long-term plan to consider Rituxan use, will avoid Cytoxan since she only has one kidney  -Previously we had discussed potential use of nucala with her but given multisystem involvement and major organ involvement proximal be a better choice  -will follow

## 2021-02-23 NOTE — PROGRESS NOTE ADULT - ATTENDING COMMENTS
2020       Cruz Moeller, DO  [address]  [address]  [facility] [place] [address]  Via Fax: [phone]      Patient: Vicenta Santana   YOB: 1957   Date of Visit: 2020       Dear Dr. Moeller:    I saw your patient, Vicenta Santana, for an evaluation. Below are my notes for this visit with her.    If you have questions, please do not hesitate to call me.      Sincerely,        Elisha Lr MD        CC: No Recipients  Elisha Lr MD  2020  3:17 PM  Sign when Signing Visit  RHEUMATOLOGY FOLLOW UP VISIT      2020  Patient Name: Vicenta Santana  : 1957  Medical Record: [id number]    CHIEF COMPLAINT:   Chief Complaint   Patient presents with   • Office Visit   • Follow-up   • Osteoporosis         HISTORY OF PRESENT ILLNESS  Vicenta Santana is a 63 year old female who is being seen for follow up evaluation of osteoporosis    Previous fractures: no   Falls: No   Loss of height: yes about 1 inch  Risk Factors: steroids no   Menopause: yes Age: 51  Exercise: NO  Smoking: NO   Alcohol: NO         Calcium intake - 1000 mg Ca  Vit D intake - 2000 U Daily    Treatment used in the past  Alendronate since  - takes appropriately - no side effects      Switch to Prolia end of . Last shot 2020    She has stopped hydrochlorothiazide since last visit.        Past Medical History:   Diagnosis Date   • Acquired renal cyst of left kidney 2019   • Hiatal hernia 2019   • Menopause 2019   • Mixed hyperlipidemia 2019   • Osteopenia of multiple sites 2019   • Vitamin D deficiency 2019     No past surgical history on file.  Social History     Socioeconomic History   • Marital status: /Civil Union     Spouse name: Not on file   • Number of children: Not on file   • Years of education: Not on file   • Highest education level: Not on file   Occupational History   • Not on file   Social Needs   • Financial resource strain: Not on file   • Food  insecurity     Worry: Not on file     Inability: Not on file   • Transportation needs     Medical: Not on file     Non-medical: Not on file   Tobacco Use   • Smoking status: Never Smoker   • Smokeless tobacco: Never Used   Substance and Sexual Activity   • Alcohol use: Yes     Frequency: Monthly or less     Comment: very rare   • Drug use: Never   • Sexual activity: Not on file   Lifestyle   • Physical activity     Days per week: Not on file     Minutes per session: Not on file   • Stress: Not on file   Relationships   • Social connections     Talks on phone: Not on file     Gets together: Not on file     Attends Yarsanism service: Not on file     Active member of club or organization: Not on file     Attends meetings of clubs or organizations: Not on file     Relationship status: Not on file   • Intimate partner violence     Fear of current or ex partner: Not on file     Emotionally abused: Not on file     Physically abused: Not on file     Forced sexual activity: Not on file   Other Topics Concern   • Not on file   Social History Narrative   • Not on file     No family history on file.        MEDICATIONS  Current Outpatient Medications   Medication Sig Dispense Refill   • Glucosamine-Chondroitin--200-150 MG Tab      • DENOSumab (PROLIA) 60 MG/ML Solution Prefilled Syringe Inject 60 mg into the skin 1 time.     • Cholecalciferol (VITAMIN D) 2000 units capsule  30 capsule    • hydrochlorothiazide (HYDRODIURIL) 12.5 MG tablet TAKE 1 TABLET BY MOUTH DAILY IN THE MORNING 90 tablet 0     No current facility-administered medications for this visit.        ALLERGIES  ALLERGIES:  No Known Allergies      Review of Systems   Constitutional: Negative for chills, fatigue, fever and unexpected weight change.   HENT: Negative for congestion, mouth sores and sore throat.    Eyes: Negative for pain, redness and visual disturbance.   Respiratory: Negative for cough and shortness of breath.    Cardiovascular: Negative for  chest pain and leg swelling.   Gastrointestinal: Negative for abdominal pain, blood in stool, nausea and vomiting.   Endocrine: Negative for polydipsia and polyuria.   Genitourinary: Negative for dysuria and genital sores.   Musculoskeletal:        See HPI   Skin: Negative for rash and wound.   Neurological: Negative for weakness and numbness.   Hematological: Negative for adenopathy.   Psychiatric/Behavioral: Negative for agitation. The patient is not nervous/anxious.           PHYSICAL EXAM   Vitals:    08/31/20 1449   BP: 115/70   Pulse: 75   Temp: 98 °F (36.7 °C)   TempSrc: Temporal   Weight: 70.3 kg (155 lb)   Height: 5' 3\" (1.6 m)   PainSc:  0         General appearance: Pt appears well developed, well nourished with attention to grooming  Skin: No rash. No induration  Eyes: Conjunctiva and lids moist and without inflammation, no icterus noted  ENT: no ulcers, no erythema  Respiratory: Clear to auscultation, no wheezes or rhonchi, normal respiratory effort   Cardiovascular: Regular rate and rhythm, no murmurs rubs or gallops  Abdomen: non tender, no masses, liver and spleen not palpable  Musculoskeletal:    Fingers - no bony hypertrophy, tenderness or synovitis. Full finger flexion.  Wrists - no swelling, good range of motion, no tenderness  Elbows - full flexion/extension, no tenderness  Shoulder - full range of motion, no effusion  Cervical spine - good range of motion, no pain with rotation  Thoracolumbar spine - no focal tenderness  Hips - full range of motion with pain, greater trochanters not tender  Knee - cool to touch , no effusion, no bony hypertrophy.  Mild tenderness to palpation along the joint line  Ankle - full range of motion, no swelling  Toes - no tenderness or deformities      Neuro: A&Ox4, strength is 5/5 in bilateral upper and lower extremity. no gross motor or sensory defects  Psychiatric: Mood and affect are appropriate        LABS:    Component      Latest Ref Rng & Units 8/26/2020    Fasting Status      hrs 12   Sodium      135 - 145 mmol/L 141   Potassium      3.4 - 5.1 mmol/L 3.9   Chloride      98 - 107 mmol/L 107   CO2      21 - 32 mmol/L 26   ANION GAP      10 - 20 mmol/L 12   Glucose      65 - 99 mg/dL 88   BUN      6 - 20 mg/dL 20   Creatinine      0.51 - 0.95 mg/dL 0.64   GFR Estimate,        >90   GFR Estimate, Non        >90   BUN/CREATININE RATIO      7 - 25 31 (H)   CALCIUM      8.4 - 10.2 mg/dL 9.5   TOTAL BILIRUBIN      0.2 - 1.0 mg/dL 0.9   AST/SGOT      <38 Units/L 28   ALT/SGPT      <64 Units/L 39   ALK PHOSPHATASE      45 - 117 Units/L 75   TOTAL PROTEIN      6.4 - 8.2 g/dL 7.2   Albumin      3.6 - 5.1 g/dL 4.1   GLOBULIN      2.0 - 4.0 g/dL 3.1   A/G Ratio, Serum      1.0 - 2.4 1.3         IMAGING:    Jul 18, 2019  4:48 PM   Reason For Exam    osteopenia,osteopenia   IR Timeline    IR Event Log   Study Result    Accession #     [id number]     Exam: Central Bone Density Study (DXA scan)     Clinical Indication: Osteopenia; postmenopausal female; family history of osteoporosis;   Alendronate.     Scan Unit: [employer] [facility] [id number]  Sites Analyzed: Lumbar spine, left hip     Findings:     LUMBAR SPINE   T-SCORE: -2.3     LEFT HIP  T-SCORE:       Total: -0.4    Femoral Neck: -1.1     Impression:     1.  The patient's bone mineral density is compatible with osteopenia.     2.  Since the prior DXA of 07/15/2017, there has been a statistically significant decrease of   6.0% in overall bone mineral density in the region of the left hip without a statistically   significant change noted in the region of the lumbar spine.          Assessment and plan  #Postmenopausal osteoporosis  #Long-term denosumab abuse  #Hip and knee joint pain    RECOMMENDATIONS  -Continue Prolia 60 mg subcu every 6 months.  -Last DEXA scan was in 2019, due for next in 2021  -Continue calcium vitamin D supplementation  -We will get x-rays of knees and hips to rule out  Patient seen & examined at bedside resting comfortably in the chair, having flatus no BM. Patient has resolution of her leukocytosis today.    Exam  Gen: NAD, AAOx3  Abd: Soft, ND, appropriately tender, Bill drain serous output    Plan  - Adv. diet to clears  - C/W Abx  - GI ppx  - Steroids per Rheumatology  - DVT ppx  - Transfer to regular floor arthritis.    RTC -6 months    The patient indicates understanding of these issues and agrees with the plan.  All of patients questions were answered    Elisha Lr MD, [facility]      This note was created using the Dragon voice recognition system. Errors in content may be related to improper recognition of the system. Effort to review and correct the note has been made but irregularities may still be present.

## 2021-02-24 ENCOUNTER — APPOINTMENT (OUTPATIENT)
Dept: RHEUMATOLOGY | Facility: CLINIC | Age: 56
End: 2021-02-24

## 2021-02-24 LAB
ANION GAP SERPL CALC-SCNC: 6 MMOL/L — SIGNIFICANT CHANGE UP (ref 5–17)
BUN SERPL-MCNC: 11 MG/DL — SIGNIFICANT CHANGE UP (ref 7–23)
CALCIUM SERPL-MCNC: 8 MG/DL — LOW (ref 8.5–10.1)
CHLORIDE SERPL-SCNC: 108 MMOL/L — SIGNIFICANT CHANGE UP (ref 96–108)
CO2 SERPL-SCNC: 28 MMOL/L — SIGNIFICANT CHANGE UP (ref 22–31)
CREAT SERPL-MCNC: 0.37 MG/DL — LOW (ref 0.5–1.3)
CULTURE RESULTS: SIGNIFICANT CHANGE UP
CULTURE RESULTS: SIGNIFICANT CHANGE UP
DRVVT SCREEN TO CONFIRM RATIO: SIGNIFICANT CHANGE UP
GLUCOSE SERPL-MCNC: 116 MG/DL — HIGH (ref 70–99)
HCT VFR BLD CALC: 28.4 % — LOW (ref 34.5–45)
HGB BLD-MCNC: 9 G/DL — LOW (ref 11.5–15.5)
LA NT DPL PPP QL: 37.8 SEC — SIGNIFICANT CHANGE UP
MAGNESIUM SERPL-MCNC: 1.9 MG/DL — SIGNIFICANT CHANGE UP (ref 1.6–2.6)
MCHC RBC-ENTMCNC: 27.9 PG — SIGNIFICANT CHANGE UP (ref 27–34)
MCHC RBC-ENTMCNC: 31.7 GM/DL — LOW (ref 32–36)
MCV RBC AUTO: 87.9 FL — SIGNIFICANT CHANGE UP (ref 80–100)
NORMALIZED SCT PPP-RTO: 0.74 RATIO — SIGNIFICANT CHANGE UP (ref 0–1.16)
NORMALIZED SCT PPP-RTO: SIGNIFICANT CHANGE UP
PHOSPHATE SERPL-MCNC: 2.8 MG/DL — SIGNIFICANT CHANGE UP (ref 2.5–4.5)
PLATELET # BLD AUTO: 312 K/UL — SIGNIFICANT CHANGE UP (ref 150–400)
POTASSIUM SERPL-MCNC: 3.3 MMOL/L — LOW (ref 3.5–5.3)
POTASSIUM SERPL-SCNC: 3.3 MMOL/L — LOW (ref 3.5–5.3)
RBC # BLD: 3.23 M/UL — LOW (ref 3.8–5.2)
RBC # FLD: 16 % — HIGH (ref 10.3–14.5)
SODIUM SERPL-SCNC: 142 MMOL/L — SIGNIFICANT CHANGE UP (ref 135–145)
SPECIMEN SOURCE: SIGNIFICANT CHANGE UP
SPECIMEN SOURCE: SIGNIFICANT CHANGE UP
WBC # BLD: 9.05 K/UL — SIGNIFICANT CHANGE UP (ref 3.8–10.5)
WBC # FLD AUTO: 9.05 K/UL — SIGNIFICANT CHANGE UP (ref 3.8–10.5)

## 2021-02-24 RX ORDER — ACETAMINOPHEN 500 MG
650 TABLET ORAL EVERY 6 HOURS
Refills: 0 | Status: DISCONTINUED | OUTPATIENT
Start: 2021-02-24 | End: 2021-02-25

## 2021-02-24 RX ORDER — CALCIUM GLUCONATE 100 MG/ML
2 VIAL (ML) INTRAVENOUS ONCE
Refills: 0 | Status: COMPLETED | OUTPATIENT
Start: 2021-02-24 | End: 2021-02-24

## 2021-02-24 RX ORDER — POTASSIUM CHLORIDE 20 MEQ
20 PACKET (EA) ORAL
Refills: 0 | Status: COMPLETED | OUTPATIENT
Start: 2021-02-24 | End: 2021-02-24

## 2021-02-24 RX ADMIN — Medication 100 MILLIGRAM(S): at 20:33

## 2021-02-24 RX ADMIN — Medication 100 MILLIGRAM(S): at 21:37

## 2021-02-24 RX ADMIN — GABAPENTIN 600 MILLIGRAM(S): 400 CAPSULE ORAL at 04:49

## 2021-02-24 RX ADMIN — HEPARIN SODIUM 5000 UNIT(S): 5000 INJECTION INTRAVENOUS; SUBCUTANEOUS at 14:16

## 2021-02-24 RX ADMIN — Medication 1 MILLIGRAM(S): at 00:34

## 2021-02-24 RX ADMIN — Medication 88 MICROGRAM(S): at 04:49

## 2021-02-24 RX ADMIN — HEPARIN SODIUM 5000 UNIT(S): 5000 INJECTION INTRAVENOUS; SUBCUTANEOUS at 04:49

## 2021-02-24 RX ADMIN — Medication 120 MILLIGRAM(S): at 10:22

## 2021-02-24 RX ADMIN — Medication 20 MILLIEQUIVALENT(S): at 10:51

## 2021-02-24 RX ADMIN — PANTOPRAZOLE SODIUM 40 MILLIGRAM(S): 20 TABLET, DELAYED RELEASE ORAL at 10:22

## 2021-02-24 RX ADMIN — Medication 650 MILLIGRAM(S): at 00:51

## 2021-02-24 RX ADMIN — Medication 20 MILLIEQUIVALENT(S): at 10:21

## 2021-02-24 RX ADMIN — GABAPENTIN 600 MILLIGRAM(S): 400 CAPSULE ORAL at 14:16

## 2021-02-24 RX ADMIN — Medication 100 MILLIGRAM(S): at 04:49

## 2021-02-24 RX ADMIN — Medication 30 MILLIGRAM(S): at 10:26

## 2021-02-24 RX ADMIN — GABAPENTIN 600 MILLIGRAM(S): 400 CAPSULE ORAL at 20:33

## 2021-02-24 RX ADMIN — HEPARIN SODIUM 5000 UNIT(S): 5000 INJECTION INTRAVENOUS; SUBCUTANEOUS at 20:33

## 2021-02-24 RX ADMIN — DULOXETINE HYDROCHLORIDE 20 MILLIGRAM(S): 30 CAPSULE, DELAYED RELEASE ORAL at 10:22

## 2021-02-24 RX ADMIN — Medication 10000 UNIT(S): at 10:21

## 2021-02-24 RX ADMIN — Medication 100 MILLIGRAM(S): at 04:48

## 2021-02-24 RX ADMIN — Medication 100 MILLIGRAM(S): at 15:40

## 2021-02-24 RX ADMIN — Medication 100 MILLIGRAM(S): at 14:16

## 2021-02-24 RX ADMIN — Medication 0.5 MILLIGRAM(S): at 20:33

## 2021-02-24 RX ADMIN — BUDESONIDE AND FORMOTEROL FUMARATE DIHYDRATE 2 PUFF(S): 160; 4.5 AEROSOL RESPIRATORY (INHALATION) at 21:04

## 2021-02-24 RX ADMIN — Medication 200 GRAM(S): at 10:46

## 2021-02-24 RX ADMIN — Medication 650 MILLIGRAM(S): at 20:34

## 2021-02-24 NOTE — PROGRESS NOTE ADULT - ASSESSMENT
56 yo F on high dose steroids, found to have a small bowel perforation, is POD5 s/p diagnostic laparoscopy, lysis of adhesions, small bowel resection with primary anastomosis.    Plan:  -Monitor vitals  -ADAT  -Pain control prn  -Anti emetic prn  -Continue IVF  -Continue IV Abx  -Serial abd exams  -Monitor daily labs   -Monitor SOHAM drain output  -Rheumatology and Medicine on consult appreciatedd  -Encourage oob/ambulation  -Encourage IS use  -DVT/GI ppx  -plan for downgrade to med/surg floor    Plan discussed with Dr. Stanley 56 yo F on high dose steroids, found to have a small bowel perforation, is POD5 s/p diagnostic laparoscopy, lysis of adhesions, small bowel resection with primary anastomosis.    Plan:  -Monitor vitals  -ADAT to low residue diet  -Pain control prn  -Anti emetic prn  -Continue IVF  -Continue IV Abx  -Serial abd exams  -Monitor daily labs   -Monitor SOHAM drain output  -Rheumatology and Medicine on consult appreciated - no pulse steroids  -Encourage oob/ambulation  -Encourage IS use  -DVT/GI ppx  -plan for downgrade to med/surg floor    Plan discussed with Dr. Stanley 56 yo F on high dose steroids, found to have a small bowel perforation, is POD5 s/p diagnostic laparoscopy, lysis of adhesions, small bowel resection with primary anastomosis.    Plan:  -Monitor vitals  -ADAT to low residue diet  -Pain control prn  -Anti emetic prn  -Continue IVF  -Continue IV Abx  -Serial abd exams  -Monitor daily labs   -Monitor SOHAM drain output  -Rheumatology and Medicine on consult appreciated - no pulse steroids or immunomodulators for 4-6 weeks  -Hematology consulted for anticoagulation recs for d/c   -Encourage oob/ambulation  -Encourage IS use  -DVT/GI ppx  -plan for downgrade to med/surg floor    Plan discussed with Dr. Stanley

## 2021-02-24 NOTE — PROVIDER CONTACT NOTE (OTHER) - SITUATION
Spoke with Oralia in Dr Solorio's office to inform  of consult. Dr Marin is on call. Spoke with Oralia in Dr Solorio's office to inform dr of consult. Dr Marin is on call and aware of consult.

## 2021-02-24 NOTE — CONSULT NOTE ADULT - SUBJECTIVE AND OBJECTIVE BOX
HPI:  Pt is a 56 yo F who presents to  ED with complaints of abdominal pain that woke her up from her sleep around 3am this morning. Pt was here on 21 with complaints of abdominal pain and was discharged home on 21. Only new medications on discharge was protonix, Bactrim, and prednisone. Acute cholecystitis and mesenteric ischemia was ruled out. Pt states this pain is different and is in the lower abdomen. States the pain comes and goes but when it is present, it is severe. Denies fevers, chills, chest pain, shortness of breath, nausea or vomiting. Reports last BM was yesterday.  (2021 11:09)        56 yo F with pmh R nephrectomy in , HTN, asthma- with recurrent need for steroids nearly consistent since - 10-40 mg daily, hypothyroidism, unknown rheumatologic disorder with new onset progressive peripheral neuropathy, purpuric rash, and oral/ nasal ulcerations, and significantly 50 lb wt loss Was in usual state health with intermittent asthma/ and sinus infection but otherwise healthy till   Recent evaluation for mid epigastric abd/ non bloody emesis, EGD + gastritis confirmed eosinophilic esophagitis/ gastritis. Extensive w/u neuro/ rheum work up negative as per patient in the past. Here she is noted to have + MPO; +P-ANCA; +RF; +DS-DNA.     She was initially admitted with abdominal pain and new vasculitis lesions on her skin, granulomatous elbows on last admission she was noted to have a positive J LUIS, positive ANCA antibody positive MPO antibody, elevated inflammatory markers, status post skin biopsy consistent with vasculitis. Now comes in urgently to the ER with acute abdomen noted to have pneumoperitoneum status post surgery, He also has neuropathy consistent with vasculitis  Clinically high suspicion for EGPA, with surgeon, reporting to have inflammatory findings during the procedure, tissue was sent out.    She was initially admitted with abdominal pain and new vasculitis lesions on her skin, granulomatous elbows on last admission she was noted to have a positive J LUIS, positive ANCA antibody positive MPO antibody, elevated inflammatory markers, status post skin biopsy consistent with vasculitis. Now comes in urgently to the ER with acute abdomen noted to have pneumoperitoneum status post surgery, He also has neuropathy consistent with vasculitis  Clinically high suspicion for EGPA, with surgeon, reporting to have inflammatory findings during the procedure, tissue was sent out.    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  Bump in ESR/CRP from last admission likely from perforation.    -Maintain Solu-Medrol 30 mg IV BID.  Appears to be stable, labs overall improving.    -Maintain Bactrim for PCP prophylaxis  -Given current and recent clinical picture rheumatologically the patient will need pulsed steroids, 500 mg IV for 3 days but since she is postop and status post acute abdomen and pneumoperitoneum will hold off on high-dose steroids until she heals.   Await surgical input on this.     -Long-term plan to consider Rituxan use, will avoid Cytoxan since she only has one kidney  -Previously we had discussed potential use of nucala with her but given multisystem involvement and major organ involvement proximal be a better choice  -will follow  PAST MEDICAL & SURGICAL HISTORY:  H/O autoimmune disorder    Hypothyroid    HTN (hypertension)    Sciatica    Asthma    H/O  section    H/O right nephrectomy        MEDICATIONS  (STANDING):  acetaminophen  IVPB .. 1000 milliGRAM(s) IV Intermittent every 6 hours  budesonide 160 MICROgram(s)/formoterol 4.5 MICROgram(s) Inhaler 2 Puff(s) Inhalation two times a day  clindamycin IVPB 300 milliGRAM(s) IV Intermittent every 8 hours  clindamycin IVPB      diltiazem    milliGRAM(s) Oral daily  DULoxetine 20 milliGRAM(s) Oral daily  gabapentin Oral Tab/Cap - Peds 600 milliGRAM(s) Oral three times a day  heparin   Injectable 5000 Unit(s) SubCutaneous every 8 hours  levothyroxine 88 MICROGram(s) Oral daily  metroNIDAZOLE  IVPB 500 milliGRAM(s) IV Intermittent every 8 hours  metroNIDAZOLE  IVPB      pantoprazole  Injectable 40 milliGRAM(s) IV Push daily  vitamin A 35632 Unit(s) Oral daily    MEDICATIONS  (PRN):  acetaminophen   Tablet .. 650 milliGRAM(s) Oral every 6 hours PRN Temp greater or equal to 38C (100.4F), Mild Pain (1 - 3)  diphenhydrAMINE   Injectable 25 milliGRAM(s) IV Push once PRN Itching  HYDROmorphone  Injectable 0.5 milliGRAM(s) IV Push every 4 hours PRN Severe Pain (7 - 10)  LORazepam   Injectable 0.5 milliGRAM(s) IV Push every 6 hours PRN Anxiety  LORazepam   Injectable 0.5 milliGRAM(s) IV Push every 6 hours PRN Anxiety  naloxone Injectable 0.1 milliGRAM(s) IV Push every 3 minutes PRN For ANY of the following changes in patient status:  A. RR LESS THAN 10 breaths per minute, B. Oxygen saturation LESS THAN 90%, C. Sedation score of 6  ondansetron Injectable 4 milliGRAM(s) IV Push every 6 hours PRN Nausea      Allergies    ciprofloxacin (Unknown)  penicillin (Unknown)    Intolerances        SOCIAL HISTORY:    FAMILY HISTORY:      Vital Signs Last 24 Hrs  T(C): 36.7 (2021 12:10), Max: 37.4 (2021 22:10)  T(F): 98.1 (2021 12:10), Max: 99.4 (2021 22:10)  HR: 79 (2021 12:10) (54 - 84)  BP: 162/89 (2021 12:10) (112/79 - 162/89)  BP(mean): 89 (2021 04:00) (84 - 110)  RR: 18 (2021 12:10) (16 - 23)  SpO2: 99% (2021 12:10) (99% - 99%)      LABS:                        9.0    9.05  )-----------( 312      ( 2021 05:36 )             28.4         142  |  108  |  11  ----------------------------<  116<H>  3.3<L>   |  28  |  0.37<L>    Ca    8.0<L>      2021 05:36  Phos  2.8       Mg     1.9                 RADIOLOGY & ADDITIONAL STUDIES:    < from: CT Abdomen and Pelvis No Cont (21 @ 09:32) >  EXAM:  CT ABDOMEN AND PELVIS                            PROCEDURE DATE:  2021          INTERPRETATION:  CLINICAL INFORMATION: Abdominal pain    COMPARISON: 2021    PROCEDURE:  CT of the Abdomen and Pelvis was performed without intravenous contrast.  Intravenous contrast: None.  Oral contrast: None.  Sagittal and coronal reformats were performed.    FINDINGS:  LOWER CHEST: Within normal limits.    LIVER: Left hepatic lobe cyst.  BILE DUCTS: Normal caliber.  GALLBLADDER: Withinnormal limits.  SPLEEN: Within normal limits.  PANCREAS: Within normal limits.  ADRENALS: Within normal limits.  KIDNEYS/URETERS: Unremarkable left kidney. Status post right nephrectomy.    BLADDER: Within normal limits.  REPRODUCTIVE ORGANS: Uterus and adnexa within normal limits.    BOWEL/PERITONEUM: Stranding adjacent to a loop of pelvic small bowel with multiple pockets of gas in the small bowel mesentery and left upper quadrant. No drainable extraluminal fluid collection.    VESSELS: Within normal limits.  RETROPERITONEUM/LYMPH NODES: No lymphadenopathy.  ABDOMINAL WALL: Within normal limits.  BONES: No acute bony abnormality.    IMPRESSION:  Perforation of a loop of pelvic small bowel, without definable etiology.    Scattered small pockets of pneumoperitoneum.    No intraperitoneal fluid collection.    Findings were discussed with Dr. ROMERO CALDWELL [phone] 2021 9:55 AM by Dr. Cano with read back confirmation.            NEREIDA CANO JR, MD; Attending Radiologist  This document has been electronically signed. 2021  9:56AM    < end of copied text >    rad HPI:  Pt is a 56 yo with probable collagen vascular disease ; followed by rheumatology for multi system disease that includes neuropathy, rash, ( biopsy of rash cw/ vasculitis) oral ulcerations gastritis / eosinophilic esophagitis with + serologies presents to  ED with complaints of abdominal pain that woke her up from her sleep around 3am this morning. Pt was here on 21 with complaints of abdominal pain and was discharged home on 21. Only new medications on discharge was protonix, Bactrim, and prednisone. Acute cholecystitis and mesenteric ischemia was ruled out. Pt states this pain is different and is in the lower abdomen. States the pain comes and goes but when it is present, it is severe. Denies fevers, chills, chest pain, shortness of breath, nausea or vomiting. Reports last BM was yesterday.  (2021 11:09)/ CT imaging revealed perforated small bowel and she underwent emergent surgery; pathology reveals diffuse ischemic changes, no thrombosis noted.   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 Lupus anticoagulant is negative; Anticardiolipin antibodies pending . We are asked to comment on appropriate anticoagulation          PAST MEDICAL & SURGICAL HISTORY:  H/O autoimmune disorder    Hypothyroid    HTN (hypertension)    Sciatica    Asthma    H/O  section    H/O right nephrectomy        MEDICATIONS  (STANDING):  acetaminophen  IVPB .. 1000 milliGRAM(s) IV Intermittent every 6 hours  budesonide 160 MICROgram(s)/formoterol 4.5 MICROgram(s) Inhaler 2 Puff(s) Inhalation two times a day  clindamycin IVPB 300 milliGRAM(s) IV Intermittent every 8 hours  clindamycin IVPB      diltiazem    milliGRAM(s) Oral daily  DULoxetine 20 milliGRAM(s) Oral daily  gabapentin Oral Tab/Cap - Peds 600 milliGRAM(s) Oral three times a day  heparin   Injectable 5000 Unit(s) SubCutaneous every 8 hours  levothyroxine 88 MICROGram(s) Oral daily  metroNIDAZOLE  IVPB 500 milliGRAM(s) IV Intermittent every 8 hours  metroNIDAZOLE  IVPB      pantoprazole  Injectable 40 milliGRAM(s) IV Push daily  vitamin A 60042 Unit(s) Oral daily    MEDICATIONS  (PRN):  acetaminophen   Tablet .. 650 milliGRAM(s) Oral every 6 hours PRN Temp greater or equal to 38C (100.4F), Mild Pain (1 - 3)  diphenhydrAMINE   Injectable 25 milliGRAM(s) IV Push once PRN Itching  HYDROmorphone  Injectable 0.5 milliGRAM(s) IV Push every 4 hours PRN Severe Pain (7 - 10)  LORazepam   Injectable 0.5 milliGRAM(s) IV Push every 6 hours PRN Anxiety  LORazepam   Injectable 0.5 milliGRAM(s) IV Push every 6 hours PRN Anxiety  naloxone Injectable 0.1 milliGRAM(s) IV Push every 3 minutes PRN For ANY of the following changes in patient status:  A. RR LESS THAN 10 breaths per minute, B. Oxygen saturation LESS THAN 90%, C. Sedation score of 6  ondansetron Injectable 4 milliGRAM(s) IV Push every 6 hours PRN Nausea      Allergies    ciprofloxacin (Unknown)  penicillin (Unknown)    Intolerances        SOCIAL HISTORY:    FAMILY HISTORY:      Vital Signs Last 24 Hrs  T(C): 36.7 (2021 12:10), Max: 37.4 (2021 22:10)  T(F): 98.1 (2021 12:10), Max: 99.4 (2021 22:10)  HR: 79 (2021 12:10) (54 - 84)  BP: 162/89 (2021 12:10) (112/79 - 162/89)  BP(mean): 89 (2021 04:00) (84 - 110)  RR: 18 (2021 12:10) (16 - 23)  SpO2: 99% (2021 12:10) (99% - 99%)      LABS:                        9.0    9.05  )-----------( 312      ( 2021 05:36 )             28.4         142  |  108  |  11  ----------------------------<  116<H>  3.3<L>   |  28  |  0.37<L>    Ca    8.0<L>      2021 05:36  Phos  2.8       Mg     1.9                 RADIOLOGY & ADDITIONAL STUDIES:    < from: CT Abdomen and Pelvis No Cont (21 @ 09:32) >  EXAM:  CT ABDOMEN AND PELVIS                            PROCEDURE DATE:  2021          INTERPRETATION:  CLINICAL INFORMATION: Abdominal pain    COMPARISON: 2021    PROCEDURE:  CT of the Abdomen and Pelvis was performed without intravenous contrast.  Intravenous contrast: None.  Oral contrast: None.  Sagittal and coronal reformats were performed.    FINDINGS:  LOWER CHEST: Within normal limits.    LIVER: Left hepatic lobe cyst.  BILE DUCTS: Normal caliber.  GALLBLADDER: Withinnormal limits.  SPLEEN: Within normal limits.  PANCREAS: Within normal limits.  ADRENALS: Within normal limits.  KIDNEYS/URETERS: Unremarkable left kidney. Status post right nephrectomy.    BLADDER: Within normal limits.  REPRODUCTIVE ORGANS: Uterus and adnexa within normal limits.    BOWEL/PERITONEUM: Stranding adjacent to a loop of pelvic small bowel with multiple pockets of gas in the small bowel mesentery and left upper quadrant. No drainable extraluminal fluid collection.    VESSELS: Within normal limits.  RETROPERITONEUM/LYMPH NODES: No lymphadenopathy.  ABDOMINAL WALL: Within normal limits.  BONES: No acute bony abnormality.    IMPRESSION:  Perforation of a loop of pelvic small bowel, without definable etiology.    Scattered small pockets of pneumoperitoneum.    No intraperitoneal fluid collection.    Findings were discussed with Dr. ROMERO CALDWELL [phone] 2021 9:55 AM by Dr. Cano with read back confirmation.            NEREIDA CANO JR, MD; Attending Radiologist  This document has been electronically signed. 2021  9:56AM    < end of copied text >    rad HPI:    Pt is a 54 yo with probable collagen vascular disease ; followed by rheumatology for multi system disease that includes neuropathy, rash, ( biopsy of rash cw/ vasculitis) oral ulcerations gastritis / eosinophilic esophagitis with + serologies;  presents to  ED with complaints of abdominal pain that woke her up from her sleep'  Pt was here on 21 with complaints of abdominal pain and was discharged home on 21.  Acute cholecystitis and mesenteric ischemia was ruled out.  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 Lupus anticoagulant is negative; Anticardiolipin antibodies pending . We are asked to comment on appropriate anticoagulation    PAST MEDICAL & SURGICAL HISTORY:  H/O autoimmune disorder    Hypothyroid    HTN (hypertension)    Sciatica    Asthma    H/O  section    H/O right nephrectomy        MEDICATIONS  (STANDING):  acetaminophen  IVPB .. 1000 milliGRAM(s) IV Intermittent every 6 hours  budesonide 160 MICROgram(s)/formoterol 4.5 MICROgram(s) Inhaler 2 Puff(s) Inhalation two times a day  clindamycin IVPB 300 milliGRAM(s) IV Intermittent every 8 hours  clindamycin IVPB      diltiazem    milliGRAM(s) Oral daily  DULoxetine 20 milliGRAM(s) Oral daily  gabapentin Oral Tab/Cap - Peds 600 milliGRAM(s) Oral three times a day  heparin   Injectable 5000 Unit(s) SubCutaneous every 8 hours  levothyroxine 88 MICROGram(s) Oral daily  metroNIDAZOLE  IVPB 500 milliGRAM(s) IV Intermittent every 8 hours  metroNIDAZOLE  IVPB      pantoprazole  Injectable 40 milliGRAM(s) IV Push daily  vitamin A 29266 Unit(s) Oral daily    MEDICATIONS  (PRN):  acetaminophen   Tablet .. 650 milliGRAM(s) Oral every 6 hours PRN Temp greater or equal to 38C (100.4F), Mild Pain (1 - 3)  diphenhydrAMINE   Injectable 25 milliGRAM(s) IV Push once PRN Itching  HYDROmorphone  Injectable 0.5 milliGRAM(s) IV Push every 4 hours PRN Severe Pain (7 - 10)  LORazepam   Injectable 0.5 milliGRAM(s) IV Push every 6 hours PRN Anxiety  LORazepam   Injectable 0.5 milliGRAM(s) IV Push every 6 hours PRN Anxiety  naloxone Injectable 0.1 milliGRAM(s) IV Push every 3 minutes PRN For ANY of the following changes in patient status:  A. RR LESS THAN 10 breaths per minute, B. Oxygen saturation LESS THAN 90%, C. Sedation score of 6  ondansetron Injectable 4 milliGRAM(s) IV Push every 6 hours PRN Nausea      Allergies    ciprofloxacin (Unknown)  penicillin (Unknown)    Intolerances        SOCIAL HISTORY:    FAMILY HISTORY:      Vital Signs Last 24 Hrs  T(C): 36.7 (2021 12:10), Max: 37.4 (2021 22:10)  T(F): 98.1 (2021 12:10), Max: 99.4 (2021 22:10)  HR: 79 (2021 12:10) (54 - 84)  BP: 162/89 (2021 12:10) (112/79 - 162/89)  BP(mean): 89 (2021 04:00) (84 - 110)  RR: 18 (2021 12:10) (16 - 23)  SpO2: 99% (2021 12:10) (99% - 99%)      LABS:                        9.0    9.05  )-----------( 312      ( 2021 05:36 )             28.4         142  |  108  |  11  ----------------------------<  116<H>  3.3<L>   |  28  |  0.37<L>    Ca    8.0<L>      2021 05:36  Phos  2.8       Mg     1.9           PHYSICAL EXAM:      Constitutional: Appears comfortable, in  NAD, A/O X 3     Eyes: EOMI, PERRLA ;No icterus    ENMT:  No oral lesions, thrush; pharynx not injected    Neck:  Supple; No masses, lymph nodes, no bruits    Breasts: NA    Back: No tenderness     Respiratory:  Clear to A/P, No wheezes, rhonchi, rales. No dullness to percussion    Cardiovascular: Normal rate and rhythm; normal S1 and S2; No murmurs. No gallop    Gastrointestinal: No distension, normal bowl sounds; No tendeness , guarding, rebound;  no masses, no hepatosplenimegaly, no fluid wave    Genitourinary: No flank pains, no inguninal adenopathy    Rectal: NA    Extremities:  No phlebitis, no edema    Vascular: No acrocyanosis, no edema    Neurological: Alert and oriented. Cranial nerves II-XII WNL, Upper extremity / lower extremity  strength WNL;  Reflexes WNL, Plantar downgoing ; No cerebellar signs    Skin: No rash, petichae, purpura, echymoses    Lymph Nodes: No cervical, supraclavicular, axillary, inguinal adenopathy    Musculoskeletal: No joint swelling    Psychiatric: Alert, oriented, normal affect          RADIOLOGY & ADDITIONAL STUDIES:    < from: CT Abdomen and Pelvis No Cont (21 @ 09:32) >  EXAM:  CT ABDOMEN AND PELVIS                            PROCEDURE DATE:  2021          INTERPRETATION:  CLINICAL INFORMATION: Abdominal pain    COMPARISON: 2021    PROCEDURE:  CT of the Abdomen and Pelvis was performed without intravenous contrast.  Intravenous contrast: None.  Oral contrast: None.  Sagittal and coronal reformats were performed.    FINDINGS:  LOWER CHEST: Within normal limits.    LIVER: Left hepatic lobe cyst.  BILE DUCTS: Normal caliber.  GALLBLADDER: Withinnormal limits.  SPLEEN: Within normal limits.  PANCREAS: Within normal limits.  ADRENALS: Within normal limits.  KIDNEYS/URETERS: Unremarkable left kidney. Status post right nephrectomy.    BLADDER: Within normal limits.  REPRODUCTIVE ORGANS: Uterus and adnexa within normal limits.    BOWEL/PERITONEUM: Stranding adjacent to a loop of pelvic small bowel with multiple pockets of gas in the small bowel mesentery and left upper quadrant. No drainable extraluminal fluid collection.    VESSELS: Within normal limits.  RETROPERITONEUM/LYMPH NODES: No lymphadenopathy.  ABDOMINAL WALL: Within normal limits.  BONES: No acute bony abnormality.    IMPRESSION:  Perforation of a loop of pelvic small bowel, without definable etiology.    Scattered small pockets of pneumoperitoneum.    No intraperitoneal fluid collection.    Findings were discussed with Dr. ROMERO CALDWELL [phone] 2021 9:55 AM by Dr. Cano with read back confirmation.            NEREIDA CANO JR, MD; Attending Radiologist  This document has been electronically signed. 2021  9:56AM    < end of copied text >    rad

## 2021-02-24 NOTE — CONSULT NOTE ADULT - ASSESSMENT
Patient has vasculitis and no documented thrombotic/ thromboembolic phenomena   The presumption is that this is a vasculitic prompted event   No primary thrombophilia identified ; although anti cardiolipin antibodies should be evaluated  Treatment of vasculitis is mainstay of therapy / as per Rheumatology  Anticoagulation: standard prophylaxis for DVT  in a high risk  patient following abdominal surgery as per surgical / medical guidelines s appropriate     Patient has vasculitis and no documented thrombotic/ thromboembolic phenomena   The presumption is that this is a vasculitic prompted event   No primary thrombophilia identified ; although anti cardiolipin antibodies should be evaluated    Treatment of vasculitis is mainstay of therapy / as per Rheumatology  Anticoagulation: standard prophylaxis for DVT  in a high risk  patient following abdominal surgery as per surgical / medical guidelines is appropriate ( Lovenox daily)  No role for full dose anticoagulation ( at least from a hematology point of view) in the absence of documented thrombus or thrombophilia   Decision for therapeutic anticoagulation for reasons other then thrombosis ( say vasculitis / ischemic bowel) deferred to Rheumatology/ GI/ Surgery

## 2021-02-25 ENCOUNTER — TRANSCRIPTION ENCOUNTER (OUTPATIENT)
Age: 56
End: 2021-02-25

## 2021-02-25 ENCOUNTER — APPOINTMENT (OUTPATIENT)
Dept: DERMATOLOGY | Facility: CLINIC | Age: 56
End: 2021-02-25

## 2021-02-25 VITALS
DIASTOLIC BLOOD PRESSURE: 86 MMHG | HEART RATE: 79 BPM | SYSTOLIC BLOOD PRESSURE: 146 MMHG | TEMPERATURE: 98 F | OXYGEN SATURATION: 98 % | RESPIRATION RATE: 17 BRPM

## 2021-02-25 LAB
ANION GAP SERPL CALC-SCNC: 5 MMOL/L — SIGNIFICANT CHANGE UP (ref 5–17)
B2 GLYCOPROT1 AB SER QL: NEGATIVE — SIGNIFICANT CHANGE UP
BUN SERPL-MCNC: 14 MG/DL — SIGNIFICANT CHANGE UP (ref 7–23)
CALCIUM SERPL-MCNC: 7.9 MG/DL — LOW (ref 8.5–10.1)
CARDIOLIPIN AB SER-ACNC: NEGATIVE — SIGNIFICANT CHANGE UP
CHLORIDE SERPL-SCNC: 110 MMOL/L — HIGH (ref 96–108)
CO2 SERPL-SCNC: 27 MMOL/L — SIGNIFICANT CHANGE UP (ref 22–31)
CREAT SERPL-MCNC: 0.45 MG/DL — LOW (ref 0.5–1.3)
GLUCOSE SERPL-MCNC: 95 MG/DL — SIGNIFICANT CHANGE UP (ref 70–99)
HCT VFR BLD CALC: 28.5 % — LOW (ref 34.5–45)
HGB BLD-MCNC: 9 G/DL — LOW (ref 11.5–15.5)
MAGNESIUM SERPL-MCNC: 1.8 MG/DL — SIGNIFICANT CHANGE UP (ref 1.6–2.6)
MCHC RBC-ENTMCNC: 28 PG — SIGNIFICANT CHANGE UP (ref 27–34)
MCHC RBC-ENTMCNC: 31.6 GM/DL — LOW (ref 32–36)
MCV RBC AUTO: 88.5 FL — SIGNIFICANT CHANGE UP (ref 80–100)
PHOSPHATE SERPL-MCNC: 2.5 MG/DL — SIGNIFICANT CHANGE UP (ref 2.5–4.5)
PLATELET # BLD AUTO: 300 K/UL — SIGNIFICANT CHANGE UP (ref 150–400)
POTASSIUM SERPL-MCNC: 3 MMOL/L — LOW (ref 3.5–5.3)
POTASSIUM SERPL-SCNC: 3 MMOL/L — LOW (ref 3.5–5.3)
RBC # BLD: 3.22 M/UL — LOW (ref 3.8–5.2)
RBC # FLD: 16.4 % — HIGH (ref 10.3–14.5)
SODIUM SERPL-SCNC: 142 MMOL/L — SIGNIFICANT CHANGE UP (ref 135–145)
WBC # BLD: 11.8 K/UL — HIGH (ref 3.8–10.5)
WBC # FLD AUTO: 11.8 K/UL — HIGH (ref 3.8–10.5)

## 2021-02-25 PROCEDURE — 99233 SBSQ HOSP IP/OBS HIGH 50: CPT

## 2021-02-25 RX ORDER — OXYCODONE HYDROCHLORIDE 5 MG/1
1 TABLET ORAL
Qty: 56 | Refills: 0
Start: 2021-02-25 | End: 2021-03-10

## 2021-02-25 RX ORDER — METRONIDAZOLE 500 MG
1 TABLET ORAL
Qty: 21 | Refills: 0
Start: 2021-02-25 | End: 2021-03-03

## 2021-02-25 RX ORDER — SUCRALFATE 1 G
10 TABLET ORAL
Qty: 0 | Refills: 0 | DISCHARGE

## 2021-02-25 RX ADMIN — BUDESONIDE AND FORMOTEROL FUMARATE DIHYDRATE 2 PUFF(S): 160; 4.5 AEROSOL RESPIRATORY (INHALATION) at 07:48

## 2021-02-25 RX ADMIN — GABAPENTIN 600 MILLIGRAM(S): 400 CAPSULE ORAL at 04:46

## 2021-02-25 RX ADMIN — HEPARIN SODIUM 5000 UNIT(S): 5000 INJECTION INTRAVENOUS; SUBCUTANEOUS at 12:35

## 2021-02-25 RX ADMIN — Medication 10000 UNIT(S): at 09:57

## 2021-02-25 RX ADMIN — Medication 100 MILLIGRAM(S): at 12:31

## 2021-02-25 RX ADMIN — Medication 0.5 MILLIGRAM(S): at 02:33

## 2021-02-25 RX ADMIN — PANTOPRAZOLE SODIUM 40 MILLIGRAM(S): 20 TABLET, DELAYED RELEASE ORAL at 09:57

## 2021-02-25 RX ADMIN — HEPARIN SODIUM 5000 UNIT(S): 5000 INJECTION INTRAVENOUS; SUBCUTANEOUS at 04:48

## 2021-02-25 RX ADMIN — GABAPENTIN 600 MILLIGRAM(S): 400 CAPSULE ORAL at 12:35

## 2021-02-25 RX ADMIN — DULOXETINE HYDROCHLORIDE 20 MILLIGRAM(S): 30 CAPSULE, DELAYED RELEASE ORAL at 09:57

## 2021-02-25 RX ADMIN — Medication 100 MILLIGRAM(S): at 04:46

## 2021-02-25 RX ADMIN — Medication 88 MICROGRAM(S): at 04:46

## 2021-02-25 RX ADMIN — Medication 120 MILLIGRAM(S): at 09:58

## 2021-02-25 RX ADMIN — Medication 100 MILLIGRAM(S): at 04:01

## 2021-02-25 NOTE — DISCHARGE NOTE NURSING/CASE MANAGEMENT/SOCIAL WORK - PATIENT PORTAL LINK FT
You can access the FollowMyHealth Patient Portal offered by [facility] by registering at the following website: [email or web address]. By joining [employer]’s FollowMyHealth portal, you will also be able to view your health information using other applications (apps) compatible with our system.

## 2021-02-25 NOTE — PROGRESS NOTE ADULT - ATTENDING COMMENTS
Patient seen & examined at bedside resting comfortably without any complaints. Patient's pain is controlled, having GI fxn, and tolerating regular diet.  Hematology consulted secondary to perforation being secondary to ischemic event, but given no signs of thrombosis decision was made for no anticoagulation. This also discussed with Dr. Sheldon who agrees with no anticoagulation at this time. Dr. Estrada recommends comtinue steroids 30 BID and will follow-up outaptient setting.       Plan  - D/C SOHAM drain  - Flagyl for 5 more days  - Will F/U with Dr. Sheldon, Dr. Estrada (Rheum), and me post-op

## 2021-02-25 NOTE — DISCHARGE NOTE PROVIDER - NSDCFUSCHEDAPPT_GEN_ALL_CORE_FT
[facility] ; 03/04/2021 ; NPP Gastro [address] [facility] ; 03/05/2021 ; NPP IntMed [address] [facility] ; 03/31/2021 ; NPP Derm [address] [facility] ; 04/15/2021 ; NPP Neuro [address] [facility]

## 2021-02-25 NOTE — DISCHARGE NOTE PROVIDER - HOSPITAL COURSE
Patient was admitted under the surgical service. Patient was taken to the OR for a diagnostic laparoscopy, small bowel resection and primary anastomosis. Patient tolerated the procedure well. Hematology and rheumatology was consulted. Their recommendations were appreciated and followed. Bowel function was monitored and diet was advanced as tolerated. Blood work and vital signs were monitored throughout her stay. Incentive spirometry and out of bed/ambulation were encouraged.

## 2021-02-25 NOTE — DISCHARGE NOTE PROVIDER - NSDCCPCAREPLAN_GEN_ALL_CORE_FT
PRINCIPAL DISCHARGE DIAGNOSIS  Diagnosis: Small bowel perforation  Assessment and Plan of Treatment:

## 2021-02-25 NOTE — PHYSICAL THERAPY INITIAL EVALUATION ADULT - DIAGNOSIS, PT EVAL
56 yo F on high dose steroids, found to have a small bowel perforation, is POD6 s/p diagnostic laparoscopy, lysis of adhesions, small bowel resection with primary anastomosis.

## 2021-02-25 NOTE — PHYSICAL THERAPY INITIAL EVALUATION ADULT - GENERAL OBSERVATIONS, REHAB EVAL
The pt was received on 2N, supine, pleasant and cooperative with PT, eager to get OOB and ambulate with PT.
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
## 2021-02-25 NOTE — PHYSICAL THERAPY INITIAL EVALUATION ADULT - PERTINENT HX OF CURRENT PROBLEM, REHAB EVAL
56 yo with probable collagen vascular disease ; followed by rheumatology for multi system disease that includes neuropathy, rash, ( biopsy of rash cw/ vasculitis) oral ulcerations gastritis / eosinophilic esophagitis with + serologies;  presents to  ED with complaints of abdominal pain that woke her up from her sleep'  Pt was here on 2/9/21 with complaints of abdominal pain and was discharged home on 2/17/21.

## 2021-02-25 NOTE — PROGRESS NOTE ADULT - ASSESSMENT
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 Extensive w/u neuro/ rheum work up negative as per patient in the past. Here she is noted to have + MPO; +P-ANCA; +RF; +DS-DNA.  C-ANCA is now positive.       She was initially admitted with abdominal pain and new vasculitis lesions on her skin, granulomatous elbows on last admission she was noted to have a positive J LUIS, positive ANCA antibody positive MPO antibody, elevated inflammatory markers, status post skin biopsy consistent with vasculitis.  Patient had GI perforation, currently no whealing.  She also has neuropathy consistent with vasculitis  Clinically high suspicion for EGPA, with surgeon, reporting to have inflammatory findings during the procedure, tissue was sent out.    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  Bump in ESR/CRP from last admission likely from perforation.    -Given current and recent clinical picture rheumatologically the patient will need pulsed steroids, 500 mg IV for 3 days but since she is postop and status post acute abdomen and pneumoperitoneum will hold off on high-dose steroids until she heals.  Consulted with surgical team, and they would like to hold off on high dose steroid/biologic immunsuppressant for 3-4 weeks.    -Long-term plan to consider Rituxan use, will avoid Cytoxan since she only has one kidney  -Previously we had discussed potential use of nucala with her but given multisystem involvement and major organ involvement Rituxan will be a better choice  -If patient is being discharged today, please have her follow up in 1-2 weeks in our office  -Prednisone 30mg BID until outpatient visit  -bactrim PCP prophylaxis 1DS tablet 3x/week

## 2021-02-25 NOTE — PHYSICAL THERAPY INITIAL EVALUATION ADULT - ADDITIONAL COMMENTS
The pt reports having 2 steps to enter the home with a pole on each side but no banister/ rails. The also reports having steps to her basement with railings. The pt reports that her bed is situated too high for her at home and she may require a step stool to get up and on to her bed.

## 2021-02-25 NOTE — PROGRESS NOTE ADULT - ASSESSMENT
56 yo F on high dose steroids, found to have a small bowel perforation, is POD6 s/p diagnostic laparoscopy, lysis of adhesions, small bowel resection with primary anastomosis.    Plan:  -Monitor vitals  -low residue diet  -Pain control prn  -Anti emetic prn  -Continue IVF  -Continue IV Abx  -Serial abd exams  -Monitor daily labs   -Monitor SOHAM drain output  -Rheumatology and Medicine on consult appreciated - no pulse steroids or immunomodulators for 4-6 weeks  -Hematology consult appreciated  -Encourage oob/ambulation  -Encourage IS use  -DVT/GI ppx  -plan for downgrade to med/surg floor    Plan discussed with Dr. Stanley

## 2021-02-25 NOTE — DISCHARGE NOTE PROVIDER - NSDCMRMEDTOKEN_GEN_ALL_CORE_FT
Cymbalta 20 mg oral delayed release capsule: 1 cap(s) orally once a day in the evening.  dilTIAZem 240 mg/24 hours oral capsule, extended release: 1 cap(s) orally once a day  gabapentin 300 mg oral capsule: 2 cap(s) orally 3 times a day  gabapentin 300 mg oral capsule: 2 cap(s) orally 3 times a day  LEVOTHYROXIN TAB 88MC tab(s) orally once a day  oxyCODONE 5 mg oral tablet: 1 tab(s) orally every 6 hours, As Needed - for severe pain  pantoprazole 40 mg oral delayed release tablet: 1 tab(s) orally once a day  Symbicort 160 mcg-4.5 mcg/inh inhalation aerosol: 2 puff(s) inhaled 2 times a day   Cymbalta 20 mg oral delayed release capsule: 1 cap(s) orally once a day in the evening.  dilTIAZem 240 mg/24 hours oral capsule, extended release: 1 cap(s) orally once a day  gabapentin 300 mg oral capsule: 2 cap(s) orally 3 times a day  gabapentin 300 mg oral capsule: 2 cap(s) orally 3 times a day  LEVOTHYROXIN TAB 88MC tab(s) orally once a day  metroNIDAZOLE 375 mg oral capsule: 1 cap(s) orally 3 times a day   oxyCODONE 5 mg oral tablet: 1 tab(s) orally every 6 hours, As Needed - for severe pain MDD:4gm  pantoprazole 40 mg oral delayed release tablet: 1 tab(s) orally once a day  predniSONE 20 mg oral tablet: 1 tab(s) orally 3 times a day   Symbicort 160 mcg-4.5 mcg/inh inhalation aerosol: 2 puff(s) inhaled 2 times a day

## 2021-02-25 NOTE — PROGRESS NOTE ADULT - SUBJECTIVE AND OBJECTIVE BOX
Vital Signs Last 24 Hrs  T(C): 36.6 (21 Feb 2021 06:00), Max: 37 (20 Feb 2021 16:37)  T(F): 97.8 (21 Feb 2021 06:00), Max: 98.6 (20 Feb 2021 16:37)  HR: 57 (21 Feb 2021 06:00) (50 - 68)  BP: 134/77 (21 Feb 2021 06:00) (117/74 - 170/87)  BP(mean): 96 (21 Feb 2021 06:00) (87 - 115)  RR: 9 (21 Feb 2021 06:00) (8 - 19)  SpO2: 93% (21 Feb 2021 06:00) (90% - 100%)    Labs:      CARDIAC MARKERS ( 19 Feb 2021 14:33 )  0.077 ng/mL / x     / x     / x     / x      CARDIAC MARKERS ( 19 Feb 2021 10:37 )  0.047 ng/mL / x     / x     / x     / x      CARDIAC MARKERS ( 19 Feb 2021 08:20 )  0.055 ng/mL / x     / x     / x     / x                                9.6    18.52 )-----------( 361      ( 21 Feb 2021 05:42 )             30.6     CBC Full  -  ( 21 Feb 2021 05:42 )  WBC Count : 18.52 K/uL  RBC Count : 3.38 M/uL  Hemoglobin : 9.6 g/dL  Hematocrit : 30.6 %  Platelet Count - Automated : 361 K/uL  Mean Cell Volume : 90.5 fl  Mean Cell Hemoglobin : 28.4 pg  Mean Cell Hemoglobin Concentration : 31.4 gm/dL  Auto Neutrophil # : x  Auto Lymphocyte # : x  Auto Monocyte # : x  Auto Eosinophil # : x  Auto Basophil # : x  Auto Neutrophil % : x  Auto Lymphocyte % : x  Auto Monocyte % : x  Auto Eosinophil % : x  Auto Basophil % : x    02-21    141  |  107  |  15  ----------------------------<  93  4.1   |  28  |  0.46<L>    Ca    8.3<L>      21 Feb 2021 05:42  Phos  2.7     02-21  Mg     2.0     02-21    TPro  6.7  /  Alb  2.7<L>  /  TBili  0.4  /  DBili  x   /  AST  120<H>  /  ALT  84<H>  /  AlkPhos  95  02-19    LIVER FUNCTIONS - ( 19 Feb 2021 08:20 )  Alb: 2.7 g/dL / Pro: 6.7 gm/dL / ALK PHOS: 95 U/L / ALT: 84 U/L / AST: 120 U/L / GGT: x           PT/INR - ( 19 Feb 2021 08:20 )   PT: 12.6 sec;   INR: 1.08 ratio         PTT - ( 19 Feb 2021 08:20 )  PTT:26.2 sec      Meds:  acetaminophen  IVPB .. 1000 milliGRAM(s) IV Intermittent every 6 hours  clindamycin IVPB 300 milliGRAM(s) IV Intermittent every 8 hours  clindamycin IVPB      diltiazem Injectable 25 milliGRAM(s) IV Push every 6 hours  DULoxetine 20 milliGRAM(s) Oral daily  gabapentin Oral Tab/Cap - Peds 600 milliGRAM(s) Oral three times a day  heparin   Injectable 5000 Unit(s) SubCutaneous every 8 hours  HYDROmorphone PCA (1 mG/mL) 30 milliLiter(s) PCA Continuous PCA Continuous  HYDROmorphone PCA (1 mG/mL) Rescue Clinician Bolus 0.5 milliGRAM(s) IV Push every 15 minutes PRN  levothyroxine 88 MICROGram(s) Oral daily  LORazepam   Injectable 0.5 milliGRAM(s) IV Push every 6 hours PRN  LORazepam   Injectable 1 milliGRAM(s) IV Push once  LORazepam   Injectable 0.5 milliGRAM(s) IV Push every 6 hours PRN  methylPREDNISolone sodium succinate Injectable 30 milliGRAM(s) IV Push two times a day  metroNIDAZOLE  IVPB      metroNIDAZOLE  IVPB 500 milliGRAM(s) IV Intermittent every 8 hours  naloxone Injectable 0.1 milliGRAM(s) IV Push every 3 minutes PRN  ondansetron Injectable 4 milliGRAM(s) IV Push every 6 hours PRN  pantoprazole  Injectable 40 milliGRAM(s) IV Push daily  sodium chloride 0.9%. 1000 milliLiter(s) IV Continuous <Continuous>  vitamin A 98883 Unit(s) Oral daily      Radiology:    Pt was seen and examined at bedside this morning with surgery team. No acute overnight events reported by nursing staff. Pt offers no new complaints at this time. Tavarez removed yesterday, voiding. Denies bowel function at this time. Denies fever/cp/sob/n/v/d/c. Vitals stable.       Physical Exam:  General: AAOx3, in NAD  HEENT: NC/AT, EOMI, NGT in place  Cardio: S1S2, RRR  Pulm: equal air entry b/l, non labored   Abdomen: soft, appropriate tenderness to palpation at incision sites, dermabond dressings in place, SOHAM drain on left-abdomen with serosanguinous output  Extremities: FROM x4  : Tavarez in place    
Subjective:    Awake, alert. Less abd pain. +BM yesterday. Still w/ neuropathic pains in hands/legs.    MEDICATIONS  (STANDING):  acetaminophen  IVPB .. 1000 milliGRAM(s) IV Intermittent every 6 hours  budesonide 160 MICROgram(s)/formoterol 4.5 MICROgram(s) Inhaler 2 Puff(s) Inhalation two times a day  clindamycin IVPB      clindamycin IVPB 300 milliGRAM(s) IV Intermittent every 8 hours  diltiazem    milliGRAM(s) Oral daily  DULoxetine 20 milliGRAM(s) Oral daily  gabapentin Oral Tab/Cap - Peds 600 milliGRAM(s) Oral three times a day  heparin   Injectable 5000 Unit(s) SubCutaneous every 8 hours  levothyroxine 88 MICROGram(s) Oral daily  metroNIDAZOLE  IVPB      metroNIDAZOLE  IVPB 500 milliGRAM(s) IV Intermittent every 8 hours  pantoprazole  Injectable 40 milliGRAM(s) IV Push daily  vitamin A 60437 Unit(s) Oral daily    MEDICATIONS  (PRN):  acetaminophen   Tablet .. 650 milliGRAM(s) Oral every 6 hours PRN Temp greater or equal to 38C (100.4F), Mild Pain (1 - 3)  diphenhydrAMINE   Injectable 25 milliGRAM(s) IV Push once PRN Itching  HYDROmorphone  Injectable 0.5 milliGRAM(s) IV Push every 4 hours PRN Severe Pain (7 - 10)  LORazepam   Injectable 0.5 milliGRAM(s) IV Push every 6 hours PRN Anxiety  LORazepam   Injectable 0.5 milliGRAM(s) IV Push every 6 hours PRN Anxiety  naloxone Injectable 0.1 milliGRAM(s) IV Push every 3 minutes PRN For ANY of the following changes in patient status:  A. RR LESS THAN 10 breaths per minute, B. Oxygen saturation LESS THAN 90%, C. Sedation score of 6  ondansetron Injectable 4 milliGRAM(s) IV Push every 6 hours PRN Nausea      Allergies    ciprofloxacin (Unknown)  penicillin (Unknown)    Intolerances        Vital Signs Last 24 Hrs  T(C): 36.6 (25 Feb 2021 05:06), Max: 36.7 (24 Feb 2021 12:10)  T(F): 97.9 (25 Feb 2021 05:06), Max: 98.1 (24 Feb 2021 12:10)  HR: 78 (25 Feb 2021 07:47) (65 - 84)  BP: 141/68 (25 Feb 2021 05:06) (141/68 - 162/89)  BP(mean): --  RR: 17 (25 Feb 2021 05:06) (17 - 19)  SpO2: 98% (25 Feb 2021 07:47) (98% - 99%)    PHYSICAL EXAMINATION:    NECK:  Supple. No lymphadenopathy. Jugular venous pressure not elevated.   HEART:   The cardiac impulse has a normal quality. Reg., Nl S1 and S2.    CHEST:  Chest is clear to auscultation. Normal respiratory effort.  ABDOMEN:  Soft and sl. tender.   EXTREMITIES:  There is no edema.   SKIN: purpuric lesions are resolving      LABS:                        9.0    11.80 )-----------( 300      ( 25 Feb 2021 05:58 )             28.5     02-25    142  |  110<H>  |  14  ----------------------------<  95  3.0<L>   |  27  |  0.45<L>    Ca    7.9<L>      25 Feb 2021 05:58  Phos  2.5     02-25  Mg     1.8     02-25            RADIOLOGY & ADDITIONAL TESTS:    Assessment and Recommendation:   · Assessment	  Assessment/Plan    - Maintain IV steroids as per rheum, until D/C, then switch to prednisone  - Await results of path specimen  - Incentive spirometry  - Cont. Symbicort  - OOB to chair/ambulate as myranda  - Monitor renal fxn carefully  - Change Cardizem to oral at 1/2 dose-monitor BP and pulse  - Physical Therapy    Problem/Recommendation - 1:  Small bowel perforation.  Problem/Recommendation - 2:  ·  Vasculitis determined by biopsy of skin.   Problem/Recommendation - 3:  ·  Moderate asthma without complication, unspecified whether persistent.   Problem/Recommendation - 4:  ·  Neuropathy. 
Pt seen and examined at bedside, no acute events. Pt had no complaints but is inquiring about SOHAM drain. Admits to abdominal pain and continues to pass gas and bowel movements. Tolerating CLD. Denied fever, chills, nausea, vomiting or SOB overnight.             Vital Signs Last 24 Hrs  T(C): 36.6 (25 Feb 2021 05:06), Max: 36.7 (24 Feb 2021 12:10)  T(F): 97.9 (25 Feb 2021 05:06), Max: 98.1 (24 Feb 2021 12:10)  HR: 65 (25 Feb 2021 05:06) (57 - 84)  BP: 141/68 (25 Feb 2021 05:06) (141/68 - 162/89)  BP(mean): --  RR: 17 (25 Feb 2021 05:06) (17 - 20)  SpO2: 99% (25 Feb 2021 05:06) (98% - 99%)    Labs:                                9.0    11.80 )-----------( 300      ( 25 Feb 2021 05:58 )             28.5     CBC Full  -  ( 25 Feb 2021 05:58 )  WBC Count : 11.80 K/uL  RBC Count : 3.22 M/uL  Hemoglobin : 9.0 g/dL  Hematocrit : 28.5 %  Platelet Count - Automated : 300 K/uL  Mean Cell Volume : 88.5 fl  Mean Cell Hemoglobin : 28.0 pg  Mean Cell Hemoglobin Concentration : 31.6 gm/dL  Auto Neutrophil # : x  Auto Lymphocyte # : x  Auto Monocyte # : x  Auto Eosinophil # : x  Auto Basophil # : x  Auto Neutrophil % : x  Auto Lymphocyte % : x  Auto Monocyte % : x  Auto Eosinophil % : x  Auto Basophil % : x    02-25    142  |  110<H>  |  14  ----------------------------<  95  3.0<L>   |  27  |  0.45<L>    Ca    7.9<L>      25 Feb 2021 05:58  Phos  2.5     02-25  Mg     1.8     02-25              Meds:  acetaminophen   Tablet .. 650 milliGRAM(s) Oral every 6 hours PRN  acetaminophen  IVPB .. 1000 milliGRAM(s) IV Intermittent every 6 hours  budesonide 160 MICROgram(s)/formoterol 4.5 MICROgram(s) Inhaler 2 Puff(s) Inhalation two times a day  clindamycin IVPB      clindamycin IVPB 300 milliGRAM(s) IV Intermittent every 8 hours  diltiazem    milliGRAM(s) Oral daily  diphenhydrAMINE   Injectable 25 milliGRAM(s) IV Push once PRN  DULoxetine 20 milliGRAM(s) Oral daily  gabapentin Oral Tab/Cap - Peds 600 milliGRAM(s) Oral three times a day  heparin   Injectable 5000 Unit(s) SubCutaneous every 8 hours  HYDROmorphone  Injectable 0.5 milliGRAM(s) IV Push every 4 hours PRN  levothyroxine 88 MICROGram(s) Oral daily  LORazepam   Injectable 0.5 milliGRAM(s) IV Push every 6 hours PRN  LORazepam   Injectable 0.5 milliGRAM(s) IV Push every 6 hours PRN  metroNIDAZOLE  IVPB 500 milliGRAM(s) IV Intermittent every 8 hours  metroNIDAZOLE  IVPB      naloxone Injectable 0.1 milliGRAM(s) IV Push every 3 minutes PRN  ondansetron Injectable 4 milliGRAM(s) IV Push every 6 hours PRN  pantoprazole  Injectable 40 milliGRAM(s) IV Push daily  vitamin A 98514 Unit(s) Oral daily        Physical Exam:  General: AAOx3, in NAD  HEENT: NC/AT, EOMI, NGT in place  Cardio: S1S2, RRR  Pulm: equal air entry b/l, non labored   Abdomen: soft, appropriate tenderness to palpation at incision sites, dermabond dressings in place, SOHAM drain on left-abdomen with minimal serous output  Extremities: FROM x4  
Pt seen and examined at bedside, no acute events. Pt had no complaints but is inquiring about SOHAM drain. Admits to abdominal pain and continues to pass gas. Admits to having blood-tinged BM. Tolerating CLD. Denied fever, chills, nausea, vomiting or SOB overnight.     Vital Signs Last 24 Hrs  T(C): 36.9 (24 Feb 2021 05:00), Max: 37.4 (23 Feb 2021 22:10)  T(F): 98.4 (24 Feb 2021 05:00), Max: 99.4 (23 Feb 2021 22:10)  HR: 54 (24 Feb 2021 04:00) (54 - 80)  BP: 121/74 (24 Feb 2021 04:00) (112/79 - 165/97)  BP(mean): 89 (24 Feb 2021 04:00) (84 - 118)  RR: 18 (24 Feb 2021 04:00) (11 - 23)  SpO2: --    Labs:                          9.0    9.05  )-----------( 312      ( 24 Feb 2021 05:36 )             28.4   02-24    142  |  108  |  11  ----------------------------<  116<H>  3.3<L>   |  28  |  0.37<L>    Ca    8.0<L>      24 Feb 2021 05:36  Phos  2.8     02-24  Mg     1.9     02-24        Meds:  acetaminophen  IVPB .. 1000 milliGRAM(s) IV Intermittent every 6 hours  budesonide 160 MICROgram(s)/formoterol 4.5 MICROgram(s) Inhaler 2 Puff(s) Inhalation two times a day  clindamycin IVPB 300 milliGRAM(s) IV Intermittent every 8 hours  clindamycin IVPB      cyanocobalamin Injectable 1000 MICROGram(s) IntraMuscular once  diltiazem    milliGRAM(s) Oral daily  DULoxetine 20 milliGRAM(s) Oral daily  gabapentin Oral Tab/Cap - Peds 600 milliGRAM(s) Oral three times a day  heparin   Injectable 5000 Unit(s) SubCutaneous every 8 hours  HYDROmorphone  Injectable 0.5 milliGRAM(s) IV Push every 4 hours PRN  levothyroxine 88 MICROGram(s) Oral daily  LORazepam   Injectable 0.5 milliGRAM(s) IV Push every 6 hours PRN  LORazepam   Injectable 1 milliGRAM(s) IV Push once  LORazepam   Injectable 0.5 milliGRAM(s) IV Push every 6 hours PRN  methylPREDNISolone sodium succinate Injectable 30 milliGRAM(s) IV Push two times a day  metroNIDAZOLE  IVPB      metroNIDAZOLE  IVPB 500 milliGRAM(s) IV Intermittent every 8 hours  naloxone Injectable 0.1 milliGRAM(s) IV Push every 3 minutes PRN  ondansetron Injectable 4 milliGRAM(s) IV Push every 6 hours PRN  pantoprazole  Injectable 40 milliGRAM(s) IV Push daily  sodium chloride 0.9%. 1000 milliLiter(s) IV Continuous <Continuous>  vitamin A 82272 Unit(s) Oral daily      Physical Exam:  General: AAOx3, in NAD  HEENT: NC/AT, EOMI, NGT in place  Cardio: S1S2, RRR  Pulm: equal air entry b/l, non labored   Abdomen: soft, appropriate tenderness to palpation at incision sites, dermabond dressings in place, SOHAM drain on left-abdomen with minimal serous output  Extremities: FROM x4    I&O's Detail    23 Feb 2021 07:01  -  24 Feb 2021 07:00  --------------------------------------------------------  IN:    sodium chloride 0.9%: 641 mL  Total IN: 641 mL    OUT:    Bulb (mL): 10 mL    Voided (mL): 3000 mL  Total OUT: 3010 mL    Total NET: -2369 mL            
Subjective:    Awake, alert. Much improved. NG tube removed. No sputum.    MEDICATIONS  (STANDING):  acetaminophen  IVPB .. 1000 milliGRAM(s) IV Intermittent every 6 hours  budesonide 160 MICROgram(s)/formoterol 4.5 MICROgram(s) Inhaler 2 Puff(s) Inhalation two times a day  clindamycin IVPB 300 milliGRAM(s) IV Intermittent every 8 hours  clindamycin IVPB      diltiazem Injectable 25 milliGRAM(s) IV Push every 6 hours  DULoxetine 20 milliGRAM(s) Oral daily  gabapentin Oral Tab/Cap - Peds 600 milliGRAM(s) Oral three times a day  heparin   Injectable 5000 Unit(s) SubCutaneous every 8 hours  levothyroxine 88 MICROGram(s) Oral daily  LORazepam   Injectable 1 milliGRAM(s) IV Push once  methylPREDNISolone sodium succinate Injectable 30 milliGRAM(s) IV Push two times a day  metroNIDAZOLE  IVPB      metroNIDAZOLE  IVPB 500 milliGRAM(s) IV Intermittent every 8 hours  pantoprazole  Injectable 40 milliGRAM(s) IV Push daily  sodium chloride 0.9%. 1000 milliLiter(s) (100 mL/Hr) IV Continuous <Continuous>  vitamin A 64107 Unit(s) Oral daily    MEDICATIONS  (PRN):  HYDROmorphone  Injectable 0.5 milliGRAM(s) IV Push every 4 hours PRN Severe Pain (7 - 10)  LORazepam   Injectable 0.5 milliGRAM(s) IV Push every 6 hours PRN Anxiety  LORazepam   Injectable 0.5 milliGRAM(s) IV Push every 6 hours PRN Anxiety  naloxone Injectable 0.1 milliGRAM(s) IV Push every 3 minutes PRN For ANY of the following changes in patient status:  A. RR LESS THAN 10 breaths per minute, B. Oxygen saturation LESS THAN 90%, C. Sedation score of 6  ondansetron Injectable 4 milliGRAM(s) IV Push every 6 hours PRN Nausea      Allergies    ciprofloxacin (Unknown)  penicillin (Unknown)    Intolerances        Vital Signs Last 24 Hrs  T(C): 36.4 (23 Feb 2021 06:00), Max: 36.7 (22 Feb 2021 09:41)  T(F): 97.6 (23 Feb 2021 06:00), Max: 98.1 (22 Feb 2021 09:41)  HR: 60 (23 Feb 2021 08:00) (53 - 83)  BP: 164/79 (23 Feb 2021 08:00) (111/92 - 170/143)  BP(mean): 103 (23 Feb 2021 08:00) (84 - 153)  RR: 11 (23 Feb 2021 08:00) (10 - 22)  SpO2: 96% (23 Feb 2021 04:00) (88% - 98%)    PHYSICAL EXAMINATION:    NECK:  Supple. No lymphadenopathy. Jugular venous pressure not elevated.   HEART:   The cardiac impulse has a normal quality. Reg., Nl S1 and S2.   CHEST:  Chest is clear to auscultation. Normal respiratory effort.  ABDOMEN:  Soft and nontender.   EXTREMITIES:  There is no edema.   SKIN: Purpuric lesions are resolving    LABS:                        9.5    9.87  )-----------( 336      ( 23 Feb 2021 07:04 )             30.0     02-23    140  |  106  |  14  ----------------------------<  98  3.6   |  28  |  0.42<L>    Ca    8.5      23 Feb 2021 07:04  Phos  3.4     02-23  Mg     1.9     02-23    TPro  5.5<L>  /  Alb  2.0<L>  /  TBili  0.3  /  DBili  x   /  AST  148<H>  /  ALT  68  /  AlkPhos  72  02-22          RADIOLOGY & ADDITIONAL TESTS:    Assessment and Recommendation:   · Assessment	  Assessment/Plan    - Maintain IV steroids as per rheum  - Await results of path specimen--spoke w/ Dr. SILVIA Ni  - Needs Vit B12 today  - Incentive spirometry  - Cont. Symbicort  - OOB to chair/ambulate as myranda  - Monitor renal fxn carefully  -  Change Cardizem to oral at 1/2 dose-monitor BP and pulse    Problem/Recommendation - 1:  Small bowel perforation.  Problem/Recommendation - 2:  ·  Vasculitis determined by biopsy of skin.   Problem/Recommendation - 3:  ·  Moderate asthma without complication, unspecified whether persistent.   Problem/Recommendation - 4:  ·  Neuropathy. 
CHIEF COMPLAINT:    SUBJECTIVE:  Patient offers no new complaints.  Main complaint is paresthesias.  She has no wrist or foot drop.  No  new rashes, SOB, chest pain, or cough.    Requesting to go to PT    REVIEW OF SYSTEMS:  Negative except as above    Vital Signs Last 24 Hrs  T(C): 36.6 (25 Feb 2021 08:44), Max: 36.7 (24 Feb 2021 20:33)  T(F): 97.9 (25 Feb 2021 08:44), Max: 98.1 (24 Feb 2021 20:33)  HR: 79 (25 Feb 2021 08:44) (65 - 79)  BP: 146/86 (25 Feb 2021 08:44) (141/68 - 155/90)  BP(mean): --  RR: 17 (25 Feb 2021 08:44) (17 - 18)  SpO2: 98% (25 Feb 2021 08:44) (98% - 99%)    I&O's Summary    24 Feb 2021 07:01  -  25 Feb 2021 07:00  --------------------------------------------------------  IN: 150 mL / OUT: 18 mL / NET: 132 mL    25 Feb 2021 07:01  -  25 Feb 2021 12:30  --------------------------------------------------------  IN: 440 mL / OUT: 0 mL / NET: 440 mL        CAPILLARY BLOOD GLUCOSE          PHYSICAL EXAM:    Constitutional: NAD, awake and alert, well-developed  HEENT- no oral lesions noted, no lymphadenoapthy noted  Heart- S1 S2 reg  Lungs- CTA b/l  Abd- Soft NT, drain in place with small amount of serosanguinous flulid.    Ext- no edema  Skin- no rashes  Musculoskelatal- FROM all joints, no active synovitis noted  Neurological- intact strength upper and lower extremities      MEDICATIONS:  MEDICATIONS  (STANDING):  acetaminophen  IVPB .. 1000 milliGRAM(s) IV Intermittent every 6 hours  budesonide 160 MICROgram(s)/formoterol 4.5 MICROgram(s) Inhaler 2 Puff(s) Inhalation two times a day  clindamycin IVPB 300 milliGRAM(s) IV Intermittent every 8 hours  clindamycin IVPB      diltiazem    milliGRAM(s) Oral daily  DULoxetine 20 milliGRAM(s) Oral daily  gabapentin Oral Tab/Cap - Peds 600 milliGRAM(s) Oral three times a day  heparin   Injectable 5000 Unit(s) SubCutaneous every 8 hours  levothyroxine 88 MICROGram(s) Oral daily  metroNIDAZOLE  IVPB      metroNIDAZOLE  IVPB 500 milliGRAM(s) IV Intermittent every 8 hours  pantoprazole  Injectable 40 milliGRAM(s) IV Push daily  vitamin A 80108 Unit(s) Oral daily      LABS: All Labs Reviewed:                        9.0    11.80 )-----------( 300      ( 25 Feb 2021 05:58 )             28.5     02-25    142  |  110<H>  |  14  ----------------------------<  95  3.0<L>   |  27  |  0.45<L>    Ca    7.9<L>      25 Feb 2021 05:58  Phos  2.5     02-25  Mg     1.8     02-25            Blood Culture:     RADIOLOGY/EKG:    A/P:
CHIEF COMPLAINT:  Abd pain    SUBJECTIVE:   Patient offers no new complaints.  States surgical team had just been palpating abdomen and seems to be feeling ok.  No new skin lesions, fevers/chills or joint pain.  Elbow nodules improving.  Paresthesias still present.   States she does not feel short of breath.      REVIEW OF SYSTEMS:  Negative except as above.      Vital Signs Last 24 Hrs  T(C): 36.4 (23 Feb 2021 06:00), Max: 36.7 (22 Feb 2021 09:41)  T(F): 97.6 (23 Feb 2021 06:00), Max: 98.1 (22 Feb 2021 09:41)  HR: 60 (23 Feb 2021 08:00) (53 - 83)  BP: 164/79 (23 Feb 2021 08:00) (111/92 - 170/143)  BP(mean): 103 (23 Feb 2021 08:00) (84 - 153)  RR: 11 (23 Feb 2021 08:00) (10 - 22)  SpO2: 96% (23 Feb 2021 04:00) (88% - 98%)    I&O's Summary    22 Feb 2021 07:01  -  23 Feb 2021 07:00  --------------------------------------------------------  IN: 964 mL / OUT: 2610 mL / NET: -1646 mL        CAPILLARY BLOOD GLUCOSE          PHYSICAL EXAM:    Constitutional: NAD, awake and alert, well-developed  HEENT- no oral lesions noted, no lymphadenoapthy noted  Heart- S1 S2 reg  Lungs- CTA b/l  Abd- Soft NT  Ext- no edema  Skin- One remaining purpuric lesion posterior right LE.  b/L elbow nodules/lesions improving.    Musculoskelatal- FROM all joints, no active synovitis noted  Neurological- no focal deficits.       MEDICATIONS:  MEDICATIONS  (STANDING):  acetaminophen  IVPB .. 1000 milliGRAM(s) IV Intermittent every 6 hours  budesonide 160 MICROgram(s)/formoterol 4.5 MICROgram(s) Inhaler 2 Puff(s) Inhalation two times a day  clindamycin IVPB 300 milliGRAM(s) IV Intermittent every 8 hours  clindamycin IVPB      diltiazem Injectable 25 milliGRAM(s) IV Push every 6 hours  DULoxetine 20 milliGRAM(s) Oral daily  gabapentin Oral Tab/Cap - Peds 600 milliGRAM(s) Oral three times a day  heparin   Injectable 5000 Unit(s) SubCutaneous every 8 hours  levothyroxine 88 MICROGram(s) Oral daily  LORazepam   Injectable 1 milliGRAM(s) IV Push once  methylPREDNISolone sodium succinate Injectable 30 milliGRAM(s) IV Push two times a day  metroNIDAZOLE  IVPB      metroNIDAZOLE  IVPB 500 milliGRAM(s) IV Intermittent every 8 hours  pantoprazole  Injectable 40 milliGRAM(s) IV Push daily  sodium chloride 0.9%. 1000 milliLiter(s) (100 mL/Hr) IV Continuous <Continuous>  vitamin A 99688 Unit(s) Oral daily      LABS: All Labs Reviewed:                        9.5    9.87  )-----------( 336      ( 23 Feb 2021 07:04 )             30.0     02-23    140  |  106  |  14  ----------------------------<  98  3.6   |  28  |  0.42<L>    Ca    8.5      23 Feb 2021 07:04  Phos  3.4     02-23  Mg     1.9     02-23    TPro  5.5<L>  /  Alb  2.0<L>  /  TBili  0.3  /  DBili  x   /  AST  148<H>  /  ALT  68  /  AlkPhos  72  02-22          Blood Culture: 02-19 @ 10:37  Organism --  Gram Stain Blood -- Gram Stain --  Specimen Source .Blood None  Culture-Blood --      ESr 56  CRP 7.71    RADIOLOGY/EKG:    A/P:
CHIEF COMPLAINT: abd pain    Today she states she is doing well. She states she has some mild pain at the site of the incision w/ soft abdomen w/ NG tube in and awaiting to pass gas.    Her LE rashes on steroids are pretty much resolved w/ mild residual lesions; her oral ulcer on tongue is healing well w/o thrush; her granulomas on her elbows are improving; she denies any SOB now; denies any dark urine.    Medical history/surgical history/allergies/medication history/social history-reviewed    REVIEW OF SYSTEMS:    CONSTITUTIONAL: No weakness, fevers or chills  EYES/ENT: No visual changes; No vertigo or throat pain   NECK: No pain or stiffness  RESPIRATORY: No cough, wheezing, hemoptysis; No shortness of breath  CARDIOVASCULAR: No chest pain or palpitations  GASTROINTESTINAL: abd pain  GENITOURINARY: No dysuria, frequency or hematuria  NEUROLOGICAL: No foot drop or wrist drop  SKIN: LE lesion improved a lot  All other review of systems is negative unless indicated above    Vital Signs Last 24 Hrs  T(F): 99   HR: 69  BP: 149/99  RR: 14  SpO2: 98%    I&O's Summary    19 Feb 2021 07:01  -  19 Feb 2021 19:43  --------------------------------------------------------  IN: 2700 mL / OUT: 560 mL / NET: 2140 mL    CAPILLARY BLOOD GLUCOSE    PHYSICAL EXAM:    Heart- S1 S2 reg  Lungs- CTA b/l  Abd- soft; tender around incision   Ext- no edema  Skin- vasculitic lesions have resolved a lot; granulomas on elbows improving  Musculoskelatal- FROM all joints, no active synovitis noted  Neurological- intact strength upper and lower extremities without any wrist or foot drop    MEDICATIONS:  acetaminophen  IVPB .. 1000 milliGRAM(s) IV Intermittent every 6 hours  clindamycin IVPB 300 milliGRAM(s) IV Intermittent every 8 hours  clindamycin IVPB      diltiazem Injectable 25 milliGRAM(s) IV Push every 6 hours  DULoxetine 20 milliGRAM(s) Oral daily  gabapentin Oral Tab/Cap - Peds 600 milliGRAM(s) Oral three times a day  heparin   Injectable 5000 Unit(s) SubCutaneous every 8 hours  HYDROmorphone PCA (1 mG/mL) 30 milliLiter(s) PCA Continuous PCA Continuous  HYDROmorphone PCA (1 mG/mL) [facility] 0.5 milliGRAM(s) IV Push every 15 minutes PRN  levothyroxine 88 MICROGram(s) Oral daily  LORazepam   Injectable 0.5 milliGRAM(s) IV Push every 6 hours PRN  LORazepam   Injectable 1 milliGRAM(s) IV Push once  LORazepam   Injectable 0.5 milliGRAM(s) IV Push every 6 hours PRN  methylPREDNISolone sodium succinate Injectable 30 milliGRAM(s) IV Push two times a day  metroNIDAZOLE  IVPB      metroNIDAZOLE  IVPB 500 milliGRAM(s) IV Intermittent every 8 hours  naloxone Injectable 0.1 milliGRAM(s) IV Push every 3 minutes PRN  ondansetron Injectable 4 milliGRAM(s) IV Push every 6 hours PRN  pantoprazole  Injectable 40 milliGRAM(s) IV Push daily  sodium chloride 0.9%. 1000 milliLiter(s) IV Continuous <Continuous>  vitamin A 41521 Unit(s) Oral daily    LABS: All Labs Reviewed:                            9.6    18.52 )-----------( 361      ( 21 Feb 2021 05:42 )             30.6     CBC Full  -  ( 21 Feb 2021 05:42 )  WBC Count : 18.52 K/uL  RBC Count : 3.38 M/uL  Hemoglobin : 9.6 g/dL  Hematocrit : 30.6 %  Platelet Count - Automated : 361 K/uL  Mean Cell Volume : 90.5 fl  Mean Cell Hemoglobin : 28.4 pg  Mean Cell Hemoglobin Concentration : 31.4 gm/dL  Auto Neutrophil # : x  Auto Lymphocyte # : x  Auto Monocyte # : x  Auto Eosinophil # : x  Auto Basophil # : x  Auto Neutrophil % : x  Auto Lymphocyte % : x  Auto Monocyte % : x  Auto Eosinophil % : x  Auto Basophil % : x    02-21    141  |  107  |  15  ----------------------------<  93  4.1   |  28  |  0.46<L>    Ca    8.3<L>      21 Feb 2021 05:42  Phos  2.7     02-21  Mg     2.0     02-21                       12.7   22.35 )-----------( 466      ( 19 Feb 2021 08:20 )             39.6     02-19    138  |  103  |  27<H>  ----------------------------<  84  4.0   |  28  |  0.62    Ca    9.2      19 Feb 2021 08:20    TPro  6.7  /  Alb  2.7<L>  /  TBili  0.4  /  DBili  x   /  AST  120<H>  /  ALT  84<H>  /  AlkPhos  95  02-19    PT/INR - ( 19 Feb 2021 08:20 )   PT: 12.6 sec;   INR: 1.08 ratio         PTT - ( 19 Feb 2021 08:20 )  PTT:26.2 sec  CARDIAC MARKERS ( 19 Feb 2021 14:33 )  0.077 ng/mL / x     / x     / x     / x      CARDIAC MARKERS ( 19 Feb 2021 10:37 )  0.047 ng/mL / x     / x     / x     / x      CARDIAC MARKERS ( 19 Feb 2021 08:20 )  0.055 ng/mL / x     / x     / x     / x          Blood Culture:     RADIOLOGY/EKG:    A/P    Assessment and Recommendation:   · Assessment	  56 yo F with pmh R nephrectomy in 2004, HTN, asthma- with recurrent need for steroids nearly consistent since 8/20- 10-40 mg daily, hypothyroidism, unknown rheumatologic disorder with new onset progressive peripheral neuropathy, purpuric rash, and oral/ nasal ulcerations, and significantly 50 lb wt loss Was in usual state health with intermittent asthma/ and sinus infection but otherwise healthy till 8/20  Recent evaluation for mid epigastric abd/ non bloody emesis, EGD + gastritis confirmed eosinophilic esophagitis/ gastritis. Extensive w/u neuro/ rheum work up negative as per patient in the past. Here she is noted to have + MPO; +P-ANCA; +RF; +DS-DNA.     She was initially admitted about 10 days ago with abdominal pain and new vasculitis lesions on her skin, granulomatous elbows on last admission she was noted to have a positive J LUIS, positive ANCA antibody positive MPO antibody, elevated inflammatory markers, status post skin biopsy consistent with vasculitis. Now comes in urgently to the ER with acute abdomen noted to have pneumoperitoneum status post surgery, He also has neuropathy consistent with vasculitis  Clinically high suspicion for EGPA, with surgeon, reporting to have inflammatory findings during the procedure, tissue was sent out.    Plan-  -Skin biopsy consistent with vasculitis improved on steroids now   -abdominal tissue biopsy pending to confirm eosinophilic, necrotizing vasculitis    -clinically high suspicion for vasculitis  -will trend ESR/CRP; ANCA  -Maintain Solu-Medrol 30 mg IV b.i.d.  -Maintain Bactrim for PCP prophylaxis  -Given current and recent clinical picture rheumatologically the patient will need pulsed steroids, 500 mg IV for 3 days but since she is postop and status post acute abdomen and pneumoperitoneum will hold off on high-dose steroids until she heals  -Long-term plan to consider Rituxan use, will avoid Cytoxan since she only has one kidney  -Previously we had discussed potential use of nucala with her but given multisystem involvement and major organ involvement proximal be a better choice  -Will follow w/ turn over given to my colleague, Sumaya Parsons   
Pt seen and examined at bedside, no acute events. Pt had no complaints. NGT in place, patient began to pass gas last night. Denied fever, chills, nausea, vomiting or SOB overnight.             Vital Signs Last 24 Hrs  T(C): 36.9 (22 Feb 2021 05:41), Max: 37.2 (21 Feb 2021 14:23)  T(F): 98.4 (22 Feb 2021 05:41), Max: 99 (21 Feb 2021 14:23)  HR: 55 (22 Feb 2021 06:00) (54 - 78)  BP: 134/71 (22 Feb 2021 06:00) (133/84 - 164/88)  BP(mean): 91 (22 Feb 2021 06:00) (91 - 114)  RR: 12 (22 Feb 2021 06:00) (9 - 18)  SpO2: 94% (22 Feb 2021 06:00) (92% - 99%)    Labs:                                9.7    15.07 )-----------( 370      ( 22 Feb 2021 05:52 )             30.7     CBC Full  -  ( 22 Feb 2021 05:52 )  WBC Count : 15.07 K/uL  RBC Count : 3.43 M/uL  Hemoglobin : 9.7 g/dL  Hematocrit : 30.7 %  Platelet Count - Automated : 370 K/uL  Mean Cell Volume : 89.5 fl  Mean Cell Hemoglobin : 28.3 pg  Mean Cell Hemoglobin Concentration : 31.6 gm/dL  Auto Neutrophil # : x  Auto Lymphocyte # : x  Auto Monocyte # : x  Auto Eosinophil # : x  Auto Basophil # : x  Auto Neutrophil % : x  Auto Lymphocyte % : x  Auto Monocyte % : x  Auto Eosinophil % : x  Auto Basophil % : x    02-22    140  |  105  |  14  ----------------------------<  94  3.8   |  27  |  0.43<L>    Ca    8.4<L>      22 Feb 2021 05:52  Phos  3.1     02-22  Mg     1.9     02-22    TPro  5.5<L>  /  Alb  2.0<L>  /  TBili  0.3  /  DBili  x   /  AST  148<H>  /  ALT  68  /  AlkPhos  72  02-22    LIVER FUNCTIONS - ( 22 Feb 2021 05:52 )  Alb: 2.0 g/dL / Pro: 5.5 gm/dL / ALK PHOS: 72 U/L / ALT: 68 U/L / AST: 148 U/L / GGT: x                 Meds:  acetaminophen  IVPB .. 1000 milliGRAM(s) IV Intermittent every 6 hours  calcium gluconate IVPB 2 Gram(s) IV Intermittent once  clindamycin IVPB 300 milliGRAM(s) IV Intermittent every 8 hours  clindamycin IVPB      diltiazem Injectable 25 milliGRAM(s) IV Push every 6 hours  DULoxetine 20 milliGRAM(s) Oral daily  gabapentin Oral Tab/Cap - Peds 600 milliGRAM(s) Oral three times a day  heparin   Injectable 5000 Unit(s) SubCutaneous every 8 hours  HYDROmorphone PCA (1 mG/mL) 30 milliLiter(s) PCA Continuous PCA Continuous  HYDROmorphone PCA (1 mG/mL) Rescue Clinician Bolus 0.5 milliGRAM(s) IV Push every 15 minutes PRN  levothyroxine 88 MICROGram(s) Oral daily  LORazepam   Injectable 1 milliGRAM(s) IV Push once  LORazepam   Injectable 0.5 milliGRAM(s) IV Push every 6 hours PRN  LORazepam   Injectable 0.5 milliGRAM(s) IV Push every 6 hours PRN  methylPREDNISolone sodium succinate Injectable 30 milliGRAM(s) IV Push two times a day  metroNIDAZOLE  IVPB 500 milliGRAM(s) IV Intermittent every 8 hours  metroNIDAZOLE  IVPB      naloxone Injectable 0.1 milliGRAM(s) IV Push every 3 minutes PRN  ondansetron Injectable 4 milliGRAM(s) IV Push every 6 hours PRN  pantoprazole  Injectable 40 milliGRAM(s) IV Push daily  sodium chloride 0.9%. 1000 milliLiter(s) IV Continuous <Continuous>  vitamin A 40460 Unit(s) Oral daily      Physical Exam:  General: AAOx3, in NAD  HEENT: NC/AT, EOMI, NGT in place  Cardio: S1S2, RRR  Pulm: equal air entry b/l, non labored   Abdomen: soft, appropriate tenderness to palpation at incision sites, dermabond dressings in place, SOHAM drain on left-abdomen with serosanguinous output  Extremities: FROM x4  : Tavarez in place    
Pt seen and examined at bedside, no acute events. Pt had no complaints. NGT removed yesterday. Patient continues to pass gas. Denied fever, chills, nausea, vomiting or SOB overnight.               Vital Signs Last 24 Hrs  T(C): 36.8 (23 Feb 2021 09:00), Max: 36.8 (23 Feb 2021 09:00)  T(F): 98.2 (23 Feb 2021 09:00), Max: 98.2 (23 Feb 2021 09:00)  HR: 60 (23 Feb 2021 10:19) (53 - 83)  BP: 147/87 (23 Feb 2021 10:19) (111/92 - 170/143)  BP(mean): 104 (23 Feb 2021 10:19) (84 - 153)  RR: 15 (23 Feb 2021 10:19) (10 - 22)  SpO2: 96% (23 Feb 2021 04:00) (88% - 98%)    Labs:                                9.5    9.87  )-----------( 336      ( 23 Feb 2021 07:04 )             30.0     CBC Full  -  ( 23 Feb 2021 07:04 )  WBC Count : 9.87 K/uL  RBC Count : 3.40 M/uL  Hemoglobin : 9.5 g/dL  Hematocrit : 30.0 %  Platelet Count - Automated : 336 K/uL  Mean Cell Volume : 88.2 fl  Mean Cell Hemoglobin : 27.9 pg  Mean Cell Hemoglobin Concentration : 31.7 gm/dL  Auto Neutrophil # : x  Auto Lymphocyte # : x  Auto Monocyte # : x  Auto Eosinophil # : x  Auto Basophil # : x  Auto Neutrophil % : x  Auto Lymphocyte % : x  Auto Monocyte % : x  Auto Eosinophil % : x  Auto Basophil % : x    02-23    140  |  106  |  14  ----------------------------<  98  3.6   |  28  |  0.42<L>    Ca    8.5      23 Feb 2021 07:04  Phos  3.4     02-23  Mg     1.9     02-23    TPro  5.5<L>  /  Alb  2.0<L>  /  TBili  0.3  /  DBili  x   /  AST  148<H>  /  ALT  68  /  AlkPhos  72  02-22    LIVER FUNCTIONS - ( 22 Feb 2021 05:52 )  Alb: 2.0 g/dL / Pro: 5.5 gm/dL / ALK PHOS: 72 U/L / ALT: 68 U/L / AST: 148 U/L / GGT: x                 Meds:  acetaminophen  IVPB .. 1000 milliGRAM(s) IV Intermittent every 6 hours  budesonide 160 MICROgram(s)/formoterol 4.5 MICROgram(s) Inhaler 2 Puff(s) Inhalation two times a day  clindamycin IVPB 300 milliGRAM(s) IV Intermittent every 8 hours  clindamycin IVPB      cyanocobalamin Injectable 1000 MICROGram(s) IntraMuscular once  diltiazem    milliGRAM(s) Oral daily  DULoxetine 20 milliGRAM(s) Oral daily  gabapentin Oral Tab/Cap - Peds 600 milliGRAM(s) Oral three times a day  heparin   Injectable 5000 Unit(s) SubCutaneous every 8 hours  HYDROmorphone  Injectable 0.5 milliGRAM(s) IV Push every 4 hours PRN  levothyroxine 88 MICROGram(s) Oral daily  LORazepam   Injectable 0.5 milliGRAM(s) IV Push every 6 hours PRN  LORazepam   Injectable 1 milliGRAM(s) IV Push once  LORazepam   Injectable 0.5 milliGRAM(s) IV Push every 6 hours PRN  methylPREDNISolone sodium succinate Injectable 30 milliGRAM(s) IV Push two times a day  metroNIDAZOLE  IVPB      metroNIDAZOLE  IVPB 500 milliGRAM(s) IV Intermittent every 8 hours  naloxone Injectable 0.1 milliGRAM(s) IV Push every 3 minutes PRN  ondansetron Injectable 4 milliGRAM(s) IV Push every 6 hours PRN  pantoprazole  Injectable 40 milliGRAM(s) IV Push daily  sodium chloride 0.9%. 1000 milliLiter(s) IV Continuous <Continuous>  vitamin A 22941 Unit(s) Oral daily      Radiology:      Physical Exam:  General: AAOx3, in NAD  HEENT: NC/AT, EOMI, NGT in place  Cardio: S1S2, RRR  Pulm: equal air entry b/l, non labored   Abdomen: soft, appropriate tenderness to palpation at incision sites, dermabond dressings in place, SOHAM drain on left-abdomen with minimal serosanguinous output  Extremities: FROM x4  : Tavarez in place    
Pt was seen and examined at bedside this morning with surgery team. No acute overnight events reported by nursing staff. Pt offers no new complaints at this time. Is very grateful for surgical intervention. Denies bowel function at this time. Denies fever/cp/sob/n/v/d/c. Vitals stable.     Vital signs:  T(C): 37.1 (02-20-21 @ 05:00), Max: 37.1 (02-20-21 @ 05:00)  HR: 54 (02-20-21 @ 08:00) (54 - 87)  BP: 140/82 (02-20-21 @ 08:00) (105/69 - 164/95)  RR: 9 (02-20-21 @ 08:00) (7 - 18)  SpO2: 100% (02-20-21 @ 08:00) (95% - 100%)    Labs:                      10.2   23.78 )-----------( 384      ( 20 Feb 2021 05:48 )             33.0     02-20    140  |  107  |  15  ----------------------------<  103<H>  4.1   |  26  |  0.42<L>    Ca    8.2<L>      20 Feb 2021 05:48  Phos  3.9     02-20  Mg     2.0     02-20    TPro  6.7  /  Alb  2.7<L>  /  TBili  0.4  /  DBili  x   /  AST  120<H>  /  ALT  84<H>  /  AlkPhos  95  02-19    Physical Exam:  General: AAOx3, in NAD  HEENT: NC/AT, EOMI, NGT in place  Cardio: S1S2, RRR  Pulm: equal air entry b/l, non labored   Abdomen: soft, appropriate tenderness to palpation at incision sites, dermabond dressings in place, SOHAM drain on left-abdomen with serosanguinous output  Extremities: FROM x4  : Tavarez in place

## 2021-02-25 NOTE — PROGRESS NOTE ADULT - PROVIDER SPECIALTY LIST ADULT
Surgery
Pulmonology
Pulmonology
Rheumatology
Surgery
Rheumatology
Rheumatology
Surgery

## 2021-02-25 NOTE — DISCHARGE NOTE PROVIDER - CARE PROVIDER_API CALL
Iraj Stanley)  Surgery  [address], [address]  [place] [address]  Phone: [phone]  Fax: [phone]  Follow Up Time: 2 weeks

## 2021-02-25 NOTE — DISCHARGE NOTE PROVIDER - NSDCCPTREATMENT_GEN_ALL_CORE_FT
PRINCIPAL PROCEDURE  Procedure: Small bowel resection  Findings and Treatment:       SECONDARY PROCEDURE  Procedure: Diagnostic laparoscopy  Findings and Treatment:

## 2021-02-26 DIAGNOSIS — J18.9 PNEUMONIA, UNSPECIFIED ORGANISM: ICD-10-CM

## 2021-02-26 DIAGNOSIS — R31.0 GROSS HEMATURIA: ICD-10-CM

## 2021-02-26 DIAGNOSIS — E43 UNSPECIFIED SEVERE PROTEIN-CALORIE MALNUTRITION: ICD-10-CM

## 2021-02-26 DIAGNOSIS — Z79.52 LONG TERM (CURRENT) USE OF SYSTEMIC STEROIDS: ICD-10-CM

## 2021-02-26 DIAGNOSIS — K52.81 EOSINOPHILIC GASTRITIS OR GASTROENTERITIS: ICD-10-CM

## 2021-02-26 DIAGNOSIS — J34.81 NASAL MUCOSITIS (ULCERATIVE): ICD-10-CM

## 2021-02-26 DIAGNOSIS — D72.10 EOSINOPHILIA, UNSPECIFIED: ICD-10-CM

## 2021-02-26 DIAGNOSIS — K82.4 CHOLESTEROLOSIS OF GALLBLADDER: ICD-10-CM

## 2021-02-26 DIAGNOSIS — G63 POLYNEUROPATHY IN DISEASES CLASSIFIED ELSEWHERE: ICD-10-CM

## 2021-02-26 DIAGNOSIS — D47.2 MONOCLONAL GAMMOPATHY: ICD-10-CM

## 2021-02-26 DIAGNOSIS — I10 ESSENTIAL (PRIMARY) HYPERTENSION: ICD-10-CM

## 2021-02-26 DIAGNOSIS — J45.41 MODERATE PERSISTENT ASTHMA WITH (ACUTE) EXACERBATION: ICD-10-CM

## 2021-02-26 DIAGNOSIS — Z90.5 ACQUIRED ABSENCE OF KIDNEY: ICD-10-CM

## 2021-02-26 DIAGNOSIS — I77.4 CELIAC ARTERY COMPRESSION SYNDROME: ICD-10-CM

## 2021-02-26 DIAGNOSIS — L92.9 GRANULOMATOUS DISORDER OF THE SKIN AND SUBCUTANEOUS TISSUE, UNSPECIFIED: ICD-10-CM

## 2021-02-26 DIAGNOSIS — R21 RASH AND OTHER NONSPECIFIC SKIN ERUPTION: ICD-10-CM

## 2021-02-26 DIAGNOSIS — F32.9 MAJOR DEPRESSIVE DISORDER, SINGLE EPISODE, UNSPECIFIED: ICD-10-CM

## 2021-02-26 DIAGNOSIS — K12.30 ORAL MUCOSITIS (ULCERATIVE), UNSPECIFIED: ICD-10-CM

## 2021-02-26 DIAGNOSIS — R10.9 UNSPECIFIED ABDOMINAL PAIN: ICD-10-CM

## 2021-02-26 DIAGNOSIS — T38.0X5A ADVERSE EFFECT OF GLUCOCORTICOIDS AND SYNTHETIC ANALOGUES, INITIAL ENCOUNTER: ICD-10-CM

## 2021-02-26 DIAGNOSIS — E53.8 DEFICIENCY OF OTHER SPECIFIED B GROUP VITAMINS: ICD-10-CM

## 2021-02-26 DIAGNOSIS — M13.88 OTHER SPECIFIED ARTHRITIS, OTHER SITE: ICD-10-CM

## 2021-02-26 DIAGNOSIS — K20.0 EOSINOPHILIC ESOPHAGITIS: ICD-10-CM

## 2021-02-26 DIAGNOSIS — J96.01 ACUTE RESPIRATORY FAILURE WITH HYPOXIA: ICD-10-CM

## 2021-02-26 DIAGNOSIS — T48.6X5A ADVERSE EFFECT OF ANTIASTHMATICS, INITIAL ENCOUNTER: ICD-10-CM

## 2021-02-26 DIAGNOSIS — R04.0 EPISTAXIS: ICD-10-CM

## 2021-02-26 DIAGNOSIS — E03.9 HYPOTHYROIDISM, UNSPECIFIED: ICD-10-CM

## 2021-02-26 DIAGNOSIS — M30.1: ICD-10-CM

## 2021-02-26 DIAGNOSIS — G72.0 DRUG-INDUCED MYOPATHY: ICD-10-CM

## 2021-03-01 ENCOUNTER — APPOINTMENT (OUTPATIENT)
Dept: RHEUMATOLOGY | Facility: CLINIC | Age: 56
End: 2021-03-01
Payer: MEDICAID

## 2021-03-01 VITALS
HEART RATE: 88 BPM | OXYGEN SATURATION: 98 % | WEIGHT: 160 LBS | DIASTOLIC BLOOD PRESSURE: 68 MMHG | TEMPERATURE: 97.7 F | BODY MASS INDEX: 27.46 KG/M2 | SYSTOLIC BLOOD PRESSURE: 118 MMHG

## 2021-03-01 PROCEDURE — 99072 ADDL SUPL MATRL&STAF TM PHE: CPT

## 2021-03-01 PROCEDURE — 99214 OFFICE O/P EST MOD 30 MIN: CPT

## 2021-03-02 ENCOUNTER — APPOINTMENT (OUTPATIENT)
Dept: INTERNAL MEDICINE | Facility: CLINIC | Age: 56
End: 2021-03-02
Payer: MEDICAID

## 2021-03-02 DIAGNOSIS — G62.9 POLYNEUROPATHY, UNSPECIFIED: ICD-10-CM

## 2021-03-02 DIAGNOSIS — I10 ESSENTIAL (PRIMARY) HYPERTENSION: ICD-10-CM

## 2021-03-02 DIAGNOSIS — Z79.52 LONG TERM (CURRENT) USE OF SYSTEMIC STEROIDS: ICD-10-CM

## 2021-03-02 DIAGNOSIS — J45.998 OTHER ASTHMA: ICD-10-CM

## 2021-03-02 DIAGNOSIS — E03.9 HYPOTHYROIDISM, UNSPECIFIED: ICD-10-CM

## 2021-03-02 DIAGNOSIS — I77.6 ARTERITIS, UNSPECIFIED: ICD-10-CM

## 2021-03-02 DIAGNOSIS — K52.81 EOSINOPHILIC GASTRITIS OR GASTROENTERITIS: ICD-10-CM

## 2021-03-02 DIAGNOSIS — K63.1 PERFORATION OF INTESTINE (NONTRAUMATIC): ICD-10-CM

## 2021-03-02 DIAGNOSIS — K66.8 OTHER SPECIFIED DISORDERS OF PERITONEUM: ICD-10-CM

## 2021-03-02 DIAGNOSIS — Z88.0 ALLERGY STATUS TO PENICILLIN: ICD-10-CM

## 2021-03-02 DIAGNOSIS — J45.909 UNSPECIFIED ASTHMA, UNCOMPLICATED: ICD-10-CM

## 2021-03-02 DIAGNOSIS — Z88.8 ALLERGY STATUS TO OTHER DRUGS, MEDICAMENTS AND BIOLOGICAL SUBSTANCES STATUS: ICD-10-CM

## 2021-03-02 PROCEDURE — 96372 THER/PROPH/DIAG INJ SC/IM: CPT

## 2021-03-02 PROCEDURE — 99072 ADDL SUPL MATRL&STAF TM PHE: CPT

## 2021-03-02 RX ORDER — CYANOCOBALAMIN 1000 UG/ML
1000 INJECTION INTRAMUSCULAR; SUBCUTANEOUS
Qty: 0 | Refills: 0 | Status: COMPLETED | OUTPATIENT
Start: 2021-03-01

## 2021-03-03 ENCOUNTER — NON-APPOINTMENT (OUTPATIENT)
Age: 56
End: 2021-03-03

## 2021-03-03 LAB — CORE LAB BIOPSY: NORMAL

## 2021-03-04 ENCOUNTER — APPOINTMENT (OUTPATIENT)
Dept: GASTROENTEROLOGY | Facility: CLINIC | Age: 56
End: 2021-03-04
Payer: MEDICAID

## 2021-03-04 VITALS
TEMPERATURE: 98 F | BODY MASS INDEX: 27.31 KG/M2 | SYSTOLIC BLOOD PRESSURE: 119 MMHG | DIASTOLIC BLOOD PRESSURE: 70 MMHG | WEIGHT: 160 LBS | HEART RATE: 90 BPM | HEIGHT: 64 IN

## 2021-03-04 PROCEDURE — 99072 ADDL SUPL MATRL&STAF TM PHE: CPT

## 2021-03-04 PROCEDURE — 99213 OFFICE O/P EST LOW 20 MIN: CPT

## 2021-03-04 RX ORDER — METRONIDAZOLE 375 MG/1
375 CAPSULE ORAL
Qty: 21 | Refills: 0 | Status: DISCONTINUED | COMMUNITY
Start: 2021-02-25

## 2021-03-04 RX ORDER — FLUCONAZOLE 100 MG/1
100 TABLET ORAL DAILY
Qty: 10 | Refills: 1 | Status: DISCONTINUED | COMMUNITY
Start: 2021-02-02 | End: 2021-03-04

## 2021-03-04 NOTE — PHYSICAL EXAM

## 2021-03-04 NOTE — HISTORY OF PRESENT ILLNESS
[de-identified] : Ms. JOSE ALEJANDRO HOFF is a 55 year old female with history of vasculitis with small bowel surgery in February. Patient was found endoscopically to have atrophic gastritis and eosinophilic esophagitis. Patient is clinically much improved and is going to start therapy for her vasculitis with Rituxan. Patient is tolerating regular diet moving bowels well.\par

## 2021-03-04 NOTE — ASSESSMENT
[FreeTextEntry1] : 54 yo female with complicated history of vasculitis and  eosinophilic esophagitis. Patient appears to be clinically improving, and ready to start therapy for vasculitis. Would continue PPI and follow up in two months.

## 2021-03-05 ENCOUNTER — TRANSCRIPTION ENCOUNTER (OUTPATIENT)
Age: 56
End: 2021-03-05

## 2021-03-05 ENCOUNTER — APPOINTMENT (OUTPATIENT)
Dept: INTERNAL MEDICINE | Facility: CLINIC | Age: 56
End: 2021-03-05
Payer: MEDICAID

## 2021-03-05 VITALS
DIASTOLIC BLOOD PRESSURE: 88 MMHG | OXYGEN SATURATION: 97 % | HEART RATE: 76 BPM | BODY MASS INDEX: 27.64 KG/M2 | WEIGHT: 161 LBS | TEMPERATURE: 98.1 F | SYSTOLIC BLOOD PRESSURE: 132 MMHG

## 2021-03-05 PROCEDURE — 99072 ADDL SUPL MATRL&STAF TM PHE: CPT

## 2021-03-05 PROCEDURE — 99396 PREV VISIT EST AGE 40-64: CPT | Mod: 25

## 2021-03-05 PROCEDURE — 99214 OFFICE O/P EST MOD 30 MIN: CPT | Mod: 25

## 2021-03-05 RX ORDER — GABAPENTIN 300 MG/1
300 CAPSULE ORAL
Qty: 240 | Refills: 5 | Status: DISCONTINUED | COMMUNITY
End: 2021-03-05

## 2021-03-05 RX ORDER — OXYCODONE AND ACETAMINOPHEN 7.5; 325 MG/1; MG/1
7.5-325 TABLET ORAL
Qty: 60 | Refills: 0 | Status: DISCONTINUED | COMMUNITY
Start: 2020-12-21 | End: 2021-03-05

## 2021-03-05 RX ORDER — CYCLOBENZAPRINE HYDROCHLORIDE 10 MG/1
10 TABLET, FILM COATED ORAL
Qty: 12 | Refills: 0 | Status: DISCONTINUED | COMMUNITY
Start: 2020-09-07 | End: 2021-03-05

## 2021-03-05 RX ORDER — SUCRALFATE 1 G/10ML
1 SUSPENSION ORAL 3 TIMES DAILY
Qty: 500 | Refills: 1 | Status: DISCONTINUED | COMMUNITY
Start: 2021-02-02 | End: 2021-03-05

## 2021-03-05 RX ORDER — PREDNISONE 5 MG/1
5 TABLET ORAL DAILY
Qty: 30 | Refills: 5 | Status: DISCONTINUED | COMMUNITY
Start: 2021-01-15 | End: 2021-03-05

## 2021-03-05 RX ORDER — GABAPENTIN 100 MG/1
100 CAPSULE ORAL
Qty: 240 | Refills: 2 | Status: DISCONTINUED | COMMUNITY
Start: 2020-11-30 | End: 2021-03-05

## 2021-03-05 RX ORDER — FLUTICASONE PROPIONATE 50 MCG
SPRAY, SUSPENSION NASAL
Refills: 0 | Status: DISCONTINUED | COMMUNITY
End: 2021-03-05

## 2021-03-05 NOTE — HEALTH RISK ASSESSMENT
[No] : In the past 12 months have you used drugs other than those required for medical reasons? No [1] : 2) Feeling down, depressed, or hopeless for several days (1) [] : No

## 2021-03-05 NOTE — PHYSICAL EXAM
[Normal Sclera/Conjunctiva] : normal sclera/conjunctiva [EOMI] : extraocular movements intact [Normal Oropharynx] : the oropharynx was normal [No JVD] : no jugular venous distention [Supple] : supple [Clear to Auscultation] : lungs were clear to auscultation bilaterally [No Accessory Muscle Use] : no accessory muscle use [Regular Rhythm] : with a regular rhythm [Normal S1, S2] : normal S1 and S2 [No Edema] : there was no peripheral edema [No Extremity Clubbing/Cyanosis] : no extremity clubbing/cyanosis [Soft] : abdomen soft [Normal Supraclavicular Nodes] : no supraclavicular lymphadenopathy [Normal Posterior Cervical Nodes] : no posterior cervical lymphadenopathy [Normal Anterior Cervical Nodes] : no anterior cervical lymphadenopathy [No CVA Tenderness] : no CVA  tenderness [Normal Affect] : the affect was normal [Normal Insight/Judgement] : insight and judgment were intact [de-identified] : Obese white female [de-identified] : There is slight tenderness to palpation on the lower abdomen [de-identified] : There are residual purpura on the lower extremities. There are ecchymoses on the lower abdominal wall

## 2021-03-05 NOTE — REVIEW OF SYSTEMS
[Fatigue] : fatigue [Muscle Weakness] : muscle weakness [Unsteady Walking] : ataxia [Negative] : Heme/Lymph [de-identified] : Neuropathic pain in the hands and lower extremities

## 2021-03-05 NOTE — HISTORY OF PRESENT ILLNESS
[FreeTextEntry1] : The patient comes in for a yearly wellness evaluation\par  [de-identified] : Patient states, that she is slowly improving, but she is still having issues. She was recently discharged from [facility] after having 2 hospital stays back to [facility]. She was initially admitted with significant abdominal pain and severe neuropathy issues appear she was noted to have a purpuric rash on her lower extremities. She underwent a skin biopsy and she was diagnosed with a vasculitis. 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 This would also explain the neuropathic pain the patient is complaining of. While hospitalized, she was treated with intravenous corticosteroids. Her workup included a CT angiogram of the abdomen and pelvis, CAT scans of the chest, and a HIDA scan, in addition to the skin biopsy. She was eventually sent home on 30 mg of prednisone twice a day. She returned within 48 hours, with acute abdominal pain. She was noted to have a perforated viscus at the distal jejunum. She underwent emergency surgery. She is now much improved after another week in the hospital.\par \par At the time, the patient states that she is much improved. She still, however, has severe neuropathic pain primarily in her fingers. He neuropathy in her feet is not as pronounced. She was seen this week by gastroenterology, as well as by rheumatology. She also saw her surgeon. She remains on 30 mg of prednisone twice a day. She now comes in for this assessment.

## 2021-03-05 NOTE — PLAN
[FreeTextEntry1] : 1. Continue with medication as outlined above.\par \par 2. The patient will remain on 30 mg of prednisone b.i.d., under the direction of her rheumatologist.\par \par 3. In one month, the patient will undergo more aggressive therapy for treatment of the EG PA. Since then, she will receive 500 mg of Solu-Medrol on day one, followed by several days of her toxin.\par \par 4. The patient will also be treated with Nucala..\par \par 5. Consider discontinuing Symbicort once she is being treated more aggressively with the above noted medications\par \par 6. Routine fasting blood work to be performed at this time\par \par 7. The patient will undergo physical therapy to help regain her muscle strength and to help minimize the discomfort from the neuropathy\par \par 8. Followup in 3 months with full pulmonary function testing.\par \par 9. Followup with myself in 6 months with pre-post-spirometry.

## 2021-03-09 ENCOUNTER — APPOINTMENT (OUTPATIENT)
Dept: INTERNAL MEDICINE | Facility: CLINIC | Age: 56
End: 2021-03-09
Payer: MEDICAID

## 2021-03-09 PROCEDURE — 96372 THER/PROPH/DIAG INJ SC/IM: CPT

## 2021-03-09 PROCEDURE — 99072 ADDL SUPL MATRL&STAF TM PHE: CPT

## 2021-03-09 RX ORDER — CYANOCOBALAMIN 1000 UG/ML
1000 INJECTION INTRAMUSCULAR; SUBCUTANEOUS
Qty: 0 | Refills: 0 | Status: COMPLETED | OUTPATIENT
Start: 2021-03-05

## 2021-03-12 DIAGNOSIS — K64.4 RESIDUAL HEMORRHOIDAL SKIN TAGS: ICD-10-CM

## 2021-03-12 NOTE — DATA REVIEWED
[FreeTextEntry1] : Available notes, and pertinent labs & imaging in EMR and HIE reviewed. Pertinent labs and imaging summarized in HPI. 
Color consistent with ethnicity/race, warm, dry intact, resilient.

## 2021-03-12 NOTE — PHYSICAL EXAM
[General Appearance - Alert] : alert [General Appearance - In No Acute Distress] : in no acute distress [General Appearance - Well Nourished] : well nourished [Sclera] : the sclera and conjunctiva were normal [PERRL With Normal Accommodation] : pupils were equal in size, round, and reactive to light [Extraocular Movements] : extraocular movements were intact [Oriented To Time, Place, And Person] : oriented to person, place, and time [Impaired Insight] : insight and judgment were intact [Affect] : the affect was normal [Outer Ear] : the ears and nose were normal in appearance [Nasal Cavity] : the nasal mucosa and septum were normal [Oropharynx] : the oropharynx was normal [Neck Appearance] : the appearance of the neck was normal [] : no respiratory distress [Auscultation Breath Sounds / Voice Sounds] : lungs were clear to auscultation bilaterally [Heart Rate And Rhythm] : heart rate was normal and rhythm regular [Heart Sounds] : normal S1 and S2 [Murmurs] : no murmurs [Bowel Sounds] : normal bowel sounds [Abdomen Soft] : soft [Abdomen Tenderness] : non-tender [No CVA Tenderness] : no ~M costovertebral angle tenderness [No Spinal Tenderness] : no spinal tenderness [Abnormal Walk] : normal gait [Nail Clubbing] : no clubbing  or cyanosis of the fingernails [Musculoskeletal - Swelling] : no joint swelling seen [Motor Tone] : muscle strength and tone were normal [Skin Color & Pigmentation] : normal skin color and pigmentation [FreeTextEntry1] : weakness in  strength b/l

## 2021-03-12 NOTE — ASSESSMENT
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 Unclear if EGPA vs MPA at present, features can match both and eosinophils only seen in some areas of her case. Given extensive Ab +, favor Rituxan as steroid sparing treatment with consideration for Nucala if former not effective. \par \par - healing well from surgery, case discussed with surgery, recommend to delay immunosuppressive treatment and high dose steroids for at least 6 weeks post sx to avoid complications -- will plan for initiation as of 4/1/2021 as sx on 2/19/21. \par - Plan for pulse dose steroids -- 500-1000mg qday x 3 days -- actual dose will be determined closer to time based on her response to current 1mg/kg dosing as would like to be cautious with overall steroid load \par - Plan for Rituxan 375/m2 qweekly x 4 doses \par - c/w prednisone 60mg daily in divided doses, take with food\par - c/w PPI\par - pt not on Bactrim on discharge, will start Mepron for PCP ppx\par - RTC for first infusion, will repeat labs at that time\par - call if any worsening sx \par \par  \par \par \par \par

## 2021-03-12 NOTE — HISTORY OF PRESENT ILLNESS
[FreeTextEntry1] : JOSE ALEJANDRO HOFF is a 55 year old woman with pmh R nephrectomy in 2004 for unclear reason, HTN, asthma- with recurrent need for steroids nearly consistent;y since 8/20- 10-40 mg daily, hypothyroidism. Initially seen as hospital consult for new onset progressive peripheral neuropathy, purpuric rash, granulomatous rash on elbows, and oral/ nasal ulcerations, and significantly 50 lb wt loss. Was in usual state health with intermittent asthma/ and sinus infection but otherwise healthy till 8/20. Recent evaluation for mid epigastric abd/ non bloody emesis, EGD + gastritis confirmed eosinophilic esophagitis/ gastritis. Extensive w/u neuro/ rheum work up negative as per patient in the past. Here she is noted to have + MPO; +P-ANCA; +RF; +DS-DNA. C-ANCA is now positive. Discharged, then readmitted with GI perforation, tissue appeared inflammatory during resection, sent for pathology which confirming inflammatory etiology. Skin biopsy also consistent with vasculitis. \par \par Presently healing well from abd sx, no GI sx at present. Rashes, lung issues, sinus involvement improved with steroids. Weight has stabilized, no other constitutional sx. Ongoing neuropathy with weak . \par \par \par \par \par \par \par \par \par \par

## 2021-03-12 NOTE — REVIEW OF SYSTEMS
[Negative] : Heme/Lymph [Feeling Poorly] : feeling poorly [Feeling Tired] : feeling tired [As Noted in HPI] : as noted in HPI

## 2021-03-19 ENCOUNTER — NON-APPOINTMENT (OUTPATIENT)
Age: 56
End: 2021-03-19

## 2021-03-22 ENCOUNTER — APPOINTMENT (OUTPATIENT)
Dept: RHEUMATOLOGY | Facility: CLINIC | Age: 56
End: 2021-03-22

## 2021-03-25 ENCOUNTER — APPOINTMENT (OUTPATIENT)
Dept: GASTROENTEROLOGY | Facility: CLINIC | Age: 56
End: 2021-03-25
Payer: MEDICAID

## 2021-03-25 VITALS
HEART RATE: 98 BPM | WEIGHT: 160 LBS | BODY MASS INDEX: 27.31 KG/M2 | DIASTOLIC BLOOD PRESSURE: 94 MMHG | HEIGHT: 64 IN | SYSTOLIC BLOOD PRESSURE: 115 MMHG

## 2021-03-25 DIAGNOSIS — K62.5 HEMORRHAGE OF ANUS AND RECTUM: ICD-10-CM

## 2021-03-25 PROCEDURE — 99072 ADDL SUPL MATRL&STAF TM PHE: CPT

## 2021-03-25 PROCEDURE — 99213 OFFICE O/P EST LOW 20 MIN: CPT

## 2021-03-25 NOTE — ASSESSMENT
[FreeTextEntry1] : 54 yo female with history of rectal bleeding. Will use hydrocortisone suppositories and call in one week. Patient will need to schedule colonoscopy given family history and personal history of colon polyps.

## 2021-03-25 NOTE — HISTORY OF PRESENT ILLNESS
[de-identified] : Ms. JOSE ALEJANDRO HOFF is a 55 year old female with history of vasculitis. The patient has noted rectal bleeding primarily on the outside of the stool. There's been no relief with the use of ProctoFoam. Patient had last colonoscopy in 2017. Patient has a family history of colon cancer, personal history of colon polyps. Patient in midst to treatment with steroids and Rituxan.\par

## 2021-03-29 ENCOUNTER — TRANSCRIPTION ENCOUNTER (OUTPATIENT)
Age: 56
End: 2021-03-29

## 2021-03-29 ENCOUNTER — NON-APPOINTMENT (OUTPATIENT)
Age: 56
End: 2021-03-29

## 2021-03-30 ENCOUNTER — NON-APPOINTMENT (OUTPATIENT)
Age: 56
End: 2021-03-30

## 2021-03-30 DIAGNOSIS — D72.829 ELEVATED WHITE BLOOD CELL COUNT, UNSPECIFIED: ICD-10-CM

## 2021-03-31 ENCOUNTER — APPOINTMENT (OUTPATIENT)
Dept: DERMATOLOGY | Facility: CLINIC | Age: 56
End: 2021-03-31

## 2021-04-01 RX ORDER — PREDNISONE 20 MG/1
20 TABLET ORAL
Refills: 0 | Status: DISCONTINUED | COMMUNITY
End: 2021-04-01

## 2021-04-04 ENCOUNTER — INPATIENT (INPATIENT)
Facility: HOSPITAL | Age: 56
LOS: 4 days | Discharge: HOME | DRG: 247 | End: 2021-04-09
Attending: SURGERY | Admitting: SURGERY
Payer: MEDICAID

## 2021-04-04 VITALS
RESPIRATION RATE: 16 BRPM | OXYGEN SATURATION: 100 % | WEIGHT: 160.06 LBS | TEMPERATURE: 98 F | SYSTOLIC BLOOD PRESSURE: 152 MMHG | HEIGHT: 64 IN | HEART RATE: 78 BPM | DIASTOLIC BLOOD PRESSURE: 96 MMHG

## 2021-04-04 DIAGNOSIS — Z98.891 HISTORY OF UTERINE SCAR FROM PREVIOUS SURGERY: Chronic | ICD-10-CM

## 2021-04-04 DIAGNOSIS — Z90.5 ACQUIRED ABSENCE OF KIDNEY: Chronic | ICD-10-CM

## 2021-04-04 LAB
BASOPHILS # BLD AUTO: 0.04 K/UL — SIGNIFICANT CHANGE UP (ref 0–0.2)
BASOPHILS NFR BLD AUTO: 0.2 % — SIGNIFICANT CHANGE UP (ref 0–2)
EOSINOPHIL # BLD AUTO: 0 K/UL — SIGNIFICANT CHANGE UP (ref 0–0.5)
EOSINOPHIL NFR BLD AUTO: 0 % — SIGNIFICANT CHANGE UP (ref 0–6)
HCT VFR BLD CALC: 39.5 % — SIGNIFICANT CHANGE UP (ref 34.5–45)
HGB BLD-MCNC: 13.3 G/DL — SIGNIFICANT CHANGE UP (ref 11.5–15.5)
IMM GRANULOCYTES NFR BLD AUTO: 1 % — SIGNIFICANT CHANGE UP (ref 0–1.5)
LYMPHOCYTES # BLD AUTO: 0.56 K/UL — LOW (ref 1–3.3)
LYMPHOCYTES # BLD AUTO: 2.8 % — LOW (ref 13–44)
MCHC RBC-ENTMCNC: 30.3 PG — SIGNIFICANT CHANGE UP (ref 27–34)
MCHC RBC-ENTMCNC: 33.7 GM/DL — SIGNIFICANT CHANGE UP (ref 32–36)
MCV RBC AUTO: 90 FL — SIGNIFICANT CHANGE UP (ref 80–100)
MONOCYTES # BLD AUTO: 0.4 K/UL — SIGNIFICANT CHANGE UP (ref 0–0.9)
MONOCYTES NFR BLD AUTO: 2 % — SIGNIFICANT CHANGE UP (ref 2–14)
NEUTROPHILS # BLD AUTO: 18.56 K/UL — HIGH (ref 1.8–7.4)
NEUTROPHILS NFR BLD AUTO: 94 % — HIGH (ref 43–77)
PLATELET # BLD AUTO: 266 K/UL — SIGNIFICANT CHANGE UP (ref 150–400)
RBC # BLD: 4.39 M/UL — SIGNIFICANT CHANGE UP (ref 3.8–5.2)
RBC # FLD: 18.1 % — HIGH (ref 10.3–14.5)
WBC # BLD: 19.75 K/UL — HIGH (ref 3.8–10.5)
WBC # FLD AUTO: 19.75 K/UL — HIGH (ref 3.8–10.5)

## 2021-04-04 PROCEDURE — 99285 EMERGENCY DEPT VISIT HI MDM: CPT

## 2021-04-04 PROCEDURE — 93010 ELECTROCARDIOGRAM REPORT: CPT

## 2021-04-04 RX ORDER — SODIUM CHLORIDE 9 MG/ML
1000 INJECTION INTRAMUSCULAR; INTRAVENOUS; SUBCUTANEOUS ONCE
Refills: 0 | Status: COMPLETED | OUTPATIENT
Start: 2021-04-04 | End: 2021-04-04

## 2021-04-04 RX ORDER — ONDANSETRON 8 MG/1
4 TABLET, FILM COATED ORAL ONCE
Refills: 0 | Status: COMPLETED | OUTPATIENT
Start: 2021-04-04 | End: 2021-04-04

## 2021-04-04 RX ORDER — MORPHINE SULFATE 50 MG/1
4 CAPSULE, EXTENDED RELEASE ORAL ONCE
Refills: 0 | Status: DISCONTINUED | OUTPATIENT
Start: 2021-04-04 | End: 2021-04-04

## 2021-04-04 RX ADMIN — ONDANSETRON 4 MILLIGRAM(S): 8 TABLET, FILM COATED ORAL at 23:52

## 2021-04-04 RX ADMIN — SODIUM CHLORIDE 2000 MILLILITER(S): 9 INJECTION INTRAMUSCULAR; INTRAVENOUS; SUBCUTANEOUS at 23:52

## 2021-04-04 RX ADMIN — MORPHINE SULFATE 4 MILLIGRAM(S): 50 CAPSULE, EXTENDED RELEASE ORAL at 23:52

## 2021-04-04 NOTE — ED ADULT TRIAGE NOTE - CHIEF COMPLAINT QUOTE
Pt BIBEMS c/o abd pain. +N/V denies diarrhea. Reports pain started after eating chicken, and sweet potato

## 2021-04-04 NOTE — ED PROVIDER NOTE - CLINICAL SUMMARY MEDICAL DECISION MAKING FREE TEXT BOX
Pt with recent surgery now with diffuse abd pain, distension. Risk for small bowel obstruction. Plan: pain control, labs, IV fluid, CT abd pelvis with IV and oral contrast, and consult with Dr. Stanley.

## 2021-04-04 NOTE — ED PROVIDER NOTE - OBJECTIVE STATEMENT
56 y/o female with PMHx of hypothyroidism, HTN, asthma, s/p , s/p right nephrectomy, small bowel perforation, small bowel resection presents to ED for abdominal pain, n/v. Pt reports 3-4 days of abdomen feeling hard, distended. Reports severe pain after eating. Last night pain worsened. Ate a few pieces of boiled chicken tonight at 6:30 and by 7 began vomiting and has not stopped. Vomited x10 times. Vomit does not have blood nor is it a particular color, just the food that she had recently eaten. Admitted from - with small bowel perforation and had small bowel resection with Dr. Stanley. Allergies: Cipro, penicillin.

## 2021-04-05 DIAGNOSIS — R18.8 OTHER ASCITES: ICD-10-CM

## 2021-04-05 DIAGNOSIS — K56.609 UNSPECIFIED INTESTINAL OBSTRUCTION, UNSPECIFIED AS TO PARTIAL VERSUS COMPLETE OBSTRUCTION: ICD-10-CM

## 2021-04-05 LAB
ALBUMIN SERPL ELPH-MCNC: 3 G/DL — LOW (ref 3.3–5)
ALP SERPL-CCNC: 92 U/L — SIGNIFICANT CHANGE UP (ref 40–120)
ALT FLD-CCNC: 43 U/L — SIGNIFICANT CHANGE UP (ref 12–78)
ANION GAP SERPL CALC-SCNC: 7 MMOL/L — SIGNIFICANT CHANGE UP (ref 5–17)
ANION GAP SERPL CALC-SCNC: 8 MMOL/L — SIGNIFICANT CHANGE UP (ref 5–17)
APTT BLD: 26.2 SEC — LOW (ref 27.5–35.5)
AST SERPL-CCNC: 63 U/L — HIGH (ref 15–37)
BILIRUB SERPL-MCNC: 0.6 MG/DL — SIGNIFICANT CHANGE UP (ref 0.2–1.2)
BUN SERPL-MCNC: 23 MG/DL — SIGNIFICANT CHANGE UP (ref 7–23)
BUN SERPL-MCNC: 28 MG/DL — HIGH (ref 7–23)
CALCIUM SERPL-MCNC: 8.7 MG/DL — SIGNIFICANT CHANGE UP (ref 8.5–10.1)
CALCIUM SERPL-MCNC: 9.7 MG/DL — SIGNIFICANT CHANGE UP (ref 8.5–10.1)
CHLORIDE SERPL-SCNC: 103 MMOL/L — SIGNIFICANT CHANGE UP (ref 96–108)
CHLORIDE SERPL-SCNC: 109 MMOL/L — HIGH (ref 96–108)
CO2 SERPL-SCNC: 25 MMOL/L — SIGNIFICANT CHANGE UP (ref 22–31)
CO2 SERPL-SCNC: 27 MMOL/L — SIGNIFICANT CHANGE UP (ref 22–31)
CREAT SERPL-MCNC: 0.41 MG/DL — LOW (ref 0.5–1.3)
CREAT SERPL-MCNC: 0.67 MG/DL — SIGNIFICANT CHANGE UP (ref 0.5–1.3)
GLUCOSE SERPL-MCNC: 108 MG/DL — HIGH (ref 70–99)
GLUCOSE SERPL-MCNC: 124 MG/DL — HIGH (ref 70–99)
HCT VFR BLD CALC: 33.2 % — LOW (ref 34.5–45)
HGB BLD-MCNC: 10.9 G/DL — LOW (ref 11.5–15.5)
INR BLD: 0.96 RATIO — SIGNIFICANT CHANGE UP (ref 0.88–1.16)
LACTATE SERPL-SCNC: 1.7 MMOL/L — SIGNIFICANT CHANGE UP (ref 0.7–2)
LIDOCAIN IGE QN: 143 U/L — SIGNIFICANT CHANGE UP (ref 73–393)
MAGNESIUM SERPL-MCNC: 2.1 MG/DL — SIGNIFICANT CHANGE UP (ref 1.6–2.6)
MCHC RBC-ENTMCNC: 30.5 PG — SIGNIFICANT CHANGE UP (ref 27–34)
MCHC RBC-ENTMCNC: 32.8 GM/DL — SIGNIFICANT CHANGE UP (ref 32–36)
MCV RBC AUTO: 93 FL — SIGNIFICANT CHANGE UP (ref 80–100)
PHOSPHATE SERPL-MCNC: 3 MG/DL — SIGNIFICANT CHANGE UP (ref 2.5–4.5)
PLATELET # BLD AUTO: 213 K/UL — SIGNIFICANT CHANGE UP (ref 150–400)
POTASSIUM SERPL-MCNC: 3.7 MMOL/L — SIGNIFICANT CHANGE UP (ref 3.5–5.3)
POTASSIUM SERPL-MCNC: 4.4 MMOL/L — SIGNIFICANT CHANGE UP (ref 3.5–5.3)
POTASSIUM SERPL-SCNC: 3.7 MMOL/L — SIGNIFICANT CHANGE UP (ref 3.5–5.3)
POTASSIUM SERPL-SCNC: 4.4 MMOL/L — SIGNIFICANT CHANGE UP (ref 3.5–5.3)
PROT SERPL-MCNC: 7.3 GM/DL — SIGNIFICANT CHANGE UP (ref 6–8.3)
PROTHROM AB SERPL-ACNC: 11.3 SEC — SIGNIFICANT CHANGE UP (ref 10.6–13.6)
RBC # BLD: 3.57 M/UL — LOW (ref 3.8–5.2)
RBC # FLD: 18.2 % — HIGH (ref 10.3–14.5)
SARS-COV-2 RNA SPEC QL NAA+PROBE: SIGNIFICANT CHANGE UP
SODIUM SERPL-SCNC: 136 MMOL/L — SIGNIFICANT CHANGE UP (ref 135–145)
SODIUM SERPL-SCNC: 143 MMOL/L — SIGNIFICANT CHANGE UP (ref 135–145)
WBC # BLD: 11.38 K/UL — HIGH (ref 3.8–10.5)
WBC # FLD AUTO: 11.38 K/UL — HIGH (ref 3.8–10.5)

## 2021-04-05 PROCEDURE — 74177 CT ABD & PELVIS W/CONTRAST: CPT | Mod: 26

## 2021-04-05 PROCEDURE — 87077 CULTURE AEROBIC IDENTIFY: CPT

## 2021-04-05 PROCEDURE — C1889: CPT

## 2021-04-05 PROCEDURE — 99223 1ST HOSP IP/OBS HIGH 75: CPT

## 2021-04-05 PROCEDURE — 49406 IMAGE CATH FLUID PERI/RETRO: CPT

## 2021-04-05 PROCEDURE — 85610 PROTHROMBIN TIME: CPT

## 2021-04-05 PROCEDURE — 80048 BASIC METABOLIC PNL TOTAL CA: CPT

## 2021-04-05 PROCEDURE — 74018 RADEX ABDOMEN 1 VIEW: CPT

## 2021-04-05 PROCEDURE — 74018 RADEX ABDOMEN 1 VIEW: CPT | Mod: 26

## 2021-04-05 PROCEDURE — 85027 COMPLETE CBC AUTOMATED: CPT

## 2021-04-05 PROCEDURE — 71045 X-RAY EXAM CHEST 1 VIEW: CPT | Mod: 26

## 2021-04-05 PROCEDURE — 85652 RBC SED RATE AUTOMATED: CPT

## 2021-04-05 PROCEDURE — 86140 C-REACTIVE PROTEIN: CPT

## 2021-04-05 PROCEDURE — 86255 FLUORESCENT ANTIBODY SCREEN: CPT

## 2021-04-05 PROCEDURE — 87075 CULTR BACTERIA EXCEPT BLOOD: CPT

## 2021-04-05 PROCEDURE — 94640 AIRWAY INHALATION TREATMENT: CPT

## 2021-04-05 PROCEDURE — C1729: CPT

## 2021-04-05 PROCEDURE — C9113: CPT

## 2021-04-05 PROCEDURE — 36415 COLL VENOUS BLD VENIPUNCTURE: CPT

## 2021-04-05 PROCEDURE — 85730 THROMBOPLASTIN TIME PARTIAL: CPT

## 2021-04-05 PROCEDURE — C1769: CPT

## 2021-04-05 PROCEDURE — 83735 ASSAY OF MAGNESIUM: CPT

## 2021-04-05 PROCEDURE — 87186 SC STD MICRODIL/AGAR DIL: CPT

## 2021-04-05 PROCEDURE — 86769 SARS-COV-2 COVID-19 ANTIBODY: CPT

## 2021-04-05 PROCEDURE — 99222 1ST HOSP IP/OBS MODERATE 55: CPT

## 2021-04-05 PROCEDURE — 87070 CULTURE OTHR SPECIMN AEROBIC: CPT

## 2021-04-05 PROCEDURE — 84100 ASSAY OF PHOSPHORUS: CPT

## 2021-04-05 RX ORDER — LEVOTHYROXINE SODIUM 125 MCG
44 TABLET ORAL AT BEDTIME
Refills: 0 | Status: DISCONTINUED | OUTPATIENT
Start: 2021-04-05 | End: 2021-04-09

## 2021-04-05 RX ORDER — PANTOPRAZOLE SODIUM 20 MG/1
40 TABLET, DELAYED RELEASE ORAL DAILY
Refills: 0 | Status: DISCONTINUED | OUTPATIENT
Start: 2021-04-05 | End: 2021-04-09

## 2021-04-05 RX ORDER — ONDANSETRON 8 MG/1
4 TABLET, FILM COATED ORAL EVERY 6 HOURS
Refills: 0 | Status: DISCONTINUED | OUTPATIENT
Start: 2021-04-05 | End: 2021-04-09

## 2021-04-05 RX ORDER — ENOXAPARIN SODIUM 100 MG/ML
40 INJECTION SUBCUTANEOUS DAILY
Refills: 0 | Status: DISCONTINUED | OUTPATIENT
Start: 2021-04-05 | End: 2021-04-09

## 2021-04-05 RX ORDER — ENOXAPARIN SODIUM 100 MG/ML
40 INJECTION SUBCUTANEOUS DAILY
Refills: 0 | Status: DISCONTINUED | OUTPATIENT
Start: 2021-04-05 | End: 2021-04-05

## 2021-04-05 RX ORDER — BUDESONIDE AND FORMOTEROL FUMARATE DIHYDRATE 160; 4.5 UG/1; UG/1
2 AEROSOL RESPIRATORY (INHALATION)
Refills: 0 | Status: DISCONTINUED | OUTPATIENT
Start: 2021-04-05 | End: 2021-04-09

## 2021-04-05 RX ORDER — GABAPENTIN 400 MG/1
600 CAPSULE ORAL THREE TIMES A DAY
Refills: 0 | Status: DISCONTINUED | OUTPATIENT
Start: 2021-04-05 | End: 2021-04-09

## 2021-04-05 RX ORDER — FENTANYL CITRATE 50 UG/ML
50 INJECTION INTRAVENOUS
Refills: 0 | Status: DISCONTINUED | OUTPATIENT
Start: 2021-04-05 | End: 2021-04-05

## 2021-04-05 RX ORDER — DEXTROSE MONOHYDRATE, SODIUM CHLORIDE, AND POTASSIUM CHLORIDE 50; .745; 4.5 G/1000ML; G/1000ML; G/1000ML
1000 INJECTION, SOLUTION INTRAVENOUS
Refills: 0 | Status: DISCONTINUED | OUTPATIENT
Start: 2021-04-05 | End: 2021-04-07

## 2021-04-05 RX ORDER — DILTIAZEM HCL 120 MG
240 CAPSULE, EXT RELEASE 24 HR ORAL DAILY
Refills: 0 | Status: DISCONTINUED | OUTPATIENT
Start: 2021-04-05 | End: 2021-04-09

## 2021-04-05 RX ORDER — SODIUM CHLORIDE 9 MG/ML
1000 INJECTION, SOLUTION INTRAVENOUS
Refills: 0 | Status: DISCONTINUED | OUTPATIENT
Start: 2021-04-05 | End: 2021-04-05

## 2021-04-05 RX ORDER — OXYCODONE HYDROCHLORIDE 5 MG/1
5 TABLET ORAL ONCE
Refills: 0 | Status: DISCONTINUED | OUTPATIENT
Start: 2021-04-05 | End: 2021-04-05

## 2021-04-05 RX ORDER — ACETAMINOPHEN 500 MG
1000 TABLET ORAL ONCE
Refills: 0 | Status: DISCONTINUED | OUTPATIENT
Start: 2021-04-05 | End: 2021-04-05

## 2021-04-05 RX ORDER — SODIUM CHLORIDE 9 MG/ML
1000 INJECTION INTRAMUSCULAR; INTRAVENOUS; SUBCUTANEOUS ONCE
Refills: 0 | Status: COMPLETED | OUTPATIENT
Start: 2021-04-05 | End: 2021-04-05

## 2021-04-05 RX ORDER — DEXAMETHASONE 0.5 MG/5ML
2.5 ELIXIR ORAL THREE TIMES A DAY
Refills: 0 | Status: DISCONTINUED | OUTPATIENT
Start: 2021-04-05 | End: 2021-04-05

## 2021-04-05 RX ORDER — HYDROCORTISONE 20 MG
30 TABLET ORAL
Refills: 0 | Status: DISCONTINUED | OUTPATIENT
Start: 2021-04-05 | End: 2021-04-05

## 2021-04-05 RX ORDER — ONDANSETRON 8 MG/1
4 TABLET, FILM COATED ORAL ONCE
Refills: 0 | Status: DISCONTINUED | OUTPATIENT
Start: 2021-04-05 | End: 2021-04-05

## 2021-04-05 RX ORDER — DULOXETINE HYDROCHLORIDE 30 MG/1
20 CAPSULE, DELAYED RELEASE ORAL DAILY
Refills: 0 | Status: DISCONTINUED | OUTPATIENT
Start: 2021-04-05 | End: 2021-04-09

## 2021-04-05 RX ORDER — KETOROLAC TROMETHAMINE 30 MG/ML
15 SYRINGE (ML) INJECTION EVERY 8 HOURS
Refills: 0 | Status: DISCONTINUED | OUTPATIENT
Start: 2021-04-05 | End: 2021-04-09

## 2021-04-05 RX ADMIN — Medication 44 MICROGRAM(S): at 22:34

## 2021-04-05 RX ADMIN — SODIUM CHLORIDE 125 MILLILITER(S): 9 INJECTION, SOLUTION INTRAVENOUS at 15:24

## 2021-04-05 RX ADMIN — PANTOPRAZOLE SODIUM 40 MILLIGRAM(S): 20 TABLET, DELAYED RELEASE ORAL at 16:31

## 2021-04-05 RX ADMIN — DEXTROSE MONOHYDRATE, SODIUM CHLORIDE, AND POTASSIUM CHLORIDE 75 MILLILITER(S): 50; .745; 4.5 INJECTION, SOLUTION INTRAVENOUS at 05:27

## 2021-04-05 RX ADMIN — DEXTROSE MONOHYDRATE, SODIUM CHLORIDE, AND POTASSIUM CHLORIDE 75 MILLILITER(S): 50; .745; 4.5 INJECTION, SOLUTION INTRAVENOUS at 22:35

## 2021-04-05 RX ADMIN — Medication 30 MILLIGRAM(S): at 22:34

## 2021-04-05 RX ADMIN — SODIUM CHLORIDE 1000 MILLILITER(S): 9 INJECTION INTRAMUSCULAR; INTRAVENOUS; SUBCUTANEOUS at 01:36

## 2021-04-05 RX ADMIN — Medication 1 MILLIGRAM(S): at 02:35

## 2021-04-05 RX ADMIN — BUDESONIDE AND FORMOTEROL FUMARATE DIHYDRATE 2 PUFF(S): 160; 4.5 AEROSOL RESPIRATORY (INHALATION) at 21:57

## 2021-04-05 RX ADMIN — Medication 15 MILLIGRAM(S): at 09:21

## 2021-04-05 RX ADMIN — GABAPENTIN 600 MILLIGRAM(S): 400 CAPSULE ORAL at 16:29

## 2021-04-05 RX ADMIN — GABAPENTIN 600 MILLIGRAM(S): 400 CAPSULE ORAL at 22:33

## 2021-04-05 RX ADMIN — Medication 2.5 MILLIGRAM(S): at 16:30

## 2021-04-05 RX ADMIN — Medication 240 MILLIGRAM(S): at 16:29

## 2021-04-05 RX ADMIN — DULOXETINE HYDROCHLORIDE 20 MILLIGRAM(S): 30 CAPSULE, DELAYED RELEASE ORAL at 22:34

## 2021-04-05 RX ADMIN — ENOXAPARIN SODIUM 40 MILLIGRAM(S): 100 INJECTION SUBCUTANEOUS at 09:20

## 2021-04-05 RX ADMIN — MORPHINE SULFATE 4 MILLIGRAM(S): 50 CAPSULE, EXTENDED RELEASE ORAL at 01:36

## 2021-04-05 NOTE — ED PEDIATRIC NURSE REASSESSMENT NOTE - NS ED NURSE REASSESS COMMENT FT2
16fr NGT placed to L nare, approximately 300 ml of green bile received upon insertion. Pt tolerated procedure well.

## 2021-04-05 NOTE — H&P ADULT - ASSESSMENT
56 y/o female with PMHx of hypothyroidism, HTN, asthma, s/p , s/p right nephrectomy, small bowel perforation w/ mid-jejunal resection and primary anastamosis  presents to ED for 3-4 days of abdominal pain, nausea and emesis x10 found to have SBO on CT imaging    Plan:  Admit to surgery service under Dr. Stanley  Pain control PRN  NGT to LWS   NPO  Will obtain IR consult in AM for pelvic fluid collection  Med Rec  DVT ppx    Plan discussed with Dr. Stanley

## 2021-04-05 NOTE — H&P ADULT - NSHPPHYSICALEXAM_GEN_ALL_CORE
Physical Exam:  Pt is AAOx3  General: Well developed, in no acute distress.   Chest: Lungs clear  Heart: RRR  Abdomen: Soft, Bilateral lower quadrant tenderness; nondistended  Back: Normal curvature, no tenderness.   Neuro: Physiological, no localizing findings.   Skin: Normal, no rashes, no lesions noted.   Extremities: Warm, well perfused, no edema, Pulses intact

## 2021-04-05 NOTE — DIETITIAN INITIAL EVALUATION ADULT. - ADD RECOMMEND
1) liberalize diet to regular 2) encourage protein enriched foods w/ all meals 3) MVI w/ minerals daily to meet RDI;s 4) Ensure max 8oz po x 1 daily (30gm protein) and Gelatein plus jello po BID (40gm protein) Advance diet to clear liquids to low fiber when medically feasible, and as tolerated.  If not possible, nutritional support may need to be considered.  NPO x 1.5 days. Will monitor  labs and weight.

## 2021-04-05 NOTE — CONSULT NOTE ADULT - SUBJECTIVE AND OBJECTIVE BOX
Patient is a 55y old  Female who presents with a chief complaint of SBO (2021 11:13)    HPI:  56 y/o female with PMHx of hypothyroidism, HTN, asthma, s/p , s/p right nephrectomy, small bowel perforation, small bowel resection presents to ED for abdominal pain, n/v.  Reports feeling better today now that NGT is in place, less abdominal distention but no bowel sounds or bms.  Also upset about hemorrhoid that is not going away.  Was going to set up colonoscopy w/ Dr. Winchester as outpatient in several weeks.     PAST MEDICAL & SURGICAL HISTORY:  Asthma    Sciatica    HTN (hypertension)    Hypothyroid    H/O autoimmune disorder    H/O right nephrectomy    H/O  section      MEDICATIONS  (STANDING):  budesonide 160 MICROgram(s)/formoterol 4.5 MICROgram(s) Inhaler 2 Puff(s) Inhalation two times a day  dexAMETHasone  Injectable 2.5 milliGRAM(s) IV Push three times a day  dextrose 5% + sodium chloride 0.45% with potassium chloride 20 mEq/L 1000 milliLiter(s) (75 mL/Hr) IV Continuous <Continuous>  diltiazem    milliGRAM(s) Oral daily  DULoxetine 20 milliGRAM(s) Oral daily  gabapentin 600 milliGRAM(s) Oral three times a day  levothyroxine Injectable 44 MICROGram(s) IV Push at bedtime  pantoprazole  Injectable 40 milliGRAM(s) IV Push daily    MEDICATIONS  (PRN):  ketorolac   Injectable 15 milliGRAM(s) IV Push every 8 hours PRN Moderate Pain (4 - 6)  ondansetron Injectable 4 milliGRAM(s) IV Push every 6 hours PRN Nausea    Allergies    ciprofloxacin (Unknown)  penicillin (Unknown)    Intolerances      SOCIAL HISTORY:  FAMILY HISTORY:    REVIEW OF SYSTEMS:  CONSTITUTIONAL: No weakness, fevers or chills  EYES/ENT: No visual changes;  No vertigo or throat pain   NECK: No pain or stiffness  RESPIRATORY: No cough, wheezing, hemoptysis; No shortness of breath  CARDIOVASCULAR: No chest pain or palpitations  GASTROINTESTINAL: Per HPI.   GENITOURINARY: No dysuria, frequency or hematuria  NEUROLOGICAL: No numbness or weakness  SKIN: No itching, burning, rashes, or lesions   PSYCH: Normal mood and affect  All other review of systems is negative unless indicated above.  Vital Signs Last 24 Hrs  T(C): 36.4 (2021 07:50), Max: 36.7 (2021 04:06)  T(F): 97.6 (2021 07:50), Max: 98.1 (2021 04:06)  HR: 58 (2021 07:50) (58 - 85)  BP: 117/77 (2021 07:50) (117/77 - 152/96)  BP(mean): --  RR: 17 (2021 07:50) (16 - 17)  SpO2: 93% (2021 07:50) (93% - 100%)    PHYSICAL EXAM:  Constitutional: NAD, well-developed  HEENT: MMM  Neck: No LAD  Respiratory: CTAB  Cardiovascular: S1 and S2, RRR, no M/G/R  Gastrointestinal: BS+, soft, NT/ND, NGT draining  Extremities: No peripheral edema  Vascular: 2+ peripheral pulses  Neurological: A/O x 3, no focal deficits  Psychiatric: Normal mood, normal affect  Skin: No rashes    LABS:                        10.9   11.38 )-----------( 213      ( 2021 07:18 )             33.2     04-05    143  |  109<H>  |  23  ----------------------------<  108<H>  3.7   |  27  |  0.41<L>    Ca    8.7      2021 07:18  Phos  3.0     04-05  Mg     2.1     04-05    TPro  7.3  /  Alb  3.0<L>  /  TBili  0.6  /  DBili  x   /  AST  63<H>  /  ALT  43  /  AlkPhos  92  04-04    PT/INR - ( 2021 11:14 )   PT: 11.3 sec;   INR: 0.96 ratio         PTT - ( 2021 11:14 )  PTT:26.2 sec  LIVER FUNCTIONS - ( 2021 23:41 )  Alb: 3.0 g/dL / Pro: 7.3 gm/dL / ALK PHOS: 92 U/L / ALT: 43 U/L / AST: 63 U/L / GGT: x             RADIOLOGY & ADDITIONAL STUDIES: Patient is a 55y old  Female who presents with a chief complaint of SBO (2021 11:13)    HPI:  54 y/o female with PMHx of hypothyroidism, HTN, asthma, s/p , s/p right nephrectomy, small bowel perforation, small bowel resection presents to ED for abdominal pain, n/v.  Reports feeling better today now that NGT is in place, less abdominal distention but no bowel sounds or bms.  Also upset about hemorrhoid that is not going away.  Was going to set up colonoscopy w/ Dr. Winchester as outpatient in several weeks.     PAST MEDICAL & SURGICAL HISTORY:  Asthma    Sciatica    HTN (hypertension)    Hypothyroid    H/O autoimmune disorder    H/O right nephrectomy    H/O  section      MEDICATIONS  (STANDING):  budesonide 160 MICROgram(s)/formoterol 4.5 MICROgram(s) Inhaler 2 Puff(s) Inhalation two times a day  dexAMETHasone  Injectable 2.5 milliGRAM(s) IV Push three times a day  dextrose 5% + sodium chloride 0.45% with potassium chloride 20 mEq/L 1000 milliLiter(s) (75 mL/Hr) IV Continuous <Continuous>  diltiazem    milliGRAM(s) Oral daily  DULoxetine 20 milliGRAM(s) Oral daily  gabapentin 600 milliGRAM(s) Oral three times a day  levothyroxine Injectable 44 MICROGram(s) IV Push at bedtime  pantoprazole  Injectable 40 milliGRAM(s) IV Push daily    MEDICATIONS  (PRN):  ketorolac   Injectable 15 milliGRAM(s) IV Push every 8 hours PRN Moderate Pain (4 - 6)  ondansetron Injectable 4 milliGRAM(s) IV Push every 6 hours PRN Nausea    Allergies    ciprofloxacin (Unknown)  penicillin (Unknown)    Intolerances      SOCIAL HISTORY:  FAMILY HISTORY:    REVIEW OF SYSTEMS:  CONSTITUTIONAL: No weakness, fevers or chills  EYES/ENT: No visual changes;  No vertigo or throat pain   NECK: No pain or stiffness  RESPIRATORY: No cough, wheezing, hemoptysis; No shortness of breath  CARDIOVASCULAR: No chest pain or palpitations  GASTROINTESTINAL: Per HPI.   GENITOURINARY: No dysuria, frequency or hematuria  NEUROLOGICAL: No numbness or weakness  SKIN: No itching, burning, rashes, or lesions   PSYCH: Normal mood and affect  All other review of systems is negative unless indicated above.  Vital Signs Last 24 Hrs  T(C): 36.4 (2021 07:50), Max: 36.7 (2021 04:06)  T(F): 97.6 (2021 07:50), Max: 98.1 (2021 04:06)  HR: 58 (2021 07:50) (58 - 85)  BP: 117/77 (2021 07:50) (117/77 - 152/96)  BP(mean): --  RR: 17 (2021 07:50) (16 - 17)  SpO2: 93% (2021 07:50) (93% - 100%)    PHYSICAL EXAM:  Constitutional: NAD, well-developed  HEENT: MMM  Neck: No LAD  Respiratory: CTAB  Cardiovascular: S1 and S2, RRR, no M/G/R  Gastrointestinal:  softly distended, mildly tender, No BS, NGT draining  Extremities: No peripheral edema  Vascular: 2+ peripheral pulses  Neurological: A/O x 3, no focal deficits  Psychiatric: Normal mood, normal affect  Skin: No rashes    LABS:                        10.9   11.38 )-----------( 213      ( 2021 07:18 )             33.2     04-05    143  |  109<H>  |  23  ----------------------------<  108<H>  3.7   |  27  |  0.41<L>    Ca    8.7      2021 07:18  Phos  3.0     04-05  Mg     2.1     04-05    TPro  7.3  /  Alb  3.0<L>  /  TBili  0.6  /  DBili  x   /  AST  63<H>  /  ALT  43  /  AlkPhos  92  04-04    PT/INR - ( 2021 11:14 )   PT: 11.3 sec;   INR: 0.96 ratio         PTT - ( 2021 11:14 )  PTT:26.2 sec  LIVER FUNCTIONS - ( 2021 23:41 )  Alb: 3.0 g/dL / Pro: 7.3 gm/dL / ALK PHOS: 92 U/L / ALT: 43 U/L / AST: 63 U/L / GGT: x             RADIOLOGY & ADDITIONAL STUDIES: Patient is a 55y old  Female who presents with a chief complaint of SBO (2021 11:13)    HPI:  54 y/o female with PMHx of hypothyroidism, HTN, asthma, s/p , s/p right nephrectomy, abdominal pain s/p work up found to have vasculitis, small bowel perforation, small bowel resection presents to ED for abdominal pain, n/v.  Reports feeling better today now that NGT is in place, less abdominal distention but no bowel sounds or bms.  Also upset about hemorrhoid that is not going away.  Was going to set up colonoscopy w/ Dr. Winchester as outpatient in several weeks.     PAST MEDICAL & SURGICAL HISTORY:  Asthma    Sciatica    HTN (hypertension)    Hypothyroid    H/O autoimmune disorder    H/O right nephrectomy    H/O  section      MEDICATIONS  (STANDING):  budesonide 160 MICROgram(s)/formoterol 4.5 MICROgram(s) Inhaler 2 Puff(s) Inhalation two times a day  dexAMETHasone  Injectable 2.5 milliGRAM(s) IV Push three times a day  dextrose 5% + sodium chloride 0.45% with potassium chloride 20 mEq/L 1000 milliLiter(s) (75 mL/Hr) IV Continuous <Continuous>  diltiazem    milliGRAM(s) Oral daily  DULoxetine 20 milliGRAM(s) Oral daily  gabapentin 600 milliGRAM(s) Oral three times a day  levothyroxine Injectable 44 MICROGram(s) IV Push at bedtime  pantoprazole  Injectable 40 milliGRAM(s) IV Push daily    MEDICATIONS  (PRN):  ketorolac   Injectable 15 milliGRAM(s) IV Push every 8 hours PRN Moderate Pain (4 - 6)  ondansetron Injectable 4 milliGRAM(s) IV Push every 6 hours PRN Nausea    Allergies    ciprofloxacin (Unknown)  penicillin (Unknown)    Intolerances      SOCIAL HISTORY:  FAMILY HISTORY:    REVIEW OF SYSTEMS:  CONSTITUTIONAL: No weakness, fevers or chills  EYES/ENT: No visual changes;  No vertigo or throat pain   NECK: No pain or stiffness  RESPIRATORY: No cough, wheezing, hemoptysis; No shortness of breath  CARDIOVASCULAR: No chest pain or palpitations  GASTROINTESTINAL: Per HPI.   GENITOURINARY: No dysuria, frequency or hematuria  NEUROLOGICAL: No numbness or weakness  SKIN: No itching, burning, rashes, or lesions   PSYCH: Normal mood and affect  All other review of systems is negative unless indicated above.  Vital Signs Last 24 Hrs  T(C): 36.4 (2021 07:50), Max: 36.7 (2021 04:06)  T(F): 97.6 (2021 07:50), Max: 98.1 (2021 04:06)  HR: 58 (2021 07:50) (58 - 85)  BP: 117/77 (2021 07:50) (117/77 - 152/96)  BP(mean): --  RR: 17 (2021 07:50) (16 - 17)  SpO2: 93% (2021 07:50) (93% - 100%)    PHYSICAL EXAM:  Constitutional: NAD, well-developed  HEENT: MMM  Neck: No LAD  Respiratory: CTAB  Cardiovascular: S1 and S2, RRR, no M/G/R  Gastrointestinal:  softly distended, mildly tender, No BS, NGT draining  Extremities: No peripheral edema  Vascular: 2+ peripheral pulses  Neurological: A/O x 3, no focal deficits  Psychiatric: Normal mood, normal affect  Skin: No rashes    LABS:                        10.9   11.38 )-----------( 213      ( 2021 07:18 )             33.2     04-05    143  |  109<H>  |  23  ----------------------------<  108<H>  3.7   |  27  |  0.41<L>    Ca    8.7      2021 07:18  Phos  3.0     04-05  Mg     2.1     04-05    TPro  7.3  /  Alb  3.0<L>  /  TBili  0.6  /  DBili  x   /  AST  63<H>  /  ALT  43  /  AlkPhos  92  04-04    PT/INR - ( 2021 11:14 )   PT: 11.3 sec;   INR: 0.96 ratio         PTT - ( 2021 11:14 )  PTT:26.2 sec  LIVER FUNCTIONS - ( 2021 23:41 )  Alb: 3.0 g/dL / Pro: 7.3 gm/dL / ALK PHOS: 92 U/L / ALT: 43 U/L / AST: 63 U/L / GGT: x             RADIOLOGY & ADDITIONAL STUDIES: CT reviewed in detail, pelvic fluid collection

## 2021-04-05 NOTE — DIETITIAN INITIAL EVALUATION ADULT. - NUTRITIONGOAL OUTCOME1
Diet advance when medically feasible, else nutritional support, Pt will absorb 80% nutritional needs

## 2021-04-05 NOTE — CONSULT NOTE ADULT - SUBJECTIVE AND OBJECTIVE BOX
Chief Complaint:  Patient is a 55y old  Female who presents with a chief complaint of pelvic collection    HPI:  54 y/o female with PMHx of hypothyroidism, HTN, asthma, s/p , s/p right nephrectomy, small bowel perforation, small bowel resection presents to ED for abdominal pain, n/v. Pt reports 3-4 days of abdomen feeling hard, distended. Pt was also recently admitted for small bowel perforation, now s/p small bowel resection -. Pt found to have SBO with pelvic fluid collection identified on imaging. IR was consulted for drainage.     Allergies  ciprofloxacin (Unknown)  penicillin (Unknown)    MEDICATIONS  (STANDING):  dextrose 5% + sodium chloride 0.45% with potassium chloride 20 mEq/L 1000 milliLiter(s) (75 mL/Hr) IV Continuous <Continuous>    MEDICATIONS  (PRN):  ketorolac   Injectable 15 milliGRAM(s) IV Push every 8 hours PRN Moderate Pain (4 - 6)  ondansetron Injectable 4 milliGRAM(s) IV Push every 6 hours PRN Nausea      PAST MEDICAL & SURGICAL HISTORY:  Asthma    Sciatica    HTN (hypertension)    Hypothyroid    H/O autoimmune disorder    H/O right nephrectomy    H/O  section      Review of Systems:    GENERAL/CONSTITUTIONAL:  The patient denies fever, fatigue, weakness, weight gain or weight loss.    HEAD, EYES, EARS, NOSE AND THROAT: Eyes – The patient denies pain, redness, loss of vision, double or blurred vision, flashing lights or spots, dryness, the feeling that something is in the eye and denies wearing glasses. Ears, nose, mouth and throat. The patient denies ringing in the ears, loss of hearing, nosebleeds, loss of sense of smell, dry sinuses, sinusitis, post nasal drip, sore tongue, bleeding gums, sores in the mouth, loss of sense of taste, dry mouth, dentures or removable dental work, frequent sore throats, hoarseness or constant feeling of a need to clear the throat when nothing is there, waking up with acid or bitter fluid in the mouth or throat, food sticking in throat when swallows or painful swallowing.    CARDIOVASCULAR:  The patient denies chest pain, irregular heartbeats, sudden changes in heartbeat or palpitation, shortness of breath, difficulty breathing at night, swollen legs or feet, heart murmurs, high blood pressure, cramps in his legs with walking, pain in his feet or toes at night or varicose veins.    RESPIRATORY:  The patient denies chronic dry cough, coughing up blood, coughing up mucus, waking at night coughing or choking, repeated pneumonias, wheezing or night sweats.    GASTROINTESTINAL:  The patient denies decreased appetite, nausea, vomiting, vomiting blood or coffee ground material, heartburn, regurgitation, frequent belching, stomach pain relieved by food, yellow jaundice, diarrhea, constipation, gas, blood in the stools, black tarry stools or hemorrhoids.    GENITOURINARY: The patient denies difficult urination, pain or burning with urination, blood in the urine, cloudy or smoky urine, frequent need to urinate, urgency, needing to urinate frequently at night, inability to hold the urine, discharge from the penis, kidney stones, rash or ulcers, sexual difficulties, impotence or prostate trouble, no sexually transmitted diseases.    MUSCULOSKELETAL:  The patient denies arm, buttock, thigh or calf cramps. No joint or muscle pain. No muscle weakness or tenderness. No joint swelling, neck pain, back pain or major orthopedic injuries.    SKIN AND BREASTS: The patient denies easy bruising, skin redness, skin rash, hives, sensitivity to sun exposure, tightness, nodules or bumps, hair loss, color changes in the hands or feet with cold, breast lump, breast pain or nipple discharge.    NEUROLOGIC:  The patient denies headache, dizziness, fainting, muscle spasm, loss of consciousness, sensitivity or pain in the hands and feet or memory loss.    PSYCHIATRIC: The patient denies depression with thoughts of suicide, voices in ?? head telling ?? to do things and has not been seen for psychiatric counseling or treatment.    ENDOCRINE:  The patient denies intolerance to hot or cold temperature, flushing, fingernail changes, increased thirst, increased salt intake or decreased sexual desire.    HEMATOLOGIC/LYMPHATIC: The patient denies anemia, bleeding tendency or clotting tendency.    ALLERGIC/IMMUNOLOGIC:  The patient denies rhinitis, asthma, skin sensitivity, latex allergies or sensitivity.    Physical Exam:  Vital Signs Last 24 Hrs  T(C): 36.4 (2021 07:50), Max: 36.7 (2021 04:06)  T(F): 97.6 (2021 07:50), Max: 98.1 (2021 04:06)  HR: 58 (2021 07:50) (58 - 85)  BP: 117/77 (2021 07:50) (117/77 - 152/96)  BP(mean): --  RR: 17 (2021 07:50) (16 - 17)  SpO2: 93% (2021 07:50) (93% - 100%)    GENERAL APPEARANCE: Well developed, well nourished, in no acute distress.    SKIN: Inspection of the skin reveals no rashes, ulcerations or petechiae.    HEENT: The sclerae were anicteric and conjunctivae were pink and moist. Extraocular movements were intact and pupils were equal, round, and reactive to light with normal accommodation.     NECK: Supple and symmetric. There was no thyroid enlargement, and no tenderness, or masses were felt.    CHEST: Normal AP diameter and normal contour without any kyphoscoliosis.    LUNGS: Auscultation of the lungs revealed normal breath sounds without any other adventitious sounds or rubs.    CARDIOVASCULAR: There was a regular rate and rhythm without any murmurs, gallops, rubs. The carotid pulses were normal and 2+ bilaterally without bruits. Peripheral pulses were 2+ and symmetric.    ABDOMEN: Soft and nontender with normal bowel sounds.     RECTAL: Normal perineal exam. Sphincter tone was normal. There was no external hemorrhoids or rectal masses. Stool Hemoccult was negative. The prostate was normal size without any nodules appreciated (men only).    LYMPH NODES: No lymphadenopathy was appreciated in the neck, axillae or groin.    MUSCULOSKELETAL: Gait was normal. There was no tenderness or effusions noted. Muscle strength and tone were normal.    EXTREMITIES: No cyanosis, clubbing or edema.    NEUROLOGIC: Alert and oriented x 3. Normal affect.     CBC                        10.9   11.38 )-----------( 213      ( 2021 07:18 )             33.2       Chemistry  04-05    143  |  109<H>  |  23  ----------------------------<  108<H>  3.7   |  27  |  0.41<L>    Ca    8.7      2021 07:18  Phos  3.0     04-05  Mg     2.1     04-05    TPro  7.3  /  Alb  3.0<L>  /  TBili  0.6  /  DBili  x   /  AST  63<H>  /  ALT  43  /  AlkPhos  92  -      < from: CT Abdomen and Pelvis w/ Oral Cont and w/ IV Cont (21 @ 01:30) >  IMPRESSION:    1. High-grade small bowel obstruction, transition is at the anastomosis.    2. Complex organized fluid collection in the pelvis, inseparable from the sigmoid. Differential considerations include degenerating hematoma, organized seroma, and developing abscess.    < end of copied text >             Chief Complaint:  Patient is a 55y old  Female who presents with a chief complaint of pelvic collection    HPI:  56 y/o female with PMHx of hypothyroidism, HTN, asthma, s/p , s/p right nephrectomy, small bowel perforation, small bowel resection presents to ED for abdominal pain, n/v. Pt reports 3-4 days of abdomen feeling hard, distended. Pt was also recently admitted for small bowel perforation, now s/p small bowel resection -. Pt found to have SBO with pelvic fluid collection identified on imaging. IR was consulted for drainage. Pt seen at bedside, reported feeling better after NG placement. Denied CP, SOB.    Allergies  ciprofloxacin (Unknown)  penicillin (Unknown)    MEDICATIONS  (STANDING):  dextrose 5% + sodium chloride 0.45% with potassium chloride 20 mEq/L 1000 milliLiter(s) (75 mL/Hr) IV Continuous <Continuous>    MEDICATIONS  (PRN):  ketorolac   Injectable 15 milliGRAM(s) IV Push every 8 hours PRN Moderate Pain (4 - 6)  ondansetron Injectable 4 milliGRAM(s) IV Push every 6 hours PRN Nausea      PAST MEDICAL & SURGICAL HISTORY:  Asthma    Sciatica    HTN (hypertension)    Hypothyroid    H/O autoimmune disorder    H/O right nephrectomy    H/O  section      Review of Systems:    As per HPI    Physical Exam:  Vital Signs Last 24 Hrs  T(C): 36.4 (2021 07:50), Max: 36.7 (2021 04:06)  T(F): 97.6 (2021 07:50), Max: 98.1 (2021 04:06)  HR: 58 (2021 07:50) (58 - 85)  BP: 117/77 (2021 07:50) (117/77 - 152/96)  BP(mean): --  RR: 17 (2021 07:50) (16 - 17)  SpO2: 93% (2021 07:50) (93% - 100%)    GENERAL APPEARANCE: Well developed, well nourished, in no acute distress.    LUNGS: Auscultation of the lungs revealed normal breath sounds without any other adventitious sounds or rubs.    CARDIOVASCULAR: There was a regular rate and rhythm    ABDOMEN: Mild tenderness    NEUROLOGIC: Alert and oriented x 3. Normal affect.     CBC                        10.9   11.38 )-----------( 213      ( 2021 07:18 )             33.2       Chemistry  04-05    143  |  109<H>  |  23  ----------------------------<  108<H>  3.7   |  27  |  0.41<L>    Ca    8.7      2021 07:18  Phos  3.0     04-05  Mg     2.1     04-05    TPro  7.3  /  Alb  3.0<L>  /  TBili  0.6  /  DBili  x   /  AST  63<H>  /  ALT  43  /  AlkPhos  92  04-04      < from: CT Abdomen and Pelvis w/ Oral Cont and w/ IV Cont (21 @ 01:30) >  IMPRESSION:    1. High-grade small bowel obstruction, transition is at the anastomosis.    2. Complex organized fluid collection in the pelvis, inseparable from the sigmoid. Differential considerations include degenerating hematoma, organized seroma, and developing abscess.    < end of copied text >

## 2021-04-05 NOTE — CONSULT NOTE ADULT - SUBJECTIVE AND OBJECTIVE BOX
56 y/o female with PMHx of hypothyroidism, HTN, asthma, s/p , s/p right nephrectomy, abdominal pain s/p work up found to have vasculitis, small bowel perforation, small bowel resection presents to ED for abdominal pain, n/v.  Reports feeling better today now that NGT is in place, less abdominal distention but no bowel sounds or bms.      She has PMH of R nephrectomy in  for unclear reason, HTN, asthma- with recurrent need for steroids nearly consistent;y since - 10-40 mg daily, hypothyroidism. Initially seen as hospital consult for new onset progressive peripheral neuropathy, purpuric rash, granulomatous rash on elbows, and oral/ nasal ulcerations, and significantly 50 lb wt loss. Was in usual state health with intermittent asthma/ and sinus infection but otherwise healthy till . Recent evaluation for mid epigastric abd/ non bloody emesis, EGD + gastritis confirmed eosinophilic esophagitis/ gastritis. Extensive w/u neuro/ rheum work up negative as per patient in the past. Here she is noted to have + MPO; +P-ANCA; +RF; +DS-DNA. C-ANCA is now positive. Discharged, then readmitted with GI perforation, tissue appeared inflammatory during resection, sent for pathology which confirming inflammatory etiology. Skin biopsy also consistent with vasculitis.     Rashes, lung issues, sinus involvement improved with steroids. Weight has stabilized, no other constitutional sx. Ongoing neuropathy with weak .     PAST MEDICAL & SURGICAL HISTORY:  Asthma    Sciatica    HTN (hypertension)    Hypothyroid    H/O autoimmune disorder    H/O right nephrectomy    H/O  section      MEDICATIONS  (STANDING):  budesonide 160 MICROgram(s)/formoterol 4.5 MICROgram(s) Inhaler 2 Puff(s) Inhalation two times a day  dexAMETHasone  Injectable 2.5 milliGRAM(s) IV Push three times a day  dextrose 5% + sodium chloride 0.45% with potassium chloride 20 mEq/L 1000 milliLiter(s) (75 mL/Hr) IV Continuous <Continuous>  diltiazem    milliGRAM(s) Oral daily  DULoxetine 20 milliGRAM(s) Oral daily  gabapentin 600 milliGRAM(s) Oral three times a day  levothyroxine Injectable 44 MICROGram(s) IV Push at bedtime  pantoprazole  Injectable 40 milliGRAM(s) IV Push daily    MEDICATIONS  (PRN):  ketorolac   Injectable 15 milliGRAM(s) IV Push every 8 hours PRN Moderate Pain (4 - 6)  ondansetron Injectable 4 milliGRAM(s) IV Push every 6 hours PRN Nausea    Allergies    ciprofloxacin (Unknown)  penicillin (Unknown)    Intolerances      SOCIAL HISTORY:  FAMILY HISTORY:    REVIEW OF SYSTEMS:  CONSTITUTIONAL: No weakness, fevers or chills  EYES/ENT: No visual changes;  No vertigo or throat pain   NECK: No pain or stiffness  RESPIRATORY: No cough, wheezing, hemoptysis; No shortness of breath  CARDIOVASCULAR: No chest pain or palpitations  GASTROINTESTINAL: Per HPI.   GENITOURINARY: No dysuria, frequency or hematuria  NEUROLOGICAL: No numbness or weakness  SKIN: No itching, burning, rashes, or lesions   PSYCH: Normal mood and affect  All other review of systems is negative unless indicated above.  Vital Signs Last 24 Hrs  T(C): 36.4 (2021 07:50), Max: 36.7 (2021 04:06)  T(F): 97.6 (2021 07:50), Max: 98.1 (2021 04:06)  HR: 58 (2021 07:50) (58 - 85)  BP: 117/77 (2021 07:50) (117/77 - 152/96)  BP(mean): --  RR: 17 (2021 07:50) (16 - 17)  SpO2: 93% (2021 07:50) (93% - 100%)    PHYSICAL EXAM:  Constitutional: NAD, well-developed  HEENT: MMM  Neck: No LAD  Respiratory: CTAB  Cardiovascular: S1 and S2, RRR, no M/G/R  Gastrointestinal:  softly distended, mildly tender, No BS, NGT draining  Extremities: No peripheral edema  Vascular: 2+ peripheral pulses  Neurological: A/O x 3, no focal deficits  Psychiatric: Normal mood, normal affect  Skin: No rashes    LABS:                        10.9   11.38 )-----------( 213      ( 2021 07:18 )             33.2     04-    143  |  109<H>  |  23  ----------------------------<  108<H>  3.7   |  27  |  0.41<L>    Ca    8.7      2021 07:18  Phos  3.0     04-05  Mg     2.1     04-05    TPro  7.3  /  Alb  3.0<L>  /  TBili  0.6  /  DBili  x   /  AST  63<H>  /  ALT  43  /  AlkPhos  92  04-04    PT/INR - ( 2021 11:14 )   PT: 11.3 sec;   INR: 0.96 ratio         PTT - ( 2021 11:14 )  PTT:26.2 sec  LIVER FUNCTIONS - ( 2021 23:41 )  Alb: 3.0 g/dL / Pro: 7.3 gm/dL / ALK PHOS: 92 U/L / ALT: 43 U/L / AST: 63 U/L / GGT: x             RADIOLOGY & ADDITIONAL STUDIES: CT reviewed in detail, pelvic fluid collection    Assessment and Recommendation:   ? Assessment	  54yo female with hx of hypothyroidism, HTN, asthma, s/p , s/p right nephrectomy, abdominal pain s/p work up found to have vasculitis, small bowel perforation w/ mid-jejunal resection and primary anastomosis  presents to ED for 3-4 days of abdominal pain, nausea and emesis x10 found to have SBO and pelvic fluid collection w/ CT stating differential includes hematoma, organized seroma or developing abscess.     -Surgery team following.  Continue NGT to LCWS along with NPO.    -IR following for pelvic fluid collection.    -For hx of vasculitis will give Solumedrol 30mg PO BID  -will check ANCA, ESR,CRP to trend and CBC w/ eosinophils   -plan for Rituxan outpatient

## 2021-04-05 NOTE — H&P ADULT - ATTENDING COMMENTS
Patient seen and examined at bedside resting comfortable with improvement of her abdominal pain & distention. NGT 400cc bilious overnight, last BM last night at 1800.     Exam  Gen: NAD, AAOx3  Abd: Soft, NT, ND    Plan  - C/W NGT decompression  - IR consult for drainage of pelvic collection  - Rheumatology consult Dr. Grissomo   - GI ppx  - DVT ppx

## 2021-04-05 NOTE — DIETITIAN INITIAL EVALUATION ADULT. - NAME AND PHONE
Ericka Sherwood RDN, CDN, CDE   [phone]   [email or web address]

## 2021-04-05 NOTE — DIETITIAN INITIAL EVALUATION ADULT. - OTHER INFO
54 y/o female with PMHx of hypothyroidism, HTN, asthma, s/p , s/p right nephrectomy, small bowel perforation, small bowel resection presents to ED for abdominal pain, n/v. Pt reports 3-4 days of abdomen feeling hard, distended. Reports severe pain after eating. Last night pain worsened. Ate a few pieces of boiled chicken tonight at 6:30 and by 7 began vomiting and has not stopped. Vomited x10 times. Vomit does not have blood nor is it a particular color, just the food that she had recently eaten. Admitted from - with small bowel perforation and had small bowel resection with Dr. Stanley. Allergies: Cipro, penicillin. 54 y/o female with PMHx of hypothyroidism, HTN, asthma, s/p , s/p right nephrectomy, small bowel perforation, small bowel resection presents to ED for abdominal pain, n/v. Pt reports 3-4 days of abdomen feeling hard, distended. Reports severe pain after eating. Last night pain worsened. Ate a few pieces of boiled chicken tonight at 6:30 and by 7 began vomiting and has not stopped. Vomited x10 times. Vomit does not have blood nor is it a particular color, just the food that she had recently eaten. Admitted from - with small bowel perforation and had small bowel resection with Dr. Stanley. Allergies: Cipro, penicillin.  Pt currently in IR.  Will return for visit and interview. 56 y/o female with PMHx of hypothyroidism, HTN, asthma, s/p , s/p right nephrectomy, small bowel perforation, small bowel resection presents to ED for abdominal pain, n/v. Pt reports 3-4 days of abdomen feeling hard, distended. Reports severe pain after eating. Last night pain worsened. Ate a few pieces of boiled chicken tonight at 6:30 and by 7 began vomiting and has not stopped. Vomited x10 times. Vomit does not have blood nor is it a particular color, just the food that she had recently eaten. Admitted from - with small bowel perforation and had small bowel resection with Dr. Stanley. Allergies: Cipro, penicillin.  Pt currently in IR.  Will return for visit and interview.    Returned to see pt   post IR in AM.  Pt reports last meal on  6:30   4.  pt ate plain chicken, asparagus, boiled potato and vomited often afterwards.    Pt had been eating prior to this, but not consistently.  Pt eats cheese every day, and eggs nearly every day.  Pt was drinking 1 Ensure HP every day.  Pt on gluten free/dairy free diet for 2 years.  Pt lost 70 lbs since 2020, post dx of vasculitis.  Was here  in February.  Pt has neuropathy.   Pt believes she is going home soon, but is currently NPO.    Awaiting further directions from Gastro, regarding further nutrition.  Suggest nutritional support at this point, nutritional support  may be necessary.  Last meal was , at 6:30 PM.

## 2021-04-05 NOTE — DIETITIAN INITIAL EVALUATION ADULT. - PERTINENT MEDS FT
MEDICATIONS  (STANDING):  dextrose 5% + sodium chloride 0.45% with potassium chloride 20 mEq/L 1000 milliLiter(s) (75 mL/Hr) IV Continuous <Continuous>    MEDICATIONS  (PRN):  ketorolac   Injectable 15 milliGRAM(s) IV Push every 8 hours PRN Moderate Pain (4 - 6)  ondansetron Injectable 4 milliGRAM(s) IV Push every 6 hours PRN Nausea

## 2021-04-05 NOTE — DIETITIAN INITIAL EVALUATION ADULT. - PERTINENT LABORATORY DATA
04-05 Na143 mmol/L Glu 108 mg/dL<H> K+ 3.7 mmol/L Cr  0.41 mg/dL<L> BUN 23 mg/dL Phos 3.0 mg/dL Alb n/a   PAB n/a       BMI: BMI (kg/m2): 27.5 (04-04-21 @ 22:37)  HbA1c:   Glucose:   BP: 117/77 (04-05-21 @ 07:50) (117/77 - 152/96)  Lipid Panel:

## 2021-04-05 NOTE — CONSULT NOTE ADULT - PROBLEM SELECTOR RECOMMENDATION 9
- As per IR attending, ok for drainage with lovenox off 4 hours  - Keep pt NPO  - Surgical resident aware - As per IR attending, ok for drainage with lovenox off 4 hours  - Keep pt NPO  - Ordered stat PT/PTT/INR  - Surgical resident aware

## 2021-04-05 NOTE — CONSULT NOTE ADULT - ASSESSMENT
Patient is a 55y old  Female who presents with a chief complaint of pelvic collection  - Pt is NPO  - Had lovenox at 9AM  - Consentable

## 2021-04-05 NOTE — H&P ADULT - NSHPLABSRESULTS_GEN_ALL_CORE
Vitals:  T(C): 36.6 (04-05 @ 01:37), Max: 36.6 (04-05 @ 01:37)  HR: 80 (04-05 @ 01:37) (78 - 80)  BP: 144/72 (04-05 @ 01:37) (144/72 - 152/96)  RR: 17 (04-05 @ 01:37) (16 - 17)  SpO2: 100% (04-05 @ 01:37) (100% - 100%)      04-04 @ 23:41                    13.3  CBC: 19.75>)-------(<266                     39.5                 136 | 103 | 28    CMP:  ----------------------< 124               4.4 | 25 | 0.67                      Ca:9.7  Phos:-  Mg:-               0.6|      |63        LFTs:  ------|92|-----             -|      |-      Current Inpatient Medications:

## 2021-04-05 NOTE — DIETITIAN INITIAL EVALUATION ADULT. - MALNUTRITION
severe protein/energy malnutrition Pt meets criteria for severe protein-calorie malnutrition in context of chronic disease

## 2021-04-05 NOTE — CONSULT NOTE ADULT - ASSESSMENT
56yo female with hx of hypothyroidism, HTN, asthma, s/p , s/p right nephrectomy, small bowel perforation w/ mid-jejunal resection and primary anastomosis  presents to ED for 3-4 days of abdominal pain, nausea and emesis x10 found to have SBO and pelvic fluid collection.      Surgery team following.  Continue NGT to LCWS along with NPO.  IR following for pelvic fluid collection.      Also with hx of known hemorrhoids and ?vasculitis who follows with rheumatology.    Eventual colonoscopy in several weeks when all above is improved and stable.     Discussed with pt, Dr. Burr/vEe.  54yo female with hx of hypothyroidism, HTN, asthma, s/p , s/p right nephrectomy, small bowel perforation w/ mid-jejunal resection and primary anastomosis  presents to ED for 3-4 days of abdominal pain, nausea and emesis x10 found to have SBO and pelvic fluid collection.      Surgery team following.  Continue NGT to LCWS along with NPO.  IR following for pelvic fluid collection.      Also with hx of known hemorrhoids and vasculitis after biopsy who follows with rheumatology.  Patient to eventually start Rituxan, and to continue steroids.     Eventual colonoscopy in several weeks when all above is improved and stable.     Discussed with pt, Dr. Burr/Eve.  56yo female with hx of hypothyroidism, HTN, asthma, s/p , s/p right nephrectomy, abdominal pain s/p work up found to have vasculitis, small bowel perforation w/ mid-jejunal resection and primary anastomosis  presents to ED for 3-4 days of abdominal pain, nausea and emesis x10 found to have SBO and pelvic fluid collection.      Surgery team following.  Continue NGT to LCWS along with NPO.  IR following for pelvic fluid collection.      Also with hx of known hemorrhoids and vasculitis after biopsy who follows with rheumatology.  Patient to eventually start Rituxan, and to continue steroids.     Eventual colonoscopy in several weeks when all above is improved and stable.     Discussed with pt, Dr. Burr/Eve.

## 2021-04-06 ENCOUNTER — TRANSCRIPTION ENCOUNTER (OUTPATIENT)
Age: 56
End: 2021-04-06

## 2021-04-06 LAB
ANION GAP SERPL CALC-SCNC: 6 MMOL/L — SIGNIFICANT CHANGE UP (ref 5–17)
BUN SERPL-MCNC: 13 MG/DL — SIGNIFICANT CHANGE UP (ref 7–23)
CALCIUM SERPL-MCNC: 8.7 MG/DL — SIGNIFICANT CHANGE UP (ref 8.5–10.1)
CHLORIDE SERPL-SCNC: 107 MMOL/L — SIGNIFICANT CHANGE UP (ref 96–108)
CO2 SERPL-SCNC: 25 MMOL/L — SIGNIFICANT CHANGE UP (ref 22–31)
COVID-19 SPIKE DOMAIN AB INTERP: NEGATIVE — SIGNIFICANT CHANGE UP
COVID-19 SPIKE DOMAIN ANTIBODY RESULT: 0.4 U/ML — SIGNIFICANT CHANGE UP
CREAT SERPL-MCNC: 0.44 MG/DL — LOW (ref 0.5–1.3)
CRP SERPL-MCNC: 128 MG/L — HIGH
ERYTHROCYTE [SEDIMENTATION RATE] IN BLOOD: 58 MM/HR — HIGH (ref 0–20)
GLUCOSE SERPL-MCNC: 138 MG/DL — HIGH (ref 70–99)
HCT VFR BLD CALC: 33.9 % — LOW (ref 34.5–45)
HGB BLD-MCNC: 11 G/DL — LOW (ref 11.5–15.5)
MAGNESIUM SERPL-MCNC: 2.1 MG/DL — SIGNIFICANT CHANGE UP (ref 1.6–2.6)
MCHC RBC-ENTMCNC: 30.3 PG — SIGNIFICANT CHANGE UP (ref 27–34)
MCHC RBC-ENTMCNC: 32.4 GM/DL — SIGNIFICANT CHANGE UP (ref 32–36)
MCV RBC AUTO: 93.4 FL — SIGNIFICANT CHANGE UP (ref 80–100)
PHOSPHATE SERPL-MCNC: 3.4 MG/DL — SIGNIFICANT CHANGE UP (ref 2.5–4.5)
PLATELET # BLD AUTO: 206 K/UL — SIGNIFICANT CHANGE UP (ref 150–400)
POTASSIUM SERPL-MCNC: 5 MMOL/L — SIGNIFICANT CHANGE UP (ref 3.5–5.3)
POTASSIUM SERPL-SCNC: 5 MMOL/L — SIGNIFICANT CHANGE UP (ref 3.5–5.3)
RBC # BLD: 3.63 M/UL — LOW (ref 3.8–5.2)
RBC # FLD: 17.9 % — HIGH (ref 10.3–14.5)
SARS-COV-2 IGG+IGM SERPL QL IA: 0.4 U/ML — SIGNIFICANT CHANGE UP
SARS-COV-2 IGG+IGM SERPL QL IA: NEGATIVE — SIGNIFICANT CHANGE UP
SODIUM SERPL-SCNC: 138 MMOL/L — SIGNIFICANT CHANGE UP (ref 135–145)
WBC # BLD: 12.37 K/UL — HIGH (ref 3.8–10.5)
WBC # FLD AUTO: 12.37 K/UL — HIGH (ref 3.8–10.5)

## 2021-04-06 PROCEDURE — 74018 RADEX ABDOMEN 1 VIEW: CPT | Mod: 26

## 2021-04-06 RX ORDER — DIATRIZOATE MEGLUMINE 180 MG/ML
60 INJECTION, SOLUTION INTRAVESICAL ONCE
Refills: 0 | Status: COMPLETED | OUTPATIENT
Start: 2021-04-06 | End: 2021-04-06

## 2021-04-06 RX ORDER — PIPERACILLIN AND TAZOBACTAM 4; .5 G/20ML; G/20ML
3.38 INJECTION, POWDER, LYOPHILIZED, FOR SOLUTION INTRAVENOUS ONCE
Refills: 0 | Status: COMPLETED | OUTPATIENT
Start: 2021-04-06 | End: 2021-04-06

## 2021-04-06 RX ORDER — PIPERACILLIN AND TAZOBACTAM 4; .5 G/20ML; G/20ML
3.38 INJECTION, POWDER, LYOPHILIZED, FOR SOLUTION INTRAVENOUS EVERY 8 HOURS
Refills: 0 | Status: DISCONTINUED | OUTPATIENT
Start: 2021-04-06 | End: 2021-04-09

## 2021-04-06 RX ORDER — DIATRIZOATE MEGLUMINE 180 MG/ML
60 INJECTION, SOLUTION INTRAVESICAL ONCE
Refills: 0 | Status: DISCONTINUED | OUTPATIENT
Start: 2021-04-06 | End: 2021-04-06

## 2021-04-06 RX ADMIN — DEXTROSE MONOHYDRATE, SODIUM CHLORIDE, AND POTASSIUM CHLORIDE 75 MILLILITER(S): 50; .745; 4.5 INJECTION, SOLUTION INTRAVENOUS at 15:44

## 2021-04-06 RX ADMIN — DULOXETINE HYDROCHLORIDE 20 MILLIGRAM(S): 30 CAPSULE, DELAYED RELEASE ORAL at 22:04

## 2021-04-06 RX ADMIN — Medication 30 MILLIGRAM(S): at 22:04

## 2021-04-06 RX ADMIN — GABAPENTIN 600 MILLIGRAM(S): 400 CAPSULE ORAL at 05:25

## 2021-04-06 RX ADMIN — PIPERACILLIN AND TAZOBACTAM 25 GRAM(S): 4; .5 INJECTION, POWDER, LYOPHILIZED, FOR SOLUTION INTRAVENOUS at 22:05

## 2021-04-06 RX ADMIN — PANTOPRAZOLE SODIUM 40 MILLIGRAM(S): 20 TABLET, DELAYED RELEASE ORAL at 09:38

## 2021-04-06 RX ADMIN — GABAPENTIN 600 MILLIGRAM(S): 400 CAPSULE ORAL at 22:04

## 2021-04-06 RX ADMIN — Medication 30 MILLIGRAM(S): at 09:38

## 2021-04-06 RX ADMIN — BUDESONIDE AND FORMOTEROL FUMARATE DIHYDRATE 2 PUFF(S): 160; 4.5 AEROSOL RESPIRATORY (INHALATION) at 08:16

## 2021-04-06 RX ADMIN — GABAPENTIN 600 MILLIGRAM(S): 400 CAPSULE ORAL at 15:18

## 2021-04-06 RX ADMIN — ENOXAPARIN SODIUM 40 MILLIGRAM(S): 100 INJECTION SUBCUTANEOUS at 09:38

## 2021-04-06 RX ADMIN — Medication 240 MILLIGRAM(S): at 09:39

## 2021-04-06 RX ADMIN — PIPERACILLIN AND TAZOBACTAM 25 GRAM(S): 4; .5 INJECTION, POWDER, LYOPHILIZED, FOR SOLUTION INTRAVENOUS at 15:18

## 2021-04-06 RX ADMIN — DIATRIZOATE MEGLUMINE 60 MILLILITER(S): 180 INJECTION, SOLUTION INTRAVESICAL at 09:44

## 2021-04-06 RX ADMIN — PIPERACILLIN AND TAZOBACTAM 200 GRAM(S): 4; .5 INJECTION, POWDER, LYOPHILIZED, FOR SOLUTION INTRAVENOUS at 12:39

## 2021-04-06 RX ADMIN — BUDESONIDE AND FORMOTEROL FUMARATE DIHYDRATE 2 PUFF(S): 160; 4.5 AEROSOL RESPIRATORY (INHALATION) at 20:38

## 2021-04-06 RX ADMIN — Medication 44 MICROGRAM(S): at 22:05

## 2021-04-06 NOTE — DISCHARGE NOTE NURSING/CASE MANAGEMENT/SOCIAL WORK - PATIENT PORTAL LINK FT
You can access the FollowMyHealth Patient Portal offered by [facility] by registering at the following website: [email or web address]. By joining [employer]’s FollowMyHealth portal, you will also be able to view your health information using other applications (apps) compatible with our system.

## 2021-04-06 NOTE — CONSULT NOTE ADULT - ASSESSMENT
HPI: 56 y/o female with PMHx of hypothyroidism, HTN, asthma, s/p , s/p right nephrectomy, small bowel perforation, small bowel resection presents to ED for abdominal pain, n/v. Pt reports 3-4 days of abdomen feeling hard, distended. Reports severe pain after eating. Last night pain worsened. Ate a few pieces of boiled chicken tonight at 6:30 and by 7 began vomiting and has not stopped. Vomited x10 times. Vomit does not have blood nor is it a particular color, just the food that she had recently eaten. Admitted from - with small bowel perforation and had small bowel resection with Dr. Stanley. Pt currently with NGT in place as, underwent IR drain placement on 21. Pt with leukocytosis, currently not on antibiotics. Not feeling feverish, states she has improved since admission and drain placement yesterday.     1. Leukocytosis 2/2 Intrabdominal Abscess  - Blood cultures x 2  - F/u Cultures   -  54 y/o female with PMHx of hypothyroidism, HTN, asthma, s/p , s/p right nephrectomy, small bowel perforation, small bowel resection presents to ED for abdominal pain, n/v. Pt reports 3-4 days of abdomen feeling hard, distended. Reports severe pain after eating. Last night pain worsened. Ate a few pieces of boiled chicken  6:30 and by 7 began vomiting and has not stopped. Vomited x10 times. Vomit does not have blood nor is it a particular color, just the food that she had recently eaten. Admitted from - with small bowel perforation and had small bowel resection with Dr. Stanley. Here wbc ct 19, CT abd/pelvis with SBO and abd abscess, NGT placed and pt underwent IR drain placement on 21.     1. abdominal abscess s/p drainage. SBO. hx SB perforation s/p resection. vasculitis. PCN allergy  - not true PCN allergy  - has tolerated zosyn in the past without rash/side effect  - start IV zosyn [id number]  - f/u aspirate cultures, f/u blood cx  - monitor temps  - tolerating abx well so far; no side effects noted  - reason for abx use and side effects reviewed with patient  - rheumatology eval noted  - tailor abx regimen based on culture results  - fu cbc  - supportive care    2. other issues - care per medicine

## 2021-04-06 NOTE — PHARMACOTHERAPY INTERVENTION NOTE - COMMENTS
PCN allergy not a true allergy. Childhood reaction to PCN. Patient has tolerated PCN like meds in the past

## 2021-04-06 NOTE — CONSULT NOTE ADULT - CONSULT REASON
vasculitis
abdominal pain
56yo F with SBO, found to have pelvic collection. IR consulted for drainage
Intra abdominal infection

## 2021-04-06 NOTE — CONSULT NOTE ADULT - SUBJECTIVE AND OBJECTIVE BOX
HPI: 56 y/o female with PMHx of hypothyroidism, HTN, asthma, s/p , s/p right nephrectomy, small bowel perforation, small bowel resection presents to ED for abdominal pain, n/v. Pt reports 3-4 days of abdomen feeling hard, distended. Reports severe pain after eating. Last night pain worsened. Ate a few pieces of boiled chicken tonight at 6:30 and by 7 began vomiting and has not stopped. Vomited x10 times. Vomit does not have blood nor is it a particular color, just the food that she had recently eaten. Admitted from - with small bowel perforation and had small bowel resection with Dr. Stanley. Pt currently with NGT in place as, underwent IR drain placement on 21. Pt with leukocytosis, currently not on antibiotics. Not feeling feverish, states she has improved since admission and drain placement yesterday.     PMH: as above  PSH: as above    Meds: per reconciliation sheet, noted below  MEDICATIONS  (STANDING):  budesonide 160 MICROgram(s)/formoterol 4.5 MICROgram(s) Inhaler 2 Puff(s) Inhalation two times a day  dextrose 5% + sodium chloride 0.45% with potassium chloride 20 mEq/L 1000 milliLiter(s) (75 mL/Hr) IV Continuous <Continuous>  diatrizoate meglumine/diatrizoate sodium. 60 milliLiter(s) Oral once  diltiazem    milliGRAM(s) Oral daily  DULoxetine 20 milliGRAM(s) Oral daily  enoxaparin Injectable 40 milliGRAM(s) SubCutaneous daily  gabapentin 600 milliGRAM(s) Oral three times a day  levothyroxine Injectable 44 MICROGram(s) IV Push at bedtime  methylPREDNISolone sodium succinate Injectable 30 milliGRAM(s) IV Push two times a day  pantoprazole  Injectable 40 milliGRAM(s) IV Push daily    MEDICATIONS  (PRN):  ketorolac   Injectable 15 milliGRAM(s) IV Push every 8 hours PRN Moderate Pain (4 - 6)  ondansetron Injectable 4 milliGRAM(s) IV Push every 6 hours PRN Nausea    Allergies    ciprofloxacin (Unknown)  penicillin (Unknown)    Intolerances      Social: no smoking, no alcohol, no illegal drugs; no recent travel, no exposure to TB  FAMILY HISTORY:    no history of premature cardiovascular disease in first degree relatives    ROS: the patient denies fever, no chills, no HA, no seizures, no dizziness, no sore throat, no nasal congestion, no blurry vision, no CP, no palpitations, no SOB, no cough, no abdominal pain, no diarrhea, no N/V, no dysuria, no leg pain, no claudication, no rash, no joint aches, no rectal pain or bleeding, no night sweats  All other systems reviewed and are negative    Vital Signs Last 24 Hrs  T(C): 36.6 (2021 08:34), Max: 36.6 (2021 08:34)  T(F): 97.9 (2021 08:34), Max: 97.9 (2021 08:34)  HR: 71 (2021 08:34) (53 - 85)  BP: 139/78 (2021 08:34) (109/66 - 139/81)  BP(mean): --  RR: 18 (2021 08:34) (12 - 18)  SpO2: 97% (2021 08:34) (96% - 100%)  Daily     Daily     PE:    Constitutional:  No acute distress  HEENT: NC/AT, EOMI, PERRLA, conjunctivae clear; ears and nose atraumatic; pharynx benign  Neck: supple; thyroid not palpable  Back: no tenderness  Respiratory: respiratory effort normal; clear to auscultation  Cardiovascular: S1S2 regular, no murmurs  Abdomen: soft, not tender, not distended, positive BS; no liver or spleen organomegaly  Genitourinary: no suprapubic tenderness  Lymphatic: no LN palpable  Musculoskeletal: no muscle tenderness, no joint swelling or tenderness  Extremities: no pedal edema  Neurological/ Psychiatric: AxOx3, judgement and insight normal; moving all extremities  Skin: no rashes; no palpable lesions    Labs: all available labs reviewed                        11.0   12.37 )-----------( 206      ( 2021 07:19 )             33.9     04-06    138  |  107  |  13  ----------------------------<  138<H>  5.0   |  25  |  0.44<L>    Ca    8.7      2021 07:19  Phos  3.4     04-06  Mg     2.1     04-06    TPro  7.3  /  Alb  3.0<L>  /  TBili  0.6  /  DBili  x   /  AST  63<H>  /  ALT  43  /  AlkPhos  92  04-04     LIVER FUNCTIONS - ( 2021 23:41 )  Alb: 3.0 g/dL / Pro: 7.3 gm/dL / ALK PHOS: 92 U/L / ALT: 43 U/L / AST: 63 U/L / GGT: x                 COVID-19 PCR: NotDetec (21 @ 23:41)          Radiology: all available radiological tests reviewed    < from: CT Abdomen and Pelvis w/ Oral Cont and w/ IV Cont (21 @ 01:30) >  IMPRESSION:    1. High-grade small bowel obstruction, transition is at the anastomosis.    2. Complex organized fluid collection in the pelvis, inseparable from the sigmoid. Differential considerations include degenerating hematoma, organized seroma, and developing abscess.      < end of copied text >      Advanced directives addressed: full resuscitation 54 y/o female with PMHx of hypothyroidism, HTN, asthma, s/p , s/p right nephrectomy, small bowel perforation, small bowel resection presents to ED for abdominal pain, n/v. Pt reports 3-4 days of abdomen feeling hard, distended. Reports severe pain after eating. Last night pain worsened. Ate a few pieces of boiled chicken  6:30 and by 7 began vomiting and has not stopped. Vomited x10 times. Vomit does not have blood nor is it a particular color, just the food that she had recently eaten. Admitted from - with small bowel perforation and had small bowel resection with Dr. Stanley. Here wbc ct 19, CT abd/pelvis with SBO and abd abscess, NGT placed and pt underwent IR drain placement on 21. Pt with leukocytosis, currently not on antibiotics. Not feeling feverish, states she has improved since admission and drain placement , cx pending.     PMH: as above  PSH: as above    Meds: per reconciliation sheet, noted below  MEDICATIONS  (STANDING):  budesonide 160 MICROgram(s)/formoterol 4.5 MICROgram(s) Inhaler 2 Puff(s) Inhalation two times a day  dextrose 5% + sodium chloride 0.45% with potassium chloride 20 mEq/L 1000 milliLiter(s) (75 mL/Hr) IV Continuous <Continuous>  diatrizoate meglumine/diatrizoate sodium. 60 milliLiter(s) Oral once  diltiazem    milliGRAM(s) Oral daily  DULoxetine 20 milliGRAM(s) Oral daily  enoxaparin Injectable 40 milliGRAM(s) SubCutaneous daily  gabapentin 600 milliGRAM(s) Oral three times a day  levothyroxine Injectable 44 MICROGram(s) IV Push at bedtime  methylPREDNISolone sodium succinate Injectable 30 milliGRAM(s) IV Push two times a day  pantoprazole  Injectable 40 milliGRAM(s) IV Push daily    Allergies    ciprofloxacin (Unknown)  penicillin (Unknown)    Intolerances      Social: no smoking, no alcohol, no illegal drugs; no recent travel, no exposure to TB  FAMILY HISTORY:    no history of premature cardiovascular disease in first degree relatives    ROS: the patient denies fever, no chills, no HA, no seizures, no dizziness, no sore throat, no nasal congestion, no blurry vision, no CP, no palpitations, no SOB, no cough, no dysuria, no leg pain, no claudication, no rash, no joint aches, no rectal pain or bleeding, no night sweats  All other systems reviewed and are negative    Vital Signs Last 24 Hrs  T(C): 36.6 (2021 08:34), Max: 36.6 (2021 08:34)  T(F): 97.9 (2021 08:34), Max: 97.9 (2021 08:34)  HR: 71 (2021 08:34) (53 - 85)  BP: 139/78 (2021 08:34) (109/66 - 139/81)  BP(mean): --  RR: 18 (2021 08:34) (12 - 18)  SpO2: 97% (2021 08:34) (96% - 100%)  Daily     Daily     PE:  Constitutional:  No acute distress  HEENT: NC/AT, EOMI, PERRLA, conjunctivae clear; ears and nose atraumatic; pharynx benign  Neck: supple; thyroid not palpable  Back: no tenderness  Respiratory: respiratory effort normal; clear to auscultation  Cardiovascular: S1S2 regular, no murmurs  Abdomen: soft, not tender, not distended, positive BS; abd drain in place   Genitourinary: no suprapubic tenderness  Lymphatic: no LN palpable  Musculoskeletal: no muscle tenderness, no joint swelling or tenderness  Extremities: no pedal edema  Neurological/ Psychiatric: AxOx3, judgement and insight normal; moving all extremities  Skin: no rashes; no palpable lesions    Labs: all available labs reviewed                        11.0   12.37 )-----------( 206      ( 2021 07:19 )             33.9     04-06    138  |  107  |  13  ----------------------------<  138<H>  5.0   |  25  |  0.44<L>    Ca    8.7      2021 07:19  Phos  3.4     04-06  Mg     2.1     04-06    TPro  7.3  /  Alb  3.0<L>  /  TBili  0.6  /  DBili  x   /  AST  63<H>  /  ALT  43  /  AlkPhos  92  04-04     LIVER FUNCTIONS - ( 2021 23:41 )  Alb: 3.0 g/dL / Pro: 7.3 gm/dL / ALK PHOS: 92 U/L / ALT: 43 U/L / AST: 63 U/L / GGT: x                 COVID-19 PCR: NotDetec (21 @ 23:41)          Radiology: all available radiological tests reviewed    < from: CT Abdomen and Pelvis w/ Oral Cont and w/ IV Cont (21 @ 01:30) >  IMPRESSION:    1. High-grade small bowel obstruction, transition is at the anastomosis.    2. Complex organized fluid collection in the pelvis, inseparable from the sigmoid. Differential considerations include degenerating hematoma, organized seroma, and developing abscess.      < end of copied text >      Advanced directives addressed: full resuscitation

## 2021-04-06 NOTE — DIETITIAN NUTRITION RISK NOTIFICATION - ADDITIONAL COMMENTS/DIETITIAN RECOMMENDATIONS
Advance diet to clear liquids to low fiber when medically feasible, and as tolerated  Pt NPO x 1.5 days  Nutritional support may need to be considered if pt NPO > 3 days  Add Ensure HP tid when pt diet advanced  Record PO intake in EMR after each meal (nursing.)   Monitor PO intake, tolerance, labs and weight.

## 2021-04-06 NOTE — PROGRESS NOTE ADULT - ATTENDING COMMENTS
Patient seen & examined at bedside resting comfortably, afebrile, and having flatus.      Exam  Gen: NAD, AAOx3  Abd: Soft, ND, NT    Plan  - Bedside gastrografin study today with NGT clamped, if progression of contrast to colon will d/c NGT  - ID consult pending & awaiting C&S  - GI ppx  - DVT ppx

## 2021-04-07 LAB
ANION GAP SERPL CALC-SCNC: 6 MMOL/L — SIGNIFICANT CHANGE UP (ref 5–17)
BUN SERPL-MCNC: 11 MG/DL — SIGNIFICANT CHANGE UP (ref 7–23)
CALCIUM SERPL-MCNC: 8.6 MG/DL — SIGNIFICANT CHANGE UP (ref 8.5–10.1)
CHLORIDE SERPL-SCNC: 108 MMOL/L — SIGNIFICANT CHANGE UP (ref 96–108)
CO2 SERPL-SCNC: 26 MMOL/L — SIGNIFICANT CHANGE UP (ref 22–31)
CREAT SERPL-MCNC: 0.48 MG/DL — LOW (ref 0.5–1.3)
GLUCOSE SERPL-MCNC: 161 MG/DL — HIGH (ref 70–99)
HCT VFR BLD CALC: 29.5 % — LOW (ref 34.5–45)
HGB BLD-MCNC: 9.6 G/DL — LOW (ref 11.5–15.5)
MAGNESIUM SERPL-MCNC: 2 MG/DL — SIGNIFICANT CHANGE UP (ref 1.6–2.6)
MCHC RBC-ENTMCNC: 30.4 PG — SIGNIFICANT CHANGE UP (ref 27–34)
MCHC RBC-ENTMCNC: 32.5 GM/DL — SIGNIFICANT CHANGE UP (ref 32–36)
MCV RBC AUTO: 93.4 FL — SIGNIFICANT CHANGE UP (ref 80–100)
MPO AB + PR3 PNL SER: SIGNIFICANT CHANGE UP
PHOSPHATE SERPL-MCNC: 3.4 MG/DL — SIGNIFICANT CHANGE UP (ref 2.5–4.5)
PLATELET # BLD AUTO: 191 K/UL — SIGNIFICANT CHANGE UP (ref 150–400)
POTASSIUM SERPL-MCNC: 4 MMOL/L — SIGNIFICANT CHANGE UP (ref 3.5–5.3)
POTASSIUM SERPL-SCNC: 4 MMOL/L — SIGNIFICANT CHANGE UP (ref 3.5–5.3)
RBC # BLD: 3.16 M/UL — LOW (ref 3.8–5.2)
RBC # FLD: 17.8 % — HIGH (ref 10.3–14.5)
SODIUM SERPL-SCNC: 140 MMOL/L — SIGNIFICANT CHANGE UP (ref 135–145)
WBC # BLD: 13.61 K/UL — HIGH (ref 3.8–10.5)
WBC # FLD AUTO: 13.61 K/UL — HIGH (ref 3.8–10.5)

## 2021-04-07 PROCEDURE — 99231 SBSQ HOSP IP/OBS SF/LOW 25: CPT

## 2021-04-07 RX ORDER — SODIUM CHLORIDE 9 MG/ML
3 INJECTION INTRAMUSCULAR; INTRAVENOUS; SUBCUTANEOUS EVERY 8 HOURS
Refills: 0 | Status: DISCONTINUED | OUTPATIENT
Start: 2021-04-07 | End: 2021-04-09

## 2021-04-07 RX ADMIN — PIPERACILLIN AND TAZOBACTAM 25 GRAM(S): 4; .5 INJECTION, POWDER, LYOPHILIZED, FOR SOLUTION INTRAVENOUS at 05:26

## 2021-04-07 RX ADMIN — DEXTROSE MONOHYDRATE, SODIUM CHLORIDE, AND POTASSIUM CHLORIDE 75 MILLILITER(S): 50; .745; 4.5 INJECTION, SOLUTION INTRAVENOUS at 04:58

## 2021-04-07 RX ADMIN — GABAPENTIN 600 MILLIGRAM(S): 400 CAPSULE ORAL at 05:26

## 2021-04-07 RX ADMIN — DULOXETINE HYDROCHLORIDE 20 MILLIGRAM(S): 30 CAPSULE, DELAYED RELEASE ORAL at 23:11

## 2021-04-07 RX ADMIN — Medication 30 MILLIGRAM(S): at 09:44

## 2021-04-07 RX ADMIN — PIPERACILLIN AND TAZOBACTAM 25 GRAM(S): 4; .5 INJECTION, POWDER, LYOPHILIZED, FOR SOLUTION INTRAVENOUS at 23:52

## 2021-04-07 RX ADMIN — Medication 30 MILLIGRAM(S): at 23:52

## 2021-04-07 RX ADMIN — BUDESONIDE AND FORMOTEROL FUMARATE DIHYDRATE 2 PUFF(S): 160; 4.5 AEROSOL RESPIRATORY (INHALATION) at 07:54

## 2021-04-07 RX ADMIN — GABAPENTIN 600 MILLIGRAM(S): 400 CAPSULE ORAL at 23:11

## 2021-04-07 RX ADMIN — PIPERACILLIN AND TAZOBACTAM 25 GRAM(S): 4; .5 INJECTION, POWDER, LYOPHILIZED, FOR SOLUTION INTRAVENOUS at 13:45

## 2021-04-07 RX ADMIN — Medication 240 MILLIGRAM(S): at 09:44

## 2021-04-07 RX ADMIN — Medication 44 MICROGRAM(S): at 23:52

## 2021-04-07 RX ADMIN — SODIUM CHLORIDE 3 MILLILITER(S): 9 INJECTION INTRAMUSCULAR; INTRAVENOUS; SUBCUTANEOUS at 23:21

## 2021-04-07 RX ADMIN — GABAPENTIN 600 MILLIGRAM(S): 400 CAPSULE ORAL at 13:45

## 2021-04-07 RX ADMIN — PANTOPRAZOLE SODIUM 40 MILLIGRAM(S): 20 TABLET, DELAYED RELEASE ORAL at 09:44

## 2021-04-07 RX ADMIN — ENOXAPARIN SODIUM 40 MILLIGRAM(S): 100 INJECTION SUBCUTANEOUS at 09:44

## 2021-04-07 RX ADMIN — BUDESONIDE AND FORMOTEROL FUMARATE DIHYDRATE 2 PUFF(S): 160; 4.5 AEROSOL RESPIRATORY (INHALATION) at 20:14

## 2021-04-08 LAB
HCT VFR BLD CALC: 32.1 % — LOW (ref 34.5–45)
HGB BLD-MCNC: 10.3 G/DL — LOW (ref 11.5–15.5)
MCHC RBC-ENTMCNC: 29.9 PG — SIGNIFICANT CHANGE UP (ref 27–34)
MCHC RBC-ENTMCNC: 32.1 GM/DL — SIGNIFICANT CHANGE UP (ref 32–36)
MCV RBC AUTO: 93 FL — SIGNIFICANT CHANGE UP (ref 80–100)
PLATELET # BLD AUTO: 211 K/UL — SIGNIFICANT CHANGE UP (ref 150–400)
RBC # BLD: 3.45 M/UL — LOW (ref 3.8–5.2)
RBC # FLD: 17.7 % — HIGH (ref 10.3–14.5)
WBC # BLD: 9.46 K/UL — SIGNIFICANT CHANGE UP (ref 3.8–10.5)
WBC # FLD AUTO: 9.46 K/UL — SIGNIFICANT CHANGE UP (ref 3.8–10.5)

## 2021-04-08 PROCEDURE — 99233 SBSQ HOSP IP/OBS HIGH 50: CPT

## 2021-04-08 RX ORDER — ACETAMINOPHEN 500 MG
650 TABLET ORAL ONCE
Refills: 0 | Status: COMPLETED | OUTPATIENT
Start: 2021-04-08 | End: 2021-04-08

## 2021-04-08 RX ORDER — LANOLIN ALCOHOL/MO/W.PET/CERES
5 CREAM (GRAM) TOPICAL AT BEDTIME
Refills: 0 | Status: DISCONTINUED | OUTPATIENT
Start: 2021-04-08 | End: 2021-04-09

## 2021-04-08 RX ADMIN — SODIUM CHLORIDE 3 MILLILITER(S): 9 INJECTION INTRAMUSCULAR; INTRAVENOUS; SUBCUTANEOUS at 22:28

## 2021-04-08 RX ADMIN — ENOXAPARIN SODIUM 40 MILLIGRAM(S): 100 INJECTION SUBCUTANEOUS at 10:32

## 2021-04-08 RX ADMIN — BUDESONIDE AND FORMOTEROL FUMARATE DIHYDRATE 2 PUFF(S): 160; 4.5 AEROSOL RESPIRATORY (INHALATION) at 20:35

## 2021-04-08 RX ADMIN — BUDESONIDE AND FORMOTEROL FUMARATE DIHYDRATE 2 PUFF(S): 160; 4.5 AEROSOL RESPIRATORY (INHALATION) at 07:46

## 2021-04-08 RX ADMIN — Medication 44 MICROGRAM(S): at 22:11

## 2021-04-08 RX ADMIN — SODIUM CHLORIDE 3 MILLILITER(S): 9 INJECTION INTRAMUSCULAR; INTRAVENOUS; SUBCUTANEOUS at 07:11

## 2021-04-08 RX ADMIN — SODIUM CHLORIDE 3 MILLILITER(S): 9 INJECTION INTRAMUSCULAR; INTRAVENOUS; SUBCUTANEOUS at 13:12

## 2021-04-08 RX ADMIN — GABAPENTIN 600 MILLIGRAM(S): 400 CAPSULE ORAL at 15:00

## 2021-04-08 RX ADMIN — GABAPENTIN 600 MILLIGRAM(S): 400 CAPSULE ORAL at 06:12

## 2021-04-08 RX ADMIN — GABAPENTIN 600 MILLIGRAM(S): 400 CAPSULE ORAL at 22:11

## 2021-04-08 RX ADMIN — Medication 240 MILLIGRAM(S): at 10:32

## 2021-04-08 RX ADMIN — PIPERACILLIN AND TAZOBACTAM 25 GRAM(S): 4; .5 INJECTION, POWDER, LYOPHILIZED, FOR SOLUTION INTRAVENOUS at 07:06

## 2021-04-08 RX ADMIN — PANTOPRAZOLE SODIUM 40 MILLIGRAM(S): 20 TABLET, DELAYED RELEASE ORAL at 10:32

## 2021-04-08 RX ADMIN — PIPERACILLIN AND TAZOBACTAM 25 GRAM(S): 4; .5 INJECTION, POWDER, LYOPHILIZED, FOR SOLUTION INTRAVENOUS at 22:12

## 2021-04-08 RX ADMIN — Medication 5 MILLIGRAM(S): at 22:29

## 2021-04-08 RX ADMIN — Medication 30 MILLIGRAM(S): at 10:32

## 2021-04-08 RX ADMIN — PIPERACILLIN AND TAZOBACTAM 25 GRAM(S): 4; .5 INJECTION, POWDER, LYOPHILIZED, FOR SOLUTION INTRAVENOUS at 14:59

## 2021-04-08 RX ADMIN — Medication 650 MILLIGRAM(S): at 02:25

## 2021-04-08 RX ADMIN — Medication 30 MILLIGRAM(S): at 22:11

## 2021-04-08 RX ADMIN — DULOXETINE HYDROCHLORIDE 20 MILLIGRAM(S): 30 CAPSULE, DELAYED RELEASE ORAL at 22:11

## 2021-04-08 NOTE — PROGRESS NOTE ADULT - ASSESSMENT
54 y/o female with PMHx of hypothyroidism, HTN, asthma, s/p , s/p right nephrectomy, small bowel perforation w/ mid-jejunal resection and primary anastamosis  presents to ED for 3-4 days of abdominal pain, nausea and emesis x10 found to have SBO on CT imaging    Plan:  IR drain in place transgluteally into pelvic abscess  F/u Cultures; awaiting sensitivities before switching to Oral Abx  Started on Zosyn as per ID  Pain control PRN  NGT removed  Advanced to regular diet and tolerating it well  Monitor bowel function  Rheumatology reccs appreciated  GI reccs appreciated  Continue Home meds  DVT ppx    Plan discussed with Dr. Stanley
56yo female with hx of hypothyroidism, HTN, asthma, s/p , s/p right nephrectomy, abdominal pain s/p work up found to have vasculitis, small bowel perforation w/ mid-jejunal resection and primary anastomosis  presents to ED for 3-4 days of abdominal pain, nausea and emesis x10 found to have SBO and pelvic fluid collection s/p IR drainage yesterday with Dr. Olivo.       Surgery team following.  Continue NGT to LCWS along with NPO. Serial abdominal x-rays to f/u with obstruction per surgery. Will need IV ABX, ID consulted.     Also with hx of known hemorrhoids and vasculitis after biopsy who follows with rheumatology.  Patient to eventually start Rituxan, and to continue steroids.     Eventual colonoscopy in several weeks when all above is improved and stable.     Discussed with pt, Dr. Burr/Eve. 
56yo female with hx of hypothyroidism, HTN, asthma, s/p , s/p right nephrectomy, abdominal pain s/p work up found to have vasculitis, small bowel perforation w/ mid-jejunal resection and primary anastomosis found to have SBO and pelvic fluid collection s/p IR drainage on admission.     Surgery team following.  NGT was removed yesterday and clinically improving.  Still no BMs, but + BS and gas.  Tolerating liquids. ABX per ID.      Also with hx of known hemorrhoids and vasculitis after biopsy who follows with rheumatology.  Patient to eventually start Rituxan, and to continue steroids.     Eventual colonoscopy in several weeks when all above is improved and stable.     Discussed with pt, Dr. Winchester. 
56 y/o female with PMHx of hypothyroidism, HTN, asthma, s/p , s/p right nephrectomy, small bowel perforation w/ mid-jejunal resection and primary anastamosis  presents to ED for 3-4 days of abdominal pain, nausea and emesis x10 found to have SBO on CT imaging    Plan:  IR drain in place transgluteally into pelvic abscess  F/u Cultures  Started on Zosyn as per ID  F/u ID recommendations regarding duration of Abx  Pain control PRN  NGT removed  Start Clear liquids   Monitor bowel function  Rheumatology reccs appreciated  GI reccs appreciated  Continue Home meds  DVT ppx    Plan discussed with Dr. Stanley
56 y/o female with PMHx of hypothyroidism, HTN, asthma, s/p , s/p right nephrectomy, small bowel perforation w/ mid-jejunal resection and primary anastamosis  presents to ED for 3-4 days of abdominal pain, nausea and emesis x10 found to have SBO on CT imaging    Plan:  IR drain successfully placed transgluteally into pelvic abscess  F/u Cultures  F/u ID recommendations  Pain control PRN  NGT to LWS   NPO  Monitor bowel function  Rheumatology reccs appreciated  GI reccs appreciated  Continue Home meds  DVT ppx    Plan discussed with Dr. Stanley
54 y/o female with PMHx of hypothyroidism, HTN, asthma, s/p , s/p right nephrectomy, small bowel perforation, small bowel resection presents to ED for abdominal pain, n/v. Pt reports 3-4 days of abdomen feeling hard, distended. Reports severe pain after eating. Last night pain worsened. Ate a few pieces of boiled chicken  6:30 and by 7 began vomiting and has not stopped. Vomited x10 times. Vomit does not have blood nor is it a particular color, just the food that she had recently eaten. Admitted from - with small bowel perforation and had small bowel resection with Dr. Stanley. Here wbc ct 19, CT abd/pelvis with SBO and abd abscess, NGT placed and pt underwent IR drain placement on 21.     1. abdominal abscess s/p drainage. SBO. hx SB perforation s/p resection. vasculitis. PCN allergy  - not true PCN allergy, improving   - has tolerated zosyn in the past without rash/side effect  - on IV zosyn [id number] #3  - aspirate cx growing citrobacter koseri sensitivities reviewed  - can dc with 2 weeks oral augmentin  - monitor temps  - tolerating abx well so far; no side effects noted  - reason for abx use and side effects reviewed with patient  - rheumatology eval noted  - surgical f/u on dc   - fu cbc  - supportive care    2. other issues - care per medicine 
54 y/o female with PMHx of hypothyroidism, HTN, asthma, s/p , s/p right nephrectomy, small bowel perforation, small bowel resection presents to ED for abdominal pain, n/v. Pt reports 3-4 days of abdomen feeling hard, distended. Reports severe pain after eating. Last night pain worsened. Ate a few pieces of boiled chicken  6:30 and by 7 began vomiting and has not stopped. Vomited x10 times. Vomit does not have blood nor is it a particular color, just the food that she had recently eaten. Admitted from - with small bowel perforation and had small bowel resection with Dr. Stanley. Here wbc ct 19, CT abd/pelvis with SBO and abd abscess, NGT placed and pt underwent IR drain placement on 21.     1. abdominal abscess s/p drainage. SBO. hx SB perforation s/p resection. vasculitis. PCN allergy  - not true PCN allergy  - has tolerated zosyn in the past without rash/side effect  - on IV zosyn [id number] #2  - aspirate cx growing citrobacter koseri f/u final cx   - monitor temps  - tolerating abx well so far; no side effects noted  - reason for abx use and side effects reviewed with patient  - rheumatology eval noted  - tailor abx regimen based on culture results  - fu cbc  - supportive care    2. other issues - care per medicine

## 2021-04-08 NOTE — PROGRESS NOTE ADULT - PROVIDER SPECIALTY LIST ADULT
Gastroenterology
Surgery
Gastroenterology
Infectious Disease
Rheumatology
Surgery
Surgery
Infectious Disease

## 2021-04-08 NOTE — PROGRESS NOTE ADULT - SUBJECTIVE AND OBJECTIVE BOX
Date of service: 04-07-21 @ 10:12    pt seen and examined   passing gas   no BM yet  sitting up in chair  denies abd discomfort     ROS: no fever or chills; denies dizziness, no HA, no SOB or cough,  no diarrhea or constipation; no dysuria, no urinary frequency, no legs pain, no rashes    MEDICATIONS  (STANDING):  budesonide 160 MICROgram(s)/formoterol 4.5 MICROgram(s) Inhaler 2 Puff(s) Inhalation two times a day  dextrose 5% + sodium chloride 0.45% with potassium chloride 20 mEq/L 1000 milliLiter(s) (75 mL/Hr) IV Continuous <Continuous>  diltiazem    milliGRAM(s) Oral daily  DULoxetine 20 milliGRAM(s) Oral daily  enoxaparin Injectable 40 milliGRAM(s) SubCutaneous daily  gabapentin 600 milliGRAM(s) Oral three times a day  levothyroxine Injectable 44 MICROGram(s) IV Push at bedtime  methylPREDNISolone sodium succinate Injectable 30 milliGRAM(s) IV Push two times a day  pantoprazole  Injectable 40 milliGRAM(s) IV Push daily  piperacillin/tazobactam IVPB.. 3.375 Gram(s) IV Intermittent every 8 hours    Vital Signs Last 24 Hrs  T(C): 36.6 (07 Apr 2021 08:08), Max: 36.6 (06 Apr 2021 16:40)  T(F): 97.9 (07 Apr 2021 08:08), Max: 97.9 (06 Apr 2021 16:40)  HR: 62 (07 Apr 2021 08:08) (62 - 73)  BP: 128/71 (07 Apr 2021 08:08) (128/71 - 142/84)  BP(mean): --  RR: 18 (07 Apr 2021 08:08) (18 - 18)  SpO2: 96% (07 Apr 2021 08:08) (95% - 96%)    PE:  Constitutional:  No acute distress  HEENT: NC/AT, EOMI, PERRLA, conjunctivae clear; ears and nose atraumatic; pharynx benign  Neck: supple; thyroid not palpable  Back: no tenderness  Respiratory: respiratory effort normal; clear to auscultation  Cardiovascular: S1S2 regular, no murmurs  Abdomen: soft, not tender, not distended, positive BS; abd drain in place   Genitourinary: no suprapubic tenderness  Lymphatic: no LN palpable  Musculoskeletal: no muscle tenderness, no joint swelling or tenderness  Extremities: no pedal edema  Neurological/ Psychiatric: AxOx3, judgement and insight normal; moving all extremities  Skin: no rashes; no palpable lesions    Labs: all available labs reviewed                                   9.6    13.61 )-----------( 191      ( 07 Apr 2021 06:22 )             29.5     04-07    140  |  108  |  11  ----------------------------<  161<H>  4.0   |  26  |  0.48<L>    Ca    8.6      07 Apr 2021 06:22  Phos  3.4     04-07  Mg     2.0     04-07          COVID-19 PCR: NotDetec (04-04-21 @ 23:41)    Culture - Abscess with Gram Stain (04.05.21 @ 14:55)   Specimen Source: .Abscess pelvic abscess drain   Culture Results:   Numerous Citrobacter koseri       Radiology: all available radiological tests reviewed    < from: CT Abdomen and Pelvis w/ Oral Cont and w/ IV Cont (04.05.21 @ 01:30) >  IMPRESSION:    1. High-grade small bowel obstruction, transition is at the anastomosis.    2. Complex organized fluid collection in the pelvis, inseparable from the sigmoid. Differential considerations include degenerating hematoma, organized seroma, and developing abscess.      < end of copied text >      Advanced directives addressed: full resuscitation
Patient seen and examined at bedside. Patient has no complaints and reports pain is under control. Patient remains NPO with NGT in place to LWS. New IR drain in place Nurse denies any acute events. Vitals reviewed and WNL.      Vitals:  T(C): 36.6 ( @ 08:34), Max: 36.6 ( @ 08:34)  HR: 71 ( @ 08:34) (53 - 85)  BP: 139/78 ( @ 08:34) (109/66 - 139/81)  RR: 18 ( @ 08:34) (12 - 18)  SpO2: 97% ( @ 08:34) (96% - 100%)     @ 07:01  -   @ 07:00  --------------------------------------------------------  IN:    dextrose 5% + sodium chloride 0.45% w/ Additives: 665 mL    Lactated Ringers: 60 mL    Other (mL): 200 mL  Total IN: 925 mL    OUT:    Nasogastric/Oral tube (mL): 200 mL    Oral Fluid: 0 mL    T-Tube (mL): 95 mL    Voided (mL): 0 mL  Total OUT: 295 mL    Total NET: 630 mL          Physical Exam:  Pt is AAOx3  General: Well developed, in no acute distress.   Chest: Lungs clear  Heart: RRR  Abdomen: Soft, nTND  Back: Normal curvature, no tenderness.   Neuro: Physiological, no localizing findings.   Skin: Normal, no rashes, no lesions noted.   Extremities: Warm, well perfused, no edema, Pulses intact  Genito: IR drain from left buttock into pelvis     @ 07:19                    11.0  CBC: 12.37>)-------(<206                     33.9                 138 | 107 | 13    CMP:  ----------------------< 138               5.0 | 25 | 0.44                      Ca:8.7  Phos:3.4  M.1               -|      |-        LFTs:  ------|-|-----             -|      |-   @ 07:18                    10.9  CBC: 11.38>)-------(<213                     33.2                 143 | 109 | 23    CMP:  ----------------------< 108               3.7 | 27 | 0.41                      Ca:8.7  Phos:3.0  M.1               -|      |-        LFTs:  ------|-|-----             -|      |-   @ 23:41                    13.3  CBC: 19.75>)-------(<266                     39.5                 136 | 103 | 28    CMP:  ----------------------< 124               4.4 | 25 | 0.67                      Ca:9.7  Phos:-  Mg:-               0.6|      |63        LFTs:  ------|92|-----             -|      |-      Current Inpatient Medications:  budesonide 160 MICROgram(s)/formoterol 4.5 MICROgram(s) Inhaler 2 Puff(s) Inhalation two times a day  dextrose 5% + sodium chloride 0.45% with potassium chloride 20 mEq/L 1000 milliLiter(s) (75 mL/Hr) IV Continuous <Continuous>  diltiazem    milliGRAM(s) Oral daily  DULoxetine 20 milliGRAM(s) Oral daily  enoxaparin Injectable 40 milliGRAM(s) SubCutaneous daily  gabapentin 600 milliGRAM(s) Oral three times a day  ketorolac   Injectable 15 milliGRAM(s) IV Push every 8 hours PRN  levothyroxine Injectable 44 MICROGram(s) IV Push at bedtime  methylPREDNISolone sodium succinate Injectable 30 milliGRAM(s) IV Push two times a day  ondansetron Injectable 4 milliGRAM(s) IV Push every 6 hours PRN  pantoprazole  Injectable 40 milliGRAM(s) IV Push daily      
Patient seen sitting in bed comfortably eating food now w/ NG tube out. Reports she has BM and passing gas. States she notes few little red dots on Rt forearm that are not raised or palpable at this time. No rashes on the LE. States she notes still neuropathy symptoms.  	  ROS: no fever or chills; denies dizziness, no HA, no SOB or cough, no diarrhea or constipation; no dysuria, no urinary frequency, no joint aches, no palpable rashes    MEDICATIONS  (STANDING):  budesonide 160 MICROgram(s)/formoterol 4.5 MICROgram(s) Inhaler 2 Puff(s) Inhalation two times a day  diltiazem    milliGRAM(s) Oral daily  DULoxetine 20 milliGRAM(s) Oral daily  enoxaparin Injectable 40 milliGRAM(s) SubCutaneous daily  gabapentin 600 milliGRAM(s) Oral three times a day  levothyroxine Injectable 44 MICROGram(s) IV Push at bedtime  methylPREDNISolone sodium succinate Injectable 30 milliGRAM(s) IV Push two times a day  pantoprazole  Injectable 40 milliGRAM(s) IV Push daily  piperacillin/tazobactam IVPB.. 3.375 Gram(s) IV Intermittent every 8 hours  sodium chloride 0.9% lock flush 3 milliLiter(s) IV Push every 8 hours    Vital Signs Last 24 Hrs  T(C): 36.7 (2021 08:28), Max: 36.7 (2021 15:37)  T(F): 98 (2021 08:28), Max: 98.1 (2021 15:37)  HR: 62 (2021 08:28) (57 - 62)  BP: 126/52 (2021 08:28) (120/76 - 127/57)  BP(mean): --  RR: 18 (2021 08:28) (18 - 18)  SpO2: 98% (2021 08:28) (96% - 98%)      PE:  Constitutional:  No acute distress  HEENT: NC/AT, EOMI, PERRLA, conjunctivae clear; ears and nose atraumatic; pharynx benign  Neck: supple; thyroid not palpable  Back: no tenderness  Respiratory: respiratory effort normal; clear to auscultation  Cardiovascular: S1S2 regular, no murmurs  Abdomen: soft, not tender, not distended, positive BS; abd drain in place   Genitourinary: no suprapubic tenderness  Lymphatic: no LN palpable  Musculoskeletal: no muscle tenderness, no joint swelling or tenderness  Extremities: no pedal edema  Neurological/ Psychiatric: AxOx3, judgement and insight normal; moving all extremities  Skin: no palpable lesions    Labs: all available labs reviewed                                   10.3   9.46  )-----------( 211      ( 2021 06:22 )             32.1     -    140  |  108  |  11  ----------------------------<  161<H>  4.0   |  26  |  0.48<L>    Ca    8.6      2021 06:22  Phos  3.4     -  Mg     2.0               COVID-19 PCR: NotDetec (21 @ 23:41)    Culture - Abscess with Gram Stain (21 @ 14:55)   - Amikacin: S <=16   - Amoxicillin/Clavulanic Acid: S <=8/4   - Ampicillin: R >16 These ampicillin results predict results for amoxicillin   - Ampicillin/Sulbactam: S <=4/2 Enterobacter, Citrobacter, and Serratia may develop resistance during prolonged therapy (3-4 days)   - Aztreonam: S <=4   - Cefazolin: S <=2 Enterobacter, Citrobacter, and Serratia may develop resistance during prolonged therapy (3-4 days)   - Cefepime: S <=2   - Cefoxitin: S <=8   - Ceftriaxone: S <=1 Enterobacter, Citrobacter, and Serratia may develop resistance during prolonged therapy   - Ciprofloxacin: S <=0.25   - Ertapenem: S <=0.5   - Gentamicin: S <=2   - Imipenem: S <=1   - Levofloxacin: S <=0.5   - Meropenem: S <=1   - Piperacillin/Tazobactam: S <=8   - Tobramycin: S <=2   - Trimethoprim/Sulfamethoxazole: S <=0.5/9.5   Specimen Source: .Abscess pelvic abscess drain   Culture Results:   Numerous Citrobacter koseri   Organism Identification: Citrobacter koseri   Organism: Citrobacter koseri   Method Type: [place]       Radiology: all available radiological tests reviewed    < from: CT Abdomen and Pelvis w/ Oral Cont and w/ IV Cont (21 @ 01:30) >  IMPRESSION:    1. High-grade small bowel obstruction, transition is at the anastomosis.    2. Complex organized fluid collection in the pelvis, inseparable from the sigmoid. Differential considerations include degenerating hematoma, organized seroma, and developing abscess.      Assessment and Plan:   ? Assessment	  56yo female with hx of hypothyroidism, HTN, asthma, s/p , s/p right nephrectomy, abdominal pain s/p work up found to have vasculitis, small bowel perforation w/ mid-jejunal resection and primary anastomosis  presents to ED for 3-4 days of abdominal pain, nausea and emesis x10 found to have SBO and abd abscess, NGT placed and pt underwent IR drain placement on 21.    She has PMH of R nephrectomy in  for unclear reason, HTN, asthma- with recurrent need for steroids nearly consistently since - 10-40 mg daily, hypothyroidism. Initially seen as hospital consult for new onset progressive peripheral neuropathy, purpuric rash, granulomatous rash on elbows, and oral/ nasal ulcerations, and significantly 50 lb wt loss. Was in usual state health with intermittent asthma/ and sinus infection but otherwise healthy till . Recent evaluation for mid epigastric abd/ non bloody emesis, EGD + gastritis confirmed eosinophilic esophagitis/ gastritis. Extensive w/u neuro/ rheum work up negative as per patient in the past. Here she is noted to have + MPO; +P-ANCA; +RF; +DS-DNA. C-ANCA is now positive. Discharged, then readmitted with GI perforation, tissue appeared inflammatory during resection, sent for pathology which confirming inflammatory etiology. Skin biopsy also consistent with vasculitis.     Rashes, lung issues, sinus involvement improved with steroids. Weight has stabilized, no other constitutional sx. Ongoing neuropathy with weak .   	   -For hx of vasculitis given Solumedrol 30mg PO BID inpatient   -can be discharged on prednisone 20mg PO TID    -plan for Rituxan outpatient when abdominal abscess resolves   -follow up with her Rheumatologist, Dr. Posadas outpatient   
Patient seen and examined at bedside. Patient has no complaints and reports pain is under control. NGT removed last night. Patient denies Nausea/vomitting. IR drain in place Nurse denies any acute events. Vitals reviewed and WNL.      Vitals:  T(C): 36.6 ( @ 08:08), Max: 36.6 ( @ 16:40)  HR: 62 ( @ 08:08) (62 - 73)  BP: 128/71 ( @ 08:08) (128/71 - 142/84)  RR: 18 ( @ 08:08) (18 - 18)  SpO2: 96% ( @ 08:08) (95% - 96%)     07:01  -   @ 07:00  --------------------------------------------------------  IN:    IV PiggyBack: 100 mL  Total IN: 100 mL    OUT:    T-Tube (mL): 15 mL  Total OUT: 15 mL    Total NET: 85 mL          Physical Exam:  Pt is AAOx3  General: Well developed, in no acute distress.   Chest: Lungs clear  Heart: RRR  Abdomen: Soft, nTND  Back: Normal curvature, no tenderness.   Neuro: Physiological, no localizing findings.   Skin: Normal, no rashes, no lesions noted.   Extremities: Warm, well perfused, no edema, Pulses intact  Genito: IR drain from left buttock into pelvis       @ 06:22                    9.6  CBC: 13.61>)-------(<191                     29.5                 140 | 108 | 11    CMP:  ----------------------< 161               4.0 | 26 | 0.48                      Ca:8.6  Phos:3.4  M.0               -|      |-        LFTs:  ------|-|-----             -|      |-   @ 07:19                    11.0  CBC: 12.37>)-------(<206                     33.9                 138 | 107 | 13    CMP:  ----------------------< 138               5.0 | 25 | 0.44                      Ca:8.7  Phos:3.4  M.1               -|      |-        LFTs:  ------|-|-----             -|      |-      Culture - Abscess with Gram Stain (collected 21 @ 14:55)  Source: .Abscess pelvic abscess drain  Preliminary Report (21 @ 17:55):    Numerous Citrobacter koseri      Current Inpatient Medications:  budesonide 160 MICROgram(s)/formoterol 4.5 MICROgram(s) Inhaler 2 Puff(s) Inhalation two times a day  dextrose 5% + sodium chloride 0.45% with potassium chloride 20 mEq/L 1000 milliLiter(s) (75 mL/Hr) IV Continuous <Continuous>  diltiazem    milliGRAM(s) Oral daily  DULoxetine 20 milliGRAM(s) Oral daily  enoxaparin Injectable 40 milliGRAM(s) SubCutaneous daily  gabapentin 600 milliGRAM(s) Oral three times a day  ketorolac   Injectable 15 milliGRAM(s) IV Push every 8 hours PRN  levothyroxine Injectable 44 MICROGram(s) IV Push at bedtime  methylPREDNISolone sodium succinate Injectable 30 milliGRAM(s) IV Push two times a day  ondansetron Injectable 4 milliGRAM(s) IV Push every 6 hours PRN  pantoprazole  Injectable 40 milliGRAM(s) IV Push daily  piperacillin/tazobactam IVPB.. 3.375 Gram(s) IV Intermittent every 8 hours      
Date of service: 04-08-21 @ 12:28    pt seen and examined  had some abd pain early this am  no further pain now  passing gas and having BMs  eating     ROS: no fever or chills; denies dizziness, no HA, no SOB or cough, no diarrhea or constipation; no dysuria, no urinary frequency, no legs pain, no rashes    MEDICATIONS  (STANDING):  budesonide 160 MICROgram(s)/formoterol 4.5 MICROgram(s) Inhaler 2 Puff(s) Inhalation two times a day  diltiazem    milliGRAM(s) Oral daily  DULoxetine 20 milliGRAM(s) Oral daily  enoxaparin Injectable 40 milliGRAM(s) SubCutaneous daily  gabapentin 600 milliGRAM(s) Oral three times a day  levothyroxine Injectable 44 MICROGram(s) IV Push at bedtime  methylPREDNISolone sodium succinate Injectable 30 milliGRAM(s) IV Push two times a day  pantoprazole  Injectable 40 milliGRAM(s) IV Push daily  piperacillin/tazobactam IVPB.. 3.375 Gram(s) IV Intermittent every 8 hours  sodium chloride 0.9% lock flush 3 milliLiter(s) IV Push every 8 hours  Vital Signs Last 24 Hrs  T(C): 36.7 (08 Apr 2021 08:28), Max: 36.7 (07 Apr 2021 15:37)  T(F): 98 (08 Apr 2021 08:28), Max: 98.1 (07 Apr 2021 15:37)  HR: 62 (08 Apr 2021 08:28) (57 - 62)  BP: 126/52 (08 Apr 2021 08:28) (120/76 - 127/57)  BP(mean): --  RR: 18 (08 Apr 2021 08:28) (18 - 18)  SpO2: 98% (08 Apr 2021 08:28) (96% - 98%)      PE:  Constitutional:  No acute distress  HEENT: NC/AT, EOMI, PERRLA, conjunctivae clear; ears and nose atraumatic; pharynx benign  Neck: supple; thyroid not palpable  Back: no tenderness  Respiratory: respiratory effort normal; clear to auscultation  Cardiovascular: S1S2 regular, no murmurs  Abdomen: soft, not tender, not distended, positive BS; abd drain in place   Genitourinary: no suprapubic tenderness  Lymphatic: no LN palpable  Musculoskeletal: no muscle tenderness, no joint swelling or tenderness  Extremities: no pedal edema  Neurological/ Psychiatric: AxOx3, judgement and insight normal; moving all extremities  Skin: no rashes; no palpable lesions    Labs: all available labs reviewed                                   10.3   9.46  )-----------( 211      ( 08 Apr 2021 06:22 )             32.1     04-07    140  |  108  |  11  ----------------------------<  161<H>  4.0   |  26  |  0.48<L>    Ca    8.6      07 Apr 2021 06:22  Phos  3.4     04-07  Mg     2.0     04-07          COVID-19 PCR: NotDetec (04-04-21 @ 23:41)    Culture - Abscess with Gram Stain (04.05.21 @ 14:55)   - Amikacin: S <=16   - Amoxicillin/Clavulanic Acid: S <=8/4   - Ampicillin: R >16 These ampicillin results predict results for amoxicillin   - Ampicillin/Sulbactam: S <=4/2 Enterobacter, Citrobacter, and Serratia may develop resistance during prolonged therapy (3-4 days)   - Aztreonam: S <=4   - Cefazolin: S <=2 Enterobacter, Citrobacter, and Serratia may develop resistance during prolonged therapy (3-4 days)   - Cefepime: S <=2   - Cefoxitin: S <=8   - Ceftriaxone: S <=1 Enterobacter, Citrobacter, and Serratia may develop resistance during prolonged therapy   - Ciprofloxacin: S <=0.25   - Ertapenem: S <=0.5   - Gentamicin: S <=2   - Imipenem: S <=1   - Levofloxacin: S <=0.5   - Meropenem: S <=1   - Piperacillin/Tazobactam: S <=8   - Tobramycin: S <=2   - Trimethoprim/Sulfamethoxazole: S <=0.5/9.5   Specimen Source: .Abscess pelvic abscess drain   Culture Results:   Numerous Citrobacter koseri   Organism Identification: Citrobacter koseri   Organism: Citrobacter koseri   Method Type: [place]       Radiology: all available radiological tests reviewed    < from: CT Abdomen and Pelvis w/ Oral Cont and w/ IV Cont (04.05.21 @ 01:30) >  IMPRESSION:    1. High-grade small bowel obstruction, transition is at the anastomosis.    2. Complex organized fluid collection in the pelvis, inseparable from the sigmoid. Differential considerations include degenerating hematoma, organized seroma, and developing abscess.      < end of copied text >      Advanced directives addressed: full resuscitation
Patient is a 55y old  Female who presents with a chief complaint of SBO (2021 08:57)    HPI:  54yo female reports feeling better.   No abdominal pain, n/v.   + gas, + bm, no stools yet.     PAST MEDICAL & SURGICAL HISTORY:  Asthma    Sciatica    HTN (hypertension)    Hypothyroid    H/O autoimmune disorder    H/O right nephrectomy    H/O  section      MEDICATIONS  (STANDING):  budesonide 160 MICROgram(s)/formoterol 4.5 MICROgram(s) Inhaler 2 Puff(s) Inhalation two times a day  dextrose 5% + sodium chloride 0.45% with potassium chloride 20 mEq/L 1000 milliLiter(s) (75 mL/Hr) IV Continuous <Continuous>  diatrizoate meglumine/diatrizoate sodium. 60 milliLiter(s) Oral once  diltiazem    milliGRAM(s) Oral daily  DULoxetine 20 milliGRAM(s) Oral daily  enoxaparin Injectable 40 milliGRAM(s) SubCutaneous daily  gabapentin 600 milliGRAM(s) Oral three times a day  levothyroxine Injectable 44 MICROGram(s) IV Push at bedtime  methylPREDNISolone sodium succinate Injectable 30 milliGRAM(s) IV Push two times a day  pantoprazole  Injectable 40 milliGRAM(s) IV Push daily    MEDICATIONS  (PRN):  ketorolac   Injectable 15 milliGRAM(s) IV Push every 8 hours PRN Moderate Pain (4 - 6)  ondansetron Injectable 4 milliGRAM(s) IV Push every 6 hours PRN Nausea    Allergies    ciprofloxacin (Unknown)  penicillin (Unknown)    Intolerances      SOCIAL HISTORY:  FAMILY HISTORY:    REVIEW OF SYSTEMS:  CONSTITUTIONAL: No weakness, fevers or chills  RESPIRATORY: No cough, wheezing, hemoptysis; No shortness of breath  CARDIOVASCULAR: No chest pain or palpitations  GASTROINTESTINAL: Per HPI.     Vital Signs Last 24 Hrs  T(C): 36.6 (2021 08:34), Max: 36.6 (2021 08:34)  T(F): 97.9 (2021 08:34), Max: 97.9 (2021 08:34)  HR: 71 (2021 08:34) (53 - 85)  BP: 139/78 (2021 08:34) (109/66 - 139/81)  BP(mean): --  RR: 18 (2021 08:34) (12 - 18)  SpO2: 97% (2021 08:34) (96% - 100%)    PHYSICAL EXAM:  Constitutional: NAD, well-developed  Respiratory: CTAB  Cardiovascular: S1 and S2, RRR, no M/G/R  Gastrointestinal: BS+, soft, NT/ND, pelvic abscess drainage bag noted.       LABS:                        11.0   12.37 )-----------( 206      ( 2021 07:19 )             33.9     04-06    138  |  107  |  13  ----------------------------<  138<H>  5.0   |  25  |  0.44<L>    Ca    8.7      2021 07:19  Phos  3.4     04-06  Mg     2.1     04-06    TPro  7.3  /  Alb  3.0<L>  /  TBili  0.6  /  DBili  x   /  AST  63<H>  /  ALT  43  /  AlkPhos  92  04-04    PT/INR - ( 2021 11:14 )   PT: 11.3 sec;   INR: 0.96 ratio         PTT - ( 2021 11:14 )  PTT:26.2 sec  LIVER FUNCTIONS - ( 2021 23:41 )  Alb: 3.0 g/dL / Pro: 7.3 gm/dL / ALK PHOS: 92 U/L / ALT: 43 U/L / AST: 63 U/L / GGT: x             RADIOLOGY & ADDITIONAL STUDIES:
Patient is a 55y old  Female who presents with a chief complaint of SBO (2021 08:58)    HPI:  54yo female reports feeling better now.   Tolerating water.   No abdominal pain, n/v.   + BS, gas, no bms yet.     PAST MEDICAL & SURGICAL HISTORY:  Asthma    Sciatica    HTN (hypertension)    Hypothyroid    H/O autoimmune disorder    H/O right nephrectomy    H/O  section      MEDICATIONS  (STANDING):  budesonide 160 MICROgram(s)/formoterol 4.5 MICROgram(s) Inhaler 2 Puff(s) Inhalation two times a day  dextrose 5% + sodium chloride 0.45% with potassium chloride 20 mEq/L 1000 milliLiter(s) (75 mL/Hr) IV Continuous <Continuous>  diltiazem    milliGRAM(s) Oral daily  DULoxetine 20 milliGRAM(s) Oral daily  enoxaparin Injectable 40 milliGRAM(s) SubCutaneous daily  gabapentin 600 milliGRAM(s) Oral three times a day  levothyroxine Injectable 44 MICROGram(s) IV Push at bedtime  methylPREDNISolone sodium succinate Injectable 30 milliGRAM(s) IV Push two times a day  pantoprazole  Injectable 40 milliGRAM(s) IV Push daily  piperacillin/tazobactam IVPB.. 3.375 Gram(s) IV Intermittent every 8 hours    MEDICATIONS  (PRN):  ketorolac   Injectable 15 milliGRAM(s) IV Push every 8 hours PRN Moderate Pain (4 - 6)  ondansetron Injectable 4 milliGRAM(s) IV Push every 6 hours PRN Nausea    Allergies    ciprofloxacin (Unknown)    Intolerances      SOCIAL HISTORY:  FAMILY HISTORY:    REVIEW OF SYSTEMS:  CONSTITUTIONAL: No weakness, fevers or chills  RESPIRATORY: No cough, wheezing, hemoptysis; No shortness of breath  CARDIOVASCULAR: No chest pain or palpitations  GASTROINTESTINAL: Per HPI.     Vital Signs Last 24 Hrs  T(C): 36.6 (2021 08:08), Max: 36.6 (2021 16:40)  T(F): 97.9 (2021 08:08), Max: 97.9 (2021 16:40)  HR: 62 (2021 08:08) (62 - 73)  BP: 128/71 (2021 08:08) (128/71 - 142/84)  BP(mean): --  RR: 18 (2021 08:08) (18 - 18)  SpO2: 96% (2021 08:08) (95% - 96%)    PHYSICAL EXAM:  Constitutional: NAD, well-developed  Respiratory: CTAB  Cardiovascular: S1 and S2, RRR, no M/G/R  Gastrointestinal: BS+, soft, NT/ND, pelvic abscess drainage noted.       LABS:                        9.6    13.61 )-----------( 191      ( 2021 06:22 )             29.5     04-07    140  |  108  |  11  ----------------------------<  161<H>  4.0   |  26  |  0.48<L>    Ca    8.6      2021 06:22  Phos  3.4     04-07  Mg     2.0     04-07      PT/INR - ( 2021 11:14 )   PT: 11.3 sec;   INR: 0.96 ratio         PTT - ( 2021 11:14 )  PTT:26.2 sec      RADIOLOGY & ADDITIONAL STUDIES:
Patient seen and examined at bedside. Patient has no complaints and reports pain is under control. NGT removed last night. Patient denies Nausea/vomitting. IR drain in place Nurse denies any acute events. Vitals reviewed and WNL.      Vitals:  T(C): 36.7 ( @ 08:28), Max: 36.7 ( @ 15:37)  HR: 62 ( 08:28) (57 - 62)  BP: 126/52 ( @ 08:28) (120/76 - 127/57)  RR: 18 ( 08:28) (18 - 18)  SpO2: 98% ( 08:28) (96% - 98%)     @ 07:01  -   @ 07:00  --------------------------------------------------------  IN:    Oral Fluid: 450 mL  Total IN: 450 mL    OUT:    T-Tube (mL): 2 mL    Voided (mL): 625 mL  Total OUT: 627 mL    Total NET: -177 mL            Physical Exam:  Pt is AAOx3  General: Well developed, in no acute distress.   Chest: Lungs clear  Heart: RRR  Abdomen: Soft, nTND  Back: Normal curvature, no tenderness.   Neuro: Physiological, no localizing findings.   Skin: Normal, no rashes, no lesions noted.   Extremities: Warm, well perfused, no edema, Pulses intact  Genito: IR drain from left buttock into pelvis           @ 06:22                    10.3  CBC: 9.46>)-------(<211                     32.1                 - | - | -    CMP:  ----------------------< -               - | - | -                      Ca:-  Phos:-  Mg:-               -|      |-        LFTs:  ------|-|-----             -|      |-   @ 06:22                    9.6  CBC: 13.61>)-------(<191                     29.5                 140 | 108 | 11    CMP:  ----------------------< 161               4.0 | 26 | 0.48                      Ca:8.6  Phos:3.4  M.0               -|      |-        LFTs:  ------|-|-----             -|      |-      Culture - Abscess with Gram Stain (collected 21 @ 14:55)  Source: .Abscess pelvic abscess drain  Preliminary Report (21 @ 17:55):    Numerous Citrobacter koseri  Organism: Citrobacter koseri (21 @ 16:39)  Organism: Citrobacter koseri (21 @ 16:39)      -  Amikacin: S <=16      -  Amoxicillin/Clavulanic Acid: S <=8/4      -  Ampicillin: R >16 These ampicillin results predict results for amoxicillin      -  Ampicillin/Sulbactam: S <=4/2 Enterobacter, Citrobacter, and Serratia may develop resistance during prolonged therapy (3-4 days)      -  Aztreonam: S <=4      -  Cefazolin: S <=2 Enterobacter, Citrobacter, and Serratia may develop resistance during prolonged therapy (3-4 days)      -  Cefepime: S <=2      -  Cefoxitin: S <=8      -  Ceftriaxone: S <=1 Enterobacter, Citrobacter, and Serratia may develop resistance during prolonged therapy      -  Ciprofloxacin: S <=0.25      -  Ertapenem: S <=0.5      -  Gentamicin: S <=2      -  Imipenem: S <=1      -  Levofloxacin: S <=0.5      -  Meropenem: S <=1      -  Piperacillin/Tazobactam: S <=8      -  Tobramycin: S <=2      -  Trimethoprim/Sulfamethoxazole: S <=0.5/9.5      Method Type: [place]      Current Inpatient Medications:  budesonide 160 MICROgram(s)/formoterol 4.5 MICROgram(s) Inhaler 2 Puff(s) Inhalation two times a day  diltiazem    milliGRAM(s) Oral daily  DULoxetine 20 milliGRAM(s) Oral daily  enoxaparin Injectable 40 milliGRAM(s) SubCutaneous daily  gabapentin 600 milliGRAM(s) Oral three times a day  ketorolac   Injectable 15 milliGRAM(s) IV Push every 8 hours PRN  levothyroxine Injectable 44 MICROGram(s) IV Push at bedtime  methylPREDNISolone sodium succinate Injectable 30 milliGRAM(s) IV Push two times a day  ondansetron Injectable 4 milliGRAM(s) IV Push every 6 hours PRN  pantoprazole  Injectable 40 milliGRAM(s) IV Push daily  piperacillin/tazobactam IVPB.. 3.375 Gram(s) IV Intermittent every 8 hours  sodium chloride 0.9% lock flush 3 milliLiter(s) IV Push every 8 hours

## 2021-04-08 NOTE — PROGRESS NOTE ADULT - NUTRITIONAL ASSESSMENT
This patient has been assessed with a concern for Malnutrition and has been determined to have a diagnosis/diagnoses of Severe protein-calorie malnutrition.    This patient is being managed with:   Diet Full Liquid-  Entered: Apr 7 2021  7:57AM    
This patient has been assessed with a concern for Malnutrition and has been determined to have a diagnosis/diagnoses of Severe protein-calorie malnutrition.    This patient is being managed with:   Diet Regular-  Fiber/Residue Restricted (LOWFIBER)  Entered: Apr 7 2021  7:32PM    
This patient has been assessed with a concern for Malnutrition and has been determined to have a diagnosis/diagnoses of Severe protein-calorie malnutrition.    This patient is being managed with:   Diet Regular-  Fiber/Residue Restricted (LOWFIBER)  Entered: Apr 7 2021  7:32PM

## 2021-04-09 ENCOUNTER — TRANSCRIPTION ENCOUNTER (OUTPATIENT)
Age: 56
End: 2021-04-09

## 2021-04-09 VITALS
RESPIRATION RATE: 16 BRPM | HEART RATE: 56 BPM | DIASTOLIC BLOOD PRESSURE: 90 MMHG | TEMPERATURE: 99 F | OXYGEN SATURATION: 99 % | SYSTOLIC BLOOD PRESSURE: 113 MMHG

## 2021-04-09 RX ADMIN — GABAPENTIN 600 MILLIGRAM(S): 400 CAPSULE ORAL at 05:46

## 2021-04-09 RX ADMIN — Medication 240 MILLIGRAM(S): at 09:27

## 2021-04-09 RX ADMIN — SODIUM CHLORIDE 3 MILLILITER(S): 9 INJECTION INTRAMUSCULAR; INTRAVENOUS; SUBCUTANEOUS at 07:21

## 2021-04-09 RX ADMIN — PIPERACILLIN AND TAZOBACTAM 25 GRAM(S): 4; .5 INJECTION, POWDER, LYOPHILIZED, FOR SOLUTION INTRAVENOUS at 05:46

## 2021-04-09 RX ADMIN — Medication 30 MILLIGRAM(S): at 09:26

## 2021-04-09 RX ADMIN — GABAPENTIN 600 MILLIGRAM(S): 400 CAPSULE ORAL at 15:44

## 2021-04-09 RX ADMIN — BUDESONIDE AND FORMOTEROL FUMARATE DIHYDRATE 2 PUFF(S): 160; 4.5 AEROSOL RESPIRATORY (INHALATION) at 09:04

## 2021-04-09 RX ADMIN — ENOXAPARIN SODIUM 40 MILLIGRAM(S): 100 INJECTION SUBCUTANEOUS at 09:26

## 2021-04-09 NOTE — DISCHARGE NOTE PROVIDER - HOSPITAL COURSE
54 y/o female with PMHx of hypothyroidism, HTN, asthma, s/p , s/p right nephrectomy, small bowel perforation, small bowel resection presents to ED for abdominal pain, n/v. CT showed High-grade small bowel obstruction, w/ transition at the anastomosis as well as pelvic collection. Conservative management with NGT decompression allowed resolution of SBO. IR placed transgluteal drain into pelvic collection and patient was started on Antibiotics. Infectious disease team provided antibiotic management and patient discharged with 2week course of Augmentin.

## 2021-04-09 NOTE — DISCHARGE NOTE PROVIDER - NSDCFUSCHEDAPPT_GEN_ALL_CORE_FT
[facility] ; 04/12/2021 ; [facility] ; NPP IntMed [facility] ; 05/05/2021 ; NPP Gastro [address] [facility] ; 06/08/2021 ; NPP IntMed [address] [facility]

## 2021-04-09 NOTE — DISCHARGE NOTE PROVIDER - NSDCCPTREATMENT_GEN_ALL_CORE_FT
PRINCIPAL PROCEDURE  Procedure: Drainage, abscess, pelvis, with CT guidance  Findings and Treatment:

## 2021-04-09 NOTE — DISCHARGE NOTE PROVIDER - CARE PROVIDER_API CALL
Iraj Stanley)  Surgery  [address] [facility], [address]  [place] [address]  Phone: [phone]  Fax: [phone]  Follow Up Time: 2 weeks

## 2021-04-09 NOTE — DISCHARGE NOTE PROVIDER - NSDCFUADDINST_GEN_ALL_CORE_FT
Patient may resume regular activity and diet as tolerated. Please continue to care for drain as directed and monitor and record daily output. Please take Augmentin every 12 hrs for 2weeks and continue to take prednisone 20mg three times per day. Please follow up with rheumatologist Dr Posadas. Initiation of Rituxan should be held until resolution of infection. May take shower as needed. Allow warm soap and water to rinse over wound but do not scrub. Please call and schedule follow up with Dr. Stanley in 2 weeks.

## 2021-04-09 NOTE — DISCHARGE NOTE PROVIDER - NSDCMRMEDTOKEN_GEN_ALL_CORE_FT
atovaquone 750 mg/5 mL oral suspension: 10 milliliter(s) orally once a day  Augmentin 875 mg-125 mg oral tablet: 1 tab(s) orally every 12 hours  Cymbalta 20 mg oral delayed release capsule: 1 cap(s) orally once a day in the evening.  dilTIAZem 240 mg/24 hours oral capsule, extended release: 1 cap(s) orally once a day  gabapentin 300 mg oral capsule: 2 cap(s) orally 3 times a day  gabapentin 300 mg oral capsule: 2 cap(s) orally 3 times a day  LEVOTHYROXIN TAB 88MC tab(s) orally once a day  oxyCODONE 5 mg oral tablet: 1 tab(s) orally every 6 hours, As Needed - for severe pain MDD:4gm  pantoprazole 40 mg oral delayed release tablet: 1 tab(s) orally once a day  predniSONE 20 mg oral tablet: 1 tab(s) orally 3 times a day   Symbicort 160 mcg-4.5 mcg/inh inhalation aerosol: 2 puff(s) inhaled 2 times a day

## 2021-04-12 ENCOUNTER — NON-APPOINTMENT (OUTPATIENT)
Age: 56
End: 2021-04-12

## 2021-04-12 ENCOUNTER — APPOINTMENT (OUTPATIENT)
Dept: RHEUMATOLOGY | Facility: CLINIC | Age: 56
End: 2021-04-12
Payer: MEDICAID

## 2021-04-12 VITALS
OXYGEN SATURATION: 97 % | HEIGHT: 64 IN | BODY MASS INDEX: 28.17 KG/M2 | WEIGHT: 165 LBS | TEMPERATURE: 97.9 F | HEART RATE: 77 BPM | SYSTOLIC BLOOD PRESSURE: 122 MMHG | DIASTOLIC BLOOD PRESSURE: 82 MMHG

## 2021-04-12 DIAGNOSIS — R11.0 NAUSEA: ICD-10-CM

## 2021-04-12 PROCEDURE — 99072 ADDL SUPL MATRL&STAF TM PHE: CPT

## 2021-04-12 PROCEDURE — 99214 OFFICE O/P EST MOD 30 MIN: CPT

## 2021-04-13 ENCOUNTER — MED ADMIN CHARGE (OUTPATIENT)
Age: 56
End: 2021-04-13

## 2021-04-13 ENCOUNTER — APPOINTMENT (OUTPATIENT)
Dept: INTERNAL MEDICINE | Facility: CLINIC | Age: 56
End: 2021-04-13
Payer: MEDICAID

## 2021-04-13 DIAGNOSIS — K56.609 UNSPECIFIED INTESTINAL OBSTRUCTION, UNSPECIFIED AS TO PARTIAL VERSUS COMPLETE OBSTRUCTION: ICD-10-CM

## 2021-04-13 DIAGNOSIS — E43 UNSPECIFIED SEVERE PROTEIN-CALORIE MALNUTRITION: ICD-10-CM

## 2021-04-13 DIAGNOSIS — Z88.1 ALLERGY STATUS TO OTHER ANTIBIOTIC AGENTS STATUS: ICD-10-CM

## 2021-04-13 DIAGNOSIS — I10 ESSENTIAL (PRIMARY) HYPERTENSION: ICD-10-CM

## 2021-04-13 DIAGNOSIS — E03.9 HYPOTHYROIDISM, UNSPECIFIED: ICD-10-CM

## 2021-04-13 DIAGNOSIS — Z90.5 ACQUIRED ABSENCE OF KIDNEY: ICD-10-CM

## 2021-04-13 DIAGNOSIS — Z88.0 ALLERGY STATUS TO PENICILLIN: ICD-10-CM

## 2021-04-13 PROCEDURE — 96372 THER/PROPH/DIAG INJ SC/IM: CPT

## 2021-04-13 PROCEDURE — 99072 ADDL SUPL MATRL&STAF TM PHE: CPT

## 2021-04-13 RX ORDER — CYANOCOBALAMIN 1000 UG/ML
1000 INJECTION INTRAMUSCULAR; SUBCUTANEOUS
Qty: 0 | Refills: 0 | Status: COMPLETED | OUTPATIENT
Start: 2021-04-07

## 2021-04-15 ENCOUNTER — APPOINTMENT (OUTPATIENT)
Dept: NEUROLOGY | Facility: CLINIC | Age: 56
End: 2021-04-15

## 2021-04-16 RX ORDER — METHYLPREDNISOLONE SODIUM SUCCINATE 1 G/16ML
1000 INJECTION, POWDER, LYOPHILIZED, FOR SOLUTION INTRAMUSCULAR; INTRAVENOUS
Qty: 3 | Refills: 0 | Status: DISCONTINUED | COMMUNITY
Start: 2021-04-15 | End: 2021-04-16

## 2021-04-16 RX ORDER — METHYLPREDNISOLONE SODIUM SUCCINATE 1 G/16ML
1000 INJECTION, POWDER, LYOPHILIZED, FOR SOLUTION INTRAMUSCULAR; INTRAVENOUS
Qty: 3 | Refills: 0 | Status: DISCONTINUED | COMMUNITY
Start: 2021-04-01 | End: 2021-04-16

## 2021-04-19 ENCOUNTER — APPOINTMENT (OUTPATIENT)
Dept: INTERNAL MEDICINE | Facility: CLINIC | Age: 56
End: 2021-04-19
Payer: MEDICAID

## 2021-04-19 VITALS
DIASTOLIC BLOOD PRESSURE: 80 MMHG | RESPIRATION RATE: 16 BRPM | TEMPERATURE: 97.1 F | BODY MASS INDEX: 27.31 KG/M2 | OXYGEN SATURATION: 97 % | HEIGHT: 64 IN | WEIGHT: 160 LBS | HEART RATE: 100 BPM | SYSTOLIC BLOOD PRESSURE: 130 MMHG

## 2021-04-19 DIAGNOSIS — K63.0 ABSCESS OF INTESTINE: ICD-10-CM

## 2021-04-19 PROCEDURE — 99072 ADDL SUPL MATRL&STAF TM PHE: CPT

## 2021-04-19 PROCEDURE — 99495 TRANSJ CARE MGMT MOD F2F 14D: CPT

## 2021-04-19 RX ORDER — ACETAMINOPHEN 325 MG/1
325 TABLET ORAL
Qty: 0 | Refills: 0 | Status: COMPLETED | OUTPATIENT
Start: 2021-04-19 | End: 1900-01-01

## 2021-04-19 RX ORDER — METHYLPREDNISOLONE SODIUM SUCCINATE 1 G/16ML
1000 INJECTION, POWDER, LYOPHILIZED, FOR SOLUTION INTRAMUSCULAR; INTRAVENOUS
Qty: 0 | Refills: 0 | Status: COMPLETED | OUTPATIENT
Start: 2021-04-19 | End: 1900-01-01

## 2021-04-19 RX ORDER — ONDANSETRON 4 MG/1
4 TABLET ORAL
Qty: 90 | Refills: 0 | Status: DISCONTINUED | COMMUNITY
Start: 2021-04-12 | End: 2021-04-19

## 2021-04-19 RX ORDER — DIPHENHYDRAMINE HYDROCHLORIDE 50 MG/ML
50 INJECTION, SOLUTION INTRAMUSCULAR; INTRAVENOUS
Qty: 1 | Refills: 0 | Status: COMPLETED | OUTPATIENT
Start: 2021-04-19 | End: 1900-01-01

## 2021-04-19 RX ORDER — HYDROCORTISONE ACETATE 25 MG/1
25 SUPPOSITORY RECTAL
Qty: 14 | Refills: 2 | Status: DISCONTINUED | COMMUNITY
Start: 2021-03-25 | End: 2021-04-19

## 2021-04-19 RX ORDER — HYDROCORTISONE 25 MG/G
2.5 CREAM TOPICAL TWICE DAILY
Qty: 1 | Refills: 4 | Status: DISCONTINUED | COMMUNITY
Start: 2021-03-12 | End: 2021-04-19

## 2021-04-19 NOTE — HISTORY OF PRESENT ILLNESS
[Post-hospitalization from ___ Hospital] : Post-hospitalization from [unfilled] Hospital [Admitted on: ___] : The patient was admitted on [unfilled] [Discharged on ___] : discharged on [unfilled] [Discharge Summary] : discharge summary [Patient Contacted By: ____] : and contacted by [unfilled] [FreeTextEntry2] : Presents for HFU.  PResented to  ED with abdominal pain 4/5.  Found to have SBO. \par Hospital Course: see below\par Discharge Date	2021 \par Admission Date	2021 03:00 \par Reason for Admission	SBO \par Hospital Course	 \par 54 y/o female with PMHx of hypothyroidism, HTN, asthma, s/p , s/p \par right nephrectomy, small bowel perforation, small bowel resection presents to \par ED for abdominal pain, n/v. CT showed High-grade small bowel obstruction, w/ \par transition at the anastomosis as well as pelvic collection. Conservative \par management with NGT decompression allowed resolution of SBO. IR placed \par transgluteal drain into pelvic collection and patient was started on \par Antibiotics. Infectious disease team provided antibiotic management and patient \par discharged with 2week course of Augmentin. \par \par Med Reconciliation: \par Medication Reconciliation Status	Admission Reconciliation is Not Complete \par Discharge Reconciliation is Completed \par Discharge Medications	atovaquone 750 mg/5 mL oral suspension: 10 milliliter(s) \par orally once a day \par Augmentin 875 mg-125 mg oral tablet: 1 tab(s) orally every 12 hours \par Cymbalta 20 mg oral delayed release capsule: 1 cap(s) orally once a day in the \par evening. \par dilTIAZem 240 mg/24 hours oral capsule, extended release: 1 cap(s) orally once \par a day \par gabapentin 300 mg oral capsule: 2 cap(s) orally 3 times a day \par gabapentin 300 mg oral capsule: 2 cap(s) orally 3 times a day \par LEVOTHYROXIN TAB 88MC tab(s) orally once a day \par oxyCODONE 5 mg oral tablet: 1 tab(s) orally every 6 hours, As Needed - for \par severe pain MDD:4gm \par pantoprazole 40 mg oral delayed release tablet: 1 tab(s) orally once a day \par predniSONE 20 mg oral tablet: 1 tab(s) orally 3 times a day \par Symbicort 160 mcg-4.5 mcg/inh inhalation aerosol: 2 puff(s) inhaled 2 times a \par day \par \par \par Care Plan/Procedures: \par Goal(s)	To get better and follow your care plan as instructed. \par Discharge Diagnoses, Assessment and Plan of Treatment	PRINCIPAL DISCHARGE \par DIAGNOSIS \par Diagnosis: Small bowel obstruction \par Assessment and Plan of Treatment: \par Discharge Procedures, Findings and Treatment	PRINCIPAL PROCEDURE \par Procedure: Drainage, abscess, pelvis, with CT guidance \par Findings and Treatment: \par \par Patient's Scheduled Appointments	JOSE ALE[facility]DRO [facility] ; 2021 ; NPP Rheum [address] \par JOSE ALEJANDRO HOFF ; 2021 ; NPP IntMed [address] \par JOSE ALEJANDRO HOFF ; 2021 ; NPP Gastro [address] \par JOSE ALEJANDRO HOFF ; 2021 ; NPP IntMed [address] \par Additional Instructions	Patient may resume regular activity and diet as \par tolerated. Please continue to care for drain as directed and monitor and record \par daily output. Please take Augmentin every 12 hrs for 2weeks and continue to \par take prednisone 20mg three times per day. Please follow up with rheumatologist \par Dr Posadas. Initiation of Rituxan should be held until resolution of infection. \par May take shower as needed. Allow warm soap and water to rinse over wound but do \par not scrub. Please call and schedule follow up with Dr. Stanley in 2 weeks. \par \par 21    She has FU with  on  .  A FU CT scan to be performed and determine if abcess and infection resolved so Rituxan may be started.  She is monitoring the drainage which has been minimal and  clear .  No fevers or abdominal pain.  Taking Augmentin which was giving her GI upset but this is improving.  Prednisone being tapered by , now on 20 mg tid.   Appetite is improving. NOrmal BM today.  She continues to have significant Neuropathy to feet, hands with generalized muscle weakness.  \par

## 2021-04-19 NOTE — PHYSICAL EXAM
[Soft] : abdomen soft [Non Tender] : non-tender [Normal] : affect was normal and insight and judgment were intact [FreeTextEntry1] : Transgluteal drain in place L buttock.

## 2021-04-21 ENCOUNTER — EMERGENCY (EMERGENCY)
Facility: HOSPITAL | Age: 56
LOS: 0 days | Discharge: HOME | End: 2021-04-21
Attending: STUDENT IN AN ORGANIZED HEALTH CARE EDUCATION/TRAINING PROGRAM
Payer: MEDICAID

## 2021-04-21 VITALS
TEMPERATURE: 98 F | SYSTOLIC BLOOD PRESSURE: 160 MMHG | RESPIRATION RATE: 18 BRPM | OXYGEN SATURATION: 100 % | DIASTOLIC BLOOD PRESSURE: 97 MMHG | HEART RATE: 68 BPM

## 2021-04-21 VITALS
HEIGHT: 64 IN | DIASTOLIC BLOOD PRESSURE: 95 MMHG | TEMPERATURE: 98 F | OXYGEN SATURATION: 100 % | HEART RATE: 64 BPM | SYSTOLIC BLOOD PRESSURE: 178 MMHG | WEIGHT: 160.06 LBS | RESPIRATION RATE: 20 BRPM

## 2021-04-21 DIAGNOSIS — Z90.5 ACQUIRED ABSENCE OF KIDNEY: Chronic | ICD-10-CM

## 2021-04-21 DIAGNOSIS — R19.7 DIARRHEA, UNSPECIFIED: ICD-10-CM

## 2021-04-21 DIAGNOSIS — Z90.5 ACQUIRED ABSENCE OF KIDNEY: ICD-10-CM

## 2021-04-21 DIAGNOSIS — R11.2 NAUSEA WITH VOMITING, UNSPECIFIED: ICD-10-CM

## 2021-04-21 DIAGNOSIS — Z98.891 HISTORY OF UTERINE SCAR FROM PREVIOUS SURGERY: Chronic | ICD-10-CM

## 2021-04-21 DIAGNOSIS — R10.9 UNSPECIFIED ABDOMINAL PAIN: ICD-10-CM

## 2021-04-21 DIAGNOSIS — I10 ESSENTIAL (PRIMARY) HYPERTENSION: ICD-10-CM

## 2021-04-21 DIAGNOSIS — Z88.1 ALLERGY STATUS TO OTHER ANTIBIOTIC AGENTS STATUS: ICD-10-CM

## 2021-04-21 LAB
ALBUMIN SERPL ELPH-MCNC: 3.2 G/DL — LOW (ref 3.3–5)
ALP SERPL-CCNC: 99 U/L — SIGNIFICANT CHANGE UP (ref 40–120)
ALT FLD-CCNC: 50 U/L — SIGNIFICANT CHANGE UP (ref 12–78)
ANION GAP SERPL CALC-SCNC: 9 MMOL/L — SIGNIFICANT CHANGE UP (ref 5–17)
APPEARANCE UR: CLEAR — SIGNIFICANT CHANGE UP
AST SERPL-CCNC: 75 U/L — HIGH (ref 15–37)
BASOPHILS # BLD AUTO: 0.02 K/UL — SIGNIFICANT CHANGE UP (ref 0–0.2)
BASOPHILS NFR BLD AUTO: 0.1 % — SIGNIFICANT CHANGE UP (ref 0–2)
BILIRUB SERPL-MCNC: 0.8 MG/DL — SIGNIFICANT CHANGE UP (ref 0.2–1.2)
BILIRUB UR-MCNC: NEGATIVE — SIGNIFICANT CHANGE UP
BUN SERPL-MCNC: 13 MG/DL — SIGNIFICANT CHANGE UP (ref 7–23)
CALCIUM SERPL-MCNC: 8.9 MG/DL — SIGNIFICANT CHANGE UP (ref 8.5–10.1)
CHLORIDE SERPL-SCNC: 99 MMOL/L — SIGNIFICANT CHANGE UP (ref 96–108)
CO2 SERPL-SCNC: 29 MMOL/L — SIGNIFICANT CHANGE UP (ref 22–31)
COLOR SPEC: YELLOW — SIGNIFICANT CHANGE UP
CREAT SERPL-MCNC: 0.53 MG/DL — SIGNIFICANT CHANGE UP (ref 0.5–1.3)
DIFF PNL FLD: NEGATIVE — SIGNIFICANT CHANGE UP
EOSINOPHIL # BLD AUTO: 0.01 K/UL — SIGNIFICANT CHANGE UP (ref 0–0.5)
EOSINOPHIL NFR BLD AUTO: 0.1 % — SIGNIFICANT CHANGE UP (ref 0–6)
GLUCOSE SERPL-MCNC: 98 MG/DL — SIGNIFICANT CHANGE UP (ref 70–99)
GLUCOSE UR QL: NEGATIVE MG/DL — SIGNIFICANT CHANGE UP
HCG SERPL-ACNC: <1 MIU/ML — SIGNIFICANT CHANGE UP
HCT VFR BLD CALC: 35.9 % — SIGNIFICANT CHANGE UP (ref 34.5–45)
HGB BLD-MCNC: 12.4 G/DL — SIGNIFICANT CHANGE UP (ref 11.5–15.5)
IMM GRANULOCYTES NFR BLD AUTO: 1.3 % — SIGNIFICANT CHANGE UP (ref 0–1.5)
KETONES UR-MCNC: NEGATIVE — SIGNIFICANT CHANGE UP
LACTATE SERPL-SCNC: 4.1 MMOL/L — CRITICAL HIGH (ref 0.7–2)
LACTATE SERPL-SCNC: 4.5 MMOL/L — CRITICAL HIGH (ref 0.7–2)
LEUKOCYTE ESTERASE UR-ACNC: NEGATIVE — SIGNIFICANT CHANGE UP
LIDOCAIN IGE QN: 148 U/L — SIGNIFICANT CHANGE UP (ref 73–393)
LYMPHOCYTES # BLD AUTO: 0.57 K/UL — LOW (ref 1–3.3)
LYMPHOCYTES # BLD AUTO: 3.5 % — LOW (ref 13–44)
MCHC RBC-ENTMCNC: 30.9 PG — SIGNIFICANT CHANGE UP (ref 27–34)
MCHC RBC-ENTMCNC: 34.5 GM/DL — SIGNIFICANT CHANGE UP (ref 32–36)
MCV RBC AUTO: 89.5 FL — SIGNIFICANT CHANGE UP (ref 80–100)
MONOCYTES # BLD AUTO: 0.67 K/UL — SIGNIFICANT CHANGE UP (ref 0–0.9)
MONOCYTES NFR BLD AUTO: 4.2 % — SIGNIFICANT CHANGE UP (ref 2–14)
NEUTROPHILS # BLD AUTO: 14.65 K/UL — HIGH (ref 1.8–7.4)
NEUTROPHILS NFR BLD AUTO: 90.8 % — HIGH (ref 43–77)
NITRITE UR-MCNC: NEGATIVE — SIGNIFICANT CHANGE UP
PH UR: 8 — SIGNIFICANT CHANGE UP (ref 5–8)
PLATELET # BLD AUTO: 213 K/UL — SIGNIFICANT CHANGE UP (ref 150–400)
POTASSIUM SERPL-MCNC: 3 MMOL/L — LOW (ref 3.5–5.3)
POTASSIUM SERPL-SCNC: 3 MMOL/L — LOW (ref 3.5–5.3)
PROT SERPL-MCNC: 6.1 GM/DL — SIGNIFICANT CHANGE UP (ref 6–8.3)
PROT UR-MCNC: NEGATIVE MG/DL — SIGNIFICANT CHANGE UP
RBC # BLD: 4.01 M/UL — SIGNIFICANT CHANGE UP (ref 3.8–5.2)
RBC # FLD: 16.8 % — HIGH (ref 10.3–14.5)
SODIUM SERPL-SCNC: 137 MMOL/L — SIGNIFICANT CHANGE UP (ref 135–145)
SP GR SPEC: 1.01 — SIGNIFICANT CHANGE UP (ref 1.01–1.02)
UROBILINOGEN FLD QL: NEGATIVE MG/DL — SIGNIFICANT CHANGE UP
WBC # BLD: 16.13 K/UL — HIGH (ref 3.8–10.5)
WBC # FLD AUTO: 16.13 K/UL — HIGH (ref 3.8–10.5)

## 2021-04-21 PROCEDURE — 81003 URINALYSIS AUTO W/O SCOPE: CPT

## 2021-04-21 PROCEDURE — 74176 CT ABD & PELVIS W/O CONTRAST: CPT

## 2021-04-21 PROCEDURE — 96365 THER/PROPH/DIAG IV INF INIT: CPT

## 2021-04-21 PROCEDURE — 99284 EMERGENCY DEPT VISIT MOD MDM: CPT | Mod: 25

## 2021-04-21 PROCEDURE — 85025 COMPLETE CBC W/AUTO DIFF WBC: CPT

## 2021-04-21 PROCEDURE — 85730 THROMBOPLASTIN TIME PARTIAL: CPT

## 2021-04-21 PROCEDURE — 36415 COLL VENOUS BLD VENIPUNCTURE: CPT

## 2021-04-21 PROCEDURE — 71045 X-RAY EXAM CHEST 1 VIEW: CPT | Mod: 26

## 2021-04-21 PROCEDURE — 71045 X-RAY EXAM CHEST 1 VIEW: CPT

## 2021-04-21 PROCEDURE — 87086 URINE CULTURE/COLONY COUNT: CPT

## 2021-04-21 PROCEDURE — 85610 PROTHROMBIN TIME: CPT

## 2021-04-21 PROCEDURE — 96366 THER/PROPH/DIAG IV INF ADDON: CPT

## 2021-04-21 PROCEDURE — 86901 BLOOD TYPING SEROLOGIC RH(D): CPT

## 2021-04-21 PROCEDURE — 87186 SC STD MICRODIL/AGAR DIL: CPT

## 2021-04-21 PROCEDURE — 83690 ASSAY OF LIPASE: CPT

## 2021-04-21 PROCEDURE — 96376 TX/PRO/DX INJ SAME DRUG ADON: CPT

## 2021-04-21 PROCEDURE — 93010 ELECTROCARDIOGRAM REPORT: CPT

## 2021-04-21 PROCEDURE — 93005 ELECTROCARDIOGRAM TRACING: CPT

## 2021-04-21 PROCEDURE — 80053 COMPREHEN METABOLIC PANEL: CPT

## 2021-04-21 PROCEDURE — 86850 RBC ANTIBODY SCREEN: CPT

## 2021-04-21 PROCEDURE — 74176 CT ABD & PELVIS W/O CONTRAST: CPT | Mod: 26

## 2021-04-21 PROCEDURE — 96375 TX/PRO/DX INJ NEW DRUG ADDON: CPT

## 2021-04-21 PROCEDURE — 87040 BLOOD CULTURE FOR BACTERIA: CPT

## 2021-04-21 PROCEDURE — 86900 BLOOD TYPING SEROLOGIC ABO: CPT

## 2021-04-21 PROCEDURE — 84702 CHORIONIC GONADOTROPIN TEST: CPT

## 2021-04-21 PROCEDURE — 99285 EMERGENCY DEPT VISIT HI MDM: CPT

## 2021-04-21 PROCEDURE — 83605 ASSAY OF LACTIC ACID: CPT

## 2021-04-21 RX ORDER — MORPHINE SULFATE 50 MG/1
4 CAPSULE, EXTENDED RELEASE ORAL ONCE
Refills: 0 | Status: DISCONTINUED | OUTPATIENT
Start: 2021-04-21 | End: 2021-04-21

## 2021-04-21 RX ORDER — SODIUM CHLORIDE 9 MG/ML
1000 INJECTION INTRAMUSCULAR; INTRAVENOUS; SUBCUTANEOUS ONCE
Refills: 0 | Status: COMPLETED | OUTPATIENT
Start: 2021-04-21 | End: 2021-04-21

## 2021-04-21 RX ORDER — ONDANSETRON 8 MG/1
4 TABLET, FILM COATED ORAL ONCE
Refills: 0 | Status: COMPLETED | OUTPATIENT
Start: 2021-04-21 | End: 2021-04-21

## 2021-04-21 RX ORDER — POTASSIUM CHLORIDE 20 MEQ
40 PACKET (EA) ORAL ONCE
Refills: 0 | Status: DISCONTINUED | OUTPATIENT
Start: 2021-04-21 | End: 2021-04-21

## 2021-04-21 RX ORDER — POTASSIUM CHLORIDE 20 MEQ
10 PACKET (EA) ORAL
Refills: 0 | Status: COMPLETED | OUTPATIENT
Start: 2021-04-21 | End: 2021-04-21

## 2021-04-21 RX ORDER — PIPERACILLIN AND TAZOBACTAM 4; .5 G/20ML; G/20ML
3.38 INJECTION, POWDER, LYOPHILIZED, FOR SOLUTION INTRAVENOUS ONCE
Refills: 0 | Status: COMPLETED | OUTPATIENT
Start: 2021-04-21 | End: 2021-04-21

## 2021-04-21 RX ADMIN — MORPHINE SULFATE 4 MILLIGRAM(S): 50 CAPSULE, EXTENDED RELEASE ORAL at 15:48

## 2021-04-21 RX ADMIN — SODIUM CHLORIDE 1000 MILLILITER(S): 9 INJECTION INTRAMUSCULAR; INTRAVENOUS; SUBCUTANEOUS at 14:26

## 2021-04-21 RX ADMIN — Medication 100 MILLIEQUIVALENT(S): at 19:19

## 2021-04-21 RX ADMIN — MORPHINE SULFATE 4 MILLIGRAM(S): 50 CAPSULE, EXTENDED RELEASE ORAL at 20:36

## 2021-04-21 RX ADMIN — Medication 100 MILLIEQUIVALENT(S): at 20:38

## 2021-04-21 RX ADMIN — SODIUM CHLORIDE 1000 MILLILITER(S): 9 INJECTION INTRAMUSCULAR; INTRAVENOUS; SUBCUTANEOUS at 15:00

## 2021-04-21 RX ADMIN — Medication 10 MILLIEQUIVALENT(S): at 21:50

## 2021-04-21 RX ADMIN — Medication 100 MILLIEQUIVALENT(S): at 22:00

## 2021-04-21 RX ADMIN — PIPERACILLIN AND TAZOBACTAM 200 GRAM(S): 4; .5 INJECTION, POWDER, LYOPHILIZED, FOR SOLUTION INTRAVENOUS at 15:48

## 2021-04-21 RX ADMIN — MORPHINE SULFATE 4 MILLIGRAM(S): 50 CAPSULE, EXTENDED RELEASE ORAL at 20:18

## 2021-04-21 RX ADMIN — ONDANSETRON 4 MILLIGRAM(S): 8 TABLET, FILM COATED ORAL at 15:48

## 2021-04-21 RX ADMIN — Medication 10 MILLIEQUIVALENT(S): at 23:18

## 2021-04-21 RX ADMIN — Medication 10 MILLIEQUIVALENT(S): at 20:19

## 2021-04-21 NOTE — ED ADULT NURSE REASSESSMENT NOTE - NS ED NURSE REASSESS COMMENT FT1
Patient c/o 5/10 abdominal pain. MD Pink aware. Order for morphine placed. Will medicate patient and monitor pain.

## 2021-04-21 NOTE — ED PROVIDER NOTE - PROGRESS NOTE DETAILS
Lizabeth Man: spoke with surgical resident under Dr. Stanley, pt is refusing ct with iv contrast ct held at this time until surgery consult. Donovan DO: s/o to Dr. Walter pending surgical consult at this time. Lizabeth CRANDALL for Dr. Man: spoke with surgical resident under Dr. Stanley, pt is refusing ct with iv contrast and or po contrast at this time; ct held at this time until surgery consult. Doonvan DO: s/o to Dr. Walter pending surgical consult at this time. Per surgery team- recommends ct with po contrast only- patient made aware and agreeable to plan. Jose Angel PAYTON:. sign out received from Dr. Man, awaiting CT results. CT noted and d/w Dr. Stanley. recommends repeating lactate and if downtrending would po challenge and if tolerated dc home. if lactate not clearing will call back for likely admission. Jose Angel PAYTON: lactate downtrending, tolerating po intake. will dc home. outpt f/u with Dr. Stanley.

## 2021-04-21 NOTE — PHYSICAL EXAM
[General Appearance - Alert] : alert [General Appearance - In No Acute Distress] : in no acute distress [General Appearance - Well Nourished] : well nourished [Sclera] : the sclera and conjunctiva were normal [PERRL With Normal Accommodation] : pupils were equal in size, round, and reactive to light [Extraocular Movements] : extraocular movements were intact [Outer Ear] : the ears and nose were normal in appearance [Nasal Cavity] : the nasal mucosa and septum were normal [Oropharynx] : the oropharynx was normal [Neck Appearance] : the appearance of the neck was normal [] : no respiratory distress [Auscultation Breath Sounds / Voice Sounds] : lungs were clear to auscultation bilaterally [Heart Rate And Rhythm] : heart rate was normal and rhythm regular [Heart Sounds] : normal S1 and S2 [Murmurs] : no murmurs [Abdomen Soft] : soft [No CVA Tenderness] : no ~M costovertebral angle tenderness [No Spinal Tenderness] : no spinal tenderness [Nail Clubbing] : no clubbing  or cyanosis of the fingernails [Abnormal Walk] : normal gait [Musculoskeletal - Swelling] : no joint swelling seen [Motor Tone] : muscle strength and tone were normal [Skin Color & Pigmentation] : normal skin color and pigmentation [Impaired Insight] : insight and judgment were intact [Oriented To Time, Place, And Person] : oriented to person, place, and time [Affect] : the affect was normal [FreeTextEntry1] : weakness in  strength b/l, improved ability to arise from seated

## 2021-04-21 NOTE — ED ADULT NURSE NOTE - NSIMPLEMENTINTERV_GEN_ALL_ED
Implemented All Fall with Harm Risk Interventions:  [place]ulu to call system. Call bell, personal items and telephone within reach. Instruct patient to call for assistance. Room bathroom lighting operational. Non-slip footwear when patient is off stretcher. Physically safe environment: no spills, clutter or unnecessary equipment. Stretcher in lowest position, wheels locked, appropriate side rails in place. Provide visual cue, wrist band, yellow gown, etc. Monitor gait and stability. Monitor for mental status changes and reorient to person, place, and time. Review medications for side effects contributing to fall risk. Reinforce activity limits and safety measures with patient and family. Provide visual clues: red socks.

## 2021-04-21 NOTE — ASSESSMENT
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 Unclear if EGPA vs MPA at present, features can match both and eosinophils only seen in some areas of her case. Given extensive Ab +, favor Rituxan as steroid sparing treatment with consideration for Nucala if former not effective. Recent admission for SBO and abscess, remains with drain and to finish a course of Abx. Will need to delay immunosuppressive therapy until infection and healing complete. \par \par - discussed that I would like drain removed and surgery input prior to scheduling Rituxan/pulse. Would now also favor mini pulse of 500mg daily x 3 days given recent infection. If needed we can repeat mini pulse with one of the weekly Rituxan doses. She is amenable. \par - c/w prednisone 50mg daily in divided doses, take with food\par - c/w PPI\par - c/w Mepron for PCP ppx\par - RTC for first infusion, will repeat labs at that time\par - call if any worsening sx \par \par  \par \par \par \par

## 2021-04-21 NOTE — ED ADULT TRIAGE NOTE - CHIEF COMPLAINT QUOTE
PT. C/O 10/10 abd. pain with n/v that started last night, pt. has hx of 4 surgeries to repair ruptured bowel.

## 2021-04-21 NOTE — HISTORY OF PRESENT ILLNESS
[FreeTextEntry1] : JOSE ALEJANDRO HOFF is a 55 year old woman with pmh R nephrectomy in 2004 for unclear reason, HTN, asthma- with recurrent need for steroids nearly consistent;y since 8/20- 10-40 mg daily, hypothyroidism. Initially seen as hospital consult for new onset progressive peripheral neuropathy, purpuric rash, granulomatous rash on elbows, and oral/ nasal ulcerations, and significantly 50 lb wt loss. Was in usual state health with intermittent asthma/ and sinus infection but otherwise healthy till 8/20. Recent evaluation for mid epigastric abd/ non bloody emesis, EGD + gastritis confirmed eosinophilic esophagitis/ gastritis. Extensive w/u neuro/ rheum work up negative as per patient in the past. Here she is noted to have + MPO; +P-ANCA; +RF; +DS-DNA. C-ANCA is now positive. Discharged, then readmitted with GI perforation, tissue appeared inflammatory during resection, sent for pathology which confirming inflammatory etiology. Skin biopsy also consistent with vasculitis. \par \par Presently healing well from abd sx, no GI sx at present. Rashes, lung issues, sinus involvement improved with steroids. Weight has stabilized, no other constitutional sx. Ongoing neuropathy with weak . \par \par ---------\par 4/12/21 -- Just discharged from repeat hospital admission, found with SBO at site of anastomosis and associated abscess, s/p Abx and IR guided drainage, feeling better, no current abdominal complaints aside from mild nausea. Ongoing neurologic complaints which are slightly worse. \par \par \par \par \par \par \par \par \par \par

## 2021-04-21 NOTE — ED PROVIDER NOTE - PATIENT PORTAL LINK FT
You can access the FollowMyHealth Patient Portal offered by [facility] by registering at the following website: [email or web address]. By joining [facility]’s FollowMyHealth portal, you will also be able to view your health information using other applications (apps) compatible with our system.

## 2021-04-21 NOTE — ED ADULT NURSE NOTE - OBJECTIVE STATEMENT
Patient states she had a CT yesterday with her doctor and states she was told she could eat and had Chinese food last night and developed abd pain.

## 2021-04-21 NOTE — CONSULT NOTE ADULT - CONSULT REQUESTED DATE/TIME
CT BRAIN WITHOUT CONTRAST:

 

Date:  08/18/19 

 

HISTORY:  

Fever. 

 

FINDINGS:

No evidence of acute infarct, hemorrhage, midline shift, or abnormal extra-axial fluid collections ar
e seen. The ventricular size is appropriate and the basilar cisterns are patent. There is mucosal dis
ease in the paranasal sinuses. 

 

IMPRESSION: 

No CT evidence of acute intracranial process.  

 

POS: [facility] 21-Apr-2021 14:37

## 2021-04-21 NOTE — ED PROVIDER NOTE - NSFOLLOWUPINSTRUCTIONS_ED_ALL_ED_FT
return for any worsening pain, fever, vomiting, inability to eat/drink.   please follow up with Dr. Stanley this week.

## 2021-04-21 NOTE — ED PROVIDER NOTE - OBJECTIVE STATEMENT
56 y/o female with PMHx of hypothyroidism, HTN, asthma, s/p , s/p right nephrectomy, small bowel perforation, small bowel resection, presents to the ED c/o severe abdominal pain with associated N/V/D since last night. Pt was admitted to  from 21-21 after CT showed high-grade small bowel obstruction, with transition at the anastomosis as well as pelvic collection and conservative management with NGT decompression allowed resolution of SBO. IR placed transgluteal drain into pelvic collection and was started on abx and DC with 2 weeks course of Augmentin. States she f/u with MD Stanley since being DC from , had a CT with contrast yesterday which was "normal" as per pt and states she was cleared for eating chinese food yesterday but developed abd pain, N/V/D. 56 y/o female with PMHx of hypothyroidism, HTN, asthma, s/p , s/p right nephrectomy, small bowel perforation, small bowel resection, presents to the ED c/o severe abdominal pain with associated N/V/D since last night. Pt was admitted to  from 21-21 after CT showed "high-grade small bowel obstruction, with transition at the anastomosis as well as pelvic collection", SBO resolved and pt was DC on 2 weeks course of Augmentin. Pt states she f/u with MD Stanley since being DC from , had a CT with contrast yesterday which was "normal" as per pt and states she was cleared for eating chinese food yesterday but developed abd pain, N/V/D.

## 2021-04-21 NOTE — CONSULT NOTE ADULT - ASSESSMENT
Pt is a 65 year old female well known to surgical service with hx of SBO found to have acute onset abd pain after eating dinner s/p ct yesterday showing interval resolution of abscess collection    -Rec CT abd/pelvis with PO contrast  -Will fu results    Discussed plan with Dr. Stanley, surgery attending  THOMAS PGY4

## 2021-04-21 NOTE — PHYSICAL EXAM
[General Appearance - Alert] : alert [General Appearance - In No Acute Distress] : in no acute distress [General Appearance - Well Nourished] : well nourished [Sclera] : the sclera and conjunctiva were normal [PERRL With Normal Accommodation] : pupils were equal in size, round, and reactive to light [Extraocular Movements] : extraocular movements were intact [Outer Ear] : the ears and nose were normal in appearance [Nasal Cavity] : the nasal mucosa and septum were normal [Oropharynx] : the oropharynx was normal [Neck Appearance] : the appearance of the neck was normal [] : no respiratory distress [Auscultation Breath Sounds / Voice Sounds] : lungs were clear to auscultation bilaterally [Heart Rate And Rhythm] : heart rate was normal and rhythm regular [Heart Sounds] : normal S1 and S2 [Murmurs] : no murmurs [Abdomen Soft] : soft [No Spinal Tenderness] : no spinal tenderness [No CVA Tenderness] : no ~M costovertebral angle tenderness [Nail Clubbing] : no clubbing  or cyanosis of the fingernails [Abnormal Walk] : normal gait [Musculoskeletal - Swelling] : no joint swelling seen [Motor Tone] : muscle strength and tone were normal [Skin Color & Pigmentation] : normal skin color and pigmentation [Impaired Insight] : insight and judgment were intact [Oriented To Time, Place, And Person] : oriented to person, place, and time [Affect] : the affect was normal [FreeTextEntry1] : weakness in  strength b/l, improved ability to arise from seated

## 2021-04-21 NOTE — CONSULT NOTE ADULT - SUBJECTIVE AND OBJECTIVE BOX
PER HPI    54 y/o female with PMHx of hypothyroidism, HTN, asthma, s/p , s/p right nephrectomy, small bowel perforation, small bowel resection, presents to the ED c/o severe abdominal pain with associated N/V/D since last night. Pt was admitted to  from 21-21 after CT showed "high-grade small bowel obstruction, with transition at the anastomosis as well as pelvic collection", SBO resolved and pt was DC on 2 weeks course of Augmentin. Pt states she f/u with MD Stanley since being DC from , had a CT with contrast yesterday which was "normal" as per pt and states she was cleared for eating chinese food yesterday but developed abd pain, N/V/D.    Surgery hx 21    Pt was seen and examined at bedside. reports that after CT at [place] yesterday she celebrated with eating a meal she "knew she shouldn't have" She the developed abdominal pain diffuse and 8/10 non radiating. Currently having BM and flatus in ED. Pt offers no other complaints at this time. Denies fever/cp/sob/n/v/d/c. VSS.    PHYSICAL EXAM:  Constitutional: NAD, GCS: 15/15  AOX3  Eyes:  WNL  ENMT:  WNL  Neck:  WNL, non tender  Back: Non tender  Respiratory: CTABL  Cardiovascular:  S1+S2+0  Gastrointestinal: Soft, tender to palpation diffusely, no rgr  Genitourinary:  WNL  Extremities: NV intact  Vascular:  Intact  Neurological: No focal neurological deficit,  CN, motor and sensory system grossly intact.  Skin: WNL  Musculoskeletal: WNL  Psychiatric: Grossly WNL                          12.4   16.13 )-----------( 213      ( 2021 14:06 )             35.9         137  |  99  |  13  ----------------------------<  98  3.0<L>   |  29  |  0.53    Ca    8.9      2021 14:06    TPro  6.1  /  Alb  3.2<L>  /  TBili  0.8  /  DBili  x   /  AST  75<H>  /  ALT  50  /  AlkPhos  99

## 2021-04-22 RX ORDER — PREDNISONE 10 MG/1
10 TABLET ORAL
Qty: 150 | Refills: 2 | Status: DISCONTINUED | COMMUNITY
Start: 2021-04-01 | End: 2021-04-22

## 2021-04-24 LAB

## 2021-04-25 NOTE — ED POST DISCHARGE NOTE - RESULT SUMMARY
Urine cx showed 10-49,000 Enterococcus faecalis.  I called pt to inquire if she was symptomatic.  She reported that she did not have burning with urination, urinary frequency, or urgency.  I inquired about these symptoms in non-medical terms, so pt could clearly understand the symptoms.  As pt only had 10-49,000 bacteria with no urinary symptoms, I am not starting Abx at this time.  She asked me to send report to Dr. guevara.  I informed her that his office will not be open on Sunday, but she can inform him tomorrow and Dr. Guevara can see the Urine Cx report in [facility] system.  She reported that she will be seeing her Rheumatologist tomorrow for infusion and will make also them aware.  She can repeat Urine Cx as outpt.  DIAZ soriano PA-C

## 2021-04-26 ENCOUNTER — MED ADMIN CHARGE (OUTPATIENT)
Age: 56
End: 2021-04-26

## 2021-04-26 ENCOUNTER — APPOINTMENT (OUTPATIENT)
Dept: RHEUMATOLOGY | Facility: CLINIC | Age: 56
End: 2021-04-26
Payer: MEDICAID

## 2021-04-26 VITALS
HEART RATE: 60 BPM | OXYGEN SATURATION: 99 % | SYSTOLIC BLOOD PRESSURE: 151 MMHG | DIASTOLIC BLOOD PRESSURE: 87 MMHG | RESPIRATION RATE: 16 BRPM | TEMPERATURE: 97.8 F

## 2021-04-26 VITALS
TEMPERATURE: 97.1 F | DIASTOLIC BLOOD PRESSURE: 87 MMHG | HEART RATE: 70 BPM | OXYGEN SATURATION: 96 % | RESPIRATION RATE: 16 BRPM | SYSTOLIC BLOOD PRESSURE: 130 MMHG

## 2021-04-26 VITALS
DIASTOLIC BLOOD PRESSURE: 94 MMHG | OXYGEN SATURATION: 97 % | RESPIRATION RATE: 16 BRPM | SYSTOLIC BLOOD PRESSURE: 156 MMHG | HEART RATE: 62 BPM

## 2021-04-26 VITALS
SYSTOLIC BLOOD PRESSURE: 145 MMHG | OXYGEN SATURATION: 98 % | DIASTOLIC BLOOD PRESSURE: 92 MMHG | HEART RATE: 66 BPM | TEMPERATURE: 98.9 F | RESPIRATION RATE: 16 BRPM

## 2021-04-26 VITALS
RESPIRATION RATE: 16 BRPM | TEMPERATURE: 97.6 F | OXYGEN SATURATION: 97 % | DIASTOLIC BLOOD PRESSURE: 86 MMHG | SYSTOLIC BLOOD PRESSURE: 134 MMHG | HEART RATE: 68 BPM

## 2021-04-26 LAB
CULTURE RESULTS: SIGNIFICANT CHANGE UP
CULTURE RESULTS: SIGNIFICANT CHANGE UP
SPECIMEN SOURCE: SIGNIFICANT CHANGE UP
SPECIMEN SOURCE: SIGNIFICANT CHANGE UP

## 2021-04-26 PROCEDURE — 96375 TX/PRO/DX INJ NEW DRUG ADDON: CPT

## 2021-04-26 PROCEDURE — 96413 CHEMO IV INFUSION 1 HR: CPT

## 2021-04-26 PROCEDURE — 99072 ADDL SUPL MATRL&STAF TM PHE: CPT

## 2021-04-26 PROCEDURE — 96415 CHEMO IV INFUSION ADDL HR: CPT

## 2021-04-26 PROCEDURE — 96367 TX/PROPH/DG ADDL SEQ IV INF: CPT

## 2021-04-26 RX ORDER — METHYLPREDNISOLONE SODIUM SUCCINATE 1 G/16ML
1000 INJECTION, POWDER, LYOPHILIZED, FOR SOLUTION INTRAMUSCULAR; INTRAVENOUS
Qty: 0 | Refills: 0 | Status: COMPLETED | OUTPATIENT
Start: 2021-04-20

## 2021-04-26 RX ORDER — DIPHENHYDRAMINE HYDROCHLORIDE 50 MG/ML
50 INJECTION, SOLUTION INTRAMUSCULAR; INTRAVENOUS
Qty: 1 | Refills: 0 | Status: COMPLETED | OUTPATIENT
Start: 2021-04-20

## 2021-04-26 RX ORDER — ACETAMINOPHEN 325 MG/1
325 TABLET ORAL
Qty: 0 | Refills: 0 | Status: COMPLETED | OUTPATIENT
Start: 2021-04-20

## 2021-04-27 ENCOUNTER — APPOINTMENT (OUTPATIENT)
Dept: RHEUMATOLOGY | Facility: CLINIC | Age: 56
End: 2021-04-27
Payer: MEDICAID

## 2021-04-27 ENCOUNTER — MED ADMIN CHARGE (OUTPATIENT)
Age: 56
End: 2021-04-27

## 2021-04-27 VITALS
HEART RATE: 60 BPM | RESPIRATION RATE: 16 BRPM | SYSTOLIC BLOOD PRESSURE: 143 MMHG | OXYGEN SATURATION: 98 % | DIASTOLIC BLOOD PRESSURE: 86 MMHG

## 2021-04-27 VITALS
RESPIRATION RATE: 16 BRPM | OXYGEN SATURATION: 98 % | SYSTOLIC BLOOD PRESSURE: 140 MMHG | HEART RATE: 69 BPM | TEMPERATURE: 98.4 F | DIASTOLIC BLOOD PRESSURE: 85 MMHG

## 2021-04-27 PROCEDURE — 99072 ADDL SUPL MATRL&STAF TM PHE: CPT

## 2021-04-27 PROCEDURE — 96365 THER/PROPH/DIAG IV INF INIT: CPT

## 2021-04-27 RX ORDER — METHYLPREDNISOLONE SODIUM SUCCINATE 1 G/16ML
1000 INJECTION, POWDER, LYOPHILIZED, FOR SOLUTION INTRAMUSCULAR; INTRAVENOUS
Qty: 0 | Refills: 0 | Status: COMPLETED | OUTPATIENT
Start: 2021-04-21

## 2021-04-28 ENCOUNTER — APPOINTMENT (OUTPATIENT)
Dept: RHEUMATOLOGY | Facility: CLINIC | Age: 56
End: 2021-04-28
Payer: MEDICAID

## 2021-04-28 ENCOUNTER — TRANSCRIPTION ENCOUNTER (OUTPATIENT)
Age: 56
End: 2021-04-28

## 2021-04-28 ENCOUNTER — APPOINTMENT (OUTPATIENT)
Dept: INTERNAL MEDICINE | Facility: CLINIC | Age: 56
End: 2021-04-28

## 2021-04-28 VITALS
DIASTOLIC BLOOD PRESSURE: 83 MMHG | RESPIRATION RATE: 16 BRPM | OXYGEN SATURATION: 97 % | SYSTOLIC BLOOD PRESSURE: 131 MMHG | HEART RATE: 63 BPM

## 2021-04-28 VITALS
HEART RATE: 76 BPM | SYSTOLIC BLOOD PRESSURE: 136 MMHG | DIASTOLIC BLOOD PRESSURE: 73 MMHG | OXYGEN SATURATION: 98 % | TEMPERATURE: 97.8 F | RESPIRATION RATE: 16 BRPM

## 2021-04-28 PROCEDURE — 99072 ADDL SUPL MATRL&STAF TM PHE: CPT

## 2021-04-28 PROCEDURE — 96365 THER/PROPH/DIAG IV INF INIT: CPT

## 2021-04-28 RX ORDER — METHYLPREDNISOLONE SODIUM SUCCINATE 1 G/16ML
1000 INJECTION, POWDER, LYOPHILIZED, FOR SOLUTION INTRAMUSCULAR; INTRAVENOUS
Qty: 0 | Refills: 0 | Status: COMPLETED | OUTPATIENT
Start: 2021-04-22

## 2021-04-29 ENCOUNTER — TRANSCRIPTION ENCOUNTER (OUTPATIENT)
Age: 56
End: 2021-04-29

## 2021-04-30 ENCOUNTER — APPOINTMENT (OUTPATIENT)
Age: 56
End: 2021-04-30

## 2021-04-30 ENCOUNTER — TRANSCRIPTION ENCOUNTER (OUTPATIENT)
Age: 56
End: 2021-04-30

## 2021-05-03 ENCOUNTER — APPOINTMENT (OUTPATIENT)
Dept: RHEUMATOLOGY | Facility: CLINIC | Age: 56
End: 2021-05-03
Payer: MEDICAID

## 2021-05-03 VITALS
OXYGEN SATURATION: 97 % | DIASTOLIC BLOOD PRESSURE: 85 MMHG | HEART RATE: 63 BPM | RESPIRATION RATE: 16 BRPM | SYSTOLIC BLOOD PRESSURE: 129 MMHG | TEMPERATURE: 98.1 F

## 2021-05-03 VITALS
SYSTOLIC BLOOD PRESSURE: 154 MMHG | TEMPERATURE: 97.8 F | OXYGEN SATURATION: 99 % | DIASTOLIC BLOOD PRESSURE: 90 MMHG | HEART RATE: 68 BPM | RESPIRATION RATE: 16 BRPM

## 2021-05-03 VITALS
OXYGEN SATURATION: 96 % | DIASTOLIC BLOOD PRESSURE: 76 MMHG | RESPIRATION RATE: 16 BRPM | TEMPERATURE: 98.1 F | SYSTOLIC BLOOD PRESSURE: 115 MMHG | HEART RATE: 65 BPM

## 2021-05-03 VITALS
DIASTOLIC BLOOD PRESSURE: 92 MMHG | RESPIRATION RATE: 16 BRPM | HEART RATE: 77 BPM | OXYGEN SATURATION: 96 % | SYSTOLIC BLOOD PRESSURE: 159 MMHG | TEMPERATURE: 98.3 F

## 2021-05-03 VITALS
DIASTOLIC BLOOD PRESSURE: 85 MMHG | OXYGEN SATURATION: 95 % | HEART RATE: 66 BPM | RESPIRATION RATE: 16 BRPM | SYSTOLIC BLOOD PRESSURE: 132 MMHG | TEMPERATURE: 98.5 F

## 2021-05-03 PROCEDURE — 99072 ADDL SUPL MATRL&STAF TM PHE: CPT

## 2021-05-03 PROCEDURE — 96415 CHEMO IV INFUSION ADDL HR: CPT

## 2021-05-03 PROCEDURE — 96413 CHEMO IV INFUSION 1 HR: CPT

## 2021-05-03 PROCEDURE — 96375 TX/PRO/DX INJ NEW DRUG ADDON: CPT

## 2021-05-03 RX ORDER — DIPHENHYDRAMINE HYDROCHLORIDE 50 MG/ML
50 INJECTION, SOLUTION INTRAMUSCULAR; INTRAVENOUS
Qty: 1 | Refills: 0 | Status: COMPLETED | OUTPATIENT
Start: 2021-04-27

## 2021-05-03 RX ORDER — METHYLPREDNISOLONE 125 MG/2ML
125 INJECTION, POWDER, LYOPHILIZED, FOR SOLUTION INTRAMUSCULAR; INTRAVENOUS
Qty: 0 | Refills: 0 | Status: COMPLETED | OUTPATIENT
Start: 2021-05-03

## 2021-05-03 RX ORDER — METHYLPREDNISOLONE 125 MG/2ML
125 INJECTION, POWDER, LYOPHILIZED, FOR SOLUTION INTRAMUSCULAR; INTRAVENOUS
Qty: 0 | Refills: 0 | Status: COMPLETED | OUTPATIENT
Start: 2021-05-03 | End: 1900-01-01

## 2021-05-03 RX ORDER — ACETAMINOPHEN 325 MG/1
325 TABLET ORAL
Qty: 0 | Refills: 0 | Status: COMPLETED | OUTPATIENT
Start: 2021-04-27

## 2021-05-03 RX ADMIN — METHYLPREDNISOLONE 0.8 MG: 125 INJECTION, POWDER, LYOPHILIZED, FOR SOLUTION INTRAMUSCULAR; INTRAVENOUS at 00:00

## 2021-05-03 RX ADMIN — METHYLPREDNISOLONE SODIUM SUCCINATE 0.8 MG: 125 INJECTION, POWDER, FOR SOLUTION INTRAMUSCULAR; INTRAVENOUS at 00:00

## 2021-05-04 RX ADMIN — METHYLPREDNISOLONE SODIUM SUCCINATE 0.8 MG: 125 INJECTION, POWDER, FOR SOLUTION INTRAMUSCULAR; INTRAVENOUS at 00:00

## 2021-05-05 ENCOUNTER — APPOINTMENT (OUTPATIENT)
Dept: GASTROENTEROLOGY | Facility: CLINIC | Age: 56
End: 2021-05-05

## 2021-05-07 ENCOUNTER — TRANSCRIPTION ENCOUNTER (OUTPATIENT)
Age: 56
End: 2021-05-07

## 2021-05-10 ENCOUNTER — APPOINTMENT (OUTPATIENT)
Dept: RHEUMATOLOGY | Facility: CLINIC | Age: 56
End: 2021-05-10
Payer: MEDICAID

## 2021-05-10 VITALS
DIASTOLIC BLOOD PRESSURE: 86 MMHG | RESPIRATION RATE: 16 BRPM | OXYGEN SATURATION: 97 % | SYSTOLIC BLOOD PRESSURE: 135 MMHG | HEART RATE: 73 BPM

## 2021-05-10 VITALS
RESPIRATION RATE: 16 BRPM | OXYGEN SATURATION: 98 % | DIASTOLIC BLOOD PRESSURE: 85 MMHG | TEMPERATURE: 98.6 F | SYSTOLIC BLOOD PRESSURE: 147 MMHG | HEART RATE: 56 BPM

## 2021-05-10 VITALS
SYSTOLIC BLOOD PRESSURE: 145 MMHG | DIASTOLIC BLOOD PRESSURE: 87 MMHG | RESPIRATION RATE: 16 BRPM | TEMPERATURE: 98.7 F | OXYGEN SATURATION: 98 % | HEART RATE: 58 BPM

## 2021-05-10 VITALS
DIASTOLIC BLOOD PRESSURE: 85 MMHG | RESPIRATION RATE: 16 BRPM | HEART RATE: 62 BPM | TEMPERATURE: 98.8 F | SYSTOLIC BLOOD PRESSURE: 148 MMHG | OXYGEN SATURATION: 96 %

## 2021-05-10 VITALS
DIASTOLIC BLOOD PRESSURE: 96 MMHG | OXYGEN SATURATION: 98 % | HEART RATE: 66 BPM | SYSTOLIC BLOOD PRESSURE: 157 MMHG | RESPIRATION RATE: 16 BRPM | TEMPERATURE: 98.5 F

## 2021-05-10 PROCEDURE — 96413 CHEMO IV INFUSION 1 HR: CPT

## 2021-05-10 PROCEDURE — 96415 CHEMO IV INFUSION ADDL HR: CPT

## 2021-05-10 PROCEDURE — 99072 ADDL SUPL MATRL&STAF TM PHE: CPT

## 2021-05-10 PROCEDURE — 96375 TX/PRO/DX INJ NEW DRUG ADDON: CPT

## 2021-05-10 RX ORDER — DIPHENHYDRAMINE HYDROCHLORIDE 50 MG/ML
50 INJECTION, SOLUTION INTRAMUSCULAR; INTRAVENOUS
Qty: 1 | Refills: 0 | Status: COMPLETED | OUTPATIENT
Start: 2021-05-04

## 2021-05-10 RX ORDER — METHYLPREDNISOLONE 125 MG/2ML
125 INJECTION, POWDER, LYOPHILIZED, FOR SOLUTION INTRAMUSCULAR; INTRAVENOUS
Qty: 0 | Refills: 0 | Status: COMPLETED | OUTPATIENT
Start: 2021-05-04

## 2021-05-11 RX ADMIN — METHYLPREDNISOLONE SODIUM SUCCINATE 0.8 MG: 125 INJECTION, POWDER, FOR SOLUTION INTRAMUSCULAR; INTRAVENOUS at 00:00

## 2021-05-17 ENCOUNTER — APPOINTMENT (OUTPATIENT)
Dept: RHEUMATOLOGY | Facility: CLINIC | Age: 56
End: 2021-05-17
Payer: MEDICAID

## 2021-05-17 VITALS
DIASTOLIC BLOOD PRESSURE: 96 MMHG | SYSTOLIC BLOOD PRESSURE: 160 MMHG | TEMPERATURE: 98.2 F | HEART RATE: 61 BPM | OXYGEN SATURATION: 94 % | RESPIRATION RATE: 16 BRPM

## 2021-05-17 VITALS
TEMPERATURE: 97.1 F | HEART RATE: 73 BPM | DIASTOLIC BLOOD PRESSURE: 104 MMHG | OXYGEN SATURATION: 97 % | RESPIRATION RATE: 16 BRPM | SYSTOLIC BLOOD PRESSURE: 164 MMHG

## 2021-05-17 VITALS
RESPIRATION RATE: 16 BRPM | OXYGEN SATURATION: 92 % | HEART RATE: 71 BPM | SYSTOLIC BLOOD PRESSURE: 147 MMHG | DIASTOLIC BLOOD PRESSURE: 93 MMHG | TEMPERATURE: 97.8 F

## 2021-05-17 VITALS
OXYGEN SATURATION: 96 % | HEART RATE: 64 BPM | TEMPERATURE: 98.6 F | SYSTOLIC BLOOD PRESSURE: 147 MMHG | DIASTOLIC BLOOD PRESSURE: 88 MMHG | RESPIRATION RATE: 16 BRPM

## 2021-05-17 VITALS
HEART RATE: 60 BPM | OXYGEN SATURATION: 94 % | SYSTOLIC BLOOD PRESSURE: 145 MMHG | DIASTOLIC BLOOD PRESSURE: 94 MMHG | RESPIRATION RATE: 16 BRPM | TEMPERATURE: 97.8 F

## 2021-05-17 VITALS
RESPIRATION RATE: 16 BRPM | SYSTOLIC BLOOD PRESSURE: 141 MMHG | DIASTOLIC BLOOD PRESSURE: 89 MMHG | HEART RATE: 81 BPM | OXYGEN SATURATION: 93 %

## 2021-05-17 PROCEDURE — 96413 CHEMO IV INFUSION 1 HR: CPT

## 2021-05-17 PROCEDURE — 96415 CHEMO IV INFUSION ADDL HR: CPT

## 2021-05-17 PROCEDURE — 96375 TX/PRO/DX INJ NEW DRUG ADDON: CPT

## 2021-05-17 PROCEDURE — 99072 ADDL SUPL MATRL&STAF TM PHE: CPT

## 2021-05-17 RX ORDER — DIPHENHYDRAMINE HYDROCHLORIDE 50 MG/ML
50 INJECTION, SOLUTION INTRAMUSCULAR; INTRAVENOUS
Qty: 1 | Refills: 0 | Status: COMPLETED | OUTPATIENT
Start: 2021-05-11

## 2021-05-17 RX ORDER — METHYLPREDNISOLONE 125 MG/2ML
125 INJECTION, POWDER, LYOPHILIZED, FOR SOLUTION INTRAMUSCULAR; INTRAVENOUS
Qty: 0 | Refills: 0 | Status: COMPLETED | OUTPATIENT
Start: 2021-05-11

## 2021-05-20 ENCOUNTER — TRANSCRIPTION ENCOUNTER (OUTPATIENT)
Age: 56
End: 2021-05-20

## 2021-05-20 ENCOUNTER — APPOINTMENT (OUTPATIENT)
Dept: INTERNAL MEDICINE | Facility: CLINIC | Age: 56
End: 2021-05-20
Payer: MEDICAID

## 2021-05-20 PROCEDURE — 96372 THER/PROPH/DIAG INJ SC/IM: CPT

## 2021-05-20 RX ORDER — CYANOCOBALAMIN 1000 UG/ML
1000 INJECTION INTRAMUSCULAR; SUBCUTANEOUS
Qty: 0 | Refills: 0 | Status: COMPLETED | OUTPATIENT
Start: 2021-05-18

## 2021-05-21 ENCOUNTER — TRANSCRIPTION ENCOUNTER (OUTPATIENT)
Age: 56
End: 2021-05-21

## 2021-05-21 ENCOUNTER — NON-APPOINTMENT (OUTPATIENT)
Age: 56
End: 2021-05-21

## 2021-05-25 ENCOUNTER — TRANSCRIPTION ENCOUNTER (OUTPATIENT)
Age: 56
End: 2021-05-25

## 2021-05-25 ENCOUNTER — APPOINTMENT (OUTPATIENT)
Dept: NEUROSURGERY | Facility: CLINIC | Age: 56
End: 2021-05-25
Payer: MEDICAID

## 2021-05-25 VITALS
DIASTOLIC BLOOD PRESSURE: 102 MMHG | BODY MASS INDEX: 27.31 KG/M2 | WEIGHT: 160 LBS | HEART RATE: 79 BPM | HEIGHT: 64 IN | OXYGEN SATURATION: 97 % | SYSTOLIC BLOOD PRESSURE: 158 MMHG

## 2021-05-25 DIAGNOSIS — M54.9 DORSALGIA, UNSPECIFIED: ICD-10-CM

## 2021-05-25 DIAGNOSIS — G89.29 DORSALGIA, UNSPECIFIED: ICD-10-CM

## 2021-05-25 PROCEDURE — 99204 OFFICE O/P NEW MOD 45 MIN: CPT

## 2021-05-27 ENCOUNTER — TRANSCRIPTION ENCOUNTER (OUTPATIENT)
Age: 56
End: 2021-05-27

## 2021-05-28 ENCOUNTER — APPOINTMENT (OUTPATIENT)
Dept: INTERNAL MEDICINE | Facility: CLINIC | Age: 56
End: 2021-05-28

## 2021-06-01 ENCOUNTER — TRANSCRIPTION ENCOUNTER (OUTPATIENT)
Age: 56
End: 2021-06-01

## 2021-06-01 ENCOUNTER — RX RENEWAL (OUTPATIENT)
Age: 56
End: 2021-06-01

## 2021-06-01 NOTE — CONSULT LETTER
[Dear  ___] : Dear  [unfilled], [Courtesy Letter:] : I had the pleasure of seeing your patient, [unfilled], in my office today. [Sincerely,] : Sincerely, [FreeTextEntry1] : Mrs. Santos is a very pleasant 55-year-old female patient being assessed in our office today for back pain and imaging review.\par \par The patient has a complex systemic inflammatory disorder which is still being investigated and diagnosed according to the patient.  The patient endorses an insidious onset of several symptoms in September 2020.  The patient states that she all of a sudden developed progressively worsening muscle pain and weakness in addition to GI symptoms which ultimately led to an intra-abdominal abscess.  The patient was also diagnosed with severe B12 deficiency prior to having a biopsy proving a vasculitic process.  During the investigation and treatment of her vasculitic process, the patient developed severe low back pain which has been ongoing over the last several months.  The patient was referred to our office for surgical evaluation.  Currently, the patient's pain resides primarily in her low back and is rated at a 10/10 in severity.  The patient's pain resides primarily in the lumbar spine and does not radiate. The  patient has a known peripheral neuropathy causing numbness and pain in the hands and feet.  The patient's back pain is unremitting and causes difficulty walking because of pain.\par \par The patient's past medical history is significant for hypertension, thyroid dysfunction, peripheral vascular disease, asthma, anxiety, and a recent diagnosis of vasculitis.  The patient's current medical regimen includes levothyroxine,  pantoprazole, diltiazem, gabapentin, prednisone, Symbicort, atovaquone, cymbalta, and multivitamins.  The patient has been taking occasional Tylenol and oxycodone for pain. \par \par On examination, the patient has several notable findings including weakness in the biceps and the triceps bilateral rated at a 4/5.  The patient has dorsiflexion weakness bilaterally rated at 4/5 in finger flexion weakness graded at 4/5 with muscle atrophy.  The patient also has pitting edema in the lower extremities bilaterally.  The patient has difficulty ambulating and transitioning secondary to pain.\par \par The patient is accompanied with an MRI scan of the cervical, lumbar, and thoracic spine dated December 4, 2020.  The patient cervical spine MRI scan demonstrates mild to moderate degenerative changes in the cervical spine without ongoing compression of the nerve roots or the spinal cord. Similarly in the thoracic spine, mild to moderate degenerative changes are noted without ongoing compression of the spinal cord or nerve roots.  Finally, in the lumbar spine, the patient also has mild to moderate degenerative changes without ongoing compression of the nerve roots or cauda equina.\par \par Taken together, the patient has a very active ongoing disease process which is currently being treated.  I explained to the patient I do not have a specific structural lesion in the spine to explain her current low back pain.  However, I did recommend imaging of the vasculature of the in the abdomen to rule out the possibility of a dissection or another vasculitic process in the abdomen causing referred pain to the back.  The patient states that, because of her condition,  it is extremely difficult to obtain vascular access and thus intravenous contrast will be very difficult to deliver.  The patient states that she will follow-up with her rheumatologist and GI physician with regards to further imaging in this regard.  Finally, I offer the patient the option of pain management given that I do not have a surgical lesion to help with.  I provided the patient a referral for this.  The patient will be following up with us on an as-needed basis at this time.  [FreeTextEntry2] : Johnny Sheldon MD\par [address] [facility]\[place] [address] [FreeTextEntry3] : Jerzy Lemon MD, PhD, FRCPSC \par Attending Neurosurgeon \par [facility] \par [address], [address] \[place] [address] \par Office: [phone] \par Fax: [phone]\par \par

## 2021-06-01 NOTE — REVIEW OF SYSTEMS
[Anxiety] : anxiety [Joint Pain] : joint pain [Joint Swelling] : joint swelling [Easy Bleeding] : a tendency for easy bleeding [Easy Bruising] : a tendency for easy bruising [Negative] : Endocrine [FreeTextEntry2] : Weight Changes [de-identified] : Muscle Wasting [FreeTextEntry7] : Blood in Stool [FreeTextEntry9] : Muscle Pain

## 2021-06-02 ENCOUNTER — TRANSCRIPTION ENCOUNTER (OUTPATIENT)
Age: 56
End: 2021-06-02

## 2021-06-04 ENCOUNTER — TRANSCRIPTION ENCOUNTER (OUTPATIENT)
Age: 56
End: 2021-06-04

## 2021-06-07 ENCOUNTER — TRANSCRIPTION ENCOUNTER (OUTPATIENT)
Age: 56
End: 2021-06-07

## 2021-06-21 ENCOUNTER — APPOINTMENT (OUTPATIENT)
Dept: RHEUMATOLOGY | Facility: CLINIC | Age: 56
End: 2021-06-21
Payer: MEDICAID

## 2021-06-21 VITALS
HEART RATE: 81 BPM | TEMPERATURE: 96.7 F | DIASTOLIC BLOOD PRESSURE: 81 MMHG | OXYGEN SATURATION: 98 % | BODY MASS INDEX: 27.83 KG/M2 | WEIGHT: 163 LBS | HEIGHT: 64 IN | SYSTOLIC BLOOD PRESSURE: 139 MMHG

## 2021-06-21 PROCEDURE — 99214 OFFICE O/P EST MOD 30 MIN: CPT

## 2021-06-23 ENCOUNTER — APPOINTMENT (OUTPATIENT)
Dept: INTERNAL MEDICINE | Facility: CLINIC | Age: 56
End: 2021-06-23
Payer: MEDICAID

## 2021-06-23 PROCEDURE — 96372 THER/PROPH/DIAG INJ SC/IM: CPT

## 2021-06-23 RX ORDER — CYANOCOBALAMIN 1000 UG/ML
1000 INJECTION INTRAMUSCULAR; SUBCUTANEOUS
Qty: 0 | Refills: 0 | Status: COMPLETED | OUTPATIENT
Start: 2021-06-20

## 2021-07-06 ENCOUNTER — TRANSCRIPTION ENCOUNTER (OUTPATIENT)
Age: 56
End: 2021-07-06

## 2021-07-08 ENCOUNTER — INPATIENT (INPATIENT)
Facility: HOSPITAL | Age: 56
LOS: 1 days | Discharge: HOME | DRG: 247 | End: 2021-07-10
Attending: SURGERY | Admitting: SURGERY
Payer: MEDICAID

## 2021-07-08 ENCOUNTER — TRANSCRIPTION ENCOUNTER (OUTPATIENT)
Age: 56
End: 2021-07-08

## 2021-07-08 VITALS
TEMPERATURE: 98 F | OXYGEN SATURATION: 94 % | WEIGHT: 160.06 LBS | DIASTOLIC BLOOD PRESSURE: 100 MMHG | SYSTOLIC BLOOD PRESSURE: 143 MMHG | HEIGHT: 64 IN | RESPIRATION RATE: 19 BRPM | HEART RATE: 94 BPM

## 2021-07-08 DIAGNOSIS — R10.9 UNSPECIFIED ABDOMINAL PAIN: ICD-10-CM

## 2021-07-08 DIAGNOSIS — Z90.5 ACQUIRED ABSENCE OF KIDNEY: Chronic | ICD-10-CM

## 2021-07-08 DIAGNOSIS — Z98.891 HISTORY OF UTERINE SCAR FROM PREVIOUS SURGERY: Chronic | ICD-10-CM

## 2021-07-08 LAB
ALBUMIN SERPL ELPH-MCNC: 3.3 G/DL — SIGNIFICANT CHANGE UP (ref 3.3–5)
ALP SERPL-CCNC: 211 U/L — HIGH (ref 40–120)
ALT FLD-CCNC: 35 U/L — SIGNIFICANT CHANGE UP (ref 12–78)
ANION GAP SERPL CALC-SCNC: 6 MMOL/L — SIGNIFICANT CHANGE UP (ref 5–17)
APPEARANCE UR: CLEAR — SIGNIFICANT CHANGE UP
APTT BLD: 27.9 SEC — SIGNIFICANT CHANGE UP (ref 27.5–35.5)
AST SERPL-CCNC: 63 U/L — HIGH (ref 15–37)
BASOPHILS # BLD AUTO: 0.03 K/UL — SIGNIFICANT CHANGE UP (ref 0–0.2)
BASOPHILS NFR BLD AUTO: 0.2 % — SIGNIFICANT CHANGE UP (ref 0–2)
BILIRUB SERPL-MCNC: 0.4 MG/DL — SIGNIFICANT CHANGE UP (ref 0.2–1.2)
BILIRUB UR-MCNC: NEGATIVE — SIGNIFICANT CHANGE UP
BUN SERPL-MCNC: 14 MG/DL — SIGNIFICANT CHANGE UP (ref 7–23)
CALCIUM SERPL-MCNC: 8.8 MG/DL — SIGNIFICANT CHANGE UP (ref 8.5–10.1)
CHLORIDE SERPL-SCNC: 106 MMOL/L — SIGNIFICANT CHANGE UP (ref 96–108)
CO2 SERPL-SCNC: 30 MMOL/L — SIGNIFICANT CHANGE UP (ref 22–31)
COLOR SPEC: YELLOW — SIGNIFICANT CHANGE UP
CREAT SERPL-MCNC: 0.49 MG/DL — LOW (ref 0.5–1.3)
DIFF PNL FLD: NEGATIVE — SIGNIFICANT CHANGE UP
EOSINOPHIL # BLD AUTO: 0.05 K/UL — SIGNIFICANT CHANGE UP (ref 0–0.5)
EOSINOPHIL NFR BLD AUTO: 0.4 % — SIGNIFICANT CHANGE UP (ref 0–6)
GLUCOSE SERPL-MCNC: 88 MG/DL — SIGNIFICANT CHANGE UP (ref 70–99)
GLUCOSE UR QL: NEGATIVE MG/DL — SIGNIFICANT CHANGE UP
HCT VFR BLD CALC: 38.8 % — SIGNIFICANT CHANGE UP (ref 34.5–45)
HGB BLD-MCNC: 12.2 G/DL — SIGNIFICANT CHANGE UP (ref 11.5–15.5)
IMM GRANULOCYTES NFR BLD AUTO: 0.8 % — SIGNIFICANT CHANGE UP (ref 0–1.5)
INR BLD: 0.93 RATIO — SIGNIFICANT CHANGE UP (ref 0.88–1.16)
KETONES UR-MCNC: NEGATIVE — SIGNIFICANT CHANGE UP
LACTATE SERPL-SCNC: 2.3 MMOL/L — HIGH (ref 0.7–2)
LEUKOCYTE ESTERASE UR-ACNC: NEGATIVE — SIGNIFICANT CHANGE UP
LIDOCAIN IGE QN: 112 U/L — SIGNIFICANT CHANGE UP (ref 73–393)
LYMPHOCYTES # BLD AUTO: 0.83 K/UL — LOW (ref 1–3.3)
LYMPHOCYTES # BLD AUTO: 6.6 % — LOW (ref 13–44)
MCHC RBC-ENTMCNC: 31.4 GM/DL — LOW (ref 32–36)
MCHC RBC-ENTMCNC: 31.9 PG — SIGNIFICANT CHANGE UP (ref 27–34)
MCV RBC AUTO: 101.3 FL — HIGH (ref 80–100)
MONOCYTES # BLD AUTO: 0.89 K/UL — SIGNIFICANT CHANGE UP (ref 0–0.9)
MONOCYTES NFR BLD AUTO: 7 % — SIGNIFICANT CHANGE UP (ref 2–14)
NEUTROPHILS # BLD AUTO: 10.75 K/UL — HIGH (ref 1.8–7.4)
NEUTROPHILS NFR BLD AUTO: 85 % — HIGH (ref 43–77)
NITRITE UR-MCNC: NEGATIVE — SIGNIFICANT CHANGE UP
PH UR: 8 — SIGNIFICANT CHANGE UP (ref 5–8)
PLATELET # BLD AUTO: 306 K/UL — SIGNIFICANT CHANGE UP (ref 150–400)
POTASSIUM SERPL-MCNC: 3.7 MMOL/L — SIGNIFICANT CHANGE UP (ref 3.5–5.3)
POTASSIUM SERPL-SCNC: 3.7 MMOL/L — SIGNIFICANT CHANGE UP (ref 3.5–5.3)
PROT SERPL-MCNC: 6.1 GM/DL — SIGNIFICANT CHANGE UP (ref 6–8.3)
PROT UR-MCNC: NEGATIVE MG/DL — SIGNIFICANT CHANGE UP
PROTHROM AB SERPL-ACNC: 10.8 SEC — SIGNIFICANT CHANGE UP (ref 10.6–13.6)
RBC # BLD: 3.83 M/UL — SIGNIFICANT CHANGE UP (ref 3.8–5.2)
RBC # FLD: 15.7 % — HIGH (ref 10.3–14.5)
SARS-COV-2 RNA SPEC QL NAA+PROBE: SIGNIFICANT CHANGE UP
SODIUM SERPL-SCNC: 142 MMOL/L — SIGNIFICANT CHANGE UP (ref 135–145)
SP GR SPEC: 1.01 — SIGNIFICANT CHANGE UP (ref 1.01–1.02)
TROPONIN I SERPL-MCNC: <0.015 NG/ML — SIGNIFICANT CHANGE UP (ref 0.01–0.04)
UROBILINOGEN FLD QL: NEGATIVE MG/DL — SIGNIFICANT CHANGE UP
WBC # BLD: 12.65 K/UL — HIGH (ref 3.8–10.5)
WBC # FLD AUTO: 12.65 K/UL — HIGH (ref 3.8–10.5)

## 2021-07-08 PROCEDURE — 86769 SARS-COV-2 COVID-19 ANTIBODY: CPT

## 2021-07-08 PROCEDURE — 85027 COMPLETE CBC AUTOMATED: CPT

## 2021-07-08 PROCEDURE — U0005: CPT

## 2021-07-08 PROCEDURE — 93010 ELECTROCARDIOGRAM REPORT: CPT

## 2021-07-08 PROCEDURE — 71045 X-RAY EXAM CHEST 1 VIEW: CPT | Mod: 26

## 2021-07-08 PROCEDURE — 80048 BASIC METABOLIC PNL TOTAL CA: CPT

## 2021-07-08 PROCEDURE — 74177 CT ABD & PELVIS W/CONTRAST: CPT | Mod: 26,MA

## 2021-07-08 PROCEDURE — U0003: CPT

## 2021-07-08 PROCEDURE — 84100 ASSAY OF PHOSPHORUS: CPT

## 2021-07-08 PROCEDURE — 36415 COLL VENOUS BLD VENIPUNCTURE: CPT

## 2021-07-08 PROCEDURE — 83735 ASSAY OF MAGNESIUM: CPT

## 2021-07-08 PROCEDURE — 74018 RADEX ABDOMEN 1 VIEW: CPT

## 2021-07-08 PROCEDURE — 83605 ASSAY OF LACTIC ACID: CPT

## 2021-07-08 PROCEDURE — 99285 EMERGENCY DEPT VISIT HI MDM: CPT

## 2021-07-08 PROCEDURE — 94640 AIRWAY INHALATION TREATMENT: CPT

## 2021-07-08 RX ORDER — LEVOTHYROXINE SODIUM 125 MCG
88 TABLET ORAL DAILY
Refills: 0 | Status: DISCONTINUED | OUTPATIENT
Start: 2021-07-08 | End: 2021-07-10

## 2021-07-08 RX ORDER — SODIUM CHLORIDE 9 MG/ML
1000 INJECTION, SOLUTION INTRAVENOUS
Refills: 0 | Status: DISCONTINUED | OUTPATIENT
Start: 2021-07-08 | End: 2021-07-09

## 2021-07-08 RX ORDER — CHOLECALCIFEROL (VITAMIN D3) 125 MCG
5000 CAPSULE ORAL DAILY
Refills: 0 | Status: DISCONTINUED | OUTPATIENT
Start: 2021-07-08 | End: 2021-07-10

## 2021-07-08 RX ORDER — DILTIAZEM HCL 120 MG
240 CAPSULE, EXT RELEASE 24 HR ORAL DAILY
Refills: 0 | Status: DISCONTINUED | OUTPATIENT
Start: 2021-07-08 | End: 2021-07-10

## 2021-07-08 RX ORDER — KETOROLAC TROMETHAMINE 30 MG/ML
15 SYRINGE (ML) INJECTION EVERY 6 HOURS
Refills: 0 | Status: DISCONTINUED | OUTPATIENT
Start: 2021-07-08 | End: 2021-07-10

## 2021-07-08 RX ORDER — GABAPENTIN 400 MG/1
2 CAPSULE ORAL
Qty: 0 | Refills: 0 | DISCHARGE

## 2021-07-08 RX ORDER — DIATRIZOATE MEGLUMINE 180 MG/ML
90 INJECTION, SOLUTION INTRAVESICAL ONCE
Refills: 0 | Status: COMPLETED | OUTPATIENT
Start: 2021-07-08 | End: 2021-07-09

## 2021-07-08 RX ORDER — ALBUTEROL 90 UG/1
2 AEROSOL, METERED ORAL EVERY 6 HOURS
Refills: 0 | Status: DISCONTINUED | OUTPATIENT
Start: 2021-07-08 | End: 2021-07-10

## 2021-07-08 RX ORDER — SODIUM CHLORIDE 9 MG/ML
1000 INJECTION INTRAMUSCULAR; INTRAVENOUS; SUBCUTANEOUS ONCE
Refills: 0 | Status: COMPLETED | OUTPATIENT
Start: 2021-07-08 | End: 2021-07-08

## 2021-07-08 RX ORDER — BUDESONIDE AND FORMOTEROL FUMARATE DIHYDRATE 160; 4.5 UG/1; UG/1
2 AEROSOL RESPIRATORY (INHALATION)
Refills: 0 | Status: DISCONTINUED | OUTPATIENT
Start: 2021-07-08 | End: 2021-07-10

## 2021-07-08 RX ORDER — SODIUM CHLORIDE 9 MG/ML
1000 INJECTION, SOLUTION INTRAVENOUS ONCE
Refills: 0 | Status: COMPLETED | OUTPATIENT
Start: 2021-07-08 | End: 2021-07-08

## 2021-07-08 RX ORDER — DULOXETINE HYDROCHLORIDE 30 MG/1
20 CAPSULE, DELAYED RELEASE ORAL DAILY
Refills: 0 | Status: DISCONTINUED | OUTPATIENT
Start: 2021-07-08 | End: 2021-07-10

## 2021-07-08 RX ORDER — MORPHINE SULFATE 50 MG/1
4 CAPSULE, EXTENDED RELEASE ORAL ONCE
Refills: 0 | Status: DISCONTINUED | OUTPATIENT
Start: 2021-07-08 | End: 2021-07-08

## 2021-07-08 RX ORDER — PANTOPRAZOLE SODIUM 20 MG/1
40 TABLET, DELAYED RELEASE ORAL
Refills: 0 | Status: DISCONTINUED | OUTPATIENT
Start: 2021-07-08 | End: 2021-07-10

## 2021-07-08 RX ORDER — ONDANSETRON 8 MG/1
4 TABLET, FILM COATED ORAL EVERY 6 HOURS
Refills: 0 | Status: DISCONTINUED | OUTPATIENT
Start: 2021-07-08 | End: 2021-07-10

## 2021-07-08 RX ORDER — ONDANSETRON 8 MG/1
4 TABLET, FILM COATED ORAL ONCE
Refills: 0 | Status: COMPLETED | OUTPATIENT
Start: 2021-07-08 | End: 2021-07-08

## 2021-07-08 RX ORDER — ENOXAPARIN SODIUM 100 MG/ML
40 INJECTION SUBCUTANEOUS DAILY
Refills: 0 | Status: DISCONTINUED | OUTPATIENT
Start: 2021-07-08 | End: 2021-07-10

## 2021-07-08 RX ORDER — ACETAMINOPHEN 500 MG
650 TABLET ORAL EVERY 6 HOURS
Refills: 0 | Status: DISCONTINUED | OUTPATIENT
Start: 2021-07-08 | End: 2021-07-10

## 2021-07-08 RX ORDER — GABAPENTIN 400 MG/1
600 CAPSULE ORAL THREE TIMES A DAY
Refills: 0 | Status: DISCONTINUED | OUTPATIENT
Start: 2021-07-08 | End: 2021-07-10

## 2021-07-08 RX ADMIN — SODIUM CHLORIDE 1000 MILLILITER(S): 9 INJECTION INTRAMUSCULAR; INTRAVENOUS; SUBCUTANEOUS at 16:15

## 2021-07-08 RX ADMIN — ONDANSETRON 4 MILLIGRAM(S): 8 TABLET, FILM COATED ORAL at 15:15

## 2021-07-08 RX ADMIN — DULOXETINE HYDROCHLORIDE 20 MILLIGRAM(S): 30 CAPSULE, DELAYED RELEASE ORAL at 23:23

## 2021-07-08 RX ADMIN — GABAPENTIN 600 MILLIGRAM(S): 400 CAPSULE ORAL at 22:45

## 2021-07-08 RX ADMIN — MORPHINE SULFATE 4 MILLIGRAM(S): 50 CAPSULE, EXTENDED RELEASE ORAL at 15:46

## 2021-07-08 RX ADMIN — MORPHINE SULFATE 4 MILLIGRAM(S): 50 CAPSULE, EXTENDED RELEASE ORAL at 15:16

## 2021-07-08 RX ADMIN — SODIUM CHLORIDE 1000 MILLILITER(S): 9 INJECTION, SOLUTION INTRAVENOUS at 19:30

## 2021-07-08 RX ADMIN — SODIUM CHLORIDE 1000 MILLILITER(S): 9 INJECTION INTRAMUSCULAR; INTRAVENOUS; SUBCUTANEOUS at 15:15

## 2021-07-08 RX ADMIN — Medication 650 MILLIGRAM(S): at 23:08

## 2021-07-08 NOTE — ED ADULT NURSE NOTE - CHIEF COMPLAINT QUOTE
Pt brought in by ambulance from home complaining of abdominal pain that started last night. Pt with hx of perforated bowel February 2021

## 2021-07-08 NOTE — ED ADULT NURSE NOTE - OBJECTIVE STATEMENT
patient axox3, c/o bilateral upper quadrant abdominal pain since last night, worsening this morning. + nausea and 1 episode of vomiting. patient states she was able to eat breakfast this morning. patient had one BM today. patient denies headache, vision changes, diarrhea, fever, chills, cough, chest pain, SOB bloody stools. color good, skin warm and dry, respirations even and unlabored. patient able to speak in full sentences.

## 2021-07-08 NOTE — ED PROVIDER NOTE - CONSTITUTIONAL, MLM
Tearful, anxious appearing, awake, alert, oriented to person, place, time/situation and in no apparent distress. normal... Tearful, anxious appearing, awake, alert, oriented to person, place, time/situation

## 2021-07-08 NOTE — PHYSICAL EXAM
[General Appearance - Alert] : alert [General Appearance - In No Acute Distress] : in no acute distress [General Appearance - Well Nourished] : well nourished [Sclera] : the sclera and conjunctiva were normal [PERRL With Normal Accommodation] : pupils were equal in size, round, and reactive to light [Extraocular Movements] : extraocular movements were intact [Outer Ear] : the ears and nose were normal in appearance [Nasal Cavity] : the nasal mucosa and septum were normal [Oropharynx] : the oropharynx was normal [Neck Appearance] : the appearance of the neck was normal [] : no respiratory distress [Heart Rate And Rhythm] : heart rate was normal and rhythm regular [Auscultation Breath Sounds / Voice Sounds] : lungs were clear to auscultation bilaterally [Heart Sounds] : normal S1 and S2 [Murmurs] : no murmurs [Abdomen Soft] : soft [No CVA Tenderness] : no ~M costovertebral angle tenderness [No Spinal Tenderness] : no spinal tenderness [Nail Clubbing] : no clubbing  or cyanosis of the fingernails [Musculoskeletal - Swelling] : no joint swelling seen [Motor Tone] : muscle strength and tone were normal [Skin Color & Pigmentation] : normal skin color and pigmentation [Oriented To Time, Place, And Person] : oriented to person, place, and time [Impaired Insight] : insight and judgment were intact [Affect] : the affect was normal [Bowel Sounds] : normal bowel sounds [Abdomen Tenderness] : non-tender [Abdomen Mass (___ Cm)] : no abdominal mass palpated [Cervical Lymph Nodes Enlarged Posterior Bilaterally] : posterior cervical [Cervical Lymph Nodes Enlarged Anterior Bilaterally] : anterior cervical [Supraclavicular Lymph Nodes Enlarged Bilaterally] : supraclavicular [FreeTextEntry1] : weakness in  strength b/l, improved ability to arise from seated

## 2021-07-08 NOTE — ED PROVIDER NOTE - OBJECTIVE STATEMENT
56 y/o female with a PMHx of asthma, autoimmune disorder, HTN, hypothyroid, sciatica, SBO in 02/2020 presents to the ED c/o abd pain starting last night, worsening today. +n/v x1 episode. Pt had BM today. Denies bloody stools. No other complaints at this time.

## 2021-07-08 NOTE — H&P ADULT - HISTORY OF PRESENT ILLNESS
54 y/o female with a PMHx of asthma, vasculitis, HTN, hypothyroid, sciatica, SBO in 02/2020 presents to the ED c/o abd pain starting last night, worsening today. +n/v x1 episode. Pt had BM todayand passing flatus. Denies bloody stools. No other complaints at this time. Patient has history of small bowel resection after perforation in 02/2021 and presented with SBO 04/2020 that was treated conservatively and resolved. Patients admits to being on steroid taper for vasculitis and currently takes 15mg daily with plans to decrease to 10mg daily next week.

## 2021-07-08 NOTE — H&P ADULT - ASSESSMENT
56yo F with comorbidities presenting with recurrent SBO    Plan:  Admit to surgery service under Dr. Stanley  CT scan appreciated  Will treat SBO conservatively as patient continues to pass gas and have BM's and is on steroids  NPO, IV fluid hydration  Gastrogaffin challenge; will advance diet if shows no obstruction  Planning for surgical intervention as outpatient once steroid taper is completed  Pain/nausea control PRN  OOB ambulate  DVT ppx    Plan discussed with Dr. Stanley

## 2021-07-08 NOTE — H&P ADULT - NSHPPHYSICALEXAM_GEN_ALL_CORE
Physical Exam:  General: AAOx3, Well developed, NAD  Chest: Normal respiratory effort  Heart: RRR  Abdomen: Soft, ND, mild TTP in RUQ/epigastric region  Neuro/Psych: No localized deficits. Normal spech, normal tone  Skin: Normal, no rashes, no lesions noted.   Extremities: Warm, well perfused, no edema, Pulses intact

## 2021-07-08 NOTE — ED PROVIDER NOTE - CARE PLAN
Principal Discharge DX:	Abdominal pain   Principal Discharge DX:	Abdominal pain  Secondary Diagnosis:	SBO (small bowel obstruction)

## 2021-07-08 NOTE — ED ADULT NURSE REASSESSMENT NOTE - NS ED NURSE REASSESS COMMENT FT1
Pt received at 1900. Pt AA&Ox4. Pt c/o upper abdominal pain 8/10. Pt denies nausea, SOB, CP. MD Ludmila Martinez at the bedside.

## 2021-07-08 NOTE — H&P ADULT - NSCORESITESY/N_GEN_A_CORE_RD
Chief complaint:   Chief Complaint   Patient presents with   • URI     Productive Cough whitish to yellow phlegm, Dizziness, Fever, No appetite, x 1 week   • Shortness of Breath     x 2 days       Vitals:  Visit Vitals  /80   Pulse 83   Temp (!) 103.3 °F (39.6 °C) (Oral)   Resp (!) 28   Ht 6' 2\" (1.88 m)   Wt 77.1 kg   SpO2 95%   BMI 21.83 kg/m²       HISTORY OF PRESENT ILLNESS     Shortness of Breath   Associated symptoms include a fever and headaches. Pertinent negatives include no rhinorrhea or wheezing.   Cough   This is a new problem. The current episode started in the past 7 days. The problem has been unchanged. The cough is productive of purulent sputum. Associated symptoms include a fever, headaches and shortness of breath. Pertinent negatives include no nasal congestion, postnasal drip, rhinorrhea or wheezing.       Other significant problems:  Patient Active Problem List    Diagnosis Date Noted   • Pulmonary blastomycosis (CMS/HCC)      Priority: Low   • Hepatitis C      Priority: Low   • Essential hypertension 09/26/2016     Priority: Low       PAST MEDICAL, FAMILY AND SOCIAL HISTORY     Medications:  Current Outpatient Medications   Medication   • carvedilol (COREG) 25 MG tablet   • tamsulosin (FLOMAX) 0.4 MG Cap   • lisinopril (ZESTRIL) 20 MG tablet   • VITAMIN D, ERGOCALCIFEROL, PO     Current Facility-Administered Medications   Medication   • levalbuterol (XOPENEX) 1.25 MG/3ML nebulizer solution 1.25 mg       Allergies:  ALLERGIES:  No Known Allergies    Past Medical  History/Surgeries:  Past Medical History:   Diagnosis Date   • Elevated PSA    • Essential hypertension 9/26/2016   • Hepatitis C    • Pulmonary blastomycosis (CMS/HCC)        Past Surgical History:   Procedure Laterality Date   • Cataract extraction extracapsular w/ intraocular lens implantation Bilateral    • Hernia repair     • Ir lung biopsy Right 12/22/2017   • Ir lung biopsy Left 01/03/2018   • Open repair thumb fx/disloc      • Tonsillectomy     • [place] tooth extraction      all 4       Family History:  Family History   Problem Relation Age of Onset   • Cancer, Colon Father    • * Brother         complications of diabetes        Social History:  Social History     Tobacco Use   • Smoking status: Current Some Day Smoker     Packs/day: 1.00     Years: 48.00     Pack years: 48.00     Last attempt to quit: 11/15/2017     Years since quittin.2   • Smokeless tobacco: Never Used   Substance Use Topics   • Alcohol use: Yes     Alcohol/week: 0.0 standard drinks     Comment: A few beers a day       REVIEW OF SYSTEMS     Review of Systems   Constitutional: Positive for fever. Negative for activity change and appetite change.   HENT: Negative for congestion, postnasal drip, rhinorrhea, sinus pressure and sinus pain.    Respiratory: Positive for cough and shortness of breath. Negative for chest tightness and wheezing.    Neurological: Positive for headaches.       PHYSICAL EXAM     Physical Exam  HENT:      Right Ear: Tympanic membrane normal.      Left Ear: Tympanic membrane normal.      Nose: No congestion.      Mouth/Throat:      Mouth: Mucous membranes are moist.      Pharynx: No posterior oropharyngeal erythema.   Eyes:      Extraocular Movements: Extraocular movements intact.      Pupils: Pupils are equal, round, and reactive to light.   Neck:      Musculoskeletal: Normal range of motion.   Cardiovascular:      Rate and Rhythm: Normal rate.      Pulses: Normal pulses.      Heart sounds: Normal heart sounds.   Pulmonary:      Effort: Pulmonary effort is normal.      Breath sounds: Rhonchi present.   Musculoskeletal: Normal range of motion.   Neurological:      Mental Status: He is alert and oriented to person, place, and time.       Treatment course:  Patient received Xopenex 1.25 mg nebulizer. Patient feeling better. Patient given Rocephin 1 gm IM, patient tolerated it well.    ASSESSMENT/PLAN     Diagnoses and all orders for this  Yes visit:  Cough  -     levalbuterol (XOPENEX) 1.25 MG/3ML nebulizer solution 1.25 mg  -     POCT INFLUENZA A/B  -     XR CHEST PA AND LATERAL; Future  -     acetaminophen (TYLENOL) tablet 1,000 mg  Pneumonia of left lower lobe due to infectious organism (CMS/HCC)  -     cefTRIAXone (ROCEPHIN) 1 g in lidocaine 1 % for IM injection  Other orders  -     doxycycline hyclate (VIBRAMYCIN) 100 MG capsule; Take 1 capsule by mouth 2 times daily.  -     cefadroxil (DURICEF) 500 MG capsule; Take 1 capsule by mouth every 12 hours for 10 days.  -     benzonatate (TESSALON PERLES) 100 MG capsule; Take 1 capsule by mouth 3 times daily as needed for Cough.  -     albuterol 108 (90 Base) MCG/ACT inhaler; Inhale 2 puffs into the lungs every 4 hours as needed for Shortness of Breath or Wheezing.    Increase fluids.  Tylenol and motrin for fever.  Appointment made to follow up pmd next week, advised to follow up.

## 2021-07-08 NOTE — H&P ADULT - NSHPLABSRESULTS_GEN_ALL_CORE
Vitals:  T(C): 36.7 ( @ 16:24), Max: 37.1 ( @ 00:05)  HR: 84 ( @ 16:24) (63 - 84)  BP: 122/75 ( @ 16:24) (122/75 - 150/87)  RR: 17 ( @ 16:24) (16 - 18)  SpO2: 96% ( @ 16:24) (92% - 96%)     @ 07:01  -   @ 07:00  --------------------------------------------------------  IN:    Lactated Ringers: 100 mL  Total IN: 100 mL    OUT:  Total OUT: 0 mL    Total NET: 100 mL       @ 07:01  -   @ 17:40  --------------------------------------------------------  IN:    Lactated Ringers: 798 mL  Total IN: 798 mL    OUT:  Total OUT: 0 mL    Total NET: 798 mL           @ 06:33                    11.3  CBC: 11.84>)-------(<299                     35.3                 142 | 107 | 11    CMP:  ----------------------< 84               3.9 | 32 | 0.43                      Ca:8.7  Phos:4.6  M.3               -|      |-        LFTs:  ------|-|-----             -|      |-   @ 15:04                    12.2  CBC: 12.65>)-------(<306                     38.8                 142 | 106 | 14    CMP:  ----------------------< 88               3.7 | 30 | 0.49                      Ca:8.8  Phos:-  Mg:-               0.4|      |63        LFTs:  ------|211|-----             -|      |-      Current Inpatient Medications:  acetaminophen   Tablet .. 650 milliGRAM(s) Oral every 6 hours PRN  ALBUTerol    90 MICROgram(s) HFA Inhaler 2 Puff(s) Inhalation every 6 hours PRN  budesonide 160 MICROgram(s)/formoterol 4.5 MICROgram(s) Inhaler 2 Puff(s) Inhalation two times a day  cholecalciferol 5000 Unit(s) Oral daily  diltiazem    milliGRAM(s) Oral daily  DULoxetine 20 milliGRAM(s) Oral daily  enoxaparin Injectable 40 milliGRAM(s) SubCutaneous daily  gabapentin 600 milliGRAM(s) Oral three times a day  ketorolac   Injectable 15 milliGRAM(s) IV Push every 6 hours PRN  levothyroxine 88 MICROGram(s) Oral daily  multivitamin 1 Tablet(s) Oral daily  ondansetron Injectable 4 milliGRAM(s) IV Push every 6 hours PRN  pantoprazole    Tablet 40 milliGRAM(s) Oral before breakfast  predniSONE   Tablet 15 milliGRAM(s) Oral daily  sodium chloride 0.9% lock flush 3 milliLiter(s) IV Push every 8 hours

## 2021-07-09 ENCOUNTER — NON-APPOINTMENT (OUTPATIENT)
Age: 56
End: 2021-07-09

## 2021-07-09 DIAGNOSIS — K56.600 PARTIAL INTESTINAL OBSTRUCTION, UNSPECIFIED AS TO CAUSE: ICD-10-CM

## 2021-07-09 LAB
ANION GAP SERPL CALC-SCNC: 3 MMOL/L — LOW (ref 5–17)
BUN SERPL-MCNC: 11 MG/DL — SIGNIFICANT CHANGE UP (ref 7–23)
CALCIUM SERPL-MCNC: 8.7 MG/DL — SIGNIFICANT CHANGE UP (ref 8.5–10.1)
CHLORIDE SERPL-SCNC: 107 MMOL/L — SIGNIFICANT CHANGE UP (ref 96–108)
CO2 SERPL-SCNC: 32 MMOL/L — HIGH (ref 22–31)
COVID-19 SPIKE DOMAIN AB INTERP: NEGATIVE — SIGNIFICANT CHANGE UP
COVID-19 SPIKE DOMAIN ANTIBODY RESULT: 0.4 U/ML — SIGNIFICANT CHANGE UP
CREAT SERPL-MCNC: 0.43 MG/DL — LOW (ref 0.5–1.3)
GLUCOSE SERPL-MCNC: 84 MG/DL — SIGNIFICANT CHANGE UP (ref 70–99)
HCT VFR BLD CALC: 35.3 % — SIGNIFICANT CHANGE UP (ref 34.5–45)
HGB BLD-MCNC: 11.3 G/DL — LOW (ref 11.5–15.5)
MAGNESIUM SERPL-MCNC: 2.3 MG/DL — SIGNIFICANT CHANGE UP (ref 1.6–2.6)
MCHC RBC-ENTMCNC: 32 GM/DL — SIGNIFICANT CHANGE UP (ref 32–36)
MCHC RBC-ENTMCNC: 32.5 PG — SIGNIFICANT CHANGE UP (ref 27–34)
MCV RBC AUTO: 101.4 FL — HIGH (ref 80–100)
PHOSPHATE SERPL-MCNC: 4.6 MG/DL — HIGH (ref 2.5–4.5)
PLATELET # BLD AUTO: 299 K/UL — SIGNIFICANT CHANGE UP (ref 150–400)
POTASSIUM SERPL-MCNC: 3.9 MMOL/L — SIGNIFICANT CHANGE UP (ref 3.5–5.3)
POTASSIUM SERPL-SCNC: 3.9 MMOL/L — SIGNIFICANT CHANGE UP (ref 3.5–5.3)
RBC # BLD: 3.48 M/UL — LOW (ref 3.8–5.2)
RBC # FLD: 15.3 % — HIGH (ref 10.3–14.5)
SARS-COV-2 IGG+IGM SERPL QL IA: 0.4 U/ML — SIGNIFICANT CHANGE UP
SARS-COV-2 IGG+IGM SERPL QL IA: NEGATIVE — SIGNIFICANT CHANGE UP
SODIUM SERPL-SCNC: 142 MMOL/L — SIGNIFICANT CHANGE UP (ref 135–145)
WBC # BLD: 11.84 K/UL — HIGH (ref 3.8–10.5)
WBC # FLD AUTO: 11.84 K/UL — HIGH (ref 3.8–10.5)

## 2021-07-09 PROCEDURE — 74018 RADEX ABDOMEN 1 VIEW: CPT | Mod: 26

## 2021-07-09 RX ORDER — SODIUM CHLORIDE 9 MG/ML
3 INJECTION INTRAMUSCULAR; INTRAVENOUS; SUBCUTANEOUS EVERY 8 HOURS
Refills: 0 | Status: DISCONTINUED | OUTPATIENT
Start: 2021-07-09 | End: 2021-07-10

## 2021-07-09 RX ORDER — SODIUM CHLORIDE 9 MG/ML
1000 INJECTION, SOLUTION INTRAVENOUS
Refills: 0 | Status: DISCONTINUED | OUTPATIENT
Start: 2021-07-09 | End: 2021-07-09

## 2021-07-09 RX ADMIN — DULOXETINE HYDROCHLORIDE 20 MILLIGRAM(S): 30 CAPSULE, DELAYED RELEASE ORAL at 21:33

## 2021-07-09 RX ADMIN — SODIUM CHLORIDE 100 MILLILITER(S): 9 INJECTION, SOLUTION INTRAVENOUS at 06:45

## 2021-07-09 RX ADMIN — GABAPENTIN 600 MILLIGRAM(S): 400 CAPSULE ORAL at 05:23

## 2021-07-09 RX ADMIN — SODIUM CHLORIDE 60 MILLILITER(S): 9 INJECTION, SOLUTION INTRAVENOUS at 13:31

## 2021-07-09 RX ADMIN — Medication 240 MILLIGRAM(S): at 09:51

## 2021-07-09 RX ADMIN — SODIUM CHLORIDE 3 MILLILITER(S): 9 INJECTION INTRAMUSCULAR; INTRAVENOUS; SUBCUTANEOUS at 21:34

## 2021-07-09 RX ADMIN — Medication 88 MICROGRAM(S): at 05:23

## 2021-07-09 RX ADMIN — Medication 15 MILLIGRAM(S): at 09:52

## 2021-07-09 RX ADMIN — Medication 5000 UNIT(S): at 09:51

## 2021-07-09 RX ADMIN — GABAPENTIN 600 MILLIGRAM(S): 400 CAPSULE ORAL at 13:31

## 2021-07-09 RX ADMIN — Medication 1 TABLET(S): at 09:52

## 2021-07-09 RX ADMIN — GABAPENTIN 600 MILLIGRAM(S): 400 CAPSULE ORAL at 21:33

## 2021-07-09 RX ADMIN — SODIUM CHLORIDE 100 MILLILITER(S): 9 INJECTION, SOLUTION INTRAVENOUS at 01:16

## 2021-07-09 RX ADMIN — Medication 15 MILLIGRAM(S): at 06:45

## 2021-07-09 RX ADMIN — ENOXAPARIN SODIUM 40 MILLIGRAM(S): 100 INJECTION SUBCUTANEOUS at 09:52

## 2021-07-09 RX ADMIN — DIATRIZOATE MEGLUMINE 90 MILLILITER(S): 180 INJECTION, SOLUTION INTRAVESICAL at 06:10

## 2021-07-09 NOTE — DIETITIAN INITIAL EVALUATION ADULT. - NS FNS REASON FOR WEIGHT CHANG
pt reports unintentional wt loss PTA starting September 2020 after vasculitis dx./decreased po intake

## 2021-07-09 NOTE — DIETITIAN INITIAL EVALUATION ADULT. - CONTINUE CURRENT NUTRITION CARE PLAN
advance diet to low fiber as medically feasible and then gradually reintroduce fiber as recommended by MD/yes

## 2021-07-09 NOTE — DIETITIAN INITIAL EVALUATION ADULT. - OTHER INFO
Pt is a 56 yo F w/ PMH autoimmune disorder, hypothyroid, HTN, sciatica, and asthma. As per pt she has a hx of vasculitis and neuropathy. Pt s/p Laparoscopic SBR with primary anastomosis 2/2020 and was admitted w/ abd pain and found to have partial SBO. Pt is currently on a CLD and is tolerating w/ no c/o. Pt was provided w/ diet education regarding low fiber and gradually increasing to high fiber diet when recommended by MD. Pt was provided w/ written diet education for low fiber and high fiber diet. Pt also provided w/ list of outpt RDs 2/2 c/o fatty liver and unintentional wt loss PTA. Pt denies chewing/swallowing difficulties. No reported n/v, pt reports last BM day x 2 days ago. No reported food allergies however pt follows a gluten free/dairy free diet. Pt does report taking premier protein shake which are whey protein based. Pt is agreeable to having ensure max when diet advanced

## 2021-07-09 NOTE — DIETITIAN INITIAL EVALUATION ADULT. - ADD RECOMMEND
1. as medically feasible advance to low fiber and gradually reintroduce fiber as recommended by MD 2. add ensure max BID 3. monitor po intake and document in EMR 4. encourage f/u w/ outpt RD 5. weekly wt 6. monitor bowel function and consider adding bowel regimen PRN 7. add MVI w/minerals daily

## 2021-07-09 NOTE — DIETITIAN INITIAL EVALUATION ADULT. - PERTINENT MEDS FT
MEDICATIONS  (STANDING):  budesonide 160 MICROgram(s)/formoterol 4.5 MICROgram(s) Inhaler 2 Puff(s) Inhalation two times a day  cholecalciferol 5000 Unit(s) Oral daily  diltiazem    milliGRAM(s) Oral daily  DULoxetine 20 milliGRAM(s) Oral daily  enoxaparin Injectable 40 milliGRAM(s) SubCutaneous daily  gabapentin 600 milliGRAM(s) Oral three times a day  levothyroxine 88 MICROGram(s) Oral daily  multivitamin 1 Tablet(s) Oral daily  pantoprazole    Tablet 40 milliGRAM(s) Oral before breakfast  predniSONE   Tablet 15 milliGRAM(s) Oral daily  sodium chloride 0.9% lock flush 3 milliLiter(s) IV Push every 8 hours    MEDICATIONS  (PRN):  acetaminophen   Tablet .. 650 milliGRAM(s) Oral every 6 hours PRN Temp greater or equal to 38C (100.4F), Mild Pain (1 - 3)  ALBUTerol    90 MICROgram(s) HFA Inhaler 2 Puff(s) Inhalation every 6 hours PRN for shortness of breath and/or wheezing  ketorolac   Injectable 15 milliGRAM(s) IV Push every 6 hours PRN Moderate Pain (4 - 6)  ondansetron Injectable 4 milliGRAM(s) IV Push every 6 hours PRN Nausea

## 2021-07-09 NOTE — PROGRESS NOTE ADULT - SUBJECTIVE AND OBJECTIVE BOX
Hospital Day#: 1  Procedure:  Laparoscopic SBR with primary anastomosis 2/2020    The patient is doing well without complaints, currently having bowel function. Small bowel series shows contrast in colon.     Vital Signs Last 24 Hrs  T(C): 36.6 (09 Jul 2021 09:02), Max: 37.1 (09 Jul 2021 00:05)  T(F): 97.9 (09 Jul 2021 09:02), Max: 98.7 (09 Jul 2021 00:05)  HR: 63 (09 Jul 2021 09:02) (63 - 78)  BP: 126/80 (09 Jul 2021 09:02) (126/80 - 150/87)  BP(mean): 103 (08 Jul 2021 18:55) (103 - 103)  RR: 18 (09 Jul 2021 09:02) (16 - 18)  SpO2: 95% (09 Jul 2021 09:02) (92% - 96%)    PHYSICAL EXAM:  General: NAD.  HEENT: no JVD, no jaundice.  LUNGS: CTAB.  Heart: S1 S2 RRR  Abd: soft nt/nd                             11.3   11.84 )-----------( 299      ( 09 Jul 2021 06:33 )             35.3       07-09    142  |  107  |  11  ----------------------------<  84  3.9   |  32<H>  |  0.43<L>    Ca    8.7      09 Jul 2021 06:33  Phos  4.6     07-09  Mg     2.3     07-09    TPro  6.1  /  Alb  3.3  /  TBili  0.4  /  DBili  x   /  AST  63<H>  /  ALT  35  /  AlkPhos  211<H>  07-08      PT/INR - ( 08 Jul 2021 15:04 )   PT: 10.8 sec;   INR: 0.93 ratio         PTT - ( 08 Jul 2021 15:04 )  PTT:27.9 sec

## 2021-07-09 NOTE — DIETITIAN NUTRITION RISK NOTIFICATION - ADDITIONAL COMMENTS/DIETITIAN RECOMMENDATIONS
1. as medically feasible advance to low fiber and gradually reintroduce fiber as recommended by MD 2. add ensure max BID 3. Provide assistance w/ po intake and tray set up PRN.  4. monitor po intake and document in EMR 5. encourage f/u w/ outpt RD 6. weekly wt 7. monitor bowel function and consider adding bowel regimen PRN 8. add MVI w/minerals daily

## 2021-07-09 NOTE — DIETITIAN INITIAL EVALUATION ADULT. - NAME AND PHONE
He would like a referral for sleep study sent to [facility] sleep clinic. Also wants referral to [facility].  CC    What is reason for both?  ALEA    Consult for Apnea you order a year ago but he never had it done.  Consult for ADD never had it done either.     Referrals placed.  ALEA      
Kristine Osorio RD, -[phone] (office)

## 2021-07-09 NOTE — DIETITIAN INITIAL EVALUATION ADULT. - PHYSICAL ASSESSMENT DORSAL HAND
pt w/ severe muscle wasting on right hand and left hand is moderate. Pt is right handed. Possibly r/t lack of use as pt has neuropathy and can not use hands for most tasks./moderate

## 2021-07-09 NOTE — DIETITIAN INITIAL EVALUATION ADULT. - MALNUTRITION
Pt meets criteria for moderate malnutrition in the context of chronic illness AEB mild/moderate muscle/fat wasting and wt loss of 28.9% BW x 10 mths

## 2021-07-09 NOTE — DIETITIAN INITIAL EVALUATION ADULT. - ORAL INTAKE PTA/DIET HISTORY
Since September 2020 pt had declining 2/2 vasculitis which resulted in significant unintentional wt loss. Pt now reports appetite has improved overall and she typically has 3 meals/day and uses premier protein shakes to supplement po intake. Pt reports that abd pain started 1 day PTA where she was able to tolerate minimal po intake. Pt reports having multiple SBO in the past and she has been recommended to follow a low fiber diet and then gradually transition to high fiber diet.

## 2021-07-09 NOTE — DIETITIAN INITIAL EVALUATION ADULT. - PERTINENT LABORATORY DATA
07-09    142  |  107  |  11  ----------------------------<  84  3.9   |  32<H>  |  0.43<L>    Ca    8.7      09 Jul 2021 06:33  Phos  4.6     07-09  Mg     2.3     07-09    TPro  6.1  /  Alb  3.3  /  TBili  0.4  /  DBili  x   /  AST  63<H>  /  ALT  35  /  AlkPhos  211<H>  07-08    BMI: BMI (kg/m2): 27.5 (07-08-21 @ 14:44)  HbA1c:   Glucose:   BP: 126/80 (07-09-21 @ 09:02) (126/80 - 150/87)  Lipid Panel:

## 2021-07-10 ENCOUNTER — TRANSCRIPTION ENCOUNTER (OUTPATIENT)
Age: 56
End: 2021-07-10

## 2021-07-10 VITALS — OXYGEN SATURATION: 98 %

## 2021-07-10 LAB
ANION GAP SERPL CALC-SCNC: 5 MMOL/L — SIGNIFICANT CHANGE UP (ref 5–17)
BUN SERPL-MCNC: 9 MG/DL — SIGNIFICANT CHANGE UP (ref 7–23)
CALCIUM SERPL-MCNC: 8.3 MG/DL — LOW (ref 8.5–10.1)
CHLORIDE SERPL-SCNC: 110 MMOL/L — HIGH (ref 96–108)
CO2 SERPL-SCNC: 30 MMOL/L — SIGNIFICANT CHANGE UP (ref 22–31)
CREAT SERPL-MCNC: 0.38 MG/DL — LOW (ref 0.5–1.3)
GLUCOSE SERPL-MCNC: 80 MG/DL — SIGNIFICANT CHANGE UP (ref 70–99)
HCT VFR BLD CALC: 30.8 % — LOW (ref 34.5–45)
HGB BLD-MCNC: 9.8 G/DL — LOW (ref 11.5–15.5)
MAGNESIUM SERPL-MCNC: 2.3 MG/DL — SIGNIFICANT CHANGE UP (ref 1.6–2.6)
MCHC RBC-ENTMCNC: 31.8 GM/DL — LOW (ref 32–36)
MCHC RBC-ENTMCNC: 31.9 PG — SIGNIFICANT CHANGE UP (ref 27–34)
MCV RBC AUTO: 100.3 FL — HIGH (ref 80–100)
PHOSPHATE SERPL-MCNC: 3.9 MG/DL — SIGNIFICANT CHANGE UP (ref 2.5–4.5)
PLATELET # BLD AUTO: 273 K/UL — SIGNIFICANT CHANGE UP (ref 150–400)
POTASSIUM SERPL-MCNC: 3.1 MMOL/L — LOW (ref 3.5–5.3)
POTASSIUM SERPL-SCNC: 3.1 MMOL/L — LOW (ref 3.5–5.3)
RBC # BLD: 3.07 M/UL — LOW (ref 3.8–5.2)
RBC # FLD: 15 % — HIGH (ref 10.3–14.5)
SODIUM SERPL-SCNC: 145 MMOL/L — SIGNIFICANT CHANGE UP (ref 135–145)
WBC # BLD: 7.37 K/UL — SIGNIFICANT CHANGE UP (ref 3.8–10.5)
WBC # FLD AUTO: 7.37 K/UL — SIGNIFICANT CHANGE UP (ref 3.8–10.5)

## 2021-07-10 RX ADMIN — BUDESONIDE AND FORMOTEROL FUMARATE DIHYDRATE 2 PUFF(S): 160; 4.5 AEROSOL RESPIRATORY (INHALATION) at 09:28

## 2021-07-10 RX ADMIN — SODIUM CHLORIDE 3 MILLILITER(S): 9 INJECTION INTRAMUSCULAR; INTRAVENOUS; SUBCUTANEOUS at 12:34

## 2021-07-10 RX ADMIN — Medication 240 MILLIGRAM(S): at 08:35

## 2021-07-10 RX ADMIN — GABAPENTIN 600 MILLIGRAM(S): 400 CAPSULE ORAL at 13:11

## 2021-07-10 RX ADMIN — GABAPENTIN 600 MILLIGRAM(S): 400 CAPSULE ORAL at 05:32

## 2021-07-10 RX ADMIN — Medication 5000 UNIT(S): at 08:35

## 2021-07-10 RX ADMIN — Medication 88 MICROGRAM(S): at 05:33

## 2021-07-10 RX ADMIN — SODIUM CHLORIDE 3 MILLILITER(S): 9 INJECTION INTRAMUSCULAR; INTRAVENOUS; SUBCUTANEOUS at 05:33

## 2021-07-10 RX ADMIN — PANTOPRAZOLE SODIUM 40 MILLIGRAM(S): 20 TABLET, DELAYED RELEASE ORAL at 05:33

## 2021-07-10 RX ADMIN — Medication 1 TABLET(S): at 08:35

## 2021-07-10 RX ADMIN — Medication 15 MILLIGRAM(S): at 08:35

## 2021-07-10 RX ADMIN — ENOXAPARIN SODIUM 40 MILLIGRAM(S): 100 INJECTION SUBCUTANEOUS at 08:35

## 2021-07-10 NOTE — DISCHARGE NOTE PROVIDER - NSDCCPCAREPLAN_GEN_ALL_CORE_FT
PRINCIPAL DISCHARGE DIAGNOSIS  Diagnosis: Small bowel obstruction, partial  Assessment and Plan of Treatment:       SECONDARY DISCHARGE DIAGNOSES  Diagnosis: SBO (small bowel obstruction)  Assessment and Plan of Treatment:

## 2021-07-10 NOTE — DISCHARGE NOTE PROVIDER - NSDCFUSCHEDAPPT_GEN_ALL_CORE_FT
JOSE ALEJANDRO HOFF ; 07/23/2021 ; [facility] [address] [facility]  JOSE ALEJANDRO HOFF ; 07/23/2021 ; [facility] [address] [facility]

## 2021-07-10 NOTE — DISCHARGE NOTE PROVIDER - CARE PROVIDER_API CALL
Iraj Stanley)  Surgery  [address], [address]  [place] [address]  Phone: [phone]  Fax: [phone]  Follow Up Time:

## 2021-07-10 NOTE — DISCHARGE NOTE PROVIDER - NSDCFUADDINST_GEN_ALL_CORE_FT
Please take OTC medications for pain as needed.  Please follow up in clinic in 2 weeks  If you have any issues call the number above or come to the ER.

## 2021-07-10 NOTE — DISCHARGE NOTE PROVIDER - HOSPITAL COURSE
Patient presented with an SBO. She had a gastrograffin challenge with showed a resolved SBO. Patient tolerating diet. Patient clear for discharge.

## 2021-07-10 NOTE — DISCHARGE NOTE PROVIDER - DETAILS OF MALNUTRITION DIAGNOSIS/DIAGNOSES
This patient has been assessed with a concern for Malnutrition and was treated during this hospitalization for the following Nutrition diagnosis/diagnoses:     -  07/09/2021: Moderate protein-calorie malnutrition

## 2021-07-10 NOTE — DISCHARGE NOTE NURSING/CASE MANAGEMENT/SOCIAL WORK - PATIENT PORTAL LINK FT
You can access the FollowMyHealth Patient Portal offered by [facility] by registering at the following website: [email or web address]. By joining [employer]’s FollowMyHealth portal, you will also be able to view your health information using other applications (apps) compatible with our system.

## 2021-07-12 ENCOUNTER — NON-APPOINTMENT (OUTPATIENT)
Age: 56
End: 2021-07-12

## 2021-07-13 ENCOUNTER — TRANSCRIPTION ENCOUNTER (OUTPATIENT)
Age: 56
End: 2021-07-13

## 2021-07-19 ENCOUNTER — NON-APPOINTMENT (OUTPATIENT)
Age: 56
End: 2021-07-19

## 2021-07-19 DIAGNOSIS — M35.9 SYSTEMIC INVOLVEMENT OF CONNECTIVE TISSUE, UNSPECIFIED: ICD-10-CM

## 2021-07-19 DIAGNOSIS — J45.909 UNSPECIFIED ASTHMA, UNCOMPLICATED: ICD-10-CM

## 2021-07-19 DIAGNOSIS — I10 ESSENTIAL (PRIMARY) HYPERTENSION: ICD-10-CM

## 2021-07-19 DIAGNOSIS — E44.0 MODERATE PROTEIN-CALORIE MALNUTRITION: ICD-10-CM

## 2021-07-19 DIAGNOSIS — E03.9 HYPOTHYROIDISM, UNSPECIFIED: ICD-10-CM

## 2021-07-19 DIAGNOSIS — K56.609 UNSPECIFIED INTESTINAL OBSTRUCTION, UNSPECIFIED AS TO PARTIAL VERSUS COMPLETE OBSTRUCTION: ICD-10-CM

## 2021-07-22 ENCOUNTER — NON-APPOINTMENT (OUTPATIENT)
Age: 56
End: 2021-07-22

## 2021-07-22 ENCOUNTER — TRANSCRIPTION ENCOUNTER (OUTPATIENT)
Age: 56
End: 2021-07-22

## 2021-07-23 ENCOUNTER — APPOINTMENT (OUTPATIENT)
Dept: INTERNAL MEDICINE | Facility: CLINIC | Age: 56
End: 2021-07-23
Payer: MEDICAID

## 2021-07-23 ENCOUNTER — RESULT REVIEW (OUTPATIENT)
Age: 56
End: 2021-07-23

## 2021-07-23 ENCOUNTER — APPOINTMENT (OUTPATIENT)
Dept: RADIOLOGY | Facility: CLINIC | Age: 56
End: 2021-07-23
Payer: MEDICAID

## 2021-07-23 ENCOUNTER — OUTPATIENT (OUTPATIENT)
Dept: OUTPATIENT SERVICES | Facility: HOSPITAL | Age: 56
LOS: 1 days | End: 2021-07-23
Payer: MEDICAID

## 2021-07-23 VITALS
RESPIRATION RATE: 16 BRPM | DIASTOLIC BLOOD PRESSURE: 90 MMHG | HEART RATE: 99 BPM | BODY MASS INDEX: 29.02 KG/M2 | WEIGHT: 170 LBS | TEMPERATURE: 98.3 F | OXYGEN SATURATION: 98 % | HEIGHT: 64 IN | SYSTOLIC BLOOD PRESSURE: 160 MMHG

## 2021-07-23 DIAGNOSIS — Z98.891 HISTORY OF UTERINE SCAR FROM PREVIOUS SURGERY: Chronic | ICD-10-CM

## 2021-07-23 DIAGNOSIS — R07.89 OTHER CHEST PAIN: ICD-10-CM

## 2021-07-23 DIAGNOSIS — K56.609 UNSPECIFIED INTESTINAL OBSTRUCTION, UNSPECIFIED AS TO PARTIAL VERSUS COMPLETE OBSTRUCTION: ICD-10-CM

## 2021-07-23 DIAGNOSIS — Z90.5 ACQUIRED ABSENCE OF KIDNEY: Chronic | ICD-10-CM

## 2021-07-23 PROCEDURE — 72070 X-RAY EXAM THORAC SPINE 2VWS: CPT | Mod: 26

## 2021-07-23 PROCEDURE — 99495 TRANSJ CARE MGMT MOD F2F 14D: CPT | Mod: 25

## 2021-07-23 PROCEDURE — 71100 X-RAY EXAM RIBS UNI 2 VIEWS: CPT | Mod: 26,LT

## 2021-07-23 PROCEDURE — 71100 X-RAY EXAM RIBS UNI 2 VIEWS: CPT

## 2021-07-23 PROCEDURE — 72070 X-RAY EXAM THORAC SPINE 2VWS: CPT

## 2021-07-23 PROCEDURE — 96372 THER/PROPH/DIAG INJ SC/IM: CPT

## 2021-07-23 RX ORDER — METHYLPREDNISOLONE SODIUM SUCCINATE 1 G/16ML
1000 INJECTION, POWDER, LYOPHILIZED, FOR SOLUTION INTRAMUSCULAR; INTRAVENOUS
Qty: 3 | Refills: 0 | Status: DISCONTINUED | COMMUNITY
Start: 2021-04-16 | End: 2021-07-23

## 2021-07-23 RX ORDER — PREDNISONE 20 MG/1
20 TABLET ORAL
Qty: 90 | Refills: 1 | Status: DISCONTINUED | COMMUNITY
Start: 2021-04-22 | End: 2021-07-23

## 2021-07-23 RX ORDER — DIPHENHYDRAMINE HYDROCHLORIDE 50 MG/ML
50 INJECTION, SOLUTION INTRAMUSCULAR; INTRAVENOUS
Qty: 0.5 | Refills: 0 | Status: DISCONTINUED | COMMUNITY
Start: 2021-04-15 | End: 2021-07-23

## 2021-07-23 RX ORDER — CYANOCOBALAMIN 1000 UG/ML
1000 INJECTION INTRAMUSCULAR; SUBCUTANEOUS
Qty: 0 | Refills: 0 | Status: COMPLETED | OUTPATIENT
Start: 2021-07-15

## 2021-07-23 NOTE — PHYSICAL EXAM
[Supple] : supple [No Accessory Muscle Use] : no accessory muscle use [Clear to Auscultation] : lungs were clear to auscultation bilaterally [Regular Rhythm] : with a regular rhythm [Normal S1, S2] : normal S1 and S2 [No Edema] : there was no peripheral edema [No Extremity Clubbing/Cyanosis] : no extremity clubbing/cyanosis [Soft] : abdomen soft [Normal Bowel Sounds] : normal bowel sounds [No Spinal Tenderness] : no spinal tenderness [de-identified] : Obese white female [de-identified] : Chest wall reveals reproducible pain on deep palpation of the lateral lower rib cage.

## 2021-07-23 NOTE — HISTORY OF PRESENT ILLNESS
[FreeTextEntry1] : The patient comes in today for a post hospitalization transition of care evaluation.\par  [de-identified] : Hospital Course: \par Discharge Date	10-Jul-2021 \par Admission Date	2021 18:23 \par Hospital Course	 \par Patient presented with an SBO. She had a gastrograffin challenge with showed a \par resolved SBO. Patient tolerating diet. Patient clear for discharge. \par \par Med Reconciliation: \par Medication Reconciliation Status	Admission Reconciliation is Completed \par Discharge Reconciliation is Completed \par Discharge Medications	acetaminophen 500 mg oral tablet: 2 tab(s) orally once a \par day (in the morning) \par atovaquone 750 mg/5 mL oral suspension: 10 milliliter(s) orally once a day \par B Complex 50 oral tablet, extended release: 1 tab(s) orally once a day \par calcium carbonate 500 mg (200 mg elemental calcium) oral tablet, chewable: 1 \par tab(s) orally once a day \par cholecalciferol 5000 intl units (125 mcg) oral tablet: 1 tab(s) orally once a \par day \par Cymbalta 20 mg oral delayed release capsule: 1 cap(s) orally once a day in the \par evening. \par dilTIAZem 240 mg/24 hours oral capsule, extended release: 1 cap(s) orally once \par a day \par gabapentin 300 mg oral capsule: 2 cap(s) orally 3 times a day \par LEVOTHYROXIN TAB 88MC tab(s) orally once a day \par Lipotropic with Multivitamins oral tablet: 1 tab(s) orally every other day \par magnesium oxide 400 mg (240 mg elemental magnesium) oral tablet (obsolete): 1 \par tab(s) orally once a day \par oxyCODONE 5 mg oral tablet: 1 tab(s) orally every 6 hours, As Needed - for \par severe pain MDD:4gm \par pantoprazole 40 mg oral delayed release tablet: 1 tab(s) orally once a day \par predniSONE 10 mg oral tablet: 1.5 tab(s) orally once a day \par predniSONE 10 mg oral tablet: 1 tab(s) orally once a day \par *****Starts 21**** \par ProAir HFA 90 mcg/inh inhalation aerosol: 2 puff(s) inhaled every 6 hours, As \par Needed - for shortness of breath and/or wheezing \par Symbicort 160 mcg-4.5 mcg/inh inhalation aerosol: 2 puff(s) inhaled 2 times a \par day \par , \par , \par \par Care Plan/Procedures: \par Discharge Diagnoses, Assessment and Plan of Treatment	PRINCIPAL DISCHARGE \par DIAGNOSIS \par Diagnosis: Small bowel obstruction, partial \par \par The patient states, that since being discharged from the hospital, on 7/10/21, that her abdominal pain has improved. She has not had any further episodes. She has had a recurrence of small bowel obstruction twice now in the past 3 months. She is followed closely by her surgeon who is contemplating laparoscopic surgery for potential lysis of adhesions in the future. Her appetite is good. She is moving her bowels.\par \par The patient was well, until yesterday, when she developed acute and fairly sudden onset of discomfort in her left lateral chest wall region while bending over to  an object on the floor. The pain was not really relieved by Tylenol. She did not hear anything crack in her chest. There was no trauma to the area. She did not fall and strike her chest wall. The pain is persistent today.\par \par The patient continues to be followed carefully by her rheumatologist, Dr. Posadas. She is scheduled to receive aReclast infusion next week. Her prednisone, which is now down to 2.5 mg daily, will be discontinued on 21. Her next Rituxan, is tentatively scheduled for approximately December. She now comes in for this evaluation.

## 2021-07-23 NOTE — PLAN
[FreeTextEntry1] : #1. Continue with regimen as outlined above.\par \par 2. The patient will be sent for x-rays of the left rib cage and thoracic spine at this time.\par \par 3. The patient will take analgesics including OxyContin sparingly as needed\par \par 4. Routine rheumatologic followup\par \par 5. Follow up with myself in October with full pulmonary function testing.

## 2021-07-26 ENCOUNTER — APPOINTMENT (OUTPATIENT)
Dept: RHEUMATOLOGY | Facility: CLINIC | Age: 56
End: 2021-07-26
Payer: MEDICAID

## 2021-07-26 ENCOUNTER — APPOINTMENT (OUTPATIENT)
Dept: INTERNAL MEDICINE | Facility: CLINIC | Age: 56
End: 2021-07-26

## 2021-07-26 VITALS
HEART RATE: 78 BPM | OXYGEN SATURATION: 97 % | SYSTOLIC BLOOD PRESSURE: 146 MMHG | DIASTOLIC BLOOD PRESSURE: 95 MMHG | RESPIRATION RATE: 17 BRPM

## 2021-07-26 PROCEDURE — 96374 THER/PROPH/DIAG INJ IV PUSH: CPT

## 2021-07-26 RX ORDER — ZOLEDRONIC ACID 5 MG/100ML
5 INJECTION INTRAVENOUS
Qty: 0 | Refills: 0 | Status: COMPLETED | OUTPATIENT
Start: 2021-07-20

## 2021-07-30 ENCOUNTER — TRANSCRIPTION ENCOUNTER (OUTPATIENT)
Age: 56
End: 2021-07-30

## 2021-08-05 ENCOUNTER — RX RENEWAL (OUTPATIENT)
Age: 56
End: 2021-08-05

## 2021-08-09 ENCOUNTER — RX RENEWAL (OUTPATIENT)
Age: 56
End: 2021-08-09

## 2021-08-12 ENCOUNTER — TRANSCRIPTION ENCOUNTER (OUTPATIENT)
Age: 56
End: 2021-08-12

## 2021-08-16 ENCOUNTER — RX RENEWAL (OUTPATIENT)
Age: 56
End: 2021-08-16

## 2021-08-17 ENCOUNTER — TRANSCRIPTION ENCOUNTER (OUTPATIENT)
Age: 56
End: 2021-08-17

## 2021-08-18 ENCOUNTER — TRANSCRIPTION ENCOUNTER (OUTPATIENT)
Age: 56
End: 2021-08-18

## 2021-08-20 ENCOUNTER — APPOINTMENT (OUTPATIENT)
Dept: INTERNAL MEDICINE | Facility: CLINIC | Age: 56
End: 2021-08-20
Payer: MEDICAID

## 2021-08-20 ENCOUNTER — MED ADMIN CHARGE (OUTPATIENT)
Age: 56
End: 2021-08-20

## 2021-08-20 PROCEDURE — 96372 THER/PROPH/DIAG INJ SC/IM: CPT

## 2021-08-20 RX ORDER — CYANOCOBALAMIN 1000 UG/ML
1000 INJECTION INTRAMUSCULAR; SUBCUTANEOUS
Qty: 0 | Refills: 0 | Status: COMPLETED | OUTPATIENT
Start: 2021-08-20

## 2021-08-22 ENCOUNTER — TRANSCRIPTION ENCOUNTER (OUTPATIENT)
Age: 56
End: 2021-08-22

## 2021-08-27 ENCOUNTER — TRANSCRIPTION ENCOUNTER (OUTPATIENT)
Age: 56
End: 2021-08-27

## 2021-08-27 ENCOUNTER — RX RENEWAL (OUTPATIENT)
Age: 56
End: 2021-08-27

## 2021-08-30 ENCOUNTER — APPOINTMENT (OUTPATIENT)
Dept: RHEUMATOLOGY | Facility: CLINIC | Age: 56
End: 2021-08-30
Payer: MEDICAID

## 2021-08-30 VITALS
HEART RATE: 77 BPM | OXYGEN SATURATION: 97 % | WEIGHT: 168 LBS | TEMPERATURE: 97.7 F | DIASTOLIC BLOOD PRESSURE: 87 MMHG | BODY MASS INDEX: 28.84 KG/M2 | SYSTOLIC BLOOD PRESSURE: 138 MMHG

## 2021-08-30 PROCEDURE — 99214 OFFICE O/P EST MOD 30 MIN: CPT

## 2021-08-30 RX ORDER — PREDNISONE 2.5 MG/1
2.5 TABLET ORAL
Qty: 60 | Refills: 2 | Status: DISCONTINUED | COMMUNITY
Start: 2021-06-21 | End: 2021-08-30

## 2021-08-30 RX ORDER — OXYCODONE AND ACETAMINOPHEN 5; 325 MG/1; MG/1
5-325 TABLET ORAL
Qty: 90 | Refills: 0 | Status: DISCONTINUED | COMMUNITY
Start: 2021-05-07 | End: 2021-08-30

## 2021-08-30 RX ORDER — ATOVAQUONE 750 MG/5ML
750 SUSPENSION ORAL
Qty: 210 | Refills: 3 | Status: DISCONTINUED | COMMUNITY
Start: 2021-03-01 | End: 2021-08-30

## 2021-08-30 RX ORDER — DULOXETINE HYDROCHLORIDE 20 MG/1
20 CAPSULE, DELAYED RELEASE PELLETS ORAL
Qty: 30 | Refills: 11 | Status: DISCONTINUED | COMMUNITY
Start: 2021-01-18 | End: 2021-08-30

## 2021-08-30 RX ORDER — OXYCODONE 5 MG/1
5 TABLET ORAL
Qty: 12 | Refills: 0 | Status: DISCONTINUED | COMMUNITY
Start: 2020-09-07 | End: 2021-08-30

## 2021-09-05 NOTE — PHYSICAL EXAM
[General Appearance - Alert] : alert [General Appearance - In No Acute Distress] : in no acute distress [General Appearance - Well Nourished] : well nourished [PERRL With Normal Accommodation] : pupils were equal in size, round, and reactive to light [Sclera] : the sclera and conjunctiva were normal [Extraocular Movements] : extraocular movements were intact [Outer Ear] : the ears and nose were normal in appearance [Nasal Cavity] : the nasal mucosa and septum were normal [Oropharynx] : the oropharynx was normal [Neck Appearance] : the appearance of the neck was normal [Auscultation Breath Sounds / Voice Sounds] : lungs were clear to auscultation bilaterally [Heart Rate And Rhythm] : heart rate was normal and rhythm regular [Heart Sounds] : normal S1 and S2 [Murmurs] : no murmurs [Bowel Sounds] : normal bowel sounds [Abdomen Soft] : soft [Abdomen Tenderness] : non-tender [Abdomen Mass (___ Cm)] : no abdominal mass palpated [Cervical Lymph Nodes Enlarged Posterior Bilaterally] : posterior cervical [Cervical Lymph Nodes Enlarged Anterior Bilaterally] : anterior cervical [Supraclavicular Lymph Nodes Enlarged Bilaterally] : supraclavicular [No CVA Tenderness] : no ~M costovertebral angle tenderness [No Spinal Tenderness] : no spinal tenderness [Nail Clubbing] : no clubbing  or cyanosis of the fingernails [Musculoskeletal - Swelling] : no joint swelling seen [Motor Tone] : muscle strength and tone were normal [Skin Color & Pigmentation] : normal skin color and pigmentation [Oriented To Time, Place, And Person] : oriented to person, place, and time [Impaired Insight] : insight and judgment were intact [Affect] : the affect was normal [Full Pulse] : the pedal pulses are present [Edema] : there was no peripheral edema [Abnormal Walk] : normal gait [] : no rash [FreeTextEntry1] : weakness in  strength b/l but improving, improved ability to arise from seated

## 2021-09-05 NOTE — HISTORY OF PRESENT ILLNESS
[FreeTextEntry1] : JOSE ALEJANDRO HOFF is a 56 year old woman with pmh R nephrectomy in 2004 for unclear reason, HTN, asthma- with recurrent need for steroids nearly consistently since 8/20- 10-40 mg daily, hypothyroidism. Initially seen as hospital consult for new onset progressive peripheral neuropathy, purpuric rash, granulomatous rash on elbows, and oral/ nasal ulcerations, and significantly 50 lb wt loss. Was in usual state health with intermittent asthma/ and sinus infection but otherwise healthy till 8/20. Recent evaluation for mid epigastric abd/ non bloody emesis, EGD + gastritis confirmed eosinophilic esophagitis/ gastritis. Extensive w/u neuro/ rheum work up negative as per patient in the past. Here she is noted to have + MPO; +P-ANCA; +RF; +DS-DNA. C-ANCA is now positive. Discharged, then readmitted with GI perforation, tissue appeared inflammatory during resection, sent for pathology which confirming inflammatory etiology. Skin biopsy also consistent with vasculitis. \par \par Presently healing well from abd sx, no GI sx at present. Rashes, lung issues, sinus involvement improved with steroids. Weight has stabilized, no other constitutional sx. Ongoing neuropathy with weak . \par \par ---------\par 4/12/21 -- Just discharged from repeat hospital admission, found with SBO at site of anastomosis and associated abscess, s/p Abx and IR guided drainage, feeling better, no current abdominal complaints aside from mild nausea. Ongoing neurologic complaints which are slightly worse. \par \par 6/21/21 -- Tolerated Rituxan loading well, no SE, had no current GI sx, strength improving slowly, has been doing home exercises. No other vasculitis sx, no F/C, weight stable, improved appetite and slowly broadening diet without issues. Ongoing lower back pain, improved with opioids. \par \par 8/30/21 -- Doing well, no further rashes or GI sx, tolerating a normal diet, strength continues to improve. No infectious sx, F/C, unintentional weight loss. Back pain stable and slowly improving, not using opioids.

## 2021-09-05 NOTE — ASSESSMENT
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 Unclear if EGPA vs MPA at present, features can match both and eosinophils only seen in some areas of her case. Given extensive Ab +, favor Rituxan as steroid sparing treatment with consideration for Nucala if former not effective. Has completed Rituxan induction which was well tolerated and without GI or cutaneous involvement at present and improving neurologic sx. No infectious sx. LBP found to be spinal compression fx in setting of steroid induced OP, now s/p Reclast and healing without need for surgical intervention. \par \par - s/p Rituxan, will plan for repeat dosing q6 months x 2 years then reassess \par - monitor off steroids \par - can d/c PPI as long as no GERD \par - d/ Mepron \par - tylenol prn back pain, sparing use of opioids \par - c/w soft back brace\par - PT referral \par - recent labs reviewed and stable, will repeat prior to next visit \par - RTC in Oct -Nov for next dose of Rituxan -- will transition to 500mg q2 weeks x 2 doses\par

## 2021-09-13 ENCOUNTER — TRANSCRIPTION ENCOUNTER (OUTPATIENT)
Age: 56
End: 2021-09-13

## 2021-09-20 ENCOUNTER — APPOINTMENT (OUTPATIENT)
Dept: INTERNAL MEDICINE | Facility: CLINIC | Age: 56
End: 2021-09-20
Payer: MEDICAID

## 2021-09-20 PROCEDURE — 96372 THER/PROPH/DIAG INJ SC/IM: CPT

## 2021-09-20 RX ORDER — CYANOCOBALAMIN 1000 UG/ML
1000 INJECTION INTRAMUSCULAR; SUBCUTANEOUS
Qty: 0 | Refills: 0 | Status: COMPLETED | OUTPATIENT
Start: 2021-09-16

## 2021-10-04 ENCOUNTER — TRANSCRIPTION ENCOUNTER (OUTPATIENT)
Age: 56
End: 2021-10-04

## 2021-10-05 ENCOUNTER — APPOINTMENT (OUTPATIENT)
Dept: RHEUMATOLOGY | Facility: CLINIC | Age: 56
End: 2021-10-05
Payer: MEDICAID

## 2021-10-05 ENCOUNTER — APPOINTMENT (OUTPATIENT)
Dept: DERMATOLOGY | Facility: CLINIC | Age: 56
End: 2021-10-05
Payer: MEDICAID

## 2021-10-05 DIAGNOSIS — L64.9 ANDROGENIC ALOPECIA, UNSPECIFIED: ICD-10-CM

## 2021-10-05 DIAGNOSIS — L65.0 TELOGEN EFFLUVIUM: ICD-10-CM

## 2021-10-05 DIAGNOSIS — Z71.85 ENCOUNTER FOR IMMUNIZATION SAFETY COUNSELING: ICD-10-CM

## 2021-10-05 PROCEDURE — 99443: CPT

## 2021-10-05 PROCEDURE — 99213 OFFICE O/P EST LOW 20 MIN: CPT

## 2021-10-05 RX ORDER — PANTOPRAZOLE 40 MG/1
40 TABLET, DELAYED RELEASE ORAL DAILY
Qty: 90 | Refills: 1 | Status: DISCONTINUED | COMMUNITY
End: 2021-10-05

## 2021-10-06 ENCOUNTER — TRANSCRIPTION ENCOUNTER (OUTPATIENT)
Age: 56
End: 2021-10-06

## 2021-10-08 ENCOUNTER — APPOINTMENT (OUTPATIENT)
Dept: INTERNAL MEDICINE | Facility: CLINIC | Age: 56
End: 2021-10-08
Payer: MEDICAID

## 2021-10-08 VITALS
OXYGEN SATURATION: 97 % | WEIGHT: 167 LBS | SYSTOLIC BLOOD PRESSURE: 146 MMHG | HEART RATE: 105 BPM | RESPIRATION RATE: 18 BRPM | DIASTOLIC BLOOD PRESSURE: 88 MMHG | BODY MASS INDEX: 31.53 KG/M2 | HEIGHT: 61 IN | TEMPERATURE: 98.6 F

## 2021-10-08 DIAGNOSIS — L65.9 NONSCARRING HAIR LOSS, UNSPECIFIED: ICD-10-CM

## 2021-10-08 PROCEDURE — 94010 BREATHING CAPACITY TEST: CPT

## 2021-10-08 PROCEDURE — ZZZZZ: CPT

## 2021-10-08 PROCEDURE — 94727 GAS DIL/WSHOT DETER LNG VOL: CPT

## 2021-10-08 PROCEDURE — 94729 DIFFUSING CAPACITY: CPT

## 2021-10-08 PROCEDURE — 99214 OFFICE O/P EST MOD 30 MIN: CPT | Mod: 25

## 2021-10-08 RX ORDER — ACETAMINOPHEN 650 MG/1
650 TABLET, EXTENDED RELEASE ORAL
Qty: 1 | Refills: 0 | Status: DISCONTINUED | COMMUNITY
Start: 2021-04-15 | End: 2021-10-08

## 2021-10-08 NOTE — PLAN
[FreeTextEntry1] : One. Continued medication as outlined above.\par \par 2. Increased dose of Cardizem to 300 mg daily. She will continue to monitor her blood pressure home.\par \par 3. Followup in 6 weeks with Ms. Pagan for blood pressure check to assess the response to the increase in her Cardizem.\par \par 4. Continue with Flonase 2 squirts in each nostril b.i.d. p.r.n. for her allergy symptoms\par \par 5. Blood work to be drawn at this time\par \par 6. Routine rheumatologic followup next month. She is scheduled for her next dose of Rituxan. If she remains stable after that dose, the next dosage would be 2 years later.\par \par 7. Followup in 6 months with a wellness evaluation and routine fasting blood work\par \par 8. Endocrinology evaluation in December regarding her thyroid issues.\par \par 9. Continue with occupational therapy and physical therapy.

## 2021-10-08 NOTE — DATA REVIEWED
[FreeTextEntry1] : Blood work from March is reviewed.\par \par A pulmonary function test is performed. Lung volumes reveal a mild decrease in the total lung capacity, vital capacity, and residual volume. The FRC is moderately reduced. The lung I Reveal a mild to moderate decrease in flow rates. Bronchodilator activity is not assessed. DLCO is at the lower than normal. When corrected for alveolar volume it is 95%. This represents a combination of both restrictive and obstructive processes. Restriction is most likely due to the patient's central obesity and her kyphosis secondary to compression fractures. Obstruction is noted as well.

## 2021-10-08 NOTE — HISTORY OF PRESENT ILLNESS
[FreeTextEntry1] : The patient comes in today for a routine followup evaluation.\par  [de-identified] :  Re the patient comes in stating that she is slowly improving. Her major complaint continues to be that of back pain, secondary to her compression fracture. She received Reclast under the direction of her rheumatologist. She continues to struggle with the neuropathy. She feels that it is slowly improving. She continues with the gabapentin 600 mg t.i.d. This has been prescribed by her rheumatologist. She notes that her walking has improved somewhat and his strength is also increased. She continues to go to occupational therapy and physical therapy.\par \par Patient's underlying pulmonary status is good. She remains on Symbicort. She does have allergy symptoms which appear to be treated with good results from Claritin.\par \par The patient remains on Cardizem for treatment of her hypertension. She monitors her blood pressure at home. The systolic ranges between 160 170, and the diastolic between 70 and 80.\par \par The patient continues to have issues with hair loss. She was seen by dermatology, who feels that the hair loss is due to her stress. Blood work will be performed in any event, to reevaluate this. She now comes in for assessment

## 2021-10-08 NOTE — PHYSICAL EXAM
[No Acute Distress] : no acute distress [Supple] : supple [Clear to Auscultation] : lungs were clear to auscultation bilaterally [Normal Rate] : normal rate  [Normal S1, S2] : normal S1 and S2 [No Edema] : there was no peripheral edema [Soft] : abdomen soft [de-identified] : Spine tenderness on palpation

## 2021-10-14 RX ORDER — RITUXIMAB 10 MG/ML
100 INJECTION, SOLUTION INTRAVENOUS
Qty: 0 | Refills: 0 | Status: COMPLETED | OUTPATIENT
Start: 2021-04-19

## 2021-10-14 RX ORDER — RITUXIMAB 10 MG/ML
500 INJECTION, SOLUTION INTRAVENOUS
Qty: 0 | Refills: 0 | Status: COMPLETED | OUTPATIENT
Start: 2021-05-11

## 2021-10-14 RX ORDER — RITUXIMAB 10 MG/ML
100 INJECTION, SOLUTION INTRAVENOUS
Qty: 0 | Refills: 0 | Status: COMPLETED | OUTPATIENT
Start: 2021-05-04

## 2021-10-14 RX ORDER — RITUXIMAB 10 MG/ML
100 INJECTION, SOLUTION INTRAVENOUS
Qty: 0 | Refills: 0 | Status: COMPLETED | OUTPATIENT
Start: 2021-04-27

## 2021-10-14 RX ORDER — RITUXIMAB 10 MG/ML
100 INJECTION, SOLUTION INTRAVENOUS
Qty: 0 | Refills: 0 | Status: COMPLETED | OUTPATIENT
Start: 2021-04-20

## 2021-10-14 RX ORDER — RITUXIMAB 10 MG/ML
100 INJECTION, SOLUTION INTRAVENOUS
Qty: 0 | Refills: 0 | Status: COMPLETED | OUTPATIENT
Start: 2021-05-11

## 2021-10-14 RX ORDER — RITUXIMAB 10 MG/ML
500 INJECTION, SOLUTION INTRAVENOUS
Qty: 0 | Refills: 0 | Status: COMPLETED | OUTPATIENT
Start: 2021-04-20

## 2021-10-14 RX ORDER — RITUXIMAB 10 MG/ML
500 INJECTION, SOLUTION INTRAVENOUS
Qty: 0 | Refills: 0 | Status: COMPLETED | OUTPATIENT
Start: 2021-04-19

## 2021-10-14 RX ORDER — RITUXIMAB 10 MG/ML
100 INJECTION, SOLUTION INTRAVENOUS
Qty: 7 | Refills: 0 | Status: DISCONTINUED | COMMUNITY
Start: 2021-04-15 | End: 2021-07-23

## 2021-10-14 RX ORDER — RITUXIMAB 10 MG/ML
500 INJECTION, SOLUTION INTRAVENOUS
Qty: 0 | Refills: 0 | Status: COMPLETED | OUTPATIENT
Start: 2021-04-27

## 2021-10-14 RX ORDER — RITUXIMAB 10 MG/ML
500 INJECTION, SOLUTION INTRAVENOUS
Qty: 0 | Refills: 0 | Status: COMPLETED | OUTPATIENT
Start: 2021-05-04

## 2021-10-18 ENCOUNTER — APPOINTMENT (OUTPATIENT)
Dept: INTERNAL MEDICINE | Facility: CLINIC | Age: 56
End: 2021-10-18
Payer: MEDICAID

## 2021-10-18 PROCEDURE — 96372 THER/PROPH/DIAG INJ SC/IM: CPT

## 2021-10-18 RX ORDER — ALBUTEROL SULFATE 90 UG/1
108 (90 BASE) INHALANT RESPIRATORY (INHALATION)
Qty: 1 | Refills: 1 | Status: ACTIVE | COMMUNITY
Start: 2021-10-18 | End: 1900-01-01

## 2021-10-18 RX ORDER — CYANOCOBALAMIN 1000 UG/ML
1000 INJECTION INTRAMUSCULAR; SUBCUTANEOUS
Qty: 0 | Refills: 0 | Status: COMPLETED | OUTPATIENT
Start: 2021-10-15

## 2021-10-19 ENCOUNTER — TRANSCRIPTION ENCOUNTER (OUTPATIENT)
Age: 56
End: 2021-10-19

## 2021-10-20 ENCOUNTER — TRANSCRIPTION ENCOUNTER (OUTPATIENT)
Age: 56
End: 2021-10-20

## 2021-10-21 ENCOUNTER — TRANSCRIPTION ENCOUNTER (OUTPATIENT)
Age: 56
End: 2021-10-21

## 2021-10-25 ENCOUNTER — NON-APPOINTMENT (OUTPATIENT)
Age: 56
End: 2021-10-25

## 2021-11-04 ENCOUNTER — APPOINTMENT (OUTPATIENT)
Dept: ENDOCRINOLOGY | Facility: CLINIC | Age: 56
End: 2021-11-04

## 2021-11-15 ENCOUNTER — APPOINTMENT (OUTPATIENT)
Dept: RHEUMATOLOGY | Facility: CLINIC | Age: 56
End: 2021-11-15
Payer: MEDICAID

## 2021-11-15 VITALS
RESPIRATION RATE: 19 BRPM | OXYGEN SATURATION: 96 % | SYSTOLIC BLOOD PRESSURE: 119 MMHG | DIASTOLIC BLOOD PRESSURE: 83 MMHG | HEART RATE: 68 BPM

## 2021-11-15 VITALS
HEART RATE: 60 BPM | SYSTOLIC BLOOD PRESSURE: 140 MMHG | OXYGEN SATURATION: 96 % | RESPIRATION RATE: 18 BRPM | DIASTOLIC BLOOD PRESSURE: 83 MMHG | TEMPERATURE: 97.6 F

## 2021-11-15 VITALS
HEART RATE: 82 BPM | RESPIRATION RATE: 18 BRPM | OXYGEN SATURATION: 96 % | SYSTOLIC BLOOD PRESSURE: 157 MMHG | DIASTOLIC BLOOD PRESSURE: 91 MMHG

## 2021-11-15 VITALS
RESPIRATION RATE: 18 BRPM | OXYGEN SATURATION: 96 % | TEMPERATURE: 97.2 F | SYSTOLIC BLOOD PRESSURE: 127 MMHG | HEART RATE: 67 BPM | DIASTOLIC BLOOD PRESSURE: 82 MMHG

## 2021-11-15 PROBLEM — Z71.85 VACCINE COUNSELING: Status: ACTIVE | Noted: 2021-11-15

## 2021-11-15 PROCEDURE — 96374 THER/PROPH/DIAG INJ IV PUSH: CPT | Mod: 59

## 2021-11-15 PROCEDURE — 96413 CHEMO IV INFUSION 1 HR: CPT

## 2021-11-15 PROCEDURE — 96415 CHEMO IV INFUSION ADDL HR: CPT

## 2021-11-15 RX ORDER — METHYLPREDNISOLONE 125 MG/2ML
125 INJECTION, POWDER, LYOPHILIZED, FOR SOLUTION INTRAMUSCULAR; INTRAVENOUS
Qty: 0 | Refills: 0 | Status: COMPLETED | OUTPATIENT
Start: 2021-11-15

## 2021-11-15 RX ORDER — DIPHENHYDRAMINE HCL 25 MG/1
25 TABLET ORAL
Qty: 0 | Refills: 0 | Status: COMPLETED | OUTPATIENT
Start: 2021-11-15

## 2021-11-15 RX ORDER — ACETAMINOPHEN 325 MG/1
325 TABLET ORAL
Qty: 0 | Refills: 0 | Status: COMPLETED | OUTPATIENT
Start: 2021-11-15

## 2021-11-15 RX ORDER — RITUXIMAB 10 MG/ML
500 INJECTION, SOLUTION INTRAVENOUS
Qty: 0 | Refills: 0 | Status: COMPLETED | OUTPATIENT
Start: 2021-11-15

## 2021-11-15 RX ADMIN — RITUXIMAB 500 MG/50ML: 10 INJECTION, SOLUTION INTRAVENOUS at 00:00

## 2021-11-15 RX ADMIN — ACETAMINOPHEN 2 MG: 500 TABLET, FILM COATED ORAL at 00:00

## 2021-11-15 RX ADMIN — DIPHENHYDRAMINE HYDROCHLORIDE 1 MG: 25 TABLET, FILM COATED ORAL at 00:00

## 2021-11-15 RX ADMIN — METHYLPREDNISOLONE SODIUM SUCCINATE 0.8 MG: 125 INJECTION, POWDER, FOR SOLUTION INTRAMUSCULAR; INTRAVENOUS at 00:00

## 2021-11-17 ENCOUNTER — NON-APPOINTMENT (OUTPATIENT)
Age: 56
End: 2021-11-17

## 2021-11-17 ENCOUNTER — APPOINTMENT (OUTPATIENT)
Dept: ORTHOPEDIC SURGERY | Facility: CLINIC | Age: 56
End: 2021-11-17
Payer: MEDICAID

## 2021-11-17 VITALS
SYSTOLIC BLOOD PRESSURE: 132 MMHG | WEIGHT: 167 LBS | HEART RATE: 62 BPM | BODY MASS INDEX: 31.53 KG/M2 | DIASTOLIC BLOOD PRESSURE: 76 MMHG | HEIGHT: 61 IN

## 2021-11-17 PROCEDURE — 99204 OFFICE O/P NEW MOD 45 MIN: CPT

## 2021-11-17 NOTE — PHYSICAL EXAM
[Normal] : Oriented to person, place, and time, insight and judgement were intact and the affect was normal [de-identified] : bilateral hands: \par skin intact no swelling no erythema no ecchymosis\par severe intrinsic and thenar wasting on the right, thenar atrophy on the left\par decreased sensation all digits on the right, median nerve on the left\par ROM of the digits intact without pain\par no triggering, no A1 pulley ttp\par cap refill < 2 sec

## 2021-11-17 NOTE — ASSESSMENT
[FreeTextEntry1] : 56-year-old female who presents for evaluation of a trigger finger on the left hand that has recently resolved. Additionally she has decreased sensation in bilateral hands right worse than left with severe atrophy. I recommend EMG for further workup. She will followup to review the results of the EMG.

## 2021-11-17 NOTE — HISTORY OF PRESENT ILLNESS
[FreeTextEntry1] : 56 year female presents for evaluation of right middle as well as left index trigger finger present for a couple of months. She denies any acute injury or inciting event. She reports a history of vasculitis as well as peripheral neuropathy and both upper and lower extremity's. She reports pain and clicking of her left index as well as right middle digits which occurred regularly with motion. She had been under the care of hand therapy for this as well as for the peripheral neuropathy with slow and steady improvement in her function with ADLs. She reports she received a Rituxan yesterday and her triggering has resolved completely the last 24 hours.

## 2021-11-22 RX ORDER — DIPHENHYDRAMINE HYDROCHLORIDE 50 MG/ML
50 INJECTION, SOLUTION INTRAMUSCULAR; INTRAVENOUS
Qty: 1 | Refills: 0 | Status: COMPLETED | OUTPATIENT
Start: 2021-11-22 | End: 1900-01-01

## 2021-11-22 RX ORDER — RITUXIMAB 10 MG/ML
500 INJECTION, SOLUTION INTRAVENOUS
Qty: 0 | Refills: 0 | Status: COMPLETED | OUTPATIENT
Start: 2021-11-22 | End: 1900-01-01

## 2021-11-22 RX ORDER — ACETAMINOPHEN 325 MG/1
325 TABLET ORAL
Qty: 0 | Refills: 0 | Status: COMPLETED | OUTPATIENT
Start: 2021-11-22 | End: 1900-01-01

## 2021-11-22 RX ORDER — METHYLPREDNISOLONE 125 MG/2ML
125 INJECTION, POWDER, LYOPHILIZED, FOR SOLUTION INTRAMUSCULAR; INTRAVENOUS
Qty: 0 | Refills: 0 | Status: COMPLETED | OUTPATIENT
Start: 2021-11-22 | End: 1900-01-01

## 2021-11-29 ENCOUNTER — APPOINTMENT (OUTPATIENT)
Dept: RHEUMATOLOGY | Facility: CLINIC | Age: 56
End: 2021-11-29
Payer: MEDICAID

## 2021-11-29 VITALS
OXYGEN SATURATION: 96 % | DIASTOLIC BLOOD PRESSURE: 85 MMHG | HEART RATE: 68 BPM | RESPIRATION RATE: 18 BRPM | TEMPERATURE: 97.6 F | SYSTOLIC BLOOD PRESSURE: 134 MMHG

## 2021-11-29 VITALS
SYSTOLIC BLOOD PRESSURE: 142 MMHG | TEMPERATURE: 97 F | RESPIRATION RATE: 18 BRPM | OXYGEN SATURATION: 97 % | HEART RATE: 81 BPM | DIASTOLIC BLOOD PRESSURE: 81 MMHG

## 2021-11-29 VITALS
DIASTOLIC BLOOD PRESSURE: 88 MMHG | RESPIRATION RATE: 18 BRPM | HEART RATE: 83 BPM | OXYGEN SATURATION: 97 % | SYSTOLIC BLOOD PRESSURE: 153 MMHG | TEMPERATURE: 97.2 F

## 2021-11-29 DIAGNOSIS — M62.81 MUSCLE WEAKNESS (GENERALIZED): ICD-10-CM

## 2021-11-29 DIAGNOSIS — M79.641 PAIN IN RIGHT HAND: ICD-10-CM

## 2021-11-29 PROCEDURE — 96413 CHEMO IV INFUSION 1 HR: CPT

## 2021-11-29 PROCEDURE — 99214 OFFICE O/P EST MOD 30 MIN: CPT | Mod: 25

## 2021-11-29 PROCEDURE — 96415 CHEMO IV INFUSION ADDL HR: CPT

## 2021-11-29 PROCEDURE — ZZZZZ: CPT

## 2021-11-29 PROCEDURE — 96375 TX/PRO/DX INJ NEW DRUG ADDON: CPT

## 2021-11-29 PROCEDURE — 96374 THER/PROPH/DIAG INJ IV PUSH: CPT | Mod: 59

## 2021-11-29 RX ORDER — DIPHENHYDRAMINE HYDROCHLORIDE 50 MG/ML
50 INJECTION, SOLUTION INTRAMUSCULAR; INTRAVENOUS
Qty: 1 | Refills: 0 | Status: COMPLETED | OUTPATIENT
Start: 2021-11-23

## 2021-11-29 RX ORDER — ACETAMINOPHEN 325 MG/1
325 TABLET ORAL
Qty: 0 | Refills: 0 | Status: COMPLETED | OUTPATIENT
Start: 2021-11-23

## 2021-11-29 RX ORDER — METHYLPREDNISOLONE 125 MG/2ML
125 INJECTION, POWDER, LYOPHILIZED, FOR SOLUTION INTRAMUSCULAR; INTRAVENOUS
Qty: 0 | Refills: 0 | Status: COMPLETED | OUTPATIENT
Start: 2021-11-23

## 2021-11-29 RX ORDER — RITUXIMAB 10 MG/ML
500 INJECTION, SOLUTION INTRAVENOUS
Qty: 0 | Refills: 0 | Status: COMPLETED | OUTPATIENT
Start: 2021-11-23

## 2021-11-29 NOTE — REASON FOR VISIT
[Follow-Up: _____] : a [unfilled] follow-up visit [FreeTextEntry1] : ANCA vasculitis, steroid induced OP, chronic peripheral neuropathy

## 2021-11-29 NOTE — ASSESSMENT
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 Unclear if EGPA vs MPA at present, features can match both and eosinophils only seen in some areas of her case. Given extensive Ab +, Rituxan chosen as steroid sparing treatment with marked responsiveness and able to taper off steroids. Without GI or cutaneous involvement at present, stable neurologic sx which are likely partially chronic, improving strength with OT and PT. +spinal compression fx in setting of steroid induced OP, now s/p Reclast -- limiting ambulation time but not worsening at present. \par \par - completing 2nd Rituxan cycle today, will plan for repeat dosing q4-6 months x 2 years then reassess \par - monitor off steroids \par - tylenol prn back pain, off opioids, advised to limit ambulation to <10 min, bring cane to use prn and use moist heat as this helps\par - c/w OT and PT -- renewed order for former\par - c/w custom R hand splint as helping\par - c/w gabapentin at current dose for chronic neuropathy\par - repeat labs as below -- needs to be drawn at [facility] per insurance \par - would not consider kyphoplasty unless back pain worsening\par - plan of EMG/NCS with neuro -- agree with this \par - pt is immunocompromised and would benefit from vaccination but currently declining covid vaccine and did not get flu vaccine this year (normally does get it ) -- no role for vaccines at present given Rituxan admin and poor likelihood of response, will re discuss in 4 months and attempt to vaccinate prior to next infusion \par - extensive discussion that she needs to maintain social distancing, hand hygiene and limit interaction with nonvaccinated and ill people to decrease her possibility of getting ill \par - c/w Ca 600mg BID with food, Vit D 1000 IU daily to optimize Ca/Vit D to maintain bone health. \par - Weight bearing exercise for 30min 3-4x/week to maintain BMD as tolerated, can break into 4 10 min sessions \par - Reclast due July 2022\par - RTC in 4 months \par

## 2021-11-29 NOTE — HISTORY OF PRESENT ILLNESS
[FreeTextEntry1] : JOSE ALEJANDRO HOFF is a 56 year old woman with pmh R nephrectomy in 2004 for unclear reason, HTN, asthma- with recurrent need for steroids nearly consistently since 8/20- 10-40 mg daily, hypothyroidism. Initially seen as hospital consult for new onset progressive peripheral neuropathy, purpuric rash, granulomatous rash on elbows, and oral/ nasal ulcerations, and significantly 50 lb wt loss. Was in usual state health with intermittent asthma/ and sinus infection but otherwise healthy till 8/20. Recent evaluation for mid epigastric abd/ non bloody emesis, EGD + gastritis confirmed eosinophilic esophagitis/ gastritis. Extensive w/u neuro/ rheum work up negative as per patient in the past. Here she is noted to have + MPO; +P-ANCA; +RF; +DS-DNA. C-ANCA is now positive. Discharged, then readmitted with GI perforation, tissue appeared inflammatory during resection, sent for pathology which confirming inflammatory etiology. Skin biopsy also consistent with vasculitis. \par \par Presently healing well from abd sx, no GI sx at present. Rashes, lung issues, sinus involvement improved with steroids. Weight has stabilized, no other constitutional sx. Ongoing neuropathy with weak . \par \par ---------\par 4/12/21 -- Just discharged from repeat hospital admission, found with SBO at site of anastomosis and associated abscess, s/p Abx and IR guided drainage, feeling better, no current abdominal complaints aside from mild nausea. Ongoing neurologic complaints which are slightly worse. \par \par 6/21/21 -- Tolerated Rituxan loading well, no SE, had no current GI sx, strength improving slowly, has been doing home exercises. No other vasculitis sx, no F/C, weight stable, improved appetite and slowly broadening diet without issues. Ongoing lower back pain, improved with opioids. \par \par 8/30/21 -- Doing well, no further rashes or GI sx, tolerating a normal diet, strength continues to improve. No infectious sx, F/C, unintentional weight loss. Back pain stable and slowly improving, not using opioids. \par \par 11/29/21 -- Doing well, getting 2nd dose of q6 month Rituxan today, reports feeling some muscle weakness just prior to the 1st dose that has now resolved. No other inflammatory sx today. Peripheral neuropathy is stable but chronic, OT is helping significantly. Also doing PT which continues to help for the back, but can only ambulate for approx 10 min then needs to rest. No worsening back pain. Hair is growing back in, saw derm, likely stress related. No infectious sx, feels well otherwise.

## 2021-11-29 NOTE — PHYSICAL EXAM
[General Appearance - Alert] : alert [General Appearance - In No Acute Distress] : in no acute distress [General Appearance - Well Nourished] : well nourished [Sclera] : the sclera and conjunctiva were normal [PERRL With Normal Accommodation] : pupils were equal in size, round, and reactive to light [Extraocular Movements] : extraocular movements were intact [Outer Ear] : the ears and nose were normal in appearance [Nasal Cavity] : the nasal mucosa and septum were normal [Oropharynx] : the oropharynx was normal [Neck Appearance] : the appearance of the neck was normal [Auscultation Breath Sounds / Voice Sounds] : lungs were clear to auscultation bilaterally [Heart Rate And Rhythm] : heart rate was normal and rhythm regular [Heart Sounds] : normal S1 and S2 [Murmurs] : no murmurs [Edema] : there was no peripheral edema [Bowel Sounds] : normal bowel sounds [Abdomen Soft] : soft [Abdomen Tenderness] : non-tender [Abdomen Mass (___ Cm)] : no abdominal mass palpated [Cervical Lymph Nodes Enlarged Posterior Bilaterally] : posterior cervical [Cervical Lymph Nodes Enlarged Anterior Bilaterally] : anterior cervical [Supraclavicular Lymph Nodes Enlarged Bilaterally] : supraclavicular [No CVA Tenderness] : no ~M costovertebral angle tenderness [No Spinal Tenderness] : no spinal tenderness [Abnormal Walk] : normal gait [Nail Clubbing] : no clubbing  or cyanosis of the fingernails [Musculoskeletal - Swelling] : no joint swelling seen [Motor Tone] : muscle strength and tone were normal [Oriented To Time, Place, And Person] : oriented to person, place, and time [Impaired Insight] : insight and judgment were intact [Affect] : the affect was normal [Skin Color & Pigmentation] : normal skin color and pigmentation [] : no rash [FreeTextEntry1] : weakness in  strength b/l but improving, improved ability to arise from seated, good strength in b/l feet and proximal muscles

## 2021-11-30 ENCOUNTER — APPOINTMENT (OUTPATIENT)
Dept: INTERNAL MEDICINE | Facility: CLINIC | Age: 56
End: 2021-11-30
Payer: MEDICAID

## 2021-11-30 VITALS
TEMPERATURE: 97 F | RESPIRATION RATE: 16 BRPM | HEIGHT: 61 IN | WEIGHT: 173 LBS | BODY MASS INDEX: 32.66 KG/M2 | OXYGEN SATURATION: 97 % | SYSTOLIC BLOOD PRESSURE: 126 MMHG | HEART RATE: 87 BPM | DIASTOLIC BLOOD PRESSURE: 88 MMHG

## 2021-11-30 VITALS — DIASTOLIC BLOOD PRESSURE: 82 MMHG | SYSTOLIC BLOOD PRESSURE: 140 MMHG

## 2021-11-30 PROCEDURE — 99213 OFFICE O/P EST LOW 20 MIN: CPT

## 2021-11-30 NOTE — HISTORY OF PRESENT ILLNESS
[FreeTextEntry1] : HTN f/u [de-identified] : Patient is a 56-year-old female with PMH of R nephrectomy in 2004 for unclear reason, HTN, asthma- with recurrent need for steroids nearly consistently since 8/20- 10-40 mg daily, hypothyroidism, and vasculitis. Patient was recently seen 10/8/21 with elevated BP and was asked to increase cardizem from 240mg to 300mg. Patient reports she has been tolerating increase of dose well, denies dizziness, fatigue, lightheadedness. Patient reports she has been checking BP and readings have been 130-140/ 70-80. \par \par Patient reports she was seen by Rheum yesterday for Rituxan infusion. She reports she was given high dose of steroids intravenously. BP noted to be high normal today. \par \par Reports she saw hand specialist last week for hand paresthesias, has been referred for nerve conduction study to R/O CTS.\par \par Feels well, has some ear pressure in R ear that started 2 days ago. No other associated symptoms.

## 2021-11-30 NOTE — PLAN
[FreeTextEntry1] : 1. Patient to trial neomyxin/ polymyxin HC for L ear discomfort- to apply as directed\par \par 2. Patient to continue monitoring BP at home- Normal high BP today- however, will hold off on increasing dose at this time as patient given high dose of steroids IV yesterday. If high on next visit, will consider increasing cardizem to 340mg. Will notify if BP >140/90 at home\par \par 3. To return to office in 1 month for BP check, or sooner if needed\par \par 4. To see Dr. Sheldon 5/2022\par \par Agrees with plan. All questions answered

## 2021-11-30 NOTE — PHYSICAL EXAM
[Well-Appearing] : well-appearing [Normal Sclera/Conjunctiva] : normal sclera/conjunctiva [No Lymphadenopathy] : no lymphadenopathy [Supple] : supple [No Respiratory Distress] : no respiratory distress  [No Accessory Muscle Use] : no accessory muscle use [Clear to Auscultation] : lungs were clear to auscultation bilaterally [Normal Rate] : normal rate  [Regular Rhythm] : with a regular rhythm [Normal S1, S2] : normal S1 and S2 [No Edema] : there was no peripheral edema [Soft] : abdomen soft [Non Tender] : non-tender [Non-distended] : non-distended [Normal Bowel Sounds] : normal bowel sounds [Normal Affect] : the affect was normal [Alert and Oriented x3] : oriented to person, place, and time [de-identified] : +Pain on palpation of L pinna, canal erythematous with serous L TM

## 2021-12-06 ENCOUNTER — APPOINTMENT (OUTPATIENT)
Dept: ENDOCRINOLOGY | Facility: CLINIC | Age: 56
End: 2021-12-06
Payer: MEDICAID

## 2021-12-06 VITALS
SYSTOLIC BLOOD PRESSURE: 134 MMHG | HEIGHT: 61 IN | RESPIRATION RATE: 15 BRPM | WEIGHT: 172 LBS | OXYGEN SATURATION: 97 % | BODY MASS INDEX: 32.47 KG/M2 | TEMPERATURE: 98.4 F | DIASTOLIC BLOOD PRESSURE: 84 MMHG | HEART RATE: 97 BPM

## 2021-12-06 DIAGNOSIS — R53.83 OTHER FATIGUE: ICD-10-CM

## 2021-12-06 PROCEDURE — 99214 OFFICE O/P EST MOD 30 MIN: CPT

## 2021-12-06 PROCEDURE — 99204 OFFICE O/P NEW MOD 45 MIN: CPT

## 2021-12-07 NOTE — ED PROVIDER NOTE - CARDIAC, MLM
Patient advised that she has a \"catch\" right inner knee.  Also states she has \"shocking pain\" every time she stretches her leg when standing up.  Discussed with patient tendon possibly catching on scar tissue.  Patient states that Dr Cr also advised her of that at her last office visit.  Patient states that she would like to schedule an office visit with an xray for evaluation as she feels there is something of concern going on.    Scheduled patient for 12/8/21 at 9:30 (open slot) with Dr Cr    Routing to Dr Tayo chatterjee   Normal rate, regular rhythm.  Heart sounds S1, S2.  No murmurs, rubs or gallops.

## 2021-12-09 ENCOUNTER — APPOINTMENT (OUTPATIENT)
Dept: DERMATOLOGY | Facility: CLINIC | Age: 56
End: 2021-12-09

## 2021-12-18 ENCOUNTER — TRANSCRIPTION ENCOUNTER (OUTPATIENT)
Age: 56
End: 2021-12-18

## 2021-12-30 ENCOUNTER — APPOINTMENT (OUTPATIENT)
Dept: INTERNAL MEDICINE | Facility: CLINIC | Age: 56
End: 2021-12-30
Payer: MEDICAID

## 2021-12-30 VITALS
RESPIRATION RATE: 15 BRPM | SYSTOLIC BLOOD PRESSURE: 140 MMHG | HEIGHT: 61 IN | WEIGHT: 173 LBS | TEMPERATURE: 98.7 F | HEART RATE: 93 BPM | OXYGEN SATURATION: 97 % | DIASTOLIC BLOOD PRESSURE: 80 MMHG | BODY MASS INDEX: 32.66 KG/M2

## 2021-12-30 VITALS — SYSTOLIC BLOOD PRESSURE: 130 MMHG | DIASTOLIC BLOOD PRESSURE: 82 MMHG

## 2021-12-30 VITALS — DIASTOLIC BLOOD PRESSURE: 84 MMHG | SYSTOLIC BLOOD PRESSURE: 120 MMHG

## 2021-12-30 DIAGNOSIS — S22.000S WEDGE COMPRESSION FRACTURE OF UNSPECIFIED THORACIC VERTEBRA, SEQUELA: ICD-10-CM

## 2021-12-30 PROCEDURE — 99213 OFFICE O/P EST LOW 20 MIN: CPT

## 2021-12-30 NOTE — PLAN
[FreeTextEntry1] : 1. Bp checked manually in office today and WNL, 130/82, repeat 120/84. Bp compared with home monitor patient brought to visit today and there was approx 20 point elevation with home monitor. Possible cuff is too small on home monitor, advised to obtain larger cuff or trial wrist device. She will continue to monitor her BP at home and notify if BP >130/90. For now, will continue with diltiazem 300mg.\par \par 2. Continue with low sodium diet, limit to 2g Na daily\par \par 3. To see Dr. Sheldon 5/3/2022 or sooner if needed \par \par All questions answered. Agrees with plan above

## 2021-12-30 NOTE — HISTORY OF PRESENT ILLNESS
[FreeTextEntry1] : BP check [de-identified] : Patient is a 56-year-old female with PMH of R nephrectomy in 2004 for unclear reason, HTN, asthma- with recurrent need for steroids nearly consistently since 8/20- 10-40 mg daily, hypothyroidism, and vasculitis. Patient was recently seen with borderline elevated BP and was asked to return today for repeat BP check. Patient reports she has been checking BP and readings have been 130's / 80-85. Very rarely over 90 diastolic. Patient reports she feels well today, no acute complaints. \par

## 2021-12-30 NOTE — PHYSICAL EXAM
[Well-Appearing] : well-appearing [Normal Sclera/Conjunctiva] : normal sclera/conjunctiva [No Lymphadenopathy] : no lymphadenopathy [Supple] : supple [No Respiratory Distress] : no respiratory distress  [No Accessory Muscle Use] : no accessory muscle use [Clear to Auscultation] : lungs were clear to auscultation bilaterally [Normal Rate] : normal rate  [Regular Rhythm] : with a regular rhythm [Normal S1, S2] : normal S1 and S2 [No Edema] : there was no peripheral edema [Soft] : abdomen soft [Non Tender] : non-tender [Non-distended] : non-distended [Normal Bowel Sounds] : normal bowel sounds [Normal Affect] : the affect was normal [Alert and Oriented x3] : oriented to person, place, and time

## 2022-01-14 ENCOUNTER — APPOINTMENT (OUTPATIENT)
Dept: INTERNAL MEDICINE | Facility: CLINIC | Age: 57
End: 2022-01-14
Payer: MEDICAID

## 2022-01-14 VITALS
HEART RATE: 72 BPM | TEMPERATURE: 98.1 F | DIASTOLIC BLOOD PRESSURE: 82 MMHG | BODY MASS INDEX: 32.47 KG/M2 | HEIGHT: 61 IN | RESPIRATION RATE: 16 BRPM | SYSTOLIC BLOOD PRESSURE: 142 MMHG | WEIGHT: 172 LBS | OXYGEN SATURATION: 97 %

## 2022-01-14 DIAGNOSIS — J45.909 UNSPECIFIED ASTHMA, UNCOMPLICATED: ICD-10-CM

## 2022-01-14 DIAGNOSIS — R05.9 COUGH, UNSPECIFIED: ICD-10-CM

## 2022-01-14 DIAGNOSIS — E53.8 DEFICIENCY OF OTHER SPECIFIED B GROUP VITAMINS: ICD-10-CM

## 2022-01-14 PROCEDURE — 99214 OFFICE O/P EST MOD 30 MIN: CPT

## 2022-01-14 RX ORDER — BUDESONIDE 0.5 MG/2ML
0.5 INHALANT ORAL TWICE DAILY
Qty: 60 | Refills: 11 | Status: ACTIVE | COMMUNITY
Start: 2022-01-14 | End: 1900-01-01

## 2022-01-14 NOTE — PHYSICAL EXAM
[No Acute Distress] : no acute distress [Well Nourished] : well nourished [Normal Oropharynx] : the oropharynx was normal [No JVD] : no jugular venous distention [Supple] : supple [Normal Rate] : normal rate  [Normal S1, S2] : normal S1 and S2 [No Edema] : there was no peripheral edema [No Extremity Clubbing/Cyanosis] : no extremity clubbing/cyanosis [Soft] : abdomen soft [Normal Bowel Sounds] : normal bowel sounds [No Rash] : no rash [de-identified] : there is no tenderness on palpation over the maxillary or frontal sinus regions. [de-identified] : Scattered end-inspiratory, and expiratory wheezes are noted.There is a prolongation of the expiratory phase.

## 2022-01-14 NOTE — HISTORY OF PRESENT ILLNESS
[FreeTextEntry8] : The patient states, that she was in her usual state of health, until 2-3 days ago, when she noted the onset of nasal congestion. She has been compliant with her medical regimen as outlined. Her last dose of Rituxan, was in November, at her next scheduled doses for May of 2022.\par \par The patient states, that the secretions from her nose began to turn yellow and green over the past 48 hours. She has been compliant with the Symbicort. She also has developed wheezing over the past day as well as a slight cough. Occasionally, the cough has been productive of yellow sputum. She has been compliant with the Flonase. She denies any fevers, chills, or rigors. She is on vaccinations, due to the fact that she has concerns about developing blood clots given her vasculitis. She states that she has been staying at home and she has not been out of the house for the past 3 weeks. She has not come into contact with anyone or had any obvious known exposures to people with the corona virus. She comes in for this assessment

## 2022-01-14 NOTE — PLAN
[FreeTextEntry1] : 1. Continue with medication as outlined above.\par \par 2. Doxycycline 100 mg b.i.d. for 10 days\par \par 3. Prednisone 20 mg b.i.d. for 6 days\par \par 4. The patient will now discontinue the Flonase, and she will switch over to sinus rinses using salts, baking soda, budesonide unit dose, and 3 drops of Regulo's baby shampoo on a b.i.d. basis. When the infection improves, she can remove the baby shampoo and use the nasal rinses once a day going forward.\par \par 5. Followup with myself in May with a wellness evaluation and routine fasting blood work.\par \par 6. Continue with close rheumatologic followup for treatment of her C-ANCA vasculitis.

## 2022-01-19 ENCOUNTER — TRANSCRIPTION ENCOUNTER (OUTPATIENT)
Age: 57
End: 2022-01-19

## 2022-01-25 NOTE — ED ADULT TRIAGE NOTE - AS TEMP SITE
ED ENT HPI





- General


Chief complaint: Dental/Oral


Stated complaint: MOUTH PAIN


Time Seen by Provider: 22 22:58


Source: patient


Mode of arrival: Ambulatory


Limitations: No Limitations





- History of Present Illness


Initial comments: 





Patient is a 41-year-old F American female with pain in her right lower molars. 

She has significant decay in this region.  Patient has pain with palpation and 

with chewing over the last several days.  Patient is has been pain estimated 5 

out of 10 in severity.  No difficulty swallowing or fever.





- Related Data


                                Home Medications











 Medication  Instructions  Recorded  Confirmed  Last Taken


 


Ferrous Sulfate [Iron 325 MG] 325 mg PO QDAY 22


 


Hydralazine HCl 50 mg PO BID 22


 


NIFEdipine [Nifedipine ER] 90 mg PO QDAY 22


 


Pantoprazole [Protonix] 40 mg PO QDAY 22


 


Sacubitril/Valsartan [Entresto 1 tab PO BID 22





49-51 mg]    


 


Torsemide [Demadex] 20 mg PO QDAY 22


 


Valsartan [Diovan] 320 mg PO QDAY 22


 


traMADoL [Ultram] 50 mg PO Q4HR PRN 22








                                  Previous Rx's











 Medication  Instructions  Recorded  Last Taken  Type


 


Spironolactone [Aldactone] 25 mg PO QDAY  tablet 21 Rx


 


carvediloL [Coreg] 12.5 mg PO BID #60 tablet 21 Rx


 


Clindamycin [Clindamycin CAP] 300 mg PO Q8H #21 cap 22 Unknown Rx


 


traMADoL [Ultram] 50 mg PO Q6HR PRN #12 tablet 22 Unknown Rx











                                    Allergies











Allergy/AdvReac Type Severity Reaction Status Date / Time


 


cyclobenzaprine HCl Allergy  Shortness Verified 22 13:50





[From Flexeril]   of Breath;  





   confusion  


 


ibuprofen [From Motrin] Allergy  Swelling Verified 22 18:03


 


EPIDURAL MEDS Allergy  Shortness Uncoded 22 18:03





   of Breath  














ED Dental HPI





- General


Chief complaint: Dental/Oral


Stated complaint: MOUTH PAIN


Time Seen by Provider: 22 22:58


Source: patient


Mode of arrival: Ambulatory


Limitations: No Limitations





- Related Data


                                Home Medications











 Medication  Instructions  Recorded  Confirmed  Last Taken


 


Ferrous Sulfate [Iron 325 MG] 325 mg PO QDAY 22


 


Hydralazine HCl 50 mg PO BID 22


 


NIFEdipine [Nifedipine ER] 90 mg PO QDAY 22


 


Pantoprazole [Protonix] 40 mg PO QDAY 22


 


Sacubitril/Valsartan [Entresto 1 tab PO BID 22





49-51 mg]    


 


Torsemide [Demadex] 20 mg PO QDAY 22


 


Valsartan [Diovan] 320 mg PO QDAY 22


 


traMADoL [Ultram] 50 mg PO Q4HR PRN 22








                                  Previous Rx's











 Medication  Instructions  Recorded  Last Taken  Type


 


Spironolactone [Aldactone] 25 mg PO QDAY  tablet 21 Rx


 


carvediloL [Coreg] 12.5 mg PO BID #60 tablet 21 Rx


 


Clindamycin [Clindamycin CAP] 300 mg PO Q8H #21 cap 22 Unknown Rx


 


traMADoL [Ultram] 50 mg PO Q6HR PRN #12 tablet 22 Unknown Rx











                                    Allergies











Allergy/AdvReac Type Severity Reaction Status Date / Time


 


cyclobenzaprine HCl Allergy  Shortness Verified 22 13:50





[From Flexeril]   of Breath;  





   confusion  


 


ibuprofen [From Motrin] Allergy  Swelling Verified 22 18:03


 


EPIDURAL MEDS Allergy  Shortness Uncoded 22 18:03





   of Breath  














ED Review of Systems


ROS: 


Stated complaint: MOUTH PAIN


Other details as noted in HPI





Comment: All other systems reviewed and negative





ED Past Medical Hx





- Past Medical History


Hx Hypertension: Yes


Hx CVA: No


Hx Heart Attack/AMI: No


Hx Congestive Heart Failure: Yes


Hx Diabetes: Yes


Hx Deep Vein Thrombosis: No


Hx Pulmonary Embolism: No


Hx GERD: No


Hx Liver Disease: No


Hx Renal Disease: No


Hx Sickle Cell Disease: No


Hx Arthritis: No


Hx Headaches / Migraines: No


Hx Seizures: No


Hx Kidney Stones: No


Hx Psychiatric Treatment: No


Hx Asthma: No


Hx COPD: No


Hx Tuberculosis: No


Hx Dementia: No


Hx HIV: No





- Surgical History


Hx Coronary Stent: No


Hx Open Heart Surgery: No


Hx Pacemaker: Yes


Hx Internal Defibrillator: No


Hx Cholecystectomy: Yes


Hx Appendectomy: No


Hx Breast Surgery: No


Additional Surgical History: "Ovarian surgery",.   x 4.  TUBAL LIGATION





- Social History


Smoking Status: Current Every Day Smoker





- Medications


Home Medications: 


                                Home Medications











 Medication  Instructions  Recorded  Confirmed  Last Taken  Type


 


Spironolactone [Aldactone] 25 mg PO QDAY  tablet 21 Rx


 


carvediloL [Coreg] 12.5 mg PO BID #60 tablet 21 Rx


 


Ferrous Sulfate [Iron 325 MG] 325 mg PO QDAY 22 History


 


Hydralazine HCl 50 mg PO BID 22 History


 


NIFEdipine [Nifedipine ER] 90 mg PO QDAY 22 History


 


Pantoprazole [Protonix] 40 mg PO QDAY 22 History


 


Sacubitril/Valsartan [Entresto 1 tab PO BID 22 History





49-51 mg]     


 


Torsemide [Demadex] 20 mg PO QDAY 22 History


 


Valsartan [Diovan] 320 mg PO QDAY 22 History


 


traMADoL [Ultram] 50 mg PO Q4HR PRN 22 History


 


Clindamycin [Clindamycin CAP] 300 mg PO Q8H #21 cap 22  Unknown Rx


 


traMADoL [Ultram] 50 mg PO Q6HR PRN #12 tablet 22  Unknown Rx














ED Physical Exam





- General


Limitations: No Limitations


General appearance: alert, in no apparent distress





- Head


Head exam: Present: atraumatic, normocephalic





- Eye


Eye exam: Present: normal appearance





- ENT


ENT exam: Present: mucous membranes moist, other





- Expanded ENT Exam


  ** Expanded





                            __________________________














                            __________________________





 1 - Dental Tenderness, Other (signif decay and pain)








- Neck


Neck exam: Present: normal inspection





- Respiratory


Respiratory exam: Present: normal lung sounds bilaterally.  Absent: respiratory 

distress





- Cardiovascular


Cardiovascular Exam: Present: regular rate, normal rhythm.  Absent: systolic 

murmur, diastolic murmur, rubs, gallop





- GI/Abdominal


GI/Abdominal exam: Present: soft, normal bowel sounds





- Extremities Exam


Extremities exam: Present: normal inspection





- Back Exam


Back exam: Present: normal inspection





- Neurological Exam


Neurological exam: Present: alert, oriented X3





- Psychiatric


Psychiatric exam: Present: normal affect, normal mood





- Skin


Skin exam: Present: warm, dry, intact, normal color.  Absent: rash





ED Course





                                   Vital Signs











  22





  21:30


 


Temperature 98.7 F


 


Pulse Rate 76


 


Respiratory 18





Rate 


 


Blood Pressure 109/90


 


O2 Sat by Pulse 100





Oximetry 














ED Medical Decision Making





- Medical Decision Making





Patient be started on some antibiotics and medication for symptomatic relief and

 be discharged home.


Critical care attestation.: 


If time is entered above; I have spent that time in minutes in the direct care 

of this critically ill patient, excluding procedure time.








ED Disposition


Clinical Impression: 


 Dental abscess, Dental caries





Disposition:  HOME / SELF CARE / HOMELESS


Is pt being admited?: No


Does the pt Need Aspirin: No


Condition: Stable


Instructions:  Dental Abscess, Easy-to-Read


Time of Disposition: 00:01 oral

## 2022-01-27 ENCOUNTER — TRANSCRIPTION ENCOUNTER (OUTPATIENT)
Age: 57
End: 2022-01-27

## 2022-01-31 ENCOUNTER — RX RENEWAL (OUTPATIENT)
Age: 57
End: 2022-01-31

## 2022-02-04 ENCOUNTER — TRANSCRIPTION ENCOUNTER (OUTPATIENT)
Age: 57
End: 2022-02-04

## 2022-02-04 DIAGNOSIS — M25.552 PAIN IN RIGHT HIP: ICD-10-CM

## 2022-02-04 DIAGNOSIS — M25.551 PAIN IN RIGHT HIP: ICD-10-CM

## 2022-02-07 NOTE — ED ADULT NURSE NOTE - NSSEPSISCRITERIAMET_ED_A_ED
effexor  Last visit: 3/5/21  Follow up visit: 2/11/22  Last refill: 11/11/21    Refilled per protocol      Abnormal Lactate

## 2022-02-15 ENCOUNTER — TRANSCRIPTION ENCOUNTER (OUTPATIENT)
Age: 57
End: 2022-02-15

## 2022-02-15 PROBLEM — E53.8 B12 DEFICIENCY: Status: ACTIVE | Noted: 2021-01-26

## 2022-02-16 ENCOUNTER — RESULT REVIEW (OUTPATIENT)
Age: 57
End: 2022-02-16

## 2022-02-16 ENCOUNTER — OUTPATIENT (OUTPATIENT)
Dept: OUTPATIENT SERVICES | Facility: HOSPITAL | Age: 57
LOS: 1 days | End: 2022-02-16
Payer: MEDICAID

## 2022-02-16 ENCOUNTER — APPOINTMENT (OUTPATIENT)
Dept: RADIOLOGY | Facility: CLINIC | Age: 57
End: 2022-02-16
Payer: MEDICAID

## 2022-02-16 DIAGNOSIS — M81.8 OTHER OSTEOPOROSIS WITHOUT CURRENT PATHOLOGICAL FRACTURE: ICD-10-CM

## 2022-02-16 DIAGNOSIS — Z98.891 HISTORY OF UTERINE SCAR FROM PREVIOUS SURGERY: Chronic | ICD-10-CM

## 2022-02-16 DIAGNOSIS — Z90.5 ACQUIRED ABSENCE OF KIDNEY: Chronic | ICD-10-CM

## 2022-02-16 DIAGNOSIS — M25.551 PAIN IN RIGHT HIP: ICD-10-CM

## 2022-02-16 PROCEDURE — 73521 X-RAY EXAM HIPS BI 2 VIEWS: CPT | Mod: 26

## 2022-02-16 PROCEDURE — 73521 X-RAY EXAM HIPS BI 2 VIEWS: CPT

## 2022-02-24 ENCOUNTER — APPOINTMENT (OUTPATIENT)
Dept: NEUROLOGY | Facility: CLINIC | Age: 57
End: 2022-02-24
Payer: MEDICAID

## 2022-02-24 PROCEDURE — 95912 NRV CNDJ TEST 11-12 STUDIES: CPT

## 2022-02-24 NOTE — PROCEDURE
[FreeTextEntry3] : NCS of the arms show axonal neuropathies of the bilateral median enrves, right ulnar nerve, c/w the clinincal diagnosis of mononeuritis multiplex.\par Patient to return for studies of the legs, and needle EMG.

## 2022-02-25 NOTE — DISCHARGE NOTE NURSING/CASE MANAGEMENT/SOCIAL WORK - NSDCPEFALRISK_GEN_ALL_CORE
Patient information on fall and injury prevention The patient is a 30y Female complaining of shortness of breath.

## 2022-03-01 ENCOUNTER — TRANSCRIPTION ENCOUNTER (OUTPATIENT)
Age: 57
End: 2022-03-01

## 2022-03-07 ENCOUNTER — APPOINTMENT (OUTPATIENT)
Dept: RHEUMATOLOGY | Facility: CLINIC | Age: 57
End: 2022-03-07
Payer: MEDICAID

## 2022-03-07 VITALS
BODY MASS INDEX: 33.61 KG/M2 | HEART RATE: 103 BPM | DIASTOLIC BLOOD PRESSURE: 90 MMHG | SYSTOLIC BLOOD PRESSURE: 130 MMHG | WEIGHT: 178 LBS | HEIGHT: 61 IN | OXYGEN SATURATION: 98 % | TEMPERATURE: 97.1 F

## 2022-03-07 DIAGNOSIS — J30.9 ALLERGIC RHINITIS, UNSPECIFIED: ICD-10-CM

## 2022-03-07 DIAGNOSIS — J45.909 UNSPECIFIED ASTHMA, UNCOMPLICATED: ICD-10-CM

## 2022-03-07 PROCEDURE — 99215 OFFICE O/P EST HI 40 MIN: CPT

## 2022-03-07 NOTE — HISTORY OF PRESENT ILLNESS
[FreeTextEntry1] : JOSE ALEJANDRO HOFF is a 56 year old woman with pmh R nephrectomy in 2004 for unclear reason, HTN, asthma- with recurrent need for steroids nearly consistently since 8/20- 10-40 mg daily, hypothyroidism. Initially seen as hospital consult for new onset progressive peripheral neuropathy, purpuric rash, granulomatous rash on elbows, and oral/ nasal ulcerations, and significantly 50 lb wt loss. Was in usual state health with intermittent asthma/ and sinus infection but otherwise healthy till 8/20. Recent evaluation for mid epigastric abd/ non bloody emesis, EGD + gastritis confirmed eosinophilic esophagitis/ gastritis. Extensive w/u neuro/ rheum work up negative as per patient in the past. Here she is noted to have + MPO; +P-ANCA; +RF; +DS-DNA. C-ANCA is now positive. Discharged, then readmitted with GI perforation, tissue appeared inflammatory during resection, sent for pathology which confirming inflammatory etiology. Skin biopsy also consistent with vasculitis. \par \par Presently healing well from abd sx, no GI sx at present. Rashes, lung issues, sinus involvement improved with steroids. Weight has stabilized, no other constitutional sx. Ongoing neuropathy with weak . \par \par ---------\par 4/12/21 -- Just discharged from repeat hospital admission, found with SBO at site of anastomosis and associated abscess, s/p Abx and IR guided drainage, feeling better, no current abdominal complaints aside from mild nausea. Ongoing neurologic complaints which are slightly worse. \par \par 6/21/21 -- Tolerated Rituxan loading well, no SE, had no current GI sx, strength improving slowly, has been doing home exercises. No other vasculitis sx, no F/C, weight stable, improved appetite and slowly broadening diet without issues. Ongoing lower back pain, improved with opioids. \par \par 8/30/21 -- Doing well, no further rashes or GI sx, tolerating a normal diet, strength continues to improve. No infectious sx, F/C, unintentional weight loss. Back pain stable and slowly improving, not using opioids. \par \par 11/29/21 -- Doing well, getting 2nd dose of q6 month Rituxan today, reports feeling some muscle weakness just prior to the 1st dose that has now resolved. No other inflammatory sx today. Peripheral neuropathy is stable but chronic, OT is helping significantly. Also doing PT which continues to help for the back, but can only ambulate for approx 10 min then needs to rest. No worsening back pain. Hair is growing back in, saw derm, likely stress related. No infectious sx, feels well otherwise. \par \par 3/7/22 -- No current GI sx or new neuropathic sx but ongoing chronic neuropathic issues despite PT and OT, some contractures starting in her R hand, insurance did not approve her OT brace. No rashes, no F/C, night sweats, unintentional weight loss. Worsening SI joint pain which limits her ability to stand to approx 5 min before she has to sit again, OA changes on recent imaging. Also with worsening of her asthma and seasonal allergies, PMD/pulm has had to put her on steroids. No falls, asking for disability tag for her car as very hard to walk any distance. Asking about Evusheld, remains reluctant to get covid vaccines 2/2 concern for clots.

## 2022-03-07 NOTE — DATA REVIEWED
[FreeTextEntry1] : Available notes, and pertinent labs & imaging in EMR and HIE reviewed. Pertinent labs and imaging summarized in HPI. Recent labs from Dr Mosquera reviewed

## 2022-03-07 NOTE — REVIEW OF SYSTEMS
[As Noted in HPI] : as noted in HPI [Negative] : Heme/Lymph [Arthralgias] : arthralgias [Joint Pain] : joint pain [Joint Swelling] : no joint swelling [Joint Stiffness] : joint stiffness [Difficulty Walking] : difficulty walking

## 2022-03-07 NOTE — ASSESSMENT
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 Unclear if EGPA vs MPA at present, features can match both and eosinophils only seen in some areas of her case. Given extensive Ab +, Rituxan chosen as steroid sparing treatment with marked responsiveness and able to taper off steroids. Without GI or cutaneous involvement at present, stable neurologic sx which are likely partially chronic, improved strength with OT and PT but has now plateaued and R hand contracture developing. +spinal compression fx in setting of steroid induced OP, now s/p Reclast. This and new SI joint arthritis significantly limiting ability to walk and stand for > 5 min. \par \par # vasculitis, allergic sx ? related \par - Last Rituxan Nov 2021, will plan for repeat dosing q4-6 months x 2 years then reassess \par - given worsening of her asthma and allergies we did discuss use of Nucala but would then have to forgo Rituxan while on it. Not sure which is best option tho I am concerned she's needed more steroids recently\par - recommend allergy eval to help determine options as well as input from Pulmonary Dr Sheldon \par - repeat labs as below including eos and IgE levels \par \par # chronic neuropathy and R hand contracture\par - c/w OT and PT \par - advised to f/u ortho hand for help with a new splint to avoid fixed R hand contracture \par - c/w gabapentin at current dose for chronic neuropathy\par \par # SI joint arthritis\par - US guided local injections ordered\par - form for disabled car tag completed today \par - c/w tylenol prn back pain, off opioids, advised to limit ambulation to <10 min, bring cane to use prn and use moist heat as this helps\par \par # immunocompromised status \par - pt is immunocompromised and would benefit from vaccination but currently declining covid vaccine and did not get flu vaccine this year (normally does get it ) -- extensive discussion had again today including minimal risk of blood clots with Moderna or Pfizer versions of vaccine\par - also discussed Evusheld, she would be a candidate given Rituxan and lack of vaccination. There is a concern for cardiac events that I discussed extensively with her, she will be having full cardiac w/u this month and we will then revisit this. \par - given immunosuppressed status, pt knows to call if febrile or unwell, knows to hold medication while on Abx \par \par # steroid induced OP with compression fx \par - c/w Ca 600mg BID with food, Vit D 1000 IU daily to optimize Ca/Vit D to maintain bone health. \par - Weight bearing exercise for 30min 3-4x/week to maintain BMD as tolerated, can break into 4 10 min sessions \par - Reclast due July 2022\par \par RTC in 2 months \par

## 2022-03-07 NOTE — PHYSICAL EXAM
[General Appearance - Alert] : alert [General Appearance - In No Acute Distress] : in no acute distress [General Appearance - Well Nourished] : well nourished [Sclera] : the sclera and conjunctiva were normal [PERRL With Normal Accommodation] : pupils were equal in size, round, and reactive to light [Extraocular Movements] : extraocular movements were intact [Outer Ear] : the ears and nose were normal in appearance [Oropharynx] : the oropharynx was normal [Neck Appearance] : the appearance of the neck was normal [Auscultation Breath Sounds / Voice Sounds] : lungs were clear to auscultation bilaterally [Heart Rate And Rhythm] : heart rate was normal and rhythm regular [Heart Sounds] : normal S1 and S2 [Murmurs] : no murmurs [Edema] : there was no peripheral edema [Bowel Sounds] : normal bowel sounds [Abdomen Soft] : soft [Abdomen Tenderness] : non-tender [Abdomen Mass (___ Cm)] : no abdominal mass palpated [Cervical Lymph Nodes Enlarged Posterior Bilaterally] : posterior cervical [Cervical Lymph Nodes Enlarged Anterior Bilaterally] : anterior cervical [Supraclavicular Lymph Nodes Enlarged Bilaterally] : supraclavicular [No CVA Tenderness] : no ~M costovertebral angle tenderness [No Spinal Tenderness] : no spinal tenderness [Nail Clubbing] : no clubbing  or cyanosis of the fingernails [Musculoskeletal - Swelling] : no joint swelling seen [Skin Color & Pigmentation] : normal skin color and pigmentation [] : no rash [Oriented To Time, Place, And Person] : oriented to person, place, and time [Impaired Insight] : insight and judgment were intact [Affect] : the affect was normal [FreeTextEntry1] : improved ability to arise from seated, good strength in b/l feet and proximal muscles

## 2022-03-10 ENCOUNTER — APPOINTMENT (OUTPATIENT)
Dept: ENDOCRINOLOGY | Facility: CLINIC | Age: 57
End: 2022-03-10
Payer: MEDICAID

## 2022-03-10 VITALS
SYSTOLIC BLOOD PRESSURE: 112 MMHG | WEIGHT: 179 LBS | OXYGEN SATURATION: 97 % | BODY MASS INDEX: 33.79 KG/M2 | HEIGHT: 61 IN | HEART RATE: 84 BPM | RESPIRATION RATE: 16 BRPM | DIASTOLIC BLOOD PRESSURE: 84 MMHG | TEMPERATURE: 98.3 F

## 2022-03-10 PROCEDURE — 99214 OFFICE O/P EST MOD 30 MIN: CPT

## 2022-03-15 ENCOUNTER — OUTPATIENT (OUTPATIENT)
Dept: OUTPATIENT SERVICES | Facility: HOSPITAL | Age: 57
LOS: 1 days | End: 2022-03-15
Payer: MEDICAID

## 2022-03-15 ENCOUNTER — APPOINTMENT (OUTPATIENT)
Dept: ULTRASOUND IMAGING | Facility: CLINIC | Age: 57
End: 2022-03-15
Payer: MEDICAID

## 2022-03-15 DIAGNOSIS — M47.818 SPONDYLOSIS WITHOUT MYELOPATHY OR RADICULOPATHY, SACRAL AND SACROCOCCYGEAL REGION: ICD-10-CM

## 2022-03-15 DIAGNOSIS — Z98.891 HISTORY OF UTERINE SCAR FROM PREVIOUS SURGERY: Chronic | ICD-10-CM

## 2022-03-15 DIAGNOSIS — Z90.5 ACQUIRED ABSENCE OF KIDNEY: Chronic | ICD-10-CM

## 2022-03-15 PROCEDURE — 20611 DRAIN/INJ JOINT/BURSA W/US: CPT | Mod: LT

## 2022-03-15 PROCEDURE — 20611 DRAIN/INJ JOINT/BURSA W/US: CPT

## 2022-03-16 ENCOUNTER — APPOINTMENT (OUTPATIENT)
Dept: ORTHOPEDIC SURGERY | Facility: CLINIC | Age: 57
End: 2022-03-16
Payer: MEDICAID

## 2022-03-16 PROCEDURE — 99212 OFFICE O/P EST SF 10 MIN: CPT

## 2022-03-30 ENCOUNTER — RX RENEWAL (OUTPATIENT)
Age: 57
End: 2022-03-30

## 2022-04-04 ENCOUNTER — TRANSCRIPTION ENCOUNTER (OUTPATIENT)
Age: 57
End: 2022-04-04

## 2022-04-04 RX ORDER — DILTIAZEM HYDROCHLORIDE 240 MG/1
240 CAPSULE, EXTENDED RELEASE ORAL
Qty: 90 | Refills: 1 | Status: DISCONTINUED | COMMUNITY
Start: 2022-03-30 | End: 2022-04-04

## 2022-04-12 ENCOUNTER — NON-APPOINTMENT (OUTPATIENT)
Age: 57
End: 2022-04-12

## 2022-04-12 ENCOUNTER — INPATIENT (INPATIENT)
Facility: HOSPITAL | Age: 57
LOS: 1 days | Discharge: HOME | DRG: 247 | End: 2022-04-14
Attending: SURGERY | Admitting: SURGERY
Payer: MEDICAID

## 2022-04-12 ENCOUNTER — TRANSCRIPTION ENCOUNTER (OUTPATIENT)
Age: 57
End: 2022-04-12

## 2022-04-12 VITALS — HEIGHT: 64 IN

## 2022-04-12 DIAGNOSIS — Z90.5 ACQUIRED ABSENCE OF KIDNEY: Chronic | ICD-10-CM

## 2022-04-12 DIAGNOSIS — K56.609 UNSPECIFIED INTESTINAL OBSTRUCTION, UNSPECIFIED AS TO PARTIAL VERSUS COMPLETE OBSTRUCTION: ICD-10-CM

## 2022-04-12 DIAGNOSIS — Z98.891 HISTORY OF UTERINE SCAR FROM PREVIOUS SURGERY: Chronic | ICD-10-CM

## 2022-04-12 LAB
ALBUMIN SERPL ELPH-MCNC: 4 G/DL — SIGNIFICANT CHANGE UP (ref 3.3–5)
ALP SERPL-CCNC: 114 U/L — SIGNIFICANT CHANGE UP (ref 40–120)
ALT FLD-CCNC: 37 U/L — SIGNIFICANT CHANGE UP (ref 12–78)
ANION GAP SERPL CALC-SCNC: 4 MMOL/L — LOW (ref 5–17)
APPEARANCE UR: CLEAR — SIGNIFICANT CHANGE UP
APTT BLD: 35 SEC — SIGNIFICANT CHANGE UP (ref 27.5–35.5)
AST SERPL-CCNC: 74 U/L — HIGH (ref 15–37)
BASOPHILS # BLD AUTO: 0.03 K/UL — SIGNIFICANT CHANGE UP (ref 0–0.2)
BASOPHILS NFR BLD AUTO: 0.3 % — SIGNIFICANT CHANGE UP (ref 0–2)
BILIRUB SERPL-MCNC: 0.5 MG/DL — SIGNIFICANT CHANGE UP (ref 0.2–1.2)
BILIRUB UR-MCNC: NEGATIVE — SIGNIFICANT CHANGE UP
BUN SERPL-MCNC: 14 MG/DL — SIGNIFICANT CHANGE UP (ref 7–23)
CALCIUM SERPL-MCNC: 9.2 MG/DL — SIGNIFICANT CHANGE UP (ref 8.5–10.1)
CHLORIDE SERPL-SCNC: 110 MMOL/L — HIGH (ref 96–108)
CO2 SERPL-SCNC: 26 MMOL/L — SIGNIFICANT CHANGE UP (ref 22–31)
COLOR SPEC: YELLOW — SIGNIFICANT CHANGE UP
CREAT SERPL-MCNC: 0.71 MG/DL — SIGNIFICANT CHANGE UP (ref 0.5–1.3)
DIFF PNL FLD: NEGATIVE — SIGNIFICANT CHANGE UP
EGFR: 99 ML/MIN/1.73M2 — SIGNIFICANT CHANGE UP
EOSINOPHIL # BLD AUTO: 0.1 K/UL — SIGNIFICANT CHANGE UP (ref 0–0.5)
EOSINOPHIL NFR BLD AUTO: 1 % — SIGNIFICANT CHANGE UP (ref 0–6)
GLUCOSE SERPL-MCNC: 102 MG/DL — HIGH (ref 70–99)
GLUCOSE UR QL: NEGATIVE — SIGNIFICANT CHANGE UP
HCOV PNL SPEC NAA+PROBE: DETECTED
HCT VFR BLD CALC: 44.1 % — SIGNIFICANT CHANGE UP (ref 34.5–45)
HGB BLD-MCNC: 14 G/DL — SIGNIFICANT CHANGE UP (ref 11.5–15.5)
IMM GRANULOCYTES NFR BLD AUTO: 0.2 % — SIGNIFICANT CHANGE UP (ref 0–1.5)
INR BLD: 0.94 RATIO — SIGNIFICANT CHANGE UP (ref 0.88–1.16)
KETONES UR-MCNC: ABNORMAL
LACTATE SERPL-SCNC: 1.3 MMOL/L — SIGNIFICANT CHANGE UP (ref 0.7–2)
LEUKOCYTE ESTERASE UR-ACNC: NEGATIVE — SIGNIFICANT CHANGE UP
LIDOCAIN IGE QN: 130 U/L — SIGNIFICANT CHANGE UP (ref 73–393)
LYMPHOCYTES # BLD AUTO: 1.35 K/UL — SIGNIFICANT CHANGE UP (ref 1–3.3)
LYMPHOCYTES # BLD AUTO: 13.1 % — SIGNIFICANT CHANGE UP (ref 13–44)
MCHC RBC-ENTMCNC: 28.1 PG — SIGNIFICANT CHANGE UP (ref 27–34)
MCHC RBC-ENTMCNC: 31.7 GM/DL — LOW (ref 32–36)
MCV RBC AUTO: 88.4 FL — SIGNIFICANT CHANGE UP (ref 80–100)
MONOCYTES # BLD AUTO: 0.87 K/UL — SIGNIFICANT CHANGE UP (ref 0–0.9)
MONOCYTES NFR BLD AUTO: 8.5 % — SIGNIFICANT CHANGE UP (ref 2–14)
NEUTROPHILS # BLD AUTO: 7.91 K/UL — HIGH (ref 1.8–7.4)
NEUTROPHILS NFR BLD AUTO: 76.9 % — SIGNIFICANT CHANGE UP (ref 43–77)
NITRITE UR-MCNC: NEGATIVE — SIGNIFICANT CHANGE UP
PH UR: 5 — SIGNIFICANT CHANGE UP (ref 5–8)
PLATELET # BLD AUTO: 277 K/UL — SIGNIFICANT CHANGE UP (ref 150–400)
POTASSIUM SERPL-MCNC: 4.5 MMOL/L — SIGNIFICANT CHANGE UP (ref 3.5–5.3)
POTASSIUM SERPL-SCNC: 4.5 MMOL/L — SIGNIFICANT CHANGE UP (ref 3.5–5.3)
PROT SERPL-MCNC: 7.1 GM/DL — SIGNIFICANT CHANGE UP (ref 6–8.3)
PROT UR-MCNC: NEGATIVE — SIGNIFICANT CHANGE UP
PROTHROM AB SERPL-ACNC: 10.9 SEC — SIGNIFICANT CHANGE UP (ref 10.5–13.4)
RAPID RVP RESULT: DETECTED
RBC # BLD: 4.99 M/UL — SIGNIFICANT CHANGE UP (ref 3.8–5.2)
RBC # FLD: 15.4 % — HIGH (ref 10.3–14.5)
SARS-COV-2 RNA SPEC QL NAA+PROBE: SIGNIFICANT CHANGE UP
SODIUM SERPL-SCNC: 140 MMOL/L — SIGNIFICANT CHANGE UP (ref 135–145)
SP GR SPEC: 1.01 — SIGNIFICANT CHANGE UP (ref 1.01–1.02)
UROBILINOGEN FLD QL: NEGATIVE — SIGNIFICANT CHANGE UP
WBC # BLD: 10.28 K/UL — SIGNIFICANT CHANGE UP (ref 3.8–10.5)
WBC # FLD AUTO: 10.28 K/UL — SIGNIFICANT CHANGE UP (ref 3.8–10.5)

## 2022-04-12 PROCEDURE — 80053 COMPREHEN METABOLIC PANEL: CPT

## 2022-04-12 PROCEDURE — C9113: CPT

## 2022-04-12 PROCEDURE — 85027 COMPLETE CBC AUTOMATED: CPT

## 2022-04-12 PROCEDURE — 74018 RADEX ABDOMEN 1 VIEW: CPT

## 2022-04-12 PROCEDURE — 71045 X-RAY EXAM CHEST 1 VIEW: CPT

## 2022-04-12 PROCEDURE — 83735 ASSAY OF MAGNESIUM: CPT

## 2022-04-12 PROCEDURE — 74177 CT ABD & PELVIS W/CONTRAST: CPT | Mod: 26,MA

## 2022-04-12 PROCEDURE — 99285 EMERGENCY DEPT VISIT HI MDM: CPT

## 2022-04-12 PROCEDURE — 94640 AIRWAY INHALATION TREATMENT: CPT

## 2022-04-12 PROCEDURE — 74176 CT ABD & PELVIS W/O CONTRAST: CPT | Mod: 26,MA,59

## 2022-04-12 PROCEDURE — 71045 X-RAY EXAM CHEST 1 VIEW: CPT | Mod: 26

## 2022-04-12 PROCEDURE — 36415 COLL VENOUS BLD VENIPUNCTURE: CPT

## 2022-04-12 PROCEDURE — 84100 ASSAY OF PHOSPHORUS: CPT

## 2022-04-12 PROCEDURE — 93010 ELECTROCARDIOGRAM REPORT: CPT

## 2022-04-12 PROCEDURE — 0225U NFCT DS DNA&RNA 21 SARSCOV2: CPT

## 2022-04-12 RX ORDER — MAGNESIUM OXIDE 400 MG ORAL TABLET 241.3 MG
1 TABLET ORAL
Qty: 0 | Refills: 0 | DISCHARGE

## 2022-04-12 RX ORDER — CHOLECALCIFEROL (VITAMIN D3) 125 MCG
1 CAPSULE ORAL
Qty: 0 | Refills: 0 | DISCHARGE

## 2022-04-12 RX ORDER — MORPHINE SULFATE 50 MG/1
4 CAPSULE, EXTENDED RELEASE ORAL ONCE
Refills: 0 | Status: DISCONTINUED | OUTPATIENT
Start: 2022-04-12 | End: 2022-04-12

## 2022-04-12 RX ORDER — DILTIAZEM HCL 120 MG
1 CAPSULE, EXT RELEASE 24 HR ORAL
Qty: 0 | Refills: 0 | DISCHARGE

## 2022-04-12 RX ORDER — PANTOPRAZOLE SODIUM 20 MG/1
40 TABLET, DELAYED RELEASE ORAL DAILY
Refills: 0 | Status: DISCONTINUED | OUTPATIENT
Start: 2022-04-12 | End: 2022-04-14

## 2022-04-12 RX ORDER — ACETAMINOPHEN 500 MG
1000 TABLET ORAL ONCE
Refills: 0 | Status: DISCONTINUED | OUTPATIENT
Start: 2022-04-12 | End: 2022-04-14

## 2022-04-12 RX ORDER — DULOXETINE HYDROCHLORIDE 30 MG/1
1 CAPSULE, DELAYED RELEASE ORAL
Qty: 0 | Refills: 0 | DISCHARGE

## 2022-04-12 RX ORDER — SODIUM CHLORIDE 9 MG/ML
1000 INJECTION INTRAMUSCULAR; INTRAVENOUS; SUBCUTANEOUS ONCE
Refills: 0 | Status: COMPLETED | OUTPATIENT
Start: 2022-04-12 | End: 2022-04-12

## 2022-04-12 RX ORDER — ATOVAQUONE 750 MG/5ML
10 SUSPENSION ORAL
Qty: 0 | Refills: 0 | DISCHARGE

## 2022-04-12 RX ORDER — ACETAMINOPHEN 500 MG
2 TABLET ORAL
Qty: 0 | Refills: 0 | DISCHARGE

## 2022-04-12 RX ORDER — SODIUM CHLORIDE 9 MG/ML
1000 INJECTION, SOLUTION INTRAVENOUS
Refills: 0 | Status: DISCONTINUED | OUTPATIENT
Start: 2022-04-12 | End: 2022-04-13

## 2022-04-12 RX ORDER — ONDANSETRON 8 MG/1
4 TABLET, FILM COATED ORAL ONCE
Refills: 0 | Status: COMPLETED | OUTPATIENT
Start: 2022-04-12 | End: 2022-04-12

## 2022-04-12 RX ORDER — CALCIUM CARBONATE 500(1250)
1 TABLET ORAL
Qty: 0 | Refills: 0 | DISCHARGE

## 2022-04-12 RX ORDER — ONDANSETRON 8 MG/1
4 TABLET, FILM COATED ORAL EVERY 8 HOURS
Refills: 0 | Status: DISCONTINUED | OUTPATIENT
Start: 2022-04-12 | End: 2022-04-14

## 2022-04-12 RX ORDER — HEPARIN SODIUM 5000 [USP'U]/ML
5000 INJECTION INTRAVENOUS; SUBCUTANEOUS EVERY 8 HOURS
Refills: 0 | Status: DISCONTINUED | OUTPATIENT
Start: 2022-04-12 | End: 2022-04-14

## 2022-04-12 RX ADMIN — SODIUM CHLORIDE 1000 MILLILITER(S): 9 INJECTION INTRAMUSCULAR; INTRAVENOUS; SUBCUTANEOUS at 14:29

## 2022-04-12 RX ADMIN — Medication 1 MILLIGRAM(S): at 22:02

## 2022-04-12 RX ADMIN — SODIUM CHLORIDE 2000 MILLILITER(S): 9 INJECTION INTRAMUSCULAR; INTRAVENOUS; SUBCUTANEOUS at 18:03

## 2022-04-12 RX ADMIN — MORPHINE SULFATE 4 MILLIGRAM(S): 50 CAPSULE, EXTENDED RELEASE ORAL at 18:04

## 2022-04-12 RX ADMIN — MORPHINE SULFATE 4 MILLIGRAM(S): 50 CAPSULE, EXTENDED RELEASE ORAL at 14:29

## 2022-04-12 RX ADMIN — PANTOPRAZOLE SODIUM 40 MILLIGRAM(S): 20 TABLET, DELAYED RELEASE ORAL at 21:49

## 2022-04-12 RX ADMIN — ONDANSETRON 4 MILLIGRAM(S): 8 TABLET, FILM COATED ORAL at 18:04

## 2022-04-12 RX ADMIN — SODIUM CHLORIDE 1000 MILLILITER(S): 9 INJECTION INTRAMUSCULAR; INTRAVENOUS; SUBCUTANEOUS at 19:03

## 2022-04-12 RX ADMIN — SODIUM CHLORIDE 125 MILLILITER(S): 9 INJECTION, SOLUTION INTRAVENOUS at 21:50

## 2022-04-12 RX ADMIN — SODIUM CHLORIDE 2000 MILLILITER(S): 9 INJECTION INTRAMUSCULAR; INTRAVENOUS; SUBCUTANEOUS at 13:22

## 2022-04-12 RX ADMIN — MORPHINE SULFATE 4 MILLIGRAM(S): 50 CAPSULE, EXTENDED RELEASE ORAL at 13:21

## 2022-04-12 RX ADMIN — MORPHINE SULFATE 4 MILLIGRAM(S): 50 CAPSULE, EXTENDED RELEASE ORAL at 18:30

## 2022-04-12 RX ADMIN — ONDANSETRON 4 MILLIGRAM(S): 8 TABLET, FILM COATED ORAL at 13:22

## 2022-04-12 NOTE — CONSULT NOTE ADULT - SUBJECTIVE AND OBJECTIVE BOX
56F w/PMH of hypothyroid, HTN, sciatica, asthma, autoimmune disorder, presents to the ED for upper abdominal pain since last night and vomiting today. Reports similar episode last year and diagnosed with vasculitis tx with steroids last February. Patient has history of small bowel resection after perforation in 02/2021 and presented with SBO 04/2020 as well as 7/2021 that was treated conservatively and resolved. Denies fever/chills, cough, SOB, CP, or diarrhea. LBM yesterday, has been passing gas. Pt ambulatory with cane. PMD Dr. Wilhelm. PSH right nephrectomy. Pt is not vaccinated against COVID. Pt is on levothyroxine 88mcg, 300mg Diltiazem, 600mg gabapentin tid, Symbicort inhaler. Surgery consulted to evaluate patient for recurrent small bowel obstruction.       Vital Signs Last 24 Hrs  T(C): 36.6 (12 Apr 2022 12:28), Max: 36.6 (12 Apr 2022 12:28)  T(F): 97.8 (12 Apr 2022 12:28), Max: 97.8 (12 Apr 2022 12:28)  HR: 76 (12 Apr 2022 12:28) (76 - 76)  BP: 143/102 (12 Apr 2022 12:28) (143/102 - 143/102)  BP(mean): 114 (12 Apr 2022 12:28) (114 - 114)  RR: 18 (12 Apr 2022 12:28) (18 - 18)  SpO2: 99% (12 Apr 2022 12:28) (99% - 99%)    Labs:                        14.0   10.28 )-----------( 277      ( 12 Apr 2022 13:05 )             44.1     CBC Full  -  ( 12 Apr 2022 13:05 )  WBC Count : 10.28 K/uL  RBC Count : 4.99 M/uL  Hemoglobin : 14.0 g/dL  Hematocrit : 44.1 %  Platelet Count - Automated : 277 K/uL  Mean Cell Volume : 88.4 fl  Mean Cell Hemoglobin : 28.1 pg  Mean Cell Hemoglobin Concentration : 31.7 gm/dL  Auto Neutrophil # : 7.91 K/uL  Auto Lymphocyte # : 1.35 K/uL  Auto Monocyte # : 0.87 K/uL  Auto Eosinophil # : 0.10 K/uL  Auto Basophil # : 0.03 K/uL  Auto Neutrophil % : 76.9 %  Auto Lymphocyte % : 13.1 %  Auto Monocyte % : 8.5 %  Auto Eosinophil % : 1.0 %  Auto Basophil % : 0.3 %    04-12    140  |  110<H>  |  14  ----------------------------<  102<H>  4.5   |  26  |  0.71    Ca    9.2      12 Apr 2022 13:05    TPro  7.1  /  Alb  4.0  /  TBili  0.5  /  DBili  x   /  AST  74<H>  /  ALT  37  /  AlkPhos  114  04-12    LIVER FUNCTIONS - ( 12 Apr 2022 13:05 )  Alb: 4.0 g/dL / Pro: 7.1 gm/dL / ALK PHOS: 114 U/L / ALT: 37 U/L / AST: 74 U/L / GGT: x           PT/INR - ( 12 Apr 2022 13:05 )   PT: 10.9 sec;   INR: 0.94 ratio         PTT - ( 12 Apr 2022 13:05 )  PTT:35.0 sec        Physical Exam:  Pt is AAOx3  General: Well developed, in no acute distress.   Chest: Lungs clear, no rales, no rhonchi, no wheezes.   Heart: RR, no murmurs, no rubs, no gallops.   Abdomen: Soft, mild epigastric tenderness, nondistended, no masses, BS normal. surgical sites well healed.  Back: Normal curvature, no tenderness.   Neuro: Physiological, no localizing findings.   Skin: Normal, no rashes, no lesions noted.   Extremities: Warm, well perfused, no edema, Pulses intact        ACC: [id number] EXAM:  CT ABDOMEN AND PELVIS IC                          PROCEDURE DATE:  04/12/2022          INTERPRETATION:  CLINICAL INFORMATION: Abdominal pain, vomiting. History   of right nephrectomy, small bowel resection, bowel obstruction.    COMPARISON: July 8, 2021.    CONTRAST/COMPLICATIONS:  IV Contrast: Omnipaque 350  Oral Contrast: NONE  Complications: None reported at time of study completion    PROCEDURE:  CT of the Abdomen and Pelvis was performed.  Sagittal and coronal reformats were performed.    FINDINGS:  LOWER CHEST: Within normal limits.    LIVER: Again is noted a cyst in the left lobe of the liver. Otherwise,   within normal limits.  BILE DUCTS: Normal caliber.  GALLBLADDER: Within normal limits.  SPLEEN: Within normal limits.  PANCREAS: Within normal limits.  ADRENALS: Within normal limits.  KIDNEYS/URETERS: The right renal fossa is unremarkable. The left kidney   is unremarkable in appearance and enhancement no evidence of   hydronephrosis, mass, stone, or perinephric stranding.    BLADDER: Within normal limits.  REPRODUCTIVE ORGANS: Uterus and adnexa within normal limits.    BOWEL: Again is noted a dilated loop of mid small bowel measuring up to   3.5 cm in diameter containing feculent appearing material with abrupt   transition to collapsed small bowel at the level of the anastomosis in   the left superior pelvis on image 68.    There is no pneumatosis or extraluminal gas. The remaining small and   large bowel loops are unremarkable. There is stool throughout the colon.   There is no mesenteric adenopathy. The mesenteric vessels opacify   unremarkably. Appendix is normal.  PERITONEUM: No ascites.  VESSELS: Within normal limits.  RETROPERITONEUM/LYMPH NODES: No lymphadenopathy.  ABDOMINAL WALL: Within normal limits.  BONES: Again are noted numerous compression deformities of the   thoracolumbar spine.    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    --- End of Report---

## 2022-04-12 NOTE — CONSULT NOTE ADULT - ASSESSMENT
56F with abdominal pain/n/v and hx of small bowel resection presents with small bowel obstruction    Plan:  Oral contrast CT scan pending  NPO, IV fluid hydration  oral contrast challenge to see if obstruction resolves  Pain/nausea control PRN  serial abdominal exams    Plan discussed with Dr. Stanley

## 2022-04-12 NOTE — ED STATDOCS - PROGRESS NOTE DETAILS
Attending Lemon,  pt updated on results of test.  d/w Dr. Stanley and will see pt.  Wants to have repeat ct with po contrast. Patient continues to vomit in ED. SBO on CT with PO contrast. Pt has not passed flatus since ED arrival. As per surgery, admission to Dr. Stanley. - Drake Giron PA-C

## 2022-04-12 NOTE — PHARMACOTHERAPY INTERVENTION NOTE - COMMENTS
Medication reconciliation completed.  Reviewed Medication list and confirmed med allergies with patient; confirmed with Dr. Zimmerman MedHx.  Pt confirms that she did not take any medication at home today, po nor inhaled; shortly after taking her morning doses she vomited the medication up.

## 2022-04-12 NOTE — ED ADULT NURSE REASSESSMENT NOTE - NS ED NURSE REASSESS COMMENT FT1
received patient alert and oriented x 4, anxious, no acute respiratory distress, O2 sat 91% on RA, initiated 2 liters O2 sat increased to 96% on n/c vomiting bile color secretion, initiated 14 [place] NGT successfully, verified placement by ausculation, vomiting persist with anxiety, administered ativan as ordered with positive results, patient currently resting comfortably in bed, administered ativan as ordered with positive relief.

## 2022-04-12 NOTE — H&P ADULT - NSHPLABSRESULTS_GEN_ALL_CORE
Vital Signs Last 24 Hrs  T(C): 36.6 (12 Apr 2022 12:28), Max: 36.6 (12 Apr 2022 12:28)  T(F): 97.8 (12 Apr 2022 12:28), Max: 97.8 (12 Apr 2022 12:28)  HR: 76 (12 Apr 2022 12:28) (76 - 76)  BP: 143/102 (12 Apr 2022 12:28) (143/102 - 143/102)  BP(mean): 114 (12 Apr 2022 12:28) (114 - 114)  RR: 18 (12 Apr 2022 12:28) (18 - 18)  SpO2: 99% (12 Apr 2022 12:28) (99% - 99%)    Labs:                        14.0   10.28 )-----------( 277      ( 12 Apr 2022 13:05 )             44.1     CBC Full  -  ( 12 Apr 2022 13:05 )  WBC Count : 10.28 K/uL  RBC Count : 4.99 M/uL  Hemoglobin : 14.0 g/dL  Hematocrit : 44.1 %  Platelet Count - Automated : 277 K/uL  Mean Cell Volume : 88.4 fl  Mean Cell Hemoglobin : 28.1 pg  Mean Cell Hemoglobin Concentration : 31.7 gm/dL  Auto Neutrophil # : 7.91 K/uL  Auto Lymphocyte # : 1.35 K/uL  Auto Monocyte # : 0.87 K/uL  Auto Eosinophil # : 0.10 K/uL  Auto Basophil # : 0.03 K/uL  Auto Neutrophil % : 76.9 %  Auto Lymphocyte % : 13.1 %  Auto Monocyte % : 8.5 %  Auto Eosinophil % : 1.0 %  Auto Basophil % : 0.3 %    04-12    140  |  110<H>  |  14  ----------------------------<  102<H>  4.5   |  26  |  0.71    Ca    9.2      12 Apr 2022 13:05    TPro  7.1  /  Alb  4.0  /  TBili  0.5  /  DBili  x   /  AST  74<H>  /  ALT  37  /  AlkPhos  114  04-12    LIVER FUNCTIONS - ( 12 Apr 2022 13:05 )  Alb: 4.0 g/dL / Pro: 7.1 gm/dL / ALK PHOS: 114 U/L / ALT: 37 U/L / AST: 74 U/L / GGT: x           PT/INR - ( 12 Apr 2022 13:05 )   PT: 10.9 sec;   INR: 0.94 ratio         PTT - ( 12 Apr 2022 13:05 )  PTT:35.0 sec    ACC: [id number] EXAM: CT ABDOMEN AND PELVIS OC    PROCEDURE DATE: 04/12/2022  IMPRESSION:  Unchanged small bowel obstruction compared to CT scan performed earlier on the same day. Oral contrast has not passed through the transition point at the time of scanning.    No findings to suggest ischemia.

## 2022-04-12 NOTE — H&P ADULT - ASSESSMENT
56F with abdominal pain/n/v and hx of small bowel resection presents with small bowel obstruction    Plan:  Oral contrast CT scan appreciated  will admit to Dr. Stanley, surgery service for monitoring  NPO, IV fluid hydration  Pain/nausea control PRN  serial abdominal exams    Plan discussed with Dr. Stanley

## 2022-04-12 NOTE — H&P ADULT - NSHPPHYSICALEXAM_GEN_ALL_CORE
Physical Exam:  Pt is AAOx3  General: Well developed, in no acute distress.   Chest: Lungs clear, no rales, no rhonchi, no wheezes.   Heart: RR, no murmurs, no rubs, no gallops.   Abdomen: Soft, mild epigastric tenderness, nondistended, no masses, BS normal. surgical sites well healed.  Back: Normal curvature, no tenderness.   Neuro: Physiological, no localizing findings.   Skin: Normal, no rashes, no lesions noted.   Extremities: Warm, well perfused, no edema, Pulses intact

## 2022-04-12 NOTE — ED ADULT NURSE NOTE - NSIMPLEMENTINTERV_GEN_ALL_ED
Implemented All Universal Safety Interventions:  [place] to call system. Call bell, personal items and telephone within reach. Instruct patient to call for assistance. Room bathroom lighting operational. Non-slip footwear when patient is off stretcher. Physically safe environment: no spills, clutter or unnecessary equipment. Stretcher in lowest position, wheels locked, appropriate side rails in place.

## 2022-04-12 NOTE — H&P ADULT - HISTORY OF PRESENT ILLNESS
56F w/PMH of hypothyroid, HTN, sciatica, asthma, autoimmune disorder, presents to the ED for upper abdominal pain since last night and vomiting today. Reports similar episode last year and diagnosed with vasculitis tx with steroids last February. Patient has history of small bowel resection after perforation in 02/2021 and presented with SBO 04/2020 as well as 7/2021 that was treated conservatively and resolved. Denies fever/chills, cough, SOB, CP, or diarrhea. LBM yesterday, has been passing gas. Pt ambulatory with cane. PMD Dr. Wilhelm. PSH right nephrectomy. Pt is not vaccinated against COVID. Pt is on levothyroxine 88mcg, 300mg Diltiazem, 600mg gabapentin tid, Symbicort inhaler. Surgery consulted to evaluate patient for recurrent small bowel obstruction.

## 2022-04-12 NOTE — ED STATDOCS - NS ED ATTENDING STATEMENT MOD
This was a shared visit with the ASHLEY. I reviewed and verified the documentation and independently performed the documented:

## 2022-04-12 NOTE — ED ADULT TRIAGE NOTE - CHIEF COMPLAINT QUOTE
c/o upper abdominal pain started last night, worse this morning, with associated vomiting, patient has history of perforated bowel and bowel obstruction last year, patient states pain in similar to when she had bowel obstruction HX: vasculitis, neuropathy

## 2022-04-12 NOTE — ED STATDOCS - ATTENDING CONTRIBUTION TO CARE
I, Breezy Lemon MD, personally saw the patient with ASHLEY.  I have personally performed a face to face diagnostic evaluation on this patient.  I have reviewed the ASHLEY note and agree with the history, exam, and plan of care, except as noted.  I personally saw the patient and performed a substantive portion of the visit including all aspects of the medical decision making.

## 2022-04-12 NOTE — ED STATDOCS - OBJECTIVE STATEMENT
55 yo female w/PMH of hypothyroid, HTN, sciatica, asthma, autoimmune disorder, presents to the ED for upper abdominal pain since 2200 last night and vomiting today. Reports similar episode last year and diagnosed with vasculitis last February. Pt reports she was released and had a perforation of small intestines the next day and had surgery by Dr. Stanley a few weeks later for SBO. Denies fever/chills, cough, SOB, CP, or diarrhea. LBM yesterday, has been passing gas. Pt ambulatory with cane. PMD Dr. Wilhelm. PSH right nephrectomy. Pt is not vaccinated against COVID. Pt is on levothyroxine 88mcg, 300mg Diltiazem, 600mg gabapentin tid, Symbicort inhaler.

## 2022-04-13 DIAGNOSIS — K56.609 UNSPECIFIED INTESTINAL OBSTRUCTION, UNSPECIFIED AS TO PARTIAL VERSUS COMPLETE OBSTRUCTION: ICD-10-CM

## 2022-04-13 LAB
ALBUMIN SERPL ELPH-MCNC: 3.2 G/DL — LOW (ref 3.3–5)
ALP SERPL-CCNC: 89 U/L — SIGNIFICANT CHANGE UP (ref 40–120)
ALT FLD-CCNC: 29 U/L — SIGNIFICANT CHANGE UP (ref 12–78)
ANION GAP SERPL CALC-SCNC: 6 MMOL/L — SIGNIFICANT CHANGE UP (ref 5–17)
AST SERPL-CCNC: 56 U/L — HIGH (ref 15–37)
BILIRUB SERPL-MCNC: 0.6 MG/DL — SIGNIFICANT CHANGE UP (ref 0.2–1.2)
BUN SERPL-MCNC: 10 MG/DL — SIGNIFICANT CHANGE UP (ref 7–23)
CALCIUM SERPL-MCNC: 8.4 MG/DL — LOW (ref 8.5–10.1)
CHLORIDE SERPL-SCNC: 111 MMOL/L — HIGH (ref 96–108)
CO2 SERPL-SCNC: 26 MMOL/L — SIGNIFICANT CHANGE UP (ref 22–31)
CREAT SERPL-MCNC: 0.64 MG/DL — SIGNIFICANT CHANGE UP (ref 0.5–1.3)
CULTURE RESULTS: SIGNIFICANT CHANGE UP
EGFR: 103 ML/MIN/1.73M2 — SIGNIFICANT CHANGE UP
GLUCOSE SERPL-MCNC: 93 MG/DL — SIGNIFICANT CHANGE UP (ref 70–99)
HCT VFR BLD CALC: 38.9 % — SIGNIFICANT CHANGE UP (ref 34.5–45)
HGB BLD-MCNC: 12.4 G/DL — SIGNIFICANT CHANGE UP (ref 11.5–15.5)
MAGNESIUM SERPL-MCNC: 2.1 MG/DL — SIGNIFICANT CHANGE UP (ref 1.6–2.6)
MCHC RBC-ENTMCNC: 27.9 PG — SIGNIFICANT CHANGE UP (ref 27–34)
MCHC RBC-ENTMCNC: 31.9 GM/DL — LOW (ref 32–36)
MCV RBC AUTO: 87.4 FL — SIGNIFICANT CHANGE UP (ref 80–100)
PHOSPHATE SERPL-MCNC: 2.8 MG/DL — SIGNIFICANT CHANGE UP (ref 2.5–4.5)
PLATELET # BLD AUTO: 224 K/UL — SIGNIFICANT CHANGE UP (ref 150–400)
POTASSIUM SERPL-MCNC: 3.8 MMOL/L — SIGNIFICANT CHANGE UP (ref 3.5–5.3)
POTASSIUM SERPL-SCNC: 3.8 MMOL/L — SIGNIFICANT CHANGE UP (ref 3.5–5.3)
PROT SERPL-MCNC: 6.1 GM/DL — SIGNIFICANT CHANGE UP (ref 6–8.3)
RBC # BLD: 4.45 M/UL — SIGNIFICANT CHANGE UP (ref 3.8–5.2)
RBC # FLD: 15.2 % — HIGH (ref 10.3–14.5)
SODIUM SERPL-SCNC: 143 MMOL/L — SIGNIFICANT CHANGE UP (ref 135–145)
SPECIMEN SOURCE: SIGNIFICANT CHANGE UP
WBC # BLD: 5.82 K/UL — SIGNIFICANT CHANGE UP (ref 3.8–10.5)
WBC # FLD AUTO: 5.82 K/UL — SIGNIFICANT CHANGE UP (ref 3.8–10.5)

## 2022-04-13 PROCEDURE — 74018 RADEX ABDOMEN 1 VIEW: CPT | Mod: 26

## 2022-04-13 RX ORDER — ALBUTEROL 90 UG/1
2 AEROSOL, METERED ORAL EVERY 6 HOURS
Refills: 0 | Status: DISCONTINUED | OUTPATIENT
Start: 2022-04-13 | End: 2022-04-14

## 2022-04-13 RX ORDER — ASCORBIC ACID 60 MG
500 TABLET,CHEWABLE ORAL DAILY
Refills: 0 | Status: DISCONTINUED | OUTPATIENT
Start: 2022-04-13 | End: 2022-04-14

## 2022-04-13 RX ORDER — ACETAMINOPHEN 500 MG
1000 TABLET ORAL ONCE
Refills: 0 | Status: COMPLETED | OUTPATIENT
Start: 2022-04-13 | End: 2022-04-13

## 2022-04-13 RX ORDER — LEVOTHYROXINE SODIUM 125 MCG
88 TABLET ORAL DAILY
Refills: 0 | Status: DISCONTINUED | OUTPATIENT
Start: 2022-04-13 | End: 2022-04-14

## 2022-04-13 RX ORDER — CALCIUM CARBONATE 500(1250)
1 TABLET ORAL DAILY
Refills: 0 | Status: DISCONTINUED | OUTPATIENT
Start: 2022-04-13 | End: 2022-04-14

## 2022-04-13 RX ORDER — LANOLIN ALCOHOL/MO/W.PET/CERES
5 CREAM (GRAM) TOPICAL AT BEDTIME
Refills: 0 | Status: DISCONTINUED | OUTPATIENT
Start: 2022-04-13 | End: 2022-04-14

## 2022-04-13 RX ORDER — FLUTICASONE PROPIONATE 50 MCG
1 SPRAY, SUSPENSION NASAL ONCE
Refills: 0 | Status: DISCONTINUED | OUTPATIENT
Start: 2022-04-13 | End: 2022-04-14

## 2022-04-13 RX ORDER — BUDESONIDE, MICRONIZED 100 %
0.5 POWDER (GRAM) MISCELLANEOUS DAILY
Refills: 0 | Status: DISCONTINUED | OUTPATIENT
Start: 2022-04-13 | End: 2022-04-13

## 2022-04-13 RX ORDER — SODIUM CHLORIDE 9 MG/ML
3 INJECTION INTRAMUSCULAR; INTRAVENOUS; SUBCUTANEOUS EVERY 8 HOURS
Refills: 0 | Status: DISCONTINUED | OUTPATIENT
Start: 2022-04-13 | End: 2022-04-14

## 2022-04-13 RX ORDER — DILTIAZEM HCL 120 MG
300 CAPSULE, EXT RELEASE 24 HR ORAL DAILY
Refills: 0 | Status: DISCONTINUED | OUTPATIENT
Start: 2022-04-13 | End: 2022-04-14

## 2022-04-13 RX ORDER — BUDESONIDE AND FORMOTEROL FUMARATE DIHYDRATE 160; 4.5 UG/1; UG/1
2 AEROSOL RESPIRATORY (INHALATION)
Refills: 0 | Status: DISCONTINUED | OUTPATIENT
Start: 2022-04-13 | End: 2022-04-14

## 2022-04-13 RX ORDER — GABAPENTIN 400 MG/1
600 CAPSULE ORAL THREE TIMES A DAY
Refills: 0 | Status: DISCONTINUED | OUTPATIENT
Start: 2022-04-13 | End: 2022-04-14

## 2022-04-13 RX ADMIN — GABAPENTIN 600 MILLIGRAM(S): 400 CAPSULE ORAL at 13:02

## 2022-04-13 RX ADMIN — Medication 300 MILLIGRAM(S): at 13:03

## 2022-04-13 RX ADMIN — HEPARIN SODIUM 5000 UNIT(S): 5000 INJECTION INTRAVENOUS; SUBCUTANEOUS at 05:20

## 2022-04-13 RX ADMIN — HEPARIN SODIUM 5000 UNIT(S): 5000 INJECTION INTRAVENOUS; SUBCUTANEOUS at 21:56

## 2022-04-13 RX ADMIN — ONDANSETRON 4 MILLIGRAM(S): 8 TABLET, FILM COATED ORAL at 03:17

## 2022-04-13 RX ADMIN — PANTOPRAZOLE SODIUM 40 MILLIGRAM(S): 20 TABLET, DELAYED RELEASE ORAL at 09:41

## 2022-04-13 RX ADMIN — SODIUM CHLORIDE 125 MILLILITER(S): 9 INJECTION, SOLUTION INTRAVENOUS at 05:17

## 2022-04-13 RX ADMIN — GABAPENTIN 600 MILLIGRAM(S): 400 CAPSULE ORAL at 21:52

## 2022-04-13 RX ADMIN — SODIUM CHLORIDE 125 MILLILITER(S): 9 INJECTION, SOLUTION INTRAVENOUS at 08:48

## 2022-04-13 RX ADMIN — Medication 1000 MILLIGRAM(S): at 05:08

## 2022-04-13 RX ADMIN — HEPARIN SODIUM 5000 UNIT(S): 5000 INJECTION INTRAVENOUS; SUBCUTANEOUS at 00:24

## 2022-04-13 RX ADMIN — Medication 400 MILLIGRAM(S): at 04:45

## 2022-04-13 RX ADMIN — HEPARIN SODIUM 5000 UNIT(S): 5000 INJECTION INTRAVENOUS; SUBCUTANEOUS at 13:03

## 2022-04-13 RX ADMIN — Medication 88 MICROGRAM(S): at 13:02

## 2022-04-13 RX ADMIN — Medication 5 MILLIGRAM(S): at 21:56

## 2022-04-13 NOTE — ED ADULT NURSE REASSESSMENT NOTE - NS ED NURSE REASSESS COMMENT FT1
Pt. monitored throughout HS, no s/sx of resp. distress.  Ng-tube functioning properly,  scant amount of brown drainage observed.  Isolation precautions maintained, meds given as ordered.  ApAp IVPB given for low  grade temp and sore throat with positive effects. Will continue to monitor.

## 2022-04-13 NOTE — CHART NOTE - NSCHARTNOTEFT_GEN_A_CORE
Contacted regarding NGT dislodgement.   Will keep NGT out for now; will reassess clinically.  Home meds restarted.

## 2022-04-13 NOTE — PATIENT PROFILE ADULT - VISION (WITH CORRECTIVE LENSES IF THE PATIENT USUALLY WEARS THEM):
one pair of glasses with pt/Partially impaired: cannot see medication labels or newsprint, but can see obstacles in path, and the surrounding layout; can count fingers at arm's length

## 2022-04-13 NOTE — PATIENT PROFILE ADULT - FALL HARM RISK - HARM RISK INTERVENTIONS

## 2022-04-13 NOTE — ED ADULT NURSE REASSESSMENT NOTE - NS ED NURSE REASSESS COMMENT FT1
Pt received from night RN. Pt stable. Pt A/Ox4. Pt able to ambulate self to commode. Pt denies any pain at this time. VS WNL. Pt ready to move to M/S unit. NG tube set to LWS. Small amount of brown contents visible in canister.

## 2022-04-13 NOTE — ED ADULT NURSE REASSESSMENT NOTE - NS ED NURSE REASSESS COMMENT FT1
pt drowsy s/p ativan, ox3, NGT to LCS in progress with bile draining. ABd soft, distended, pt denies pain, but having intermittent nausea.LR IVF infusing.  VSS. Covid +. Iso precautions maintained.

## 2022-04-13 NOTE — PROGRESS NOTE ADULT - PROBLEM SELECTOR PLAN 1
- C/W NPO/IVFs & NGT decompression  - Serial abdominal exams  - Protonix 40 daily  - Ambulate  - DVT ppx  - Rpt AXR this am to check progression of oral contrast  - D/W patient SBO could be secondary to  post-operative adhesion vs. anastomotic stricture secondary to her vasculitis. Offered diagnostic laparoscopic and discussed risk and benefit's to patient in laymen's terms. At this point patient would like conservative measures.

## 2022-04-14 ENCOUNTER — TRANSCRIPTION ENCOUNTER (OUTPATIENT)
Age: 57
End: 2022-04-14

## 2022-04-14 VITALS
SYSTOLIC BLOOD PRESSURE: 137 MMHG | DIASTOLIC BLOOD PRESSURE: 75 MMHG | RESPIRATION RATE: 18 BRPM | OXYGEN SATURATION: 94 % | HEART RATE: 72 BPM | TEMPERATURE: 98 F

## 2022-04-14 LAB
ALBUMIN SERPL ELPH-MCNC: 2.9 G/DL — LOW (ref 3.3–5)
ALP SERPL-CCNC: 80 U/L — SIGNIFICANT CHANGE UP (ref 40–120)
ALT FLD-CCNC: 24 U/L — SIGNIFICANT CHANGE UP (ref 12–78)
ANION GAP SERPL CALC-SCNC: 6 MMOL/L — SIGNIFICANT CHANGE UP (ref 5–17)
AST SERPL-CCNC: 48 U/L — HIGH (ref 15–37)
BILIRUB SERPL-MCNC: 0.5 MG/DL — SIGNIFICANT CHANGE UP (ref 0.2–1.2)
BUN SERPL-MCNC: 5 MG/DL — LOW (ref 7–23)
CALCIUM SERPL-MCNC: 8.2 MG/DL — LOW (ref 8.5–10.1)
CHLORIDE SERPL-SCNC: 111 MMOL/L — HIGH (ref 96–108)
CO2 SERPL-SCNC: 25 MMOL/L — SIGNIFICANT CHANGE UP (ref 22–31)
CREAT SERPL-MCNC: 0.67 MG/DL — SIGNIFICANT CHANGE UP (ref 0.5–1.3)
EGFR: 102 ML/MIN/1.73M2 — SIGNIFICANT CHANGE UP
GLUCOSE SERPL-MCNC: 94 MG/DL — SIGNIFICANT CHANGE UP (ref 70–99)
HCT VFR BLD CALC: 35.4 % — SIGNIFICANT CHANGE UP (ref 34.5–45)
HGB BLD-MCNC: 11 G/DL — LOW (ref 11.5–15.5)
MAGNESIUM SERPL-MCNC: 2.2 MG/DL — SIGNIFICANT CHANGE UP (ref 1.6–2.6)
MCHC RBC-ENTMCNC: 27.4 PG — SIGNIFICANT CHANGE UP (ref 27–34)
MCHC RBC-ENTMCNC: 31.1 GM/DL — LOW (ref 32–36)
MCV RBC AUTO: 88.3 FL — SIGNIFICANT CHANGE UP (ref 80–100)
PHOSPHATE SERPL-MCNC: 2.7 MG/DL — SIGNIFICANT CHANGE UP (ref 2.5–4.5)
PLATELET # BLD AUTO: 218 K/UL — SIGNIFICANT CHANGE UP (ref 150–400)
POTASSIUM SERPL-MCNC: 3.6 MMOL/L — SIGNIFICANT CHANGE UP (ref 3.5–5.3)
POTASSIUM SERPL-SCNC: 3.6 MMOL/L — SIGNIFICANT CHANGE UP (ref 3.5–5.3)
PROT SERPL-MCNC: 6 GM/DL — SIGNIFICANT CHANGE UP (ref 6–8.3)
RBC # BLD: 4.01 M/UL — SIGNIFICANT CHANGE UP (ref 3.8–5.2)
RBC # FLD: 15.4 % — HIGH (ref 10.3–14.5)
SODIUM SERPL-SCNC: 142 MMOL/L — SIGNIFICANT CHANGE UP (ref 135–145)
WBC # BLD: 9.57 K/UL — SIGNIFICANT CHANGE UP (ref 3.8–10.5)
WBC # FLD AUTO: 9.57 K/UL — SIGNIFICANT CHANGE UP (ref 3.8–10.5)

## 2022-04-14 RX ORDER — POTASSIUM CHLORIDE 20 MEQ
10 PACKET (EA) ORAL
Refills: 0 | Status: COMPLETED | OUTPATIENT
Start: 2022-04-14 | End: 2022-04-14

## 2022-04-14 RX ORDER — SODIUM,POTASSIUM PHOSPHATES 278-250MG
1 POWDER IN PACKET (EA) ORAL ONCE
Refills: 0 | Status: COMPLETED | OUTPATIENT
Start: 2022-04-14 | End: 2022-04-14

## 2022-04-14 RX ADMIN — HEPARIN SODIUM 5000 UNIT(S): 5000 INJECTION INTRAVENOUS; SUBCUTANEOUS at 14:06

## 2022-04-14 RX ADMIN — Medication 1 TABLET(S): at 09:57

## 2022-04-14 RX ADMIN — Medication 300 MILLIGRAM(S): at 09:56

## 2022-04-14 RX ADMIN — Medication 1 PACKET(S): at 09:57

## 2022-04-14 RX ADMIN — Medication 100 MILLIEQUIVALENT(S): at 09:56

## 2022-04-14 RX ADMIN — SODIUM CHLORIDE 3 MILLILITER(S): 9 INJECTION INTRAMUSCULAR; INTRAVENOUS; SUBCUTANEOUS at 13:17

## 2022-04-14 RX ADMIN — Medication 100 MILLIEQUIVALENT(S): at 11:45

## 2022-04-14 RX ADMIN — HEPARIN SODIUM 5000 UNIT(S): 5000 INJECTION INTRAVENOUS; SUBCUTANEOUS at 06:22

## 2022-04-14 RX ADMIN — GABAPENTIN 600 MILLIGRAM(S): 400 CAPSULE ORAL at 06:23

## 2022-04-14 RX ADMIN — Medication 500 MILLIGRAM(S): at 09:57

## 2022-04-14 RX ADMIN — Medication 100 MILLIEQUIVALENT(S): at 13:56

## 2022-04-14 RX ADMIN — Medication 88 MICROGRAM(S): at 06:24

## 2022-04-14 RX ADMIN — SODIUM CHLORIDE 3 MILLILITER(S): 9 INJECTION INTRAMUSCULAR; INTRAVENOUS; SUBCUTANEOUS at 00:52

## 2022-04-14 RX ADMIN — BUDESONIDE AND FORMOTEROL FUMARATE DIHYDRATE 2 PUFF(S): 160; 4.5 AEROSOL RESPIRATORY (INHALATION) at 09:31

## 2022-04-14 RX ADMIN — GABAPENTIN 600 MILLIGRAM(S): 400 CAPSULE ORAL at 14:07

## 2022-04-14 RX ADMIN — Medication 1 TABLET(S): at 09:56

## 2022-04-14 RX ADMIN — SODIUM CHLORIDE 3 MILLILITER(S): 9 INJECTION INTRAMUSCULAR; INTRAVENOUS; SUBCUTANEOUS at 17:29

## 2022-04-14 RX ADMIN — PANTOPRAZOLE SODIUM 40 MILLIGRAM(S): 20 TABLET, DELAYED RELEASE ORAL at 09:58

## 2022-04-14 NOTE — DIETITIAN INITIAL EVALUATION ADULT - OTHER INFO
56F w/PMH of hypothyroid, HTN, sciatica, asthma, autoimmune disorder, presents to the ED for upper abdominal pain since last night and vomiting today. Reports similar episode last year and diagnosed with vasculitis tx with steroids last February. Patient has history of small bowel resection after perforation in 02/2021 and presented with SBO 04/2020 as well as 7/2021 that was treated conservatively and resolved. Denies fever/chills, cough, SOB, CP, or diarrhea. LBM yesterday, has been passing gas. Surgery consulted to evaluate patient for recurrent small bowel obstruction. Pt reports 70# weight loss x 2 years however gained ~15-20# back. +Clinically significant weight loss x 2 years of 40%. NFPE- moderate muscle/fat wasting (orbital/temporal). Diet advanced to low fiber and patient tolerating at this time. Will continue to monitor and follow up prn. See below for recommendations. Criteria met for moderate malnutrition.

## 2022-04-14 NOTE — DISCHARGE NOTE PROVIDER - HOSPITAL COURSE
Patient was admitted under the surgical service. Patient was managed conservatively. NGT was placed. Serial abdominal exams were performed and bowel function was monitored. Diet was advanced as tolerated. Blood work and vital signs were monitored throughout. Out of bed/ambulation was encouraged. Patient was stable for discharge.

## 2022-04-14 NOTE — DIETITIAN INITIAL EVALUATION ADULT - PERTINENT MEDS FT
MEDICATIONS  (STANDING):  ascorbic acid 500 milliGRAM(s) Oral daily  budesonide 160 MICROgram(s)/formoterol 4.5 MICROgram(s) Inhaler 2 Puff(s) Inhalation two times a day  calcium carbonate   1250 mG (OsCal) 1 Tablet(s) Oral daily  diltiazem    milliGRAM(s) Oral daily  fluticasone propionate 50 MICROgram(s)/spray Nasal Spray 1 Spray(s) Both Nostrils once  gabapentin 600 milliGRAM(s) Oral three times a day  heparin   Injectable 5000 Unit(s) SubCutaneous every 8 hours  levothyroxine 88 MICROGram(s) Oral daily  melatonin 5 milliGRAM(s) Oral at bedtime  multivitamin 1 Tablet(s) Oral daily  pantoprazole  Injectable 40 milliGRAM(s) IV Push daily  potassium chloride  10 mEq/100 mL IVPB 10 milliEquivalent(s) IV Intermittent every 1 hour  sodium chloride 0.9% lock flush 3 milliLiter(s) IV Push every 8 hours    MEDICATIONS  (PRN):  acetaminophen   IVPB .. 1000 milliGRAM(s) IV Intermittent once PRN Mild Pain (1 - 3)  ALBUTerol    90 MICROgram(s) HFA Inhaler 2 Puff(s) Inhalation every 6 hours PRN for shortness of breath and/or wheezing  LORazepam   Injectable 0.5 milliGRAM(s) IV Push every 6 hours PRN Anxiety  ondansetron Injectable 4 milliGRAM(s) IV Push every 8 hours PRN Nausea and/or Vomiting

## 2022-04-14 NOTE — DISCHARGE NOTE PROVIDER - NSDCFUSCHEDAPPT_GEN_ALL_CORE_FT
JOSE ALEJANDRO HOFF ; 05/03/2022 ; NPP Neurology [address]LEJANDR[address] Cunningham ; 05/03/2022 ; NPP IntMed [address]   JOSE ALEJANDRO HOFF ; 05/16/2022 ; NPP Rheum [address]

## 2022-04-14 NOTE — DISCHARGE NOTE PROVIDER - NSDCMRMEDTOKEN_GEN_ALL_CORE_FT
ascorbic acid 500 mg oral tablet: 1 tab(s) orally once a day  budesonide 0.5 mg/2 mL inhalation suspension: use as nasal irrigation as needed for sinus infection  dilTIAZem 300 mg/24 hours oral capsule, extended release: 1 cap(s) orally once a day  Flonase 50 mcg/inh nasal spray: 1 spray(s) nasal once a day, As Needed  gabapentin 300 mg oral capsule: 2 cap(s) orally 3 times a day  LEVOTHYROXIN TAB 88MC tab(s) orally once a day  magnesium oxide 500 mg oral tablet: 1 tab(s) orally once a day  Melatonin 5 mg oral tablet: 1 tab(s) orally once a day (at bedtime), As Needed  Multiple Vitamins oral tablet: 1 tab(s) orally once a day  Oyster 1250 mg (500 mg elemental calcium) oral tablet: 1 tab(s) orally once a day  ProAir HFA 90 mcg/inh inhalation aerosol: 2 puff(s) inhaled every 6 hours, As Needed - for shortness of breath and/or wheezing  Saline Nasal Mist 0.65% nasal solution: 2 spray(s) nasal every 2 hours, As Needed  Symbicort 160 mcg-4.5 mcg/inh inhalation aerosol: 2 puff(s) inhaled 2 times a day  Vitamin D3 25 mcg (1000 intl units) oral tablet: 7000 iu&#x27;s orally once a day  Zinc 140 mg (as elemental zinc 50 mg) oral tablet: 1 tab(s) orally once a day

## 2022-04-14 NOTE — DIETITIAN INITIAL EVALUATION ADULT - PERTINENT LABORATORY DATA
04-14    142  |  111<H>  |  5<L>  ----------------------------<  94  3.6   |  25  |  0.67    Ca    8.2<L>      14 Apr 2022 07:13  Phos  2.7     04-14  Mg     2.2     04-14    TPro  6.0  /  Alb  2.9<L>  /  TBili  0.5  /  DBili  x   /  AST  48<H>  /  ALT  24  /  AlkPhos  80  04-14

## 2022-04-14 NOTE — DIETITIAN INITIAL EVALUATION ADULT - ADD RECOMMEND
1) protein with all meals to meet increased demand 2) continue with low fiber diet (temporary) 3) monitor weights track trends 4) MVI w/minerals daily to meet RDI's 5) monitor lytes and hydration replete prn

## 2022-04-14 NOTE — PROGRESS NOTE ADULT - ATTENDING COMMENTS
Patient is feeling better, had multiple BM's yesterday and is currently still having flatus and dnies nausea/vomiting. UGI series reveals contrast in colon.      Plan  - Adv diet to LRD  - IV lock  - ambulate  - DVT ppx  - If tolerates LRD can d/c home after lunch

## 2022-04-14 NOTE — PROGRESS NOTE ADULT - ASSESSMENT
55 yo F with nausea/vomiting secondary to SBO    Plan:  - advance to LRD  - Serial abdominal exams  - Protonix 40 daily  - Ambulate  - DVT ppx  - monitor bowel function    Plan discussed with Dr. Stanley
Patient is an 55 yo F with nausea/vomiting secondary to SBO

## 2022-04-14 NOTE — DIETITIAN INITIAL EVALUATION ADULT - NSFNSGIIOFT_GEN_A_CORE
04-13-22 @ 07:01  -  04-14-22 @ 07:00  --------------------------------------------------------  OUT:    Nasogastric/Oral tube (mL): 50 mL  Total OUT: 50 mL    Total NET: -50 mL

## 2022-04-14 NOTE — PROGRESS NOTE ADULT - SUBJECTIVE AND OBJECTIVE BOX
Hospital Day#: 1      The patient is doing well with improvement of her nausea & vomiting with NGT decompression (only approx. 30-50cc bilious output overnight). Patient states no flatus or BM since yesterday.     Vital Signs Last 24 Hrs  T(C): 36.9 (13 Apr 2022 07:17), Max: 38.4 (13 Apr 2022 05:31)  T(F): 98.4 (13 Apr 2022 07:17), Max: 101.1 (13 Apr 2022 05:31)  HR: 94 (13 Apr 2022 07:17) (70 - 100)  BP: 104/59 (13 Apr 2022 07:17) (104/59 - 148/95)  BP(mean): 66 (13 Apr 2022 07:17) (66 - 114)  RR: 18 (13 Apr 2022 07:17) (17 - 24)  SpO2: 97% (13 Apr 2022 07:17) (91% - 99%)    PHYSICAL EXAM:  General: NAD.  HEENT: no JVD, no jaundice.  LUNGS: CTAB.  Heart: S1 S2 RRR  Abd: soft nt/nd                             14.0   10.28 )-----------( 277      ( 12 Apr 2022 13:05 )             44.1       04-12    140  |  110<H>  |  14  ----------------------------<  102<H>  4.5   |  26  |  0.71    Ca    9.2      12 Apr 2022 13:05    TPro  7.1  /  Alb  4.0  /  TBili  0.5  /  DBili  x   /  AST  74<H>  /  ALT  37  /  AlkPhos  114  04-12      PT/INR - ( 12 Apr 2022 13:05 )   PT: 10.9 sec;   INR: 0.94 ratio         PTT - ( 12 Apr 2022 13:05 )  PTT:35.0 sec  
Patient was seen and evaluated at bedside. Yesterday, NGT fell out, was not replaced. Pt is passing flatus and having BMs. Tolerated CLD. Denies abdominal pain. Denies f/c/cp/sob/n/v. VS reviewed.    Vital Signs Last 24 Hrs  T(C): 36.7 (14 Apr 2022 07:43), Max: 37.3 (13 Apr 2022 23:20)  T(F): 98 (14 Apr 2022 07:43), Max: 99.2 (13 Apr 2022 23:20)  HR: 77 (14 Apr 2022 07:43) (77 - 95)  BP: 101/71 (14 Apr 2022 07:43) (101/71 - 103/59)  BP(mean): 74 (13 Apr 2022 15:14) (74 - 74)  RR: 18 (14 Apr 2022 07:43) (18 - 18)  SpO2: 93% (14 Apr 2022 07:43) (93% - 96%)    PHYSICAL EXAM:  General: NAD.  HEENT: no JVD, no jaundice.  LUNGS: CTAB.  Heart: S1 S2 RRR  Abd: soft nt/nd       Labs:                            11.0   9.57  )-----------( 218      ( 14 Apr 2022 07:13 )             35.4   04-14    142  |  111<H>  |  5<L>  ----------------------------<  94  3.6   |  25  |  0.67    Ca    8.2<L>      14 Apr 2022 07:13  Phos  2.7     04-14  Mg     2.2     04-14    TPro  6.0  /  Alb  2.9<L>  /  TBili  0.5  /  DBili  x   /  AST  48<H>  /  ALT  24  /  AlkPhos  80  04-14

## 2022-04-14 NOTE — DISCHARGE NOTE NURSING/CASE MANAGEMENT/SOCIAL WORK - NSDCPEFALRISK_GEN_ALL_CORE
For information on Fall & Injury Prevention, visit: [email or web address] OR  [email or web address] OR  https://www.cdc.gov/steadi/patient.html

## 2022-04-14 NOTE — DISCHARGE NOTE PROVIDER - CARE PROVIDER_API CALL
Iraj Stanley)  Surgery  [address], [address]  [place] [address]  Phone: [phone]  Fax: [phone]  Follow Up Time: Routine    PCP,   Phone: (   )    -  Fax: (   )    -  Follow Up Time:

## 2022-04-14 NOTE — DISCHARGE NOTE PROVIDER - PROVIDER TOKENS
PROVIDER:[TOKEN:[[id number]],FOLLOWUP:[Routine]],FREE:[LAST:[PCP],PHONE:[(   )    -],FAX:[(   )    -]]

## 2022-04-14 NOTE — DIETITIAN NUTRITION RISK NOTIFICATION - ADDITIONAL COMMENTS/DIETITIAN RECOMMENDATIONS
Additional Recommendations  1) protein with all meals to meet increased demand 2) continue with low fiber diet (temporary) 3) monitor weights track trends 4) MVI w/minerals daily to meet RDI's 5) monitor lytes and hydration replete prn

## 2022-04-14 NOTE — DISCHARGE NOTE NURSING/CASE MANAGEMENT/SOCIAL WORK - PATIENT PORTAL LINK FT
You can access the FollowMyHealth Patient Portal offered by [facility] by registering at the following website: [email or web address]. By joining [facility]’s FollowMyHealth portal, you will also be able to view your health information using other applications (apps) compatible with our system.

## 2022-04-18 ENCOUNTER — NON-APPOINTMENT (OUTPATIENT)
Age: 57
End: 2022-04-18

## 2022-04-21 DIAGNOSIS — K56.609 UNSPECIFIED INTESTINAL OBSTRUCTION, UNSPECIFIED AS TO PARTIAL VERSUS COMPLETE OBSTRUCTION: ICD-10-CM

## 2022-04-21 DIAGNOSIS — Z79.51 LONG TERM (CURRENT) USE OF INHALED STEROIDS: ICD-10-CM

## 2022-04-21 DIAGNOSIS — E03.9 HYPOTHYROIDISM, UNSPECIFIED: ICD-10-CM

## 2022-04-21 DIAGNOSIS — M35.9 SYSTEMIC INVOLVEMENT OF CONNECTIVE TISSUE, UNSPECIFIED: ICD-10-CM

## 2022-04-21 DIAGNOSIS — I10 ESSENTIAL (PRIMARY) HYPERTENSION: ICD-10-CM

## 2022-04-21 DIAGNOSIS — Z28.9 IMMUNIZATION NOT CARRIED OUT FOR UNSPECIFIED REASON: ICD-10-CM

## 2022-04-21 DIAGNOSIS — Z88.1 ALLERGY STATUS TO OTHER ANTIBIOTIC AGENTS STATUS: ICD-10-CM

## 2022-04-21 DIAGNOSIS — Z79.52 LONG TERM (CURRENT) USE OF SYSTEMIC STEROIDS: ICD-10-CM

## 2022-04-21 DIAGNOSIS — Z88.0 ALLERGY STATUS TO PENICILLIN: ICD-10-CM

## 2022-04-21 DIAGNOSIS — Z90.5 ACQUIRED ABSENCE OF KIDNEY: ICD-10-CM

## 2022-04-21 DIAGNOSIS — J45.909 UNSPECIFIED ASTHMA, UNCOMPLICATED: ICD-10-CM

## 2022-04-21 DIAGNOSIS — Z90.49 ACQUIRED ABSENCE OF OTHER SPECIFIED PARTS OF DIGESTIVE TRACT: ICD-10-CM

## 2022-05-03 ENCOUNTER — APPOINTMENT (OUTPATIENT)
Dept: NEUROLOGY | Facility: CLINIC | Age: 57
End: 2022-05-03
Payer: MEDICAID

## 2022-05-03 ENCOUNTER — APPOINTMENT (OUTPATIENT)
Dept: INTERNAL MEDICINE | Facility: CLINIC | Age: 57
End: 2022-05-03
Payer: MEDICAID

## 2022-05-03 ENCOUNTER — NON-APPOINTMENT (OUTPATIENT)
Age: 57
End: 2022-05-03

## 2022-05-03 VITALS
TEMPERATURE: 98.1 F | RESPIRATION RATE: 16 BRPM | SYSTOLIC BLOOD PRESSURE: 144 MMHG | WEIGHT: 175 LBS | DIASTOLIC BLOOD PRESSURE: 90 MMHG | OXYGEN SATURATION: 97 % | HEIGHT: 61 IN | HEART RATE: 70 BPM | BODY MASS INDEX: 33.04 KG/M2

## 2022-05-03 DIAGNOSIS — R20.2 PARESTHESIA OF SKIN: ICD-10-CM

## 2022-05-03 LAB — HBA1C MFR BLD HPLC: 5.4

## 2022-05-03 PROCEDURE — 95908 NRV CNDJ TST 3-4 STUDIES: CPT

## 2022-05-03 PROCEDURE — 95885 MUSC TST DONE W/NERV TST LIM: CPT

## 2022-05-03 PROCEDURE — 99396 PREV VISIT EST AGE 40-64: CPT

## 2022-05-03 RX ORDER — NEOMYCIN SULFATE, POLYMYXIN B SULFATE, HYDROCORTISONE 3.5; 10000; 1 MG/ML; [USP'U]/ML; MG/ML
1 SOLUTION/ DROPS AURICULAR (OTIC) 4 TIMES DAILY
Qty: 1 | Refills: 0 | Status: DISCONTINUED | COMMUNITY
Start: 2021-11-30 | End: 2022-05-03

## 2022-05-03 RX ORDER — PREDNISONE 20 MG/1
20 TABLET ORAL
Qty: 12 | Refills: 0 | Status: DISCONTINUED | COMMUNITY
Start: 2022-01-14 | End: 2022-05-03

## 2022-05-03 RX ORDER — DOXYCYCLINE 100 MG/1
100 CAPSULE ORAL
Qty: 20 | Refills: 0 | Status: DISCONTINUED | COMMUNITY
Start: 2022-01-14 | End: 2022-05-03

## 2022-05-03 NOTE — HISTORY OF PRESENT ILLNESS
[FreeTextEntry1] : The patient comes in for a yearly wellness evaluation\par  [de-identified] : The patient states, that she is doing relatively well at this time.  She remains compliant with medical regimen as outlined.  Unfortunately, she presented to [facility] with fairly cute onset of abdominal pain nausea and vomiting 1 month ago.  She was noted to have an acute small bowel obstruction with a transition point noted.  An NG tube was placed, and eventually the symptoms resolved within 48 to 72 hours.  She has followed up with her surgeon, Dr. Stanley, and he is contemplating a laparoscopy for possible lysis of adhesions.\par \par The patient's major complaint continues to be that of back pain.  She continues to be followed by neurology.  She does struggle with neuropathy.  She did undergo an EMG earlier today and she is noted to have significant changes in her right upper extremity in the ulnar distribution.\par \par Patient's pulmonary status is stable.  She continues with Symbicort twice a day.  She denies any cough or wheeze.  She uses her nasal washes 3 times a week with excellent results.  Overall her energy levels are quite good and she feels well.\par \par Lastly, the patient continues to be followed by rheumatology for her ANCA related vasculitis/eosinophilic granuloma polyangiitis.  She continues to be treated with Rituxan every 6 months.  Her next dose is scheduled in the next several weeks.  She denies any swollen joints.  The strength in her legs is improving.  She now comes in for this assessment.

## 2022-05-03 NOTE — REVIEW OF SYSTEMS
[Negative] : Heme/Lymph [FreeTextEntry7] : see history of present illness [de-identified] : see history of present illness

## 2022-05-03 NOTE — PLAN
[FreeTextEntry1] : 1.  Continue with medical regimen as outlined above.\par \par 2.  Continue with close rheumatology follow-up with Dr. Posadas.  The patient is scheduled to undergo repeat Rituxan in the near future.\par \par 3.  Follow-up in 6 months with full pulmonary function testing.\par \par 4.  The patient will consider undergoing a laparoscopic lysis of adhesions with Dr. Stanley after she receives the next dose of Rituxan.

## 2022-05-03 NOTE — PHYSICAL EXAM
[Normal Sclera/Conjunctiva] : normal sclera/conjunctiva [EOMI] : extraocular movements intact [Normal Oropharynx] : the oropharynx was normal [No JVD] : no jugular venous distention [Supple] : supple [Clear to Auscultation] : lungs were clear to auscultation bilaterally [No Accessory Muscle Use] : no accessory muscle use [Regular Rhythm] : with a regular rhythm [Normal S1, S2] : normal S1 and S2 [No Edema] : there was no peripheral edema [No Extremity Clubbing/Cyanosis] : no extremity clubbing/cyanosis [Soft] : abdomen soft [Non Tender] : non-tender [Normal Bowel Sounds] : normal bowel sounds [Normal Supraclavicular Nodes] : no supraclavicular lymphadenopathy [Normal Posterior Cervical Nodes] : no posterior cervical lymphadenopathy [Normal Anterior Cervical Nodes] : no anterior cervical lymphadenopathy [No CVA Tenderness] : no CVA  tenderness [No Rash] : no rash [Normal Affect] : the affect was normal [Normal Insight/Judgement] : insight and judgment were intact [de-identified] : Obese white female [de-identified] : There is tenderness on palpation over the lower spine.

## 2022-05-03 NOTE — HEALTH RISK ASSESSMENT
[Never] : Never [Never (0 pts)] : Never (0 points) [No] : In the past 12 months have you used drugs other than those required for medical reasons? No [0] : 2) Feeling down, depressed, or hopeless: Not at all (0) [Patient reported mammogram was normal] : Patient reported mammogram was normal [MammogramDate] : 01/21

## 2022-05-11 ENCOUNTER — TRANSCRIPTION ENCOUNTER (OUTPATIENT)
Age: 57
End: 2022-05-11

## 2022-05-16 ENCOUNTER — APPOINTMENT (OUTPATIENT)
Dept: RHEUMATOLOGY | Facility: CLINIC | Age: 57
End: 2022-05-16
Payer: MEDICAID

## 2022-05-16 VITALS
HEART RATE: 76 BPM | SYSTOLIC BLOOD PRESSURE: 132 MMHG | BODY MASS INDEX: 33.63 KG/M2 | TEMPERATURE: 97.9 F | WEIGHT: 178 LBS | DIASTOLIC BLOOD PRESSURE: 82 MMHG | OXYGEN SATURATION: 96 %

## 2022-05-16 PROCEDURE — 99214 OFFICE O/P EST MOD 30 MIN: CPT

## 2022-05-25 ENCOUNTER — TRANSCRIPTION ENCOUNTER (OUTPATIENT)
Age: 57
End: 2022-05-25

## 2022-05-26 ENCOUNTER — TRANSCRIPTION ENCOUNTER (OUTPATIENT)
Age: 57
End: 2022-05-26

## 2022-06-06 NOTE — DIETITIAN NUTRITION RISK NOTIFICATION - UPON NUTRITIONAL ASSESSMENT BY THE REGISTERED DIETITIAN YOUR PATIENT WAS DETERMINED TO MEET CRITERIA/HAS EVIDENCE OF THE FOLLOWING DIAGNOSIS:
Severe protein-calorie malnutrition Call Dr. Montes at [facility] at [phone]  Call [facility] at [phone]

## 2022-06-15 ENCOUNTER — NON-APPOINTMENT (OUTPATIENT)
Age: 57
End: 2022-06-15

## 2022-06-15 RX ORDER — RITUXIMAB 10 MG/ML
500 INJECTION, SOLUTION INTRAVENOUS
Qty: 0 | Refills: 0 | Status: COMPLETED | OUTPATIENT
Start: 2022-06-15 | End: 1900-01-01

## 2022-06-20 ENCOUNTER — APPOINTMENT (OUTPATIENT)
Dept: RHEUMATOLOGY | Facility: CLINIC | Age: 57
End: 2022-06-20
Payer: MEDICAID

## 2022-06-20 VITALS
HEART RATE: 67 BPM | DIASTOLIC BLOOD PRESSURE: 75 MMHG | RESPIRATION RATE: 18 BRPM | OXYGEN SATURATION: 95 % | SYSTOLIC BLOOD PRESSURE: 117 MMHG

## 2022-06-20 VITALS
RESPIRATION RATE: 18 BRPM | DIASTOLIC BLOOD PRESSURE: 77 MMHG | OXYGEN SATURATION: 95 % | HEART RATE: 55 BPM | SYSTOLIC BLOOD PRESSURE: 122 MMHG | TEMPERATURE: 97.7 F

## 2022-06-20 VITALS
TEMPERATURE: 97.6 F | SYSTOLIC BLOOD PRESSURE: 156 MMHG | HEART RATE: 66 BPM | DIASTOLIC BLOOD PRESSURE: 87 MMHG | OXYGEN SATURATION: 96 % | RESPIRATION RATE: 18 BRPM

## 2022-06-20 VITALS
RESPIRATION RATE: 18 BRPM | OXYGEN SATURATION: 94 % | DIASTOLIC BLOOD PRESSURE: 76 MMHG | HEART RATE: 65 BPM | SYSTOLIC BLOOD PRESSURE: 118 MMHG

## 2022-06-20 PROCEDURE — 96375 TX/PRO/DX INJ NEW DRUG ADDON: CPT | Mod: 59

## 2022-06-20 PROCEDURE — 96374 THER/PROPH/DIAG INJ IV PUSH: CPT | Mod: 59

## 2022-06-20 PROCEDURE — 96413 CHEMO IV INFUSION 1 HR: CPT

## 2022-06-20 PROCEDURE — 96415 CHEMO IV INFUSION ADDL HR: CPT

## 2022-06-20 RX ORDER — DIPHENHYDRAMINE HYDROCHLORIDE 50 MG/ML
50 INJECTION, SOLUTION INTRAMUSCULAR; INTRAVENOUS
Qty: 1 | Refills: 0 | Status: COMPLETED | OUTPATIENT
Start: 2021-12-07

## 2022-06-20 RX ORDER — ACETAMINOPHEN 325 MG/1
325 TABLET ORAL
Qty: 0 | Refills: 0 | Status: COMPLETED | OUTPATIENT
Start: 2021-12-07

## 2022-06-20 RX ORDER — METHYLPREDNISOLONE 125 MG/2ML
125 INJECTION, POWDER, LYOPHILIZED, FOR SOLUTION INTRAMUSCULAR; INTRAVENOUS
Qty: 0 | Refills: 0 | Status: COMPLETED | OUTPATIENT
Start: 2021-12-07

## 2022-06-20 RX ORDER — RITUXIMAB 10 MG/ML
500 INJECTION, SOLUTION INTRAVENOUS
Qty: 0 | Refills: 0 | Status: COMPLETED | OUTPATIENT
Start: 2022-06-15

## 2022-06-22 NOTE — HISTORY OF PRESENT ILLNESS
[FreeTextEntry1] : 11/17/21: 56 year female presents for evaluation of right middle as well as left index trigger finger present for a couple of months. She denies any acute injury or inciting event. She reports a history of vasculitis as well as peripheral neuropathy and both upper and lower extremity's. She reports pain and clicking of her left index as well as right middle digits which occurred regularly with motion. She had been under the care of hand therapy for this as well as for the peripheral neuropathy with slow and steady improvement in her function with ADLs. She reports she received a Rituxan yesterday and her triggering has resolved completely the last 24 hours.\par \par 3/16/2022:  56 year old female who presents today for follow up evaluation of bilateral hand paresthesias, right worse than left. She also presents to discuss EMG findings. Patient reports continued neuropathy bilaterally without improvement. She has been working with OT which has improved her pinching. She still has difficulty with holding utensils. There are no other orthopedic concerns at this time.

## 2022-06-22 NOTE — PHYSICAL EXAM
[Normal] : Oriented to person, place, and time, insight and judgement were intact and the affect was normal [de-identified] : bilateral hands: \par skin intact no swelling no erythema no ecchymosis\par severe intrinsic and thenar wasting on the right, thenar atrophy on the left\par decreased sensation all digits on the right, median nerve on the left\par ROM of the digits intact without pain\par Difficulty with opposition, able to get thumb to distal small finger\par Weak opposition strength \par Minimal right hand small and ring finger claw deformity \par no triggering, no A1 pulley ttp\par cap refill < 2 sec [de-identified] : EMG of bilateral UE obtained on 2/24/22 demonstrated a sensorimotor axonal neuropathy affecting the bilateral median and right ulnar nerves, c/w diagnosis of mononeuritis multiplex.

## 2022-06-22 NOTE — ASSESSMENT
[FreeTextEntry1] : 56-year-old female who presents for follow up evaluation of bilateral neuropathy with EMG results that are c/w mononeuritis multiplex. Patient has significant muscular wasting in bilateral hands, right worse than left. At this time, surgical release of ulnar/median nerve is not recommended as it is likely not to provide improvement in her symptoms. At this time, she should follow up with neurology to discuss EMG results and possible treatment. She is recommended for extension splinting of the right hand to aid with claw deformity secondary to ulnar neuropathy. She was also instructed to work on PROM of the right hand. The patient may follow up here as needed. All questions and concerns were addressed with the patient and they are in agreement with this plan.

## 2022-06-27 NOTE — REVIEW OF SYSTEMS
[Arthralgias] : arthralgias [Joint Pain] : joint pain [As Noted in HPI] : as noted in HPI [Difficulty Walking] : difficulty walking [Negative] : Heme/Lymph [Joint Swelling] : no joint swelling [Joint Stiffness] : no joint stiffness

## 2022-06-27 NOTE — PHYSICAL EXAM
[General Appearance - Alert] : alert [General Appearance - In No Acute Distress] : in no acute distress [General Appearance - Well Nourished] : well nourished [Sclera] : the sclera and conjunctiva were normal [PERRL With Normal Accommodation] : pupils were equal in size, round, and reactive to light [Extraocular Movements] : extraocular movements were intact [Outer Ear] : the ears and nose were normal in appearance [Oropharynx] : the oropharynx was normal [Neck Appearance] : the appearance of the neck was normal [Auscultation Breath Sounds / Voice Sounds] : lungs were clear to auscultation bilaterally [Heart Rate And Rhythm] : heart rate was normal and rhythm regular [Heart Sounds] : normal S1 and S2 [Murmurs] : no murmurs [Edema] : there was no peripheral edema [Bowel Sounds] : normal bowel sounds [Abdomen Soft] : soft [Abdomen Tenderness] : non-tender [Abdomen Mass (___ Cm)] : no abdominal mass palpated [Cervical Lymph Nodes Enlarged Posterior Bilaterally] : posterior cervical [Cervical Lymph Nodes Enlarged Anterior Bilaterally] : anterior cervical [Supraclavicular Lymph Nodes Enlarged Bilaterally] : supraclavicular [No CVA Tenderness] : no ~M costovertebral angle tenderness [No Spinal Tenderness] : no spinal tenderness [Nail Clubbing] : no clubbing  or cyanosis of the fingernails [Musculoskeletal - Swelling] : no joint swelling seen [Skin Color & Pigmentation] : normal skin color and pigmentation [] : no rash [Oriented To Time, Place, And Person] : oriented to person, place, and time [Impaired Insight] : insight and judgment were intact [Affect] : the affect was normal [FreeTextEntry1] : good ability to arise from seated, good strength in b/l feet and proximal muscles

## 2022-06-27 NOTE — HISTORY OF PRESENT ILLNESS
[FreeTextEntry1] : JOSE ALEJANDRO HOFF is a 56 year old woman with pmh R nephrectomy in 2004 for unclear reason, HTN, asthma- with recurrent need for steroids nearly consistently since 8/20- 10-40 mg daily, hypothyroidism. Initially seen as hospital consult for new onset progressive peripheral neuropathy, purpuric rash, granulomatous rash on elbows, and oral/ nasal ulcerations, and significantly 50 lb wt loss. Was in usual state health with intermittent asthma/ and sinus infection but otherwise healthy till 8/20. Recent evaluation for mid epigastric abd/ non bloody emesis, EGD + gastritis confirmed eosinophilic esophagitis/ gastritis. Extensive w/u neuro/ rheum work up negative as per patient in the past. Here she is noted to have + MPO; +P-ANCA; +RF; +DS-DNA. C-ANCA is now positive. Discharged, then readmitted with GI perforation, tissue appeared inflammatory during resection, sent for pathology which confirming inflammatory etiology. Skin biopsy also consistent with vasculitis. \par \par Presently healing well from abd sx, no GI sx at present. Rashes, lung issues, sinus involvement improved with steroids. Weight has stabilized, no other constitutional sx. Ongoing neuropathy with weak . \par \par ---------\par 4/12/21 -- Just discharged from repeat hospital admission, found with SBO at site of anastomosis and associated abscess, s/p Abx and IR guided drainage, feeling better, no current abdominal complaints aside from mild nausea. Ongoing neurologic complaints which are slightly worse. \par \par 6/21/21 -- Tolerated Rituxan loading well, no SE, had no current GI sx, strength improving slowly, has been doing home exercises. No other vasculitis sx, no F/C, weight stable, improved appetite and slowly broadening diet without issues. Ongoing lower back pain, improved with opioids. \par \par 8/30/21 -- Doing well, no further rashes or GI sx, tolerating a normal diet, strength continues to improve. No infectious sx, F/C, unintentional weight loss. Back pain stable and slowly improving, not using opioids. \par \par 11/29/21 -- Doing well, getting 2nd dose of q6 month Rituxan today, reports feeling some muscle weakness just prior to the 1st dose that has now resolved. No other inflammatory sx today. Peripheral neuropathy is stable but chronic, OT is helping significantly. Also doing PT which continues to help for the back, but can only ambulate for approx 10 min then needs to rest. No worsening back pain. Hair is growing back in, saw derm, likely stress related. No infectious sx, feels well otherwise. \par \par 3/7/22 -- No current GI sx or new neuropathic sx but ongoing chronic neuropathic issues despite PT and OT, some contractures starting in her R hand, insurance did not approve her OT brace. No rashes, no F/C, night sweats, unintentional weight loss. Worsening SI joint pain which limits her ability to stand to approx 5 min before she has to sit again, OA changes on recent imaging. Also with worsening of her asthma and seasonal allergies, PMD/pulm has had to put her on steroids. No falls, asking for disability tag for her car as very hard to walk any distance. Asking about Evusheld, remains reluctant to get covid vaccines 2/2 concern for clots. \par \par 5/16/22 -- Recent admission for SBO, non surgically resolved, ? ex lap to see why it happened but she is not sure she wants to pursue this. No current GI issues, no current vasculitis activity. OT and gabapentin helping with weakness/neuropathy. No rashes, fevers, night sweats. Ongoing L spine spasm and pain tho has been more able to manage.

## 2022-06-27 NOTE — ASSESSMENT
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 Unclear if EGPA vs MPA at present, features can match both and eosinophils only seen in some areas of her case. Given extensive Ab +, Rituxan chosen as steroid sparing treatment with marked responsiveness and able to taper off steroids. Without GI or cutaneous involvement at present, stable neurologic sx which are likely partially chronic, improved strength with OT and PT but has now plateaued and R hand contracture developing. +spinal compression fx in setting of steroid induced OP, now s/p Reclast. This and new SI joint arthritis significantly limiting ability to walk and stand for > 5 min. \par \par # vasculitis, allergic sx ? related \par - Last Rituxan Nov 2021, will plan for repeat dosing q4-6 months x 2 years then reassess \par - due for Rituxan now, will check with surgery if ex-lap strongly indicated would do sx first then Rituxan. If not, will proceed now \par - repeat labs as below prior to rituxan \par - repeat labs as below including eos and IgE levels\par - f/u pulm and surgery  \par \par # chronic neuropathy and R hand contracture\par - c/w OT and PT as helping significantly more than HEP alone \par - f/u ortho hand for help with a new splint to avoid fixed R hand contracture \par - c/w gabapentin at current dose for chronic neuropathy\par \par # SI joint arthritis, L spine spasm \par - US guided local injections helped\par - add on prn flexeril. Advised pt of sedation and advised to take when does not have to drive the following day to evaluate extent of sedation. Can reduce to 2.5mg QHS if getting benefit but too much somnolence. Pt verbalized understanding. \par - form for disabled car tag completed today \par - c/w tylenol prn back pain, off opioids, advised to limit ambulation to <10 min, bring cane to use prn and use moist heat as this helps\par \par # immunocompromised status \par - pt is immunocompromised and would benefit from vaccination but currently declining covid vaccine and did not get flu vaccine this year (normally does get it ) -- extensive discussion had again today including minimal risk of blood clots with Moderna or Pfizer versions of vaccine\par - also discussed Zeenateld, she would be a candidate given Rituxan and lack of vaccination. There is a concern for cardiac events that I discussed extensively with her, s/p full cardiac w/u this month but would like not to have it at present \par - given immunosuppressed status, pt knows to call if febrile or unwell, knows to hold medication while on Abx \par \par # steroid induced OP with compression fx \par - c/w Ca 600mg BID with food, Vit D 1000 IU daily to optimize Ca/Vit D to maintain bone health. \par - Weight bearing exercise for 30min 3-4x/week to maintain BMD as tolerated, can break into 4 10 min sessions \par - Reclast due July 2022\par - check Vit D level with next labs \par \par RTC in 4 months \par

## 2022-06-28 ENCOUNTER — TRANSCRIPTION ENCOUNTER (OUTPATIENT)
Age: 57
End: 2022-06-28

## 2022-07-01 RX ORDER — ZOLEDRONIC ACID 5 MG/100ML
5 INJECTION INTRAVENOUS
Qty: 1 | Refills: 0 | Status: ACTIVE | COMMUNITY
Start: 2022-06-27

## 2022-07-13 ENCOUNTER — OUTPATIENT (OUTPATIENT)
Dept: OUTPATIENT SERVICES | Facility: HOSPITAL | Age: 57
LOS: 1 days | End: 2022-07-13
Payer: MEDICAID

## 2022-07-13 ENCOUNTER — APPOINTMENT (OUTPATIENT)
Dept: ULTRASOUND IMAGING | Facility: CLINIC | Age: 57
End: 2022-07-13

## 2022-07-13 DIAGNOSIS — E04.2 NONTOXIC MULTINODULAR GOITER: ICD-10-CM

## 2022-07-13 DIAGNOSIS — Z90.5 ACQUIRED ABSENCE OF KIDNEY: Chronic | ICD-10-CM

## 2022-07-13 DIAGNOSIS — Z98.891 HISTORY OF UTERINE SCAR FROM PREVIOUS SURGERY: Chronic | ICD-10-CM

## 2022-07-13 PROCEDURE — 76536 US EXAM OF HEAD AND NECK: CPT | Mod: 26

## 2022-07-13 PROCEDURE — 76536 US EXAM OF HEAD AND NECK: CPT

## 2022-07-14 ENCOUNTER — APPOINTMENT (OUTPATIENT)
Dept: ENDOCRINOLOGY | Facility: CLINIC | Age: 57
End: 2022-07-14

## 2022-07-14 VITALS
HEIGHT: 61 IN | OXYGEN SATURATION: 97 % | TEMPERATURE: 98.4 F | WEIGHT: 178 LBS | HEART RATE: 67 BPM | RESPIRATION RATE: 16 BRPM | BODY MASS INDEX: 33.61 KG/M2

## 2022-07-14 PROCEDURE — 99214 OFFICE O/P EST MOD 30 MIN: CPT

## 2022-07-15 ENCOUNTER — TRANSCRIPTION ENCOUNTER (OUTPATIENT)
Age: 57
End: 2022-07-15

## 2022-07-21 ENCOUNTER — NON-APPOINTMENT (OUTPATIENT)
Age: 57
End: 2022-07-21

## 2022-08-01 ENCOUNTER — APPOINTMENT (OUTPATIENT)
Dept: RHEUMATOLOGY | Facility: CLINIC | Age: 57
End: 2022-08-01

## 2022-08-01 VITALS
OXYGEN SATURATION: 99 % | DIASTOLIC BLOOD PRESSURE: 85 MMHG | HEART RATE: 66 BPM | RESPIRATION RATE: 16 BRPM | SYSTOLIC BLOOD PRESSURE: 128 MMHG | TEMPERATURE: 96.4 F

## 2022-08-01 VITALS
RESPIRATION RATE: 16 BRPM | SYSTOLIC BLOOD PRESSURE: 145 MMHG | HEART RATE: 86 BPM | OXYGEN SATURATION: 99 % | TEMPERATURE: 98.6 F | DIASTOLIC BLOOD PRESSURE: 79 MMHG

## 2022-08-01 PROCEDURE — 96374 THER/PROPH/DIAG INJ IV PUSH: CPT | Mod: 59

## 2022-08-01 RX ADMIN — ZOLEDRONIC ACID 0 MG/100ML: 5 INJECTION, SOLUTION INTRAVENOUS at 00:00

## 2022-08-02 RX ORDER — ZOLEDRONIC ACID 5 MG/100ML
5 INJECTION INTRAVENOUS
Qty: 0 | Refills: 0 | Status: COMPLETED | OUTPATIENT
Start: 2022-08-01

## 2022-08-08 ENCOUNTER — TRANSCRIPTION ENCOUNTER (OUTPATIENT)
Age: 57
End: 2022-08-08

## 2022-09-12 ENCOUNTER — APPOINTMENT (OUTPATIENT)
Dept: RHEUMATOLOGY | Facility: CLINIC | Age: 57
End: 2022-09-12

## 2022-09-12 VITALS
DIASTOLIC BLOOD PRESSURE: 84 MMHG | BODY MASS INDEX: 34.01 KG/M2 | SYSTOLIC BLOOD PRESSURE: 130 MMHG | TEMPERATURE: 97.6 F | HEART RATE: 76 BPM | OXYGEN SATURATION: 96 % | WEIGHT: 180 LBS

## 2022-09-12 DIAGNOSIS — R20.0 ANESTHESIA OF SKIN: ICD-10-CM

## 2022-09-12 DIAGNOSIS — M47.818 SPONDYLOSIS W/OUT MYELOPATHY OR RADICULOPATHY, SACRAL AND SACROCOCCYGEAL REGION: ICD-10-CM

## 2022-09-12 PROCEDURE — 99214 OFFICE O/P EST MOD 30 MIN: CPT

## 2022-09-21 ENCOUNTER — APPOINTMENT (OUTPATIENT)
Dept: ULTRASOUND IMAGING | Facility: CLINIC | Age: 57
End: 2022-09-21

## 2022-09-21 ENCOUNTER — OUTPATIENT (OUTPATIENT)
Dept: OUTPATIENT SERVICES | Facility: HOSPITAL | Age: 57
LOS: 1 days | End: 2022-09-21
Payer: MEDICAID

## 2022-09-21 DIAGNOSIS — M47.818 SPONDYLOSIS WITHOUT MYELOPATHY OR RADICULOPATHY, SACRAL AND SACROCOCCYGEAL REGION: ICD-10-CM

## 2022-09-21 DIAGNOSIS — Z90.5 ACQUIRED ABSENCE OF KIDNEY: Chronic | ICD-10-CM

## 2022-09-21 DIAGNOSIS — Z98.891 HISTORY OF UTERINE SCAR FROM PREVIOUS SURGERY: Chronic | ICD-10-CM

## 2022-09-21 PROCEDURE — 20611 DRAIN/INJ JOINT/BURSA W/US: CPT

## 2022-09-21 PROCEDURE — 20611 DRAIN/INJ JOINT/BURSA W/US: CPT | Mod: LT

## 2022-10-27 NOTE — ASSESSMENT
[FreeTextEntry1] : JOSE ALEJANDRO HOFF is a 57 year old woman with ANCA + (+ MPO; +P-ANCA; +RF; +DS-DNA, C-ANCA) vasculitis with clinical manifestations of vasculitic rashes, oral/nasal ulcerations, asthma exacerbation, eosinophilic esophagitis, GI perforation with inflammatory pathology, and neuropathy. Unclear if EGPA vs MPA at present, features can match both and eosinophils only seen in some areas of her case. Given extensive Ab +, Rituxan chosen as steroid sparing treatment with marked responsiveness and able to taper off steroids. Without GI or cutaneous involvement at present, stable neurologic sx which are likely partially chronic, improved strength with OT and PT but has now plateaued and R hand contracture developing tho continued OT is helping minimize progression. \par \par # vasculitis, hx as above, no active symptoms today \par - Started April 2021, last dose June 2022, will plan for infusion this Dec then determine if we continue vs surveillance -- data on 2 years of use followed by prn dosing reviewed with her today \par - most recent labs reviewed and stable\par - repeat labs as below including B12/folate to see if any other etiologies for ongoing neuropathy \par - f/u pulm and surgery as recommended \par \par # chronic neuropathy and R hand contracture\par - c/w OT and PT as helping significantly more than HEP alone - renewed \par - c/w use of splint to avoid fixed R hand contracture \par - c/w gabapentin at current dose for chronic neuropathy\par \par # SI joint arthritis, L spine spasm \par - US guided local injections helped\par - c/w prn flexeril as this is helping \par - c/w tylenol prn back pain, off opioids, advised to limit ambulation to <10 min, bring cane to use prn and use moist heat as this helps\par \par # immunocompromised status \par - pt is immunocompromised and would benefit from vaccination but currently declining covid vaccine and did not get flu vaccine 2021(normally does get it ) -- extensive discussion had again today including minimal risk of blood clots with Moderna or Pfizer versions of vaccine\par - also discussed Evusheld, she would be a candidate given Rituxan and lack of vaccination, full cardiac w/u has been negative, she continues to decline at present \par - given immunosuppressed status, pt knows to call if febrile or unwell, knows to hold medication while on Abx \par \par # 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 \par - Weight bearing exercise for 30min 3-4x/week to maintain BMD as tolerated, can break into 4 10 min sessions \par - S/p Reclast Aug 2022, next due Aug 2023\par - will repeat DEXA at next visit \par - check Vit D level with next labs \par \par RTC in 3 months so can be seen prior to ordering next Rituxan \par

## 2022-10-27 NOTE — PHYSICAL EXAM
[General Appearance - Alert] : alert [General Appearance - In No Acute Distress] : in no acute distress [General Appearance - Well Nourished] : well nourished [Sclera] : the sclera and conjunctiva were normal [PERRL With Normal Accommodation] : pupils were equal in size, round, and reactive to light [Extraocular Movements] : extraocular movements were intact [Outer Ear] : the ears and nose were normal in appearance [Oropharynx] : the oropharynx was normal [Neck Appearance] : the appearance of the neck was normal [Auscultation Breath Sounds / Voice Sounds] : lungs were clear to auscultation bilaterally [Heart Rate And Rhythm] : heart rate was normal and rhythm regular [Heart Sounds] : normal S1 and S2 [Murmurs] : no murmurs [Edema] : there was no peripheral edema [Bowel Sounds] : normal bowel sounds [Abdomen Soft] : soft [Abdomen Tenderness] : non-tender [Abdomen Mass (___ Cm)] : no abdominal mass palpated [Cervical Lymph Nodes Enlarged Posterior Bilaterally] : posterior cervical [Cervical Lymph Nodes Enlarged Anterior Bilaterally] : anterior cervical [Supraclavicular Lymph Nodes Enlarged Bilaterally] : supraclavicular [No CVA Tenderness] : no ~M costovertebral angle tenderness [No Spinal Tenderness] : no spinal tenderness [Nail Clubbing] : no clubbing  or cyanosis of the fingernails [Musculoskeletal - Swelling] : no joint swelling seen [Oriented To Time, Place, And Person] : oriented to person, place, and time [Impaired Insight] : insight and judgment were intact [Affect] : the affect was normal [] : no rash [FreeTextEntry1] : good ability to arise from seated, good strength in b/l feet and proximal muscles

## 2022-10-27 NOTE — HISTORY OF PRESENT ILLNESS
[FreeTextEntry1] : JOSE ALEJANDRO HOFF is a 56 year old woman with pmh R nephrectomy in 2004 for unclear reason, HTN, asthma- with recurrent need for steroids nearly consistently since 8/20- 10-40 mg daily, hypothyroidism. Initially seen as hospital consult for new onset progressive peripheral neuropathy, purpuric rash, granulomatous rash on elbows, and oral/ nasal ulcerations, and significantly 50 lb wt loss. Was in usual state health with intermittent asthma/ and sinus infection but otherwise healthy till 8/20. Recent evaluation for mid epigastric abd/ non bloody emesis, EGD + gastritis confirmed eosinophilic esophagitis/ gastritis. Extensive w/u neuro/ rheum work up negative as per patient in the past. Here she is noted to have + MPO; +P-ANCA; +RF; +DS-DNA. C-ANCA is now positive. Discharged, then readmitted with GI perforation, tissue appeared inflammatory during resection, sent for pathology which confirming inflammatory etiology. Skin biopsy also consistent with vasculitis. \par \par Presently healing well from abd sx, no GI sx at present. Rashes, lung issues, sinus involvement improved with steroids. Weight has stabilized, no other constitutional sx. Ongoing neuropathy with weak . \par \par ---------\par 4/12/21 -- Just discharged from repeat hospital admission, found with SBO at site of anastomosis and associated abscess, s/p Abx and IR guided drainage, feeling better, no current abdominal complaints aside from mild nausea. Ongoing neurologic complaints which are slightly worse. \par \par 6/21/21 -- Tolerated Rituxan loading well, no SE, had no current GI sx, strength improving slowly, has been doing home exercises. No other vasculitis sx, no F/C, weight stable, improved appetite and slowly broadening diet without issues. Ongoing lower back pain, improved with opioids. \par \par 8/30/21 -- Doing well, no further rashes or GI sx, tolerating a normal diet, strength continues to improve. No infectious sx, F/C, unintentional weight loss. Back pain stable and slowly improving, not using opioids. \par \par 11/29/21 -- Doing well, getting 2nd dose of q6 month Rituxan today, reports feeling some muscle weakness just prior to the 1st dose that has now resolved. No other inflammatory sx today. Peripheral neuropathy is stable but chronic, OT is helping significantly. Also doing PT which continues to help for the back, but can only ambulate for approx 10 min then needs to rest. No worsening back pain. Hair is growing back in, saw derm, likely stress related. No infectious sx, feels well otherwise. \par \par 3/7/22 -- No current GI sx or new neuropathic sx but ongoing chronic neuropathic issues despite PT and OT, some contractures starting in her R hand, insurance did not approve her OT brace. No rashes, no F/C, night sweats, unintentional weight loss. Worsening SI joint pain which limits her ability to stand to approx 5 min before she has to sit again, OA changes on recent imaging. Also with worsening of her asthma and seasonal allergies, PMD/pulm has had to put her on steroids. No falls, asking for disability tag for her car as very hard to walk any distance. Asking about Evusheld, remains reluctant to get covid vaccines 2/2 concern for clots. \par \par 5/16/22 -- Recent admission for SBO, non surgically resolved, ? ex lap to see why it happened but she is not sure she wants to pursue this. No current GI issues, no current vasculitis activity. OT and gabapentin helping with weakness/neuropathy. No rashes, fevers, night sweats. Ongoing L spine spasm and pain tho has been more able to manage. \par \par 9/12/22 -- Overall has been doing well, no further GI issues s/p SBO, exploratory sx is not being actively considered, remainder of vasculitis ROS negative. OT and meds continue to help with neuropathy/weakness in hands and L spine pain/spasm. S/p Rituxan in June, Reclast in Aug, no SE with either.

## 2022-11-14 NOTE — ED ADULT NURSE NOTE - CCCP TRG CHIEF CMPLNT
Health Maintenance Due   Topic Date Due   • Hepatitis B Vaccine (For Physician/APC Discussion) (1 of 3 - 3-dose series) Never done   • Abdominal Aortic Aneurysm (AAA) Screening  Never done   • COVID-19 Vaccine (3 - Booster for Jeovanny series) 02/22/2022   • Influenza Vaccine (1) 09/01/2022       Patient is due for topics as listed above but is not proceeding with Immunization(s) COVID-19, Hep B and Influenza at this time.    hives

## 2022-11-21 ENCOUNTER — NON-APPOINTMENT (OUTPATIENT)
Age: 57
End: 2022-11-21

## 2022-11-21 ENCOUNTER — RESULT CHARGE (OUTPATIENT)
Age: 57
End: 2022-11-21

## 2022-11-22 ENCOUNTER — NON-APPOINTMENT (OUTPATIENT)
Age: 57
End: 2022-11-22

## 2022-11-22 ENCOUNTER — APPOINTMENT (OUTPATIENT)
Dept: INTERNAL MEDICINE | Facility: CLINIC | Age: 57
End: 2022-11-22

## 2022-11-22 VITALS
BODY MASS INDEX: 31.72 KG/M2 | OXYGEN SATURATION: 97 % | DIASTOLIC BLOOD PRESSURE: 82 MMHG | HEART RATE: 62 BPM | HEIGHT: 61 IN | TEMPERATURE: 98.7 F | WEIGHT: 168 LBS | SYSTOLIC BLOOD PRESSURE: 140 MMHG

## 2022-11-22 DIAGNOSIS — J98.4 OTHER DISORDERS OF LUNG: ICD-10-CM

## 2022-11-22 PROCEDURE — 99214 OFFICE O/P EST MOD 30 MIN: CPT | Mod: 25

## 2022-11-22 PROCEDURE — 94060 EVALUATION OF WHEEZING: CPT

## 2022-11-22 PROCEDURE — 95012 NITRIC OXIDE EXP GAS DETER: CPT

## 2022-11-22 RX ORDER — BUDESONIDE AND FORMOTEROL FUMARATE DIHYDRATE 160; 4.5 UG/1; UG/1
160-4.5 AEROSOL RESPIRATORY (INHALATION) TWICE DAILY
Qty: 1 | Refills: 11 | Status: DISCONTINUED | COMMUNITY
Start: 2021-02-12 | End: 2022-11-22

## 2022-11-22 NOTE — PHYSICAL EXAM
[No Acute Distress] : no acute distress [Supple] : supple [Clear to Auscultation] : lungs were clear to auscultation bilaterally [Normal Rate] : normal rate  [Normal S1, S2] : normal S1 and S2 [No Edema] : there was no peripheral edema [Soft] : abdomen soft [de-identified] : Spine tenderness on palpation

## 2022-11-22 NOTE — PLAN
[FreeTextEntry1] : 1.  Continue with medical regimen as outlined above.\par \par 2.  Maintain cardiovascular exercise.\par \par 3 we will check B12 and folate levels now.\par \par 4.  The patient will continue with Rituxan under the direction of her rheumatologist, Dr. Posadas.  She is coming up on the completion of a 2-year treatment protocol so the medication may be discontinued\par \par 5.  The patient will continue with Reclast on a yearly basis\par \par 6.  Follow-up with myself in 6 months with a wellness evaluation and routine fasting blood work\par \par 7.  The patient will be referred for a hand evaluation with Dr. Bailey in the next several months to assess as to whether or not she has carpal tunnel syndrome.

## 2022-11-22 NOTE — HISTORY OF PRESENT ILLNESS
[FreeTextEntry1] : The patient comes in today for a routine followup evaluation.\par  [de-identified] : The patient states, that overall, she is relatively stable.  She continues to be followed closely by her rheumatologist, Dr. Posadas, for her vasculitis.  She is asymptomatic at present.  She continues to receive Rituxan every 6 months.  She is approaching her 2-year anniversary with this medication and will most likely be discontinued at that time.  Her GI symptomatology has been absent.  She does take Metamucil twice a week and a prebiotic on a daily basis.  She denies having any significant abdominal pain.\par \par The patient notes that her pulmonary status is stable.  She is compliant with the Symbicort.  She has not had any cough or wheeze.  She has not required use of her rescue inhaler.  There has been no significant breathlessness.  She notes that her sinuses have been quiescent as well.  She uses her nasal washes with budesonide twice a week.  There have been no nasal secretions.\par \par The patient continues to have issues with her hips and her back.  She did undergo a bursa injection by an interventional radiologist a few weeks ago.  There was slight improvement in her symptoms.\par \par Lastly, she still is having issues with neuropathic pain in her hands and feet.  She was told that she may have carpal tunnel syndrome.  She did undergo a neurological work-up and consultation by Dr. Cabrera.  She now comes in for this assessment.

## 2022-11-22 NOTE — DATA REVIEWED
[FreeTextEntry1] : Spirometric analysis reveals a mild degree of restriction.  Obstruction is not noted.  There is no bronchodilator reactivity demonstrated.\par \par The fractional excretion of nitric oxide is 24.

## 2022-12-05 ENCOUNTER — APPOINTMENT (OUTPATIENT)
Dept: RHEUMATOLOGY | Facility: CLINIC | Age: 57
End: 2022-12-05

## 2022-12-05 VITALS
BODY MASS INDEX: 34.01 KG/M2 | SYSTOLIC BLOOD PRESSURE: 143 MMHG | HEART RATE: 79 BPM | TEMPERATURE: 97.9 F | OXYGEN SATURATION: 99 % | DIASTOLIC BLOOD PRESSURE: 84 MMHG | WEIGHT: 180 LBS

## 2022-12-05 PROCEDURE — 99214 OFFICE O/P EST MOD 30 MIN: CPT

## 2022-12-05 NOTE — HISTORY OF PRESENT ILLNESS
[FreeTextEntry1] : JOSE ALEJANDRO HOFF is a 56 year old woman with pmh R nephrectomy in 2004 for unclear reason, HTN, asthma- with recurrent need for steroids nearly consistently since 8/20- 10-40 mg daily, hypothyroidism. Initially seen as hospital consult for new onset progressive peripheral neuropathy, purpuric rash, granulomatous rash on elbows, and oral/ nasal ulcerations, and significantly 50 lb wt loss. Was in usual state health with intermittent asthma/ and sinus infection but otherwise healthy till 8/20. Recent evaluation for mid epigastric abd/ non bloody emesis, EGD + gastritis confirmed eosinophilic esophagitis/ gastritis. Extensive w/u neuro/ rheum work up negative as per patient in the past. Here she is noted to have + MPO; +P-ANCA; +RF; +DS-DNA. C-ANCA is now positive. Discharged, then readmitted with GI perforation, tissue appeared inflammatory during resection, sent for pathology which confirming inflammatory etiology. Skin biopsy also consistent with vasculitis. \par \par Presently healing well from abd sx, no GI sx at present. Rashes, lung issues, sinus involvement improved with steroids. Weight has stabilized, no other constitutional sx. Ongoing neuropathy with weak . \par \par ---------\par 4/12/21 -- Just discharged from repeat hospital admission, found with SBO at site of anastomosis and associated abscess, s/p Abx and IR guided drainage, feeling better, no current abdominal complaints aside from mild nausea. Ongoing neurologic complaints which are slightly worse. \par \par 6/21/21 -- Tolerated Rituxan loading well, no SE, had no current GI sx, strength improving slowly, has been doing home exercises. No other vasculitis sx, no F/C, weight stable, improved appetite and slowly broadening diet without issues. Ongoing lower back pain, improved with opioids. \par \par 8/30/21 -- Doing well, no further rashes or GI sx, tolerating a normal diet, strength continues to improve. No infectious sx, F/C, unintentional weight loss. Back pain stable and slowly improving, not using opioids. \par \par 11/29/21 -- Doing well, getting 2nd dose of q6 month Rituxan today, reports feeling some muscle weakness just prior to the 1st dose that has now resolved. No other inflammatory sx today. Peripheral neuropathy is stable but chronic, OT is helping significantly. Also doing PT which continues to help for the back, but can only ambulate for approx 10 min then needs to rest. No worsening back pain. Hair is growing back in, saw derm, likely stress related. No infectious sx, feels well otherwise. \par \par 3/7/22 -- No current GI sx or new neuropathic sx but ongoing chronic neuropathic issues despite PT and OT, some contractures starting in her R hand, insurance did not approve her OT brace. No rashes, no F/C, night sweats, unintentional weight loss. Worsening SI joint pain which limits her ability to stand to approx 5 min before she has to sit again, OA changes on recent imaging. Also with worsening of her asthma and seasonal allergies, PMD/pulm has had to put her on steroids. No falls, asking for disability tag for her car as very hard to walk any distance. Asking about Evusheld, remains reluctant to get covid vaccines 2/2 concern for clots. \par \par 5/16/22 -- Recent admission for SBO, non surgically resolved, ? ex lap to see why it happened but she is not sure she wants to pursue this. No current GI issues, no current vasculitis activity. OT and gabapentin helping with weakness/neuropathy. No rashes, fevers, night sweats. Ongoing L spine spasm and pain tho has been more able to manage. \par \par 9/12/22 -- Overall has been doing well, no further GI issues s/p SBO, exploratory sx is not being actively considered, remainder of vasculitis ROS negative. OT and meds continue to help with neuropathy/weakness in hands and L spine pain/spasm. S/p Rituxan in June, Reclast in Aug, no SE with either. \par \par 12/5/22 -- Ongoing LBP which is limiting ability to ambulate/stand for prolonged periods but she does get benefit from swimming in summer, no radicular sx or LE weakness, tylenol prior to walking did not help much, she has completed her insurance covered PT and OT visits for this year. Stable strength in R hand, has been diligent with HEP. No GI sx, no new neuropathic sx, no rashes. No falls. Asking to see if she can taper off gabapentin to see if she can take less.

## 2022-12-05 NOTE — PHYSICAL EXAM
[General Appearance - Alert] : alert [General Appearance - In No Acute Distress] : in no acute distress [General Appearance - Well Nourished] : well nourished [Sclera] : the sclera and conjunctiva were normal [PERRL With Normal Accommodation] : pupils were equal in size, round, and reactive to light [Extraocular Movements] : extraocular movements were intact [Outer Ear] : the ears and nose were normal in appearance [Oropharynx] : the oropharynx was normal [Neck Appearance] : the appearance of the neck was normal [Auscultation Breath Sounds / Voice Sounds] : lungs were clear to auscultation bilaterally [Heart Rate And Rhythm] : heart rate was normal and rhythm regular [Heart Sounds] : normal S1 and S2 [Murmurs] : no murmurs [Edema] : there was no peripheral edema [Bowel Sounds] : normal bowel sounds [Abdomen Soft] : soft [Abdomen Tenderness] : non-tender [Abdomen Mass (___ Cm)] : no abdominal mass palpated [Cervical Lymph Nodes Enlarged Posterior Bilaterally] : posterior cervical [Cervical Lymph Nodes Enlarged Anterior Bilaterally] : anterior cervical [Supraclavicular Lymph Nodes Enlarged Bilaterally] : supraclavicular [No CVA Tenderness] : no ~M costovertebral angle tenderness [No Spinal Tenderness] : no spinal tenderness [Nail Clubbing] : no clubbing  or cyanosis of the fingernails [Musculoskeletal - Swelling] : no joint swelling seen [] : no rash [Oriented To Time, Place, And Person] : oriented to person, place, and time [Impaired Insight] : insight and judgment were intact [Affect] : the affect was normal [FreeTextEntry1] : good ability to arise from seated, good strength in b/l feet and proximal muscles

## 2022-12-05 NOTE — REVIEW OF SYSTEMS
[Arthralgias] : arthralgias [As Noted in HPI] : as noted in HPI [Difficulty Walking] : difficulty walking [Negative] : Heme/Lymph [Joint Pain] : joint pain [Joint Swelling] : no joint swelling [Joint Stiffness] : no joint stiffness

## 2022-12-05 NOTE — ASSESSMENT
[FreeTextEntry1] : JOSE ALEJANDRO HOFF is a 57 year old woman with ANCA + (+ MPO; +P-ANCA; +RF; +DS-DNA, C-ANCA) vasculitis with clinical manifestations of vasculitic rashes, oral/nasal ulcerations, asthma exacerbation, eosinophilic esophagitis, GI perforation with inflammatory pathology, and neuropathy. Unclear if EGPA vs MPA at present, features can match both and eosinophils only seen in some areas of her case. Given extensive Ab +, Rituxan chosen as steroid sparing treatment with marked responsiveness and able to taper off steroids. Without GI or cutaneous involvement at present, stable neurologic sx which are likely partially chronic, improved strength with OT and PT but has now plateaued and R hand contracture developing tho continued OT is helping minimize progression and now appears to have stopped progressing. \par \par # vasculitis, hx as above, no active symptoms today \par - Started April 2021, last dose June 2022, will plan for infusion this Jan after holiday season as she continues to decline vaccine and will be having family over. Given her overall minimal activity since initial presentation we will proceed to a surveillance phase as she will have completed 2 years of use, and will plan for prn dosing if flares \par - most recent labs reviewed and stable\par - f/u pulm and surgery as recommended \par - will repeat labs 1 month after Rituxan infusion \par \par # chronic neuropathy and R hand contracture\par - c/w HEP as is out of sessions covered by insurance, will resume OT and PT in 2023 as helping significantly more than HEP alone \par - c/w use of splint to avoid fixed R hand contracture \par - c/w gabapentin at current dose for chronic neuropathy but I am ok to gradual tapering to see if she can be on a lower dose - taper by 1 cap qweekly, reviewed with her, resume if pain worsening on taper \par - B12/folate levels in range, c/w B12 supplementation and will repeat with next labs  \par \par # SI joint arthritis, L spine spasm \par - US guided local injections helped\par - c/w prn flexeril as this is helping \par - c/w tylenol prn back pain, off opioids, advised to limit ambulation to <10 min, bring cane to use prn and use moist heat as this helps\par - advised to see if she can try an elliptical machine for exercise\par - if no improvement with above, will refer back to ortho spine \par \par # immunocompromised status \par - pt is immunocompromised and would benefit from vaccination but currently declining covid vaccine and did not get flu vaccine 2021(normally does get it ) -- extensive discussion including minimal risk of blood clots with Moderna or Pfizer versions of vaccine however she continues to decline\par - also discussed Evusheld, she would be a candidate given Rituxan and lack of vaccination, full cardiac w/u has been negative, she continues to decline at present \par - given immunosuppressed status, pt knows to call if febrile or unwell, knows to hold medication while on Abx \par \par # 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 \par - Weight bearing exercise for 30min 3-4x/week to maintain BMD as tolerated, can break into 4 10 min sessions \par - S/p Reclast Aug 2022, next due Aug 2023\par - will repeat DEXA now \par - check Vit D level with next labs \par \par RTC in 3-4 months \par

## 2022-12-28 ENCOUNTER — TRANSCRIPTION ENCOUNTER (OUTPATIENT)
Age: 57
End: 2022-12-28

## 2023-01-06 ENCOUNTER — TRANSCRIPTION ENCOUNTER (OUTPATIENT)
Age: 58
End: 2023-01-06

## 2023-01-06 RX ORDER — RITUXIMAB 10 MG/ML
500 INJECTION, SOLUTION INTRAVENOUS
Qty: 2 | Refills: 0 | Status: ACTIVE | COMMUNITY
Start: 2023-01-06

## 2023-01-13 ENCOUNTER — TRANSCRIPTION ENCOUNTER (OUTPATIENT)
Age: 58
End: 2023-01-13

## 2023-01-17 NOTE — CONSULT NOTE ADULT - CONSULT REASON
Attempted call again: no answer. Will try again before appointment time   
MA writer attempted a call to patient. Unable to LVM.  Will send eadvice in regards to patient: if she wants to be seen but appointment is not necessary prior to rescheduling appointment.    
Asthma

## 2023-01-19 ENCOUNTER — FORM ENCOUNTER (OUTPATIENT)
Age: 58
End: 2023-01-19

## 2023-01-19 ENCOUNTER — APPOINTMENT (OUTPATIENT)
Dept: ENDOCRINOLOGY | Facility: CLINIC | Age: 58
End: 2023-01-19
Payer: MEDICAID

## 2023-01-19 VITALS
HEIGHT: 61 IN | DIASTOLIC BLOOD PRESSURE: 86 MMHG | RESPIRATION RATE: 16 BRPM | BODY MASS INDEX: 34.36 KG/M2 | HEART RATE: 54 BPM | WEIGHT: 182 LBS | OXYGEN SATURATION: 99 % | SYSTOLIC BLOOD PRESSURE: 124 MMHG | TEMPERATURE: 98.1 F

## 2023-01-19 PROCEDURE — 99214 OFFICE O/P EST MOD 30 MIN: CPT

## 2023-01-24 ENCOUNTER — NON-APPOINTMENT (OUTPATIENT)
Age: 58
End: 2023-01-24

## 2023-01-29 NOTE — ED PROVIDER NOTE - NEUROLOGICAL, MLM
PROVIDER:[TOKEN:[[id number]]] Alert and oriented, no focal deficits, no motor or sensory deficits.

## 2023-02-03 ENCOUNTER — RX RENEWAL (OUTPATIENT)
Age: 58
End: 2023-02-03

## 2023-02-03 RX ORDER — RITUXIMAB 10 MG/ML
500 INJECTION, SOLUTION INTRAVENOUS
Qty: 2 | Refills: 0 | Status: DISCONTINUED | COMMUNITY
Start: 2021-10-11 | End: 2023-02-03

## 2023-02-06 RX ORDER — ACETAMINOPHEN 325 MG/1
325 TABLET ORAL
Refills: 0 | Status: COMPLETED | OUTPATIENT
Start: 2023-02-06 | End: 1900-01-01

## 2023-02-06 RX ORDER — CETIRIZINE HYDROCHLORIDE 10 MG/1
10 TABLET, COATED ORAL
Refills: 0 | Status: COMPLETED | OUTPATIENT
Start: 2023-02-06 | End: 1900-01-01

## 2023-02-06 RX ORDER — RITUXIMAB 10 MG/ML
500 INJECTION, SOLUTION INTRAVENOUS
Refills: 0 | Status: COMPLETED | OUTPATIENT
Start: 2023-02-06 | End: 1900-01-01

## 2023-02-06 RX ORDER — METHYLPREDNISOLONE 125 MG/2ML
125 INJECTION, POWDER, LYOPHILIZED, FOR SOLUTION INTRAMUSCULAR; INTRAVENOUS
Qty: 0 | Refills: 0 | Status: COMPLETED | OUTPATIENT
Start: 2023-02-06 | End: 1900-01-01

## 2023-02-22 RX ORDER — METHYLPREDNISOLONE 125 MG/2ML
125 INJECTION, POWDER, LYOPHILIZED, FOR SOLUTION INTRAMUSCULAR; INTRAVENOUS
Qty: 1 | Refills: 0 | Status: DISCONTINUED | COMMUNITY
Start: 2023-01-06 | End: 2023-02-22

## 2023-02-22 RX ORDER — ACETAMINOPHEN 325 MG/1
325 TABLET ORAL
Qty: 2 | Refills: 0 | Status: DISCONTINUED | COMMUNITY
Start: 2023-01-06 | End: 2023-02-22

## 2023-02-22 RX ORDER — CETIRIZINE HYDROCHLORIDE 10 MG/1
10 TABLET, COATED ORAL
Qty: 1 | Refills: 0 | Status: DISCONTINUED | COMMUNITY
Start: 2023-01-06 | End: 2023-02-22

## 2023-02-27 ENCOUNTER — NON-APPOINTMENT (OUTPATIENT)
Age: 58
End: 2023-02-27

## 2023-02-28 ENCOUNTER — APPOINTMENT (OUTPATIENT)
Dept: RHEUMATOLOGY | Facility: CLINIC | Age: 58
End: 2023-02-28
Payer: MEDICARE

## 2023-02-28 VITALS
DIASTOLIC BLOOD PRESSURE: 69 MMHG | TEMPERATURE: 98 F | SYSTOLIC BLOOD PRESSURE: 118 MMHG | OXYGEN SATURATION: 96 % | HEART RATE: 63 BPM | RESPIRATION RATE: 17 BRPM

## 2023-02-28 VITALS
TEMPERATURE: 98 F | DIASTOLIC BLOOD PRESSURE: 70 MMHG | HEART RATE: 70 BPM | OXYGEN SATURATION: 97 % | RESPIRATION RATE: 18 BRPM | SYSTOLIC BLOOD PRESSURE: 107 MMHG

## 2023-02-28 VITALS
SYSTOLIC BLOOD PRESSURE: 109 MMHG | DIASTOLIC BLOOD PRESSURE: 73 MMHG | OXYGEN SATURATION: 96 % | TEMPERATURE: 98 F | HEART RATE: 58 BPM | RESPIRATION RATE: 18 BRPM

## 2023-02-28 PROCEDURE — 96374 THER/PROPH/DIAG INJ IV PUSH: CPT | Mod: 59

## 2023-02-28 PROCEDURE — 96413 CHEMO IV INFUSION 1 HR: CPT

## 2023-02-28 PROCEDURE — 96415 CHEMO IV INFUSION ADDL HR: CPT

## 2023-02-28 RX ORDER — CETIRIZINE HYDROCHLORIDE 10 MG/1
10 TABLET, COATED ORAL
Qty: 0 | Refills: 0 | Status: COMPLETED
Start: 2023-02-06

## 2023-02-28 RX ORDER — RITUXIMAB 10 MG/ML
500 INJECTION, SOLUTION INTRAVENOUS
Qty: 0 | Refills: 0 | Status: COMPLETED
Start: 2023-02-06

## 2023-02-28 RX ORDER — ACETAMINOPHEN 325 MG/1
325 TABLET ORAL
Qty: 0 | Refills: 0 | Status: COMPLETED
Start: 2023-02-06

## 2023-02-28 RX ORDER — METHYLPREDNISOLONE 125 MG/2ML
125 INJECTION, POWDER, LYOPHILIZED, FOR SOLUTION INTRAMUSCULAR; INTRAVENOUS
Qty: 0 | Refills: 0 | Status: COMPLETED
Start: 2023-02-06

## 2023-02-28 NOTE — HISTORY OF PRESENT ILLNESS
[Denies] : Denies [No] : No [Yes] : Yes [Declined] : Declined [TB] : Tuberculosis screening [Hep acute panel] : Hepatitis acute panel [Left upper extremity] : Left upper extremity [24g] : 24g [Medication Name: ___] : Medication Name: [unfilled] [Start Time: ___] : Medication Start Time: [unfilled] [Patient  instructed to seek medical attention with signs and symptoms of adverse effects] : Patient  instructed to seek medical attention with signs and symptoms of adverse effects [Patient left unit in no acute distress] : Patient left unit in no acute distress [Medications administered as ordered and tolerated well.] : Medications administered as ordered and tolerated well. [End Time: ___] : Medication End Time: [unfilled] [IV discontinued. Intact. No signs or symptoms of IV complications noted. Time: ___] : IV discontinued. Intact. No signs or symptoms of IV complications noted. Time: [unfilled] [de-identified] : 9;10 am

## 2023-03-06 ENCOUNTER — LABORATORY RESULT (OUTPATIENT)
Age: 58
End: 2023-03-06

## 2023-03-06 ENCOUNTER — APPOINTMENT (OUTPATIENT)
Dept: RHEUMATOLOGY | Facility: CLINIC | Age: 58
End: 2023-03-06
Payer: MEDICARE

## 2023-03-06 VITALS
OXYGEN SATURATION: 96 % | WEIGHT: 184 LBS | BODY MASS INDEX: 34.74 KG/M2 | TEMPERATURE: 98.2 F | SYSTOLIC BLOOD PRESSURE: 124 MMHG | HEIGHT: 61 IN | DIASTOLIC BLOOD PRESSURE: 78 MMHG | HEART RATE: 67 BPM

## 2023-03-06 PROCEDURE — 99214 OFFICE O/P EST MOD 30 MIN: CPT | Mod: 25

## 2023-03-06 PROCEDURE — 36415 COLL VENOUS BLD VENIPUNCTURE: CPT

## 2023-03-07 LAB
ALBUMIN SERPL ELPH-MCNC: 4.3 G/DL
ALP BLD-CCNC: 111 U/L
ALT SERPL-CCNC: 20 U/L
ANION GAP SERPL CALC-SCNC: 14 MMOL/L
APPEARANCE: CLEAR
AST SERPL-CCNC: 62 U/L
BACTERIA: NEGATIVE
BASOPHILS # BLD AUTO: 0.06 K/UL
BASOPHILS NFR BLD AUTO: 0.6 %
BILIRUB SERPL-MCNC: 0.2 MG/DL
BILIRUBIN URINE: NEGATIVE
BLOOD URINE: NEGATIVE
BUN SERPL-MCNC: 16 MG/DL
CALCIUM SERPL-MCNC: 9.8 MG/DL
CHLORIDE SERPL-SCNC: 106 MMOL/L
CO2 SERPL-SCNC: 24 MMOL/L
COLOR: NORMAL
CREAT SERPL-MCNC: 0.7 MG/DL
CRP SERPL-MCNC: <3 MG/L
EGFR: 100 ML/MIN/1.73M2
EOSINOPHIL # BLD AUTO: 1.14 K/UL
EOSINOPHIL NFR BLD AUTO: 12 %
ERYTHROCYTE [SEDIMENTATION RATE] IN BLOOD BY WESTERGREN METHOD: 24 MM/HR
FOLATE SERPL-MCNC: 19.7 NG/ML
GLUCOSE QUALITATIVE U: NEGATIVE
GLUCOSE SERPL-MCNC: 101 MG/DL
HCT VFR BLD CALC: 41.9 %
HGB BLD-MCNC: 13.3 G/DL
HYALINE CASTS: 0 /LPF
IMM GRANULOCYTES NFR BLD AUTO: 0.2 %
KETONES URINE: NEGATIVE
LEUKOCYTE ESTERASE URINE: ABNORMAL
LYMPHOCYTES # BLD AUTO: 2.07 K/UL
LYMPHOCYTES NFR BLD AUTO: 21.8 %
MAN DIFF?: NORMAL
MCHC RBC-ENTMCNC: 28.6 PG
MCHC RBC-ENTMCNC: 31.7 GM/DL
MCV RBC AUTO: 90.1 FL
MICROSCOPIC-UA: NORMAL
MONOCYTES # BLD AUTO: 0.64 K/UL
MONOCYTES NFR BLD AUTO: 6.7 %
NEUTROPHILS # BLD AUTO: 5.57 K/UL
NEUTROPHILS NFR BLD AUTO: 58.7 %
NITRITE URINE: NEGATIVE
PH URINE: 6
PLATELET # BLD AUTO: 312 K/UL
POTASSIUM SERPL-SCNC: 4.7 MMOL/L
PROT SERPL-MCNC: 6.2 G/DL
PROTEIN URINE: NEGATIVE
RBC # BLD: 4.65 M/UL
RBC # FLD: 14.1 %
RED BLOOD CELLS URINE: 1 /HPF
SODIUM SERPL-SCNC: 144 MMOL/L
SPECIFIC GRAVITY URINE: 1.02
SQUAMOUS EPITHELIAL CELLS: 1 /HPF
UROBILINOGEN URINE: NORMAL
VIT B12 SERPL-MCNC: 925 PG/ML
WBC # FLD AUTO: 9.5 K/UL
WHITE BLOOD CELLS URINE: 2 /HPF

## 2023-03-08 NOTE — PHYSICAL EXAM
[General Appearance - Alert] : alert [General Appearance - In No Acute Distress] : in no acute distress [General Appearance - Well Nourished] : well nourished [Sclera] : the sclera and conjunctiva were normal [PERRL With Normal Accommodation] : pupils were equal in size, round, and reactive to light [Extraocular Movements] : extraocular movements were intact [Outer Ear] : the ears and nose were normal in appearance [Oropharynx] : the oropharynx was normal [Neck Appearance] : the appearance of the neck was normal [Auscultation Breath Sounds / Voice Sounds] : lungs were clear to auscultation bilaterally [Heart Rate And Rhythm] : heart rate was normal and rhythm regular [Heart Sounds] : normal S1 and S2 [Murmurs] : no murmurs [Edema] : there was no peripheral edema [Bowel Sounds] : normal bowel sounds [Abdomen Soft] : soft [Abdomen Tenderness] : non-tender [Abdomen Mass (___ Cm)] : no abdominal mass palpated [Cervical Lymph Nodes Enlarged Posterior Bilaterally] : posterior cervical [Cervical Lymph Nodes Enlarged Anterior Bilaterally] : anterior cervical [Supraclavicular Lymph Nodes Enlarged Bilaterally] : supraclavicular [No CVA Tenderness] : no ~M costovertebral angle tenderness [No Spinal Tenderness] : no spinal tenderness [Nail Clubbing] : no clubbing  or cyanosis of the fingernails [Musculoskeletal - Swelling] : no joint swelling seen [] : no rash [Oriented To Time, Place, And Person] : oriented to person, place, and time [Impaired Insight] : insight and judgment were intact [Affect] : the affect was normal [Abnormal Walk] : normal gait [Motor Tone] : muscle strength and tone were normal [FreeTextEntry1] : good ability to arise from seated, good strength in b/l feet and proximal muscles, no change to strength from last exam

## 2023-03-08 NOTE — REVIEW OF SYSTEMS
[Arthralgias] : arthralgias [Difficulty Walking] : difficulty walking [Negative] : Heme/Lymph [As Noted in HPI] : as noted in HPI [Feeling Tired] : feeling tired [Joint Swelling] : no joint swelling [Joint Stiffness] : no joint stiffness

## 2023-03-08 NOTE — ASSESSMENT
[FreeTextEntry1] : JOSE ALEJANDRO HOFF is a 57 year old woman with ANCA + (+ MPO; +P-ANCA; +RF; +DS-DNA, C-ANCA) vasculitis with clinical manifestations of vasculitic rashes, oral/nasal ulcerations, asthma exacerbation, eosinophilic esophagitis, GI perforation with inflammatory pathology, and neuropathy. Unclear if EGPA vs MPA at present, features can match both and eosinophils only seen in some areas of her case. Given extensive Ab +, Rituxan chosen as steroid sparing treatment with marked responsiveness and able to taper off steroids. Without GI or cutaneous involvement at present, stable neurologic sx which are likely partially chronic, improved strength with OT and PT but has now plateaued and R hand contracture developing tho continued OT is helping minimize progression and now appears to have stopped progressing. \par \par # vasculitis, hx as above, recent sx appear more infusion related than flare \par - Started April 2021, s/p last dose Feb 2023, given her overall minimal activity since initial presentation we will proceed to a surveillance phase as she has completed 2 years of use, and will plan for prn dosing if flares \par - repeat labs now, check B12/folate as well - drawn by [facility] today\par - Medrol Dosepak to see if alleviates symptoms\par \par # chronic neuropathy and R hand contracture\par - c/w HEP for now, if new/worsening weakness will need to resume OT and PT as in the past was helping significantly more than HEP alone \par - c/w use of splint to avoid fixed R hand contracture \par - c/w gabapentin and advised to slowly increase back up to original dose\par - c/w B12 supplementation  \par \par # SI joint arthritis, L spine spasm \par - US guided local injections helped right side, persistent L sided pain\par - c/w prn flexeril \par - c/w tylenol prn back pain, off opioids, advised to limit ambulation to <10 min, bring cane to use prn and use moist heat as this helps\par - advised to see if she can try an elliptical machine for exercise\par - Pain management referral for evaluation of possible trigger points and/or medical marijuana.  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 \par - Weight bearing exercise for 30min 3-4x/week to maintain BMD as tolerated, can break into 4 10 min sessions \par - S/p Reclast Aug 2022, next due Aug 2023\par - Has order to repeat DEXA --will need prior to next Reclast infusion\par \par RTC in 3 months \par

## 2023-03-08 NOTE — HISTORY OF PRESENT ILLNESS
[FreeTextEntry1] : JOSE ALEJANDRO HOFF is a 56 year old woman with pmh R nephrectomy in 2004 for unclear reason, HTN, asthma- with recurrent need for steroids nearly consistently since 8/20- 10-40 mg daily, hypothyroidism. Initially seen as hospital consult for new onset progressive peripheral neuropathy, purpuric rash, granulomatous rash on elbows, and oral/ nasal ulcerations, and significantly 50 lb wt loss. Was in usual state health with intermittent asthma/ and sinus infection but otherwise healthy till 8/20. Recent evaluation for mid epigastric abd/ non bloody emesis, EGD + gastritis confirmed eosinophilic esophagitis/ gastritis. Extensive w/u neuro/ rheum work up negative as per patient in the past. Here she is noted to have + MPO; +P-ANCA; +RF; +DS-DNA. C-ANCA is now positive. Discharged, then readmitted with GI perforation, tissue appeared inflammatory during resection, sent for pathology which confirming inflammatory etiology. Skin biopsy also consistent with vasculitis. \par \par Presently healing well from abd sx, no GI sx at present. Rashes, lung issues, sinus involvement improved with steroids. Weight has stabilized, no other constitutional sx. Ongoing neuropathy with weak . \par \par ---------\par 4/12/21 -- Just discharged from repeat hospital admission, found with SBO at site of anastomosis and associated abscess, s/p Abx and IR guided drainage, feeling better, no current abdominal complaints aside from mild nausea. Ongoing neurologic complaints which are slightly worse. \par \par 6/21/21 -- Tolerated Rituxan loading well, no SE, had no current GI sx, strength improving slowly, has been doing home exercises. No other vasculitis sx, no F/C, weight stable, improved appetite and slowly broadening diet without issues. Ongoing lower back pain, improved with opioids. \par \par 8/30/21 -- Doing well, no further rashes or GI sx, tolerating a normal diet, strength continues to improve. No infectious sx, F/C, unintentional weight loss. Back pain stable and slowly improving, not using opioids. \par \par 11/29/21 -- Doing well, getting 2nd dose of q6 month Rituxan today, reports feeling some muscle weakness just prior to the 1st dose that has now resolved. No other inflammatory sx today. Peripheral neuropathy is stable but chronic, OT is helping significantly. Also doing PT which continues to help for the back, but can only ambulate for approx 10 min then needs to rest. No worsening back pain. Hair is growing back in, saw derm, likely stress related. No infectious sx, feels well otherwise. \par \par 3/7/22 -- No current GI sx or new neuropathic sx but ongoing chronic neuropathic issues despite PT and OT, some contractures starting in her R hand, insurance did not approve her OT brace. No rashes, no F/C, night sweats, unintentional weight loss. Worsening SI joint pain which limits her ability to stand to approx 5 min before she has to sit again, OA changes on recent imaging. Also with worsening of her asthma and seasonal allergies, PMD/pulm has had to put her on steroids. No falls, asking for disability tag for her car as very hard to walk any distance. Asking about Ev[employer], remains reluctant to get covid vaccines 2/2 concern for clots. \par \par 5/16/22 -- Recent admission for SBO, non surgically resolved, ? ex lap to see why it happened but she is not sure she wants to pursue this. No current GI issues, no current vasculitis activity. OT and gabapentin helping with weakness/neuropathy. No rashes, fevers, night sweats. Ongoing L spine spasm and pain tho has been more able to manage. \par \par 9/12/22 -- Overall has been doing well, no further GI issues s/p SBO, exploratory sx is not being actively considered, remainder of vasculitis ROS negative. OT and meds continue to help with neuropathy/weakness in hands and L spine pain/spasm. S/p Rituxan in June, Reclast in Aug, no SE with either. \par \par 12/5/22 -- Ongoing LBP which is limiting ability to ambulate/stand for prolonged periods but she does get benefit from swimming in summer, no radicular sx or LE weakness, tylenol prior to walking did not help much, she has completed her insurance covered PT and OT visits for this year. Stable strength in R hand, has been diligent with HEP. No GI sx, no new neuropathic sx, no rashes. No falls. Asking to see if she can taper off gabapentin to see if she can take less. \par \par 3/6/23 -- Severe fatigue s/p Rituxan this time, now beginning to lift, also with some worsening neuropathic pain in RUE and RLE but no worsening weakness, no other neurologic sx, remainder of vasculitis ROS negative. ?in setting of her tapering back her gabapentin as now decreased by 50%. Ongoing LBP, continues to make walking and ADLs difficult, R sided SI joint injection was effective, L sided x 2 much less so.

## 2023-03-13 ENCOUNTER — APPOINTMENT (OUTPATIENT)
Dept: RHEUMATOLOGY | Facility: CLINIC | Age: 58
End: 2023-03-13

## 2023-03-14 ENCOUNTER — APPOINTMENT (OUTPATIENT)
Dept: PAIN MANAGEMENT | Facility: CLINIC | Age: 58
End: 2023-03-14
Payer: MEDICARE

## 2023-03-14 ENCOUNTER — APPOINTMENT (OUTPATIENT)
Dept: RHEUMATOLOGY | Facility: CLINIC | Age: 58
End: 2023-03-14

## 2023-03-14 VITALS — WEIGHT: 180 LBS | HEIGHT: 61 IN | BODY MASS INDEX: 33.99 KG/M2

## 2023-03-14 PROCEDURE — 99204 OFFICE O/P NEW MOD 45 MIN: CPT

## 2023-03-14 NOTE — HISTORY OF PRESENT ILLNESS
[Lower back] : lower back [8] : 8 [Burning] : burning [Shooting] : shooting [Constant] : constant [Household chores] : household chores [Sleep] : sleep [Standing] : standing [Walking] : walking [de-identified] : \par 03/14/2023- PATIENT STATES COMPLETED PHYSICAL THERAPY IN THE PAST 2022 3X A WEEK  AND REPORT'S WORSEN  IMPROVEMENT WITH PAIN.\par PT STATES CURRENTLY COMPLETES HOME STRETCHING PROGRAM AT HOME 7X A WEEK  AND REPORT'S MILD IMPROVEMENT WITH PAIN. [] : Post Surgical Visit: no [de-identified] : 2022 [de-identified] : 12/04/2020 MRI

## 2023-03-14 NOTE — ASSESSMENT
[FreeTextEntry1] : Subjective findings\par This 57-year-old female presents to us with pain in the back with a rating component down to the buttocks bilaterally.  Patient has a history of multiple compression fractures of her lumbar spine couple years ago that went untreated.  She has been left with this residual pain in her back that is made it difficult for her to function.  She denies any bowel or bladder incontinence or any progressive weakness but states that the pain is very life limiting.  She presently is under the care of a rheumatologist for these issues.  Patient has not had any recent imaging studies of the lumbar spine.  Patient is not using any medical management besides Tylenol for the treatment of the pain.  The patient says that is mostly with activity that makes the pain worse.  The CV/Pulm/GI/Heme/Renal/Hepatic//Psych/Endo systems are reviewed. A full ROS was performed and reviewed with the patient today, see intake form.  Average pain score for the month is 7 out of ten. The patient's current medications are documented to the best of their ability. The patient has failed conservative therapies to include medical management, and physical activity to treat the pain. The patient has decreased function secondary to the pain.\par Objective findings\par There are no recent imaging studies of the lumbar spine.  Patient is able to get to a standing position without assistance.  Motor and sensory exams appear grossly intact.\par Assessment\par Compression fractures with pain\par Plan\par Prior to any interventional pain management the patient will obtain an MRI of the lumbar spine to determine the source of this discomfort.  Spoke to patient about epidural steroid injections. Explained risks, benefits and alternatives including but not limited to the risk of infection, bleeding, headache, syncopal episode, failure to resolve issues, allergic reaction, symptom recurrence, allergic reaction, nerve injury, and increased pain. The patient understands the risks. All questions were answered. The patient is willing to proceed. The importance of increasing exercise after the injection is also stressed. The use of the injection as a jump start so that the patient can do more exercise, but that the exercise is the long term answer for the patient is reviewed. The patient states understanding.\par We also discussed with the patient medical management of this and will be determined by the imaging study which way we proceed.

## 2023-03-29 ENCOUNTER — TRANSCRIPTION ENCOUNTER (OUTPATIENT)
Age: 58
End: 2023-03-29

## 2023-03-29 RX ORDER — SODIUM CHLORIDE 0.65 %
2 AEROSOL, SPRAY (ML) NASAL
Qty: 0 | Refills: 0 | DISCHARGE

## 2023-03-29 RX ORDER — BUDESONIDE, MICRONIZED 100 %
0 POWDER (GRAM) MISCELLANEOUS
Qty: 0 | Refills: 0 | DISCHARGE

## 2023-03-29 RX ORDER — LEVOTHYROXINE SODIUM 125 MCG
1 TABLET ORAL
Qty: 0 | Refills: 0 | DISCHARGE

## 2023-03-29 RX ORDER — LANOLIN ALCOHOL/MO/W.PET/CERES
1 CREAM (GRAM) TOPICAL
Qty: 0 | Refills: 0 | DISCHARGE

## 2023-03-29 NOTE — ASU PATIENT PROFILE, ADULT - NSICDXPASTMEDICALHX_GEN_ALL_CORE_FT
PAST MEDICAL HISTORY:  Asthma     H/O autoimmune disorder     HTN (hypertension)     Hypothyroid     Sciatica      PAST MEDICAL HISTORY:  Asthma controlled    H/O autoimmune disorder vasculitis    HTN (hypertension)     Hypothyroid     Sciatica      PAST MEDICAL HISTORY:  Asthma controlled    H/O autoimmune disorder vasculitis    HTN (hypertension)     Hypothyroid     Neuropathy 8/2020    Sciatica

## 2023-03-29 NOTE — ASU DISCHARGE PLAN (ADULT/PEDIATRIC) - NS MD DC FALL RISK RISK
For information on Fall & Injury Prevention, visit: [email or web address] OR  [email or web address] OR  https://www.cdc.gov/steadi/patient.html

## 2023-03-29 NOTE — ASU DISCHARGE PLAN (ADULT/PEDIATRIC) - PATIENT EDUCATION MATERIALS PROVIED
Refer to Dr. Jain Post Procedure Discharge Instructions/Pre-printed instructions given for nerve block

## 2023-03-29 NOTE — ASU PATIENT PROFILE, ADULT - NSICDXPASTSURGICALHX_GEN_ALL_CORE_FT
PAST SURGICAL HISTORY:  H/O  section     H/O right nephrectomy      PAST SURGICAL HISTORY:  H/O  section two    H/O right nephrectomy nephrotic kidney 2004    History of resection of small bowel

## 2023-03-30 ENCOUNTER — OUTPATIENT (OUTPATIENT)
Dept: OUTPATIENT SERVICES | Facility: HOSPITAL | Age: 58
LOS: 1 days | End: 2023-03-30
Payer: MEDICARE

## 2023-03-30 ENCOUNTER — APPOINTMENT (OUTPATIENT)
Dept: ORTHOPEDIC SURGERY | Facility: HOSPITAL | Age: 58
End: 2023-03-30
Payer: MEDICARE

## 2023-03-30 VITALS
TEMPERATURE: 98 F | OXYGEN SATURATION: 100 % | WEIGHT: 179.9 LBS | SYSTOLIC BLOOD PRESSURE: 155 MMHG | HEART RATE: 68 BPM | DIASTOLIC BLOOD PRESSURE: 68 MMHG | RESPIRATION RATE: 20 BRPM | HEIGHT: 61 IN

## 2023-03-30 VITALS
HEART RATE: 63 BPM | OXYGEN SATURATION: 97 % | RESPIRATION RATE: 19 BRPM | SYSTOLIC BLOOD PRESSURE: 134 MMHG | DIASTOLIC BLOOD PRESSURE: 79 MMHG

## 2023-03-30 DIAGNOSIS — Z90.5 ACQUIRED ABSENCE OF KIDNEY: Chronic | ICD-10-CM

## 2023-03-30 DIAGNOSIS — M54.16 RADICULOPATHY, LUMBAR REGION: ICD-10-CM

## 2023-03-30 DIAGNOSIS — Z98.891 HISTORY OF UTERINE SCAR FROM PREVIOUS SURGERY: Chronic | ICD-10-CM

## 2023-03-30 DIAGNOSIS — Z90.49 ACQUIRED ABSENCE OF OTHER SPECIFIED PARTS OF DIGESTIVE TRACT: Chronic | ICD-10-CM

## 2023-03-30 PROCEDURE — 62323 NJX INTERLAMINAR LMBR/SAC: CPT

## 2023-03-30 RX ORDER — ASCORBIC ACID 60 MG
1 TABLET,CHEWABLE ORAL
Qty: 0 | Refills: 0 | DISCHARGE

## 2023-03-30 RX ORDER — FLUTICASONE PROPIONATE 50 MCG
1 SPRAY, SUSPENSION NASAL
Qty: 0 | Refills: 0 | DISCHARGE

## 2023-03-30 RX ORDER — MAGNESIUM OXIDE 400 MG ORAL TABLET 241.3 MG
1 TABLET ORAL
Qty: 0 | Refills: 0 | DISCHARGE

## 2023-04-24 LAB
25(OH)D3 SERPL-MCNC: 51.7 NG/ML
ALBUMIN SERPL ELPH-MCNC: 4.5 G/DL
ALP BLD-CCNC: 105 U/L
ALT SERPL-CCNC: 20 U/L
ANION GAP SERPL CALC-SCNC: 13 MMOL/L
APPEARANCE: CLEAR
AST SERPL-CCNC: 62 U/L
BACTERIA: NEGATIVE /HPF
BILIRUB SERPL-MCNC: 0.4 MG/DL
BILIRUBIN URINE: NEGATIVE
BLOOD URINE: NEGATIVE
BUN SERPL-MCNC: 15 MG/DL
CALCIUM SERPL-MCNC: 10 MG/DL
CAST: 1 /LPF
CHLORIDE SERPL-SCNC: 106 MMOL/L
CHOLEST SERPL-MCNC: 214 MG/DL
CO2 SERPL-SCNC: 26 MMOL/L
COLOR: YELLOW
CREAT SERPL-MCNC: 0.88 MG/DL
EGFR: 76 ML/MIN/1.73M2
EPITHELIAL CELLS: 6 /HPF
FOLATE SERPL-MCNC: >20 NG/ML
GLUCOSE QUALITATIVE U: NEGATIVE MG/DL
GLUCOSE SERPL-MCNC: 86 MG/DL
HDLC SERPL-MCNC: 93 MG/DL
KETONES URINE: NEGATIVE MG/DL
LDLC SERPL CALC-MCNC: 112 MG/DL
LEUKOCYTE ESTERASE URINE: ABNORMAL
MICROSCOPIC-UA: NORMAL
NITRITE URINE: NEGATIVE
NONHDLC SERPL-MCNC: 120 MG/DL
PH URINE: 7
POTASSIUM SERPL-SCNC: 5.2 MMOL/L
PROT SERPL-MCNC: 6.4 G/DL
PROTEIN URINE: NORMAL MG/DL
RED BLOOD CELLS URINE: 1 /HPF
SODIUM SERPL-SCNC: 145 MMOL/L
SPECIFIC GRAVITY URINE: 1.02
T3FREE SERPL-MCNC: 2.18 PG/ML
T4 FREE SERPL-MCNC: 1.2 NG/DL
TRIGL SERPL-MCNC: 44 MG/DL
TSH SERPL-ACNC: 4.33 UIU/ML
UROBILINOGEN URINE: 1 MG/DL
VIT B12 SERPL-MCNC: 697 PG/ML
WHITE BLOOD CELLS URINE: 29 /HPF

## 2023-04-25 LAB
BASOPHILS # BLD AUTO: 0.05 K/UL
BASOPHILS NFR BLD AUTO: 0.7 %
EOSINOPHIL # BLD AUTO: 0.43 K/UL
EOSINOPHIL NFR BLD AUTO: 6.4 %
ESTIMATED AVERAGE GLUCOSE: 114 MG/DL
HBA1C MFR BLD HPLC: 5.6 %
HCT VFR BLD CALC: 46.4 %
HGB BLD-MCNC: 14.3 G/DL
IMM GRANULOCYTES NFR BLD AUTO: 0.1 %
LYMPHOCYTES # BLD AUTO: 2.28 K/UL
LYMPHOCYTES NFR BLD AUTO: 33.7 %
MAN DIFF?: NORMAL
MCHC RBC-ENTMCNC: 29.2 PG
MCHC RBC-ENTMCNC: 30.8 GM/DL
MCV RBC AUTO: 94.9 FL
MONOCYTES # BLD AUTO: 0.47 K/UL
MONOCYTES NFR BLD AUTO: 6.9 %
NEUTROPHILS # BLD AUTO: 3.53 K/UL
NEUTROPHILS NFR BLD AUTO: 52.2 %
PLATELET # BLD AUTO: 287 K/UL
RBC # BLD: 4.89 M/UL
RBC # FLD: 14.7 %
WBC # FLD AUTO: 6.77 K/UL

## 2023-05-15 NOTE — ED ADULT TRIAGE NOTE - NS ED NURSE DIRECT TO ROOM YN
Postop Note    Surgical Date: 3/3/2023    Surgery:   ORIF left lateral malleolus with stress EUA left ankle    Subjective:   Gil is here with his mom for his third postop visit. He has no complaints. He denies fevers, chills, or other systemic symptoms. His pain is well-controlled. He has been walking out of his boot and even walked a 5k this weekend. He only reports weakness & stiffness with eversion.oot and remaining on crutches.    Physical Exam:  Vitals:    05/15/23 1004   Temp: 36.9 °C (98.4 °F)   SpO2: (P) 96%     Incision well-healed (+)  NV intact (+)  DF / PF 5/5  Inversion 4+/5  Eversion 4/5  Swelling (-)  CR < 2 secs    Radiographs:  XR left ankle (3 views) from [facility] 5/15/2023 - hardware intact with maintenance of reduction in good alignment with resolution of medial-sided avlusion    XR left ankle (3 views) from [facility] 4/12/2023 - hardware intact with maintenance of reduction in good alignment    XR left ankle (3 views) from [facility] 3/13/2023 - hardware intact with maintenance of reduction in good alignment    Assessment, Plan & Orders: ORIF left lateral malleolus  Continue to activities as tolerated  Encourage increasing ROM & strengthening exercises  Follow up as needed    Mushtaq Wang III, MD  [facility]    
Yes

## 2023-05-26 ENCOUNTER — NON-APPOINTMENT (OUTPATIENT)
Age: 58
End: 2023-05-26

## 2023-05-26 ENCOUNTER — APPOINTMENT (OUTPATIENT)
Dept: INTERNAL MEDICINE | Facility: CLINIC | Age: 58
End: 2023-05-26
Payer: MEDICAID

## 2023-05-26 VITALS
OXYGEN SATURATION: 96 % | TEMPERATURE: 98.1 F | WEIGHT: 180 LBS | DIASTOLIC BLOOD PRESSURE: 82 MMHG | BODY MASS INDEX: 30.73 KG/M2 | SYSTOLIC BLOOD PRESSURE: 132 MMHG | HEART RATE: 65 BPM | HEIGHT: 64 IN

## 2023-05-26 DIAGNOSIS — Z23 ENCOUNTER FOR IMMUNIZATION: ICD-10-CM

## 2023-05-26 DIAGNOSIS — Z00.00 ENCOUNTER FOR GENERAL ADULT MEDICAL EXAMINATION W/OUT ABNORMAL FINDINGS: ICD-10-CM

## 2023-05-26 PROBLEM — G62.9 POLYNEUROPATHY, UNSPECIFIED: Chronic | Status: ACTIVE | Noted: 2023-03-30

## 2023-05-26 PROBLEM — Z86.2 PERSONAL HISTORY OF DISEASES OF THE BLOOD AND BLOOD-FORMING ORGANS AND CERTAIN DISORDERS INVOLVING THE IMMUNE MECHANISM: Chronic | Status: ACTIVE | Noted: 2021-02-19

## 2023-05-26 PROBLEM — J45.909 UNSPECIFIED ASTHMA, UNCOMPLICATED: Chronic | Status: ACTIVE | Noted: 2019-04-11

## 2023-05-26 PROCEDURE — G0009: CPT

## 2023-05-26 PROCEDURE — 90677 PCV20 VACCINE IM: CPT

## 2023-05-26 PROCEDURE — 93000 ELECTROCARDIOGRAM COMPLETE: CPT

## 2023-05-26 PROCEDURE — 99396 PREV VISIT EST AGE 40-64: CPT | Mod: 25

## 2023-05-26 RX ORDER — CYCLOBENZAPRINE HYDROCHLORIDE 5 MG/1
5 TABLET, FILM COATED ORAL
Qty: 60 | Refills: 1 | Status: DISCONTINUED | COMMUNITY
Start: 2022-05-16 | End: 2023-05-26

## 2023-05-26 RX ORDER — METHYLPREDNISOLONE 4 MG/1
4 TABLET ORAL
Qty: 1 | Refills: 0 | Status: DISCONTINUED | COMMUNITY
Start: 2023-03-06 | End: 2023-05-26

## 2023-05-26 NOTE — HISTORY OF PRESENT ILLNESS
[FreeTextEntry1] : Patient presents for a yearly comprehensive physical exam.  [de-identified] : The patient feels relatively well at this time. SHe has a history of HTN and is maintained on diltiazem 300 mg once per day. She is followed by Dr. Dior in cardiology in this regard.  She underwent an echocardiogram and carotid duplex studies in March of last year, which were unremarkable.  She was supposed to undergo a coronary CT calcium score, but it was never performed.  She denies any chest pain or palpitations. She exercises regularly. \par \par She has a history of hypothyroidism and is maintained on levothyroxine 88 mcg once per day. She denies any lethargy. She is followed by Dr. Mosquera in endocrinology in this regard. \par \par She has vasculitis and polyneuropathy and is followed by Dr. Posadas in rheumatology. She has completed 2 years of Rituxan treatment.  Her last sedimentation rate was noted to be 24 in March, with a CRP that was normal.  She is scheduled to undergo repeat inflammatory markers again in the near future.  She still has neuropathy in her hands and feet. She is now down to taking 300 mg of gabapentin TID. \par \par She has a history of moderate persistent asthma and is maintained on Symbicort 160-4.6 mcg/act taking 2 puffs BID. She denies any cough, wheeze or SOB. \par \par She has degenerative disc disease of the lumbosacral spine. She had low back pain until the end of March when she saw Dr. Jain, a pain management specialist.  He recommended an epidural steroid injection into the lumbosacral spine region, which she received.  She had an excellent response, and she has been pain-free since the steroid injection.  Her range of motion has improved.  She denies any pain in the region at the moment. \par \par She denies any urinary symptoms. She is overdue for her routine surveillance colonoscopy. She denies any changes in bowel habits or blood in the stool. She is taking Metamucil and fiber supplements to keep her bowel habits regular. The patient denies any other constitutional symptoms at this time. She now comes in for this assessment.

## 2023-05-26 NOTE — DATA REVIEWED
[FreeTextEntry1] : Routine blood work is reviewed\par \par \par EKG reveals sinus bradycardia at a rate of 51.  There are normal intervals and axis.  There were no acute ST-T wave changes noted.

## 2023-05-26 NOTE — PLAN
[FreeTextEntry1] : 1. Continue current medications as previously outlined. \par \par 2.  Resumption of cardiovascular exercise regimen has been recommended. \par \par 3. Follow-up with Dr. Dior in cardiology for management HTN.\par \par 4. Follow-up with Dr. Posadas in rheumatology for management of vasculitis and polyneuropathy.  At this time, she is being monitored without any immunosuppressive therapy.  She completed a 2-year course of Rituxan.\par \par 5. Patient will undergo a routine surveillance colonoscopy with Dr. Winchester in GI as she is overdue. She will also have an upper endoscopy performed.  There is a possible history of eosinophilic esophagitis.\par \par 6. Follow-up with Dr. Mosquera in endocrinology for management of hypothyroidism and her thyroid nodules. \par \par 7. Prevnar 20 administered at this visit. \par \par 8. Continue nasal washes with baking soda, salt and budesonide on a weekly basis. \par \par 9. Follow-up in 6 months for office visit with full PFT.

## 2023-05-26 NOTE — PHYSICAL EXAM
[Normal Sclera/Conjunctiva] : normal sclera/conjunctiva [EOMI] : extraocular movements intact [Normal Oropharynx] : the oropharynx was normal [No JVD] : no jugular venous distention [Supple] : supple [Clear to Auscultation] : lungs were clear to auscultation bilaterally [No Accessory Muscle Use] : no accessory muscle use [Regular Rhythm] : with a regular rhythm [Normal S1, S2] : normal S1 and S2 [No Edema] : there was no peripheral edema [No Extremity Clubbing/Cyanosis] : no extremity clubbing/cyanosis [Soft] : abdomen soft [Non Tender] : non-tender [Normal Bowel Sounds] : normal bowel sounds [Normal Supraclavicular Nodes] : no supraclavicular lymphadenopathy [Normal Posterior Cervical Nodes] : no posterior cervical lymphadenopathy [Normal Anterior Cervical Nodes] : no anterior cervical lymphadenopathy [No CVA Tenderness] : no CVA  tenderness [No Rash] : no rash [Normal Affect] : the affect was normal [Normal Insight/Judgement] : insight and judgment were intact [de-identified] : Obese white female [de-identified] : There is tenderness on palpation over the lower spine.

## 2023-05-26 NOTE — ADDENDUM
[FreeTextEntry1] : I, Serg You, documented this note as a scribe on behalf of Dr. Johnny Sheldon MD on 05/26/2023.

## 2023-05-26 NOTE — REVIEW OF SYSTEMS
[Joint Stiffness] : joint stiffness [Back Pain] : back pain [Negative] : Heme/Lymph [FreeTextEntry7] : see history of present illness [FreeTextEntry9] : see history of present illness

## 2023-06-05 ENCOUNTER — APPOINTMENT (OUTPATIENT)
Dept: RHEUMATOLOGY | Facility: CLINIC | Age: 58
End: 2023-06-05
Payer: MEDICARE

## 2023-06-05 VITALS
HEIGHT: 64 IN | OXYGEN SATURATION: 98 % | DIASTOLIC BLOOD PRESSURE: 78 MMHG | TEMPERATURE: 98 F | WEIGHT: 182 LBS | BODY MASS INDEX: 31.07 KG/M2 | HEART RATE: 49 BPM | SYSTOLIC BLOOD PRESSURE: 124 MMHG

## 2023-06-05 DIAGNOSIS — E53.8 DEFICIENCY OF OTHER SPECIFIED B GROUP VITAMINS: ICD-10-CM

## 2023-06-05 PROCEDURE — 99214 OFFICE O/P EST MOD 30 MIN: CPT

## 2023-06-05 RX ORDER — GABAPENTIN 300 MG/1
300 CAPSULE ORAL 3 TIMES DAILY
Qty: 270 | Refills: 3 | Status: ACTIVE | COMMUNITY
Start: 1900-01-01 | End: 1900-01-01

## 2023-06-15 ENCOUNTER — RX RENEWAL (OUTPATIENT)
Age: 58
End: 2023-06-15

## 2023-06-30 ENCOUNTER — TRANSCRIPTION ENCOUNTER (OUTPATIENT)
Age: 58
End: 2023-06-30

## 2023-07-12 ENCOUNTER — APPOINTMENT (OUTPATIENT)
Dept: ULTRASOUND IMAGING | Facility: CLINIC | Age: 58
End: 2023-07-12
Payer: MEDICARE

## 2023-07-12 ENCOUNTER — OUTPATIENT (OUTPATIENT)
Dept: OUTPATIENT SERVICES | Facility: HOSPITAL | Age: 58
LOS: 1 days | End: 2023-07-12
Payer: MEDICARE

## 2023-07-12 ENCOUNTER — APPOINTMENT (OUTPATIENT)
Dept: RADIOLOGY | Facility: CLINIC | Age: 58
End: 2023-07-12
Payer: MEDICARE

## 2023-07-12 DIAGNOSIS — Z98.891 HISTORY OF UTERINE SCAR FROM PREVIOUS SURGERY: Chronic | ICD-10-CM

## 2023-07-12 DIAGNOSIS — Z90.49 ACQUIRED ABSENCE OF OTHER SPECIFIED PARTS OF DIGESTIVE TRACT: Chronic | ICD-10-CM

## 2023-07-12 DIAGNOSIS — E04.2 NONTOXIC MULTINODULAR GOITER: ICD-10-CM

## 2023-07-12 DIAGNOSIS — Z90.5 ACQUIRED ABSENCE OF KIDNEY: Chronic | ICD-10-CM

## 2023-07-12 PROCEDURE — 77085 DXA BONE DENSITY AXL VRT FX: CPT

## 2023-07-12 PROCEDURE — 76536 US EXAM OF HEAD AND NECK: CPT | Mod: 26

## 2023-07-12 PROCEDURE — 77085 DXA BONE DENSITY AXL VRT FX: CPT | Mod: 26

## 2023-07-12 PROCEDURE — 76536 US EXAM OF HEAD AND NECK: CPT

## 2023-07-17 RX ORDER — ZOLEDRONIC ACID 5 MG/100ML
5 INJECTION INTRAVENOUS
Refills: 0 | Status: COMPLETED | OUTPATIENT
Start: 2023-07-17 | End: 1900-01-01

## 2023-07-17 NOTE — REVIEW OF SYSTEMS
[Arthralgias] : arthralgias [As Noted in HPI] : as noted in HPI [Difficulty Walking] : difficulty walking [Joint Swelling] : no joint swelling [Joint Stiffness] : no joint stiffness [Negative] : Constitutional

## 2023-07-17 NOTE — ASSESSMENT
[FreeTextEntry1] : JOSE ALEJANDRO HOFF is a 57 year old woman with ANCA + (+ MPO; +P-ANCA; +RF; +DS-DNA, C-ANCA) vasculitis with clinical manifestations of vasculitic rashes, oral/nasal ulcerations, asthma exacerbation, eosinophilic esophagitis, GI perforation with inflammatory pathology, and neuropathy. Unclear if EGPA vs MPA at present, features can match both and eosinophils only seen in some areas of her case. Given extensive Ab +, Rituxan chosen as steroid sparing treatment with marked responsiveness and able to taper off steroids. Without GI or cutaneous involvement at present, stable neurologic sx which are likely partially chronic, improved strength with OT and PT but has now plateaued and R hand contracture developing tho continued OT is helping minimize progression and now appears to have stopped progressing. \par \par # vasculitis, hx as above, currently quiescent, recent infusion related sx resolved \par - Started April 2021, s/p last dose Feb 2023, given her overall minimal activity since initial presentation we will proceed to a surveillance phase as she has completed 2 years of use, and will plan for prn dosing if flares \par - repeat labs prior to next visit \par \par # chronic neuropathy and R hand contracture\par - c/w HEP for now, if new/worsening weakness will need to resume OT and PT as in the past was helping significantly more than HEP alone \par - c/w use of splint to avoid fixed R hand contracture \par - c/w gabapentin 300mg TID \par - c/w B12 supplementation and will repeat levels to monitor \par \par # SI joint arthritis, L spine spasm - improved s/p MARIA EUGENIA \par - US guided local injections helped right side, persistent L sided pain\par - c/w prn flexeril \par - c/w tylenol prn back pain, off opioids\par - f/u pain mgmt \par - We also discussed possible use of Botox injections with a neurologist in [facility] if above not helpful\par \par # immunocompromised status \par - pt is immunocompromised and would benefit from vaccination but currently declining covid vaccine and did not get flu vaccine (normally does get it ) despite extensive discussion prior with me\par - given immunosuppressed status, pt knows to call if febrile or unwell, knows to hold medication while on Abx \par \par # 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 \par - Weight bearing exercise for 30min 3-4x/week to maintain BMD as tolerated, can break into 4 10 min sessions \par - S/p Reclast Aug 2022, next due Aug 2023\par - Repeat DEXA now to eval for interval change \par \par RTC in 4 months \par

## 2023-07-17 NOTE — HISTORY OF PRESENT ILLNESS
[FreeTextEntry1] : JOSE ALEJANDRO HOFF is a 56 year old woman with pmh R nephrectomy in 2004 for unclear reason, HTN, asthma- with recurrent need for steroids nearly consistently since 8/20- 10-40 mg daily, hypothyroidism. Initially seen as hospital consult for new onset progressive peripheral neuropathy, purpuric rash, granulomatous rash on elbows, and oral/ nasal ulcerations, and significantly 50 lb wt loss. Was in usual state health with intermittent asthma/ and sinus infection but otherwise healthy till 8/20. Recent evaluation for mid epigastric abd/ non bloody emesis, EGD + gastritis confirmed eosinophilic esophagitis/ gastritis. Extensive w/u neuro/ rheum work up negative as per patient in the past. Here she is noted to have + MPO; +P-ANCA; +RF; +DS-DNA. C-ANCA is now positive. Discharged, then readmitted with GI perforation, tissue appeared inflammatory during resection, sent for pathology which confirming inflammatory etiology. Skin biopsy also consistent with vasculitis. \par \par Presently healing well from abd sx, no GI sx at present. Rashes, lung issues, sinus involvement improved with steroids. Weight has stabilized, no other constitutional sx. Ongoing neuropathy with weak . \par \par ---------\par 4/12/21 -- Just discharged from repeat hospital admission, found with SBO at site of anastomosis and associated abscess, s/p Abx and IR guided drainage, feeling better, no current abdominal complaints aside from mild nausea. Ongoing neurologic complaints which are slightly worse. \par \par 6/21/21 -- Tolerated Rituxan loading well, no SE, had no current GI sx, strength improving slowly, has been doing home exercises. No other vasculitis sx, no F/C, weight stable, improved appetite and slowly broadening diet without issues. Ongoing lower back pain, improved with opioids. \par \par 8/30/21 -- Doing well, no further rashes or GI sx, tolerating a normal diet, strength continues to improve. No infectious sx, F/C, unintentional weight loss. Back pain stable and slowly improving, not using opioids. \par \par 11/29/21 -- Doing well, getting 2nd dose of q6 month Rituxan today, reports feeling some muscle weakness just prior to the 1st dose that has now resolved. No other inflammatory sx today. Peripheral neuropathy is stable but chronic, OT is helping significantly. Also doing PT which continues to help for the back, but can only ambulate for approx 10 min then needs to rest. No worsening back pain. Hair is growing back in, saw derm, likely stress related. No infectious sx, feels well otherwise. \par \par 3/7/22 -- No current GI sx or new neuropathic sx but ongoing chronic neuropathic issues despite PT and OT, some contractures starting in her R hand, insurance did not approve her OT brace. No rashes, no F/C, night sweats, unintentional weight loss. Worsening SI joint pain which limits her ability to stand to approx 5 min before she has to sit again, OA changes on recent imaging. Also with worsening of her asthma and seasonal allergies, PMD/pulm has had to put her on steroids. No falls, asking for disability tag for her car as very hard to walk any distance. Asking about Evusheld, remains reluctant to get covid vaccines 2/2 concern for clots. \par \par 5/16/22 -- Recent admission for SBO, non surgically resolved, ? ex lap to see why it happened but she is not sure she wants to pursue this. No current GI issues, no current vasculitis activity. OT and gabapentin helping with weakness/neuropathy. No rashes, fevers, night sweats. Ongoing L spine spasm and pain tho has been more able to manage. \par \par 9/12/22 -- Overall has been doing well, no further GI issues s/p SBO, exploratory sx is not being actively considered, remainder of vasculitis ROS negative. OT and meds continue to help with neuropathy/weakness in hands and L spine pain/spasm. S/p Rituxan in June, Reclast in Aug, no SE with either. \par \par 12/5/22 -- Ongoing LBP which is limiting ability to ambulate/stand for prolonged periods but she does get benefit from swimming in summer, no radicular sx or LE weakness, tylenol prior to walking did not help much, she has completed her insurance covered PT and OT visits for this year. Stable strength in R hand, has been diligent with HEP. No GI sx, no new neuropathic sx, no rashes. No falls. Asking to see if she can taper off gabapentin to see if she can take less. \par \par 3/6/23 -- Severe fatigue s/p Rituxan this time, now beginning to lift, also with some worsening neuropathic pain in RUE and RLE but no worsening weakness, no other neurologic sx, remainder of vasculitis ROS negative. ?in setting of her tapering back her gabapentin as now decreased by 50%. Ongoing LBP, continues to make walking and ADLs difficult, R sided SI joint injection was effective, L sided x 2 much less so. \par \par 6/5/23 -- Overall doing well, no weakness but neuropathic pain tho improved with resuming gabapentin, back pain has improved and is able to walk longer distances now, no falls, no active vasculitis sx, no infectious sx.

## 2023-07-17 NOTE — PHYSICAL EXAM
[General Appearance - Alert] : alert [General Appearance - In No Acute Distress] : in no acute distress [General Appearance - Well Nourished] : well nourished [Sclera] : the sclera and conjunctiva were normal [PERRL With Normal Accommodation] : pupils were equal in size, round, and reactive to light [Extraocular Movements] : extraocular movements were intact [Outer Ear] : the ears and nose were normal in appearance [Oropharynx] : the oropharynx was normal [Neck Appearance] : the appearance of the neck was normal [Auscultation Breath Sounds / Voice Sounds] : lungs were clear to auscultation bilaterally [Heart Rate And Rhythm] : heart rate was normal and rhythm regular [Heart Sounds] : normal S1 and S2 [Murmurs] : no murmurs [Edema] : there was no peripheral edema [Bowel Sounds] : normal bowel sounds [Abdomen Soft] : soft [Abdomen Tenderness] : non-tender [Abdomen Mass (___ Cm)] : no abdominal mass palpated [Cervical Lymph Nodes Enlarged Anterior Bilaterally] : anterior cervical [Cervical Lymph Nodes Enlarged Posterior Bilaterally] : posterior cervical [Supraclavicular Lymph Nodes Enlarged Bilaterally] : supraclavicular [No CVA Tenderness] : no ~M costovertebral angle tenderness [No Spinal Tenderness] : no spinal tenderness [Abnormal Walk] : normal gait [Nail Clubbing] : no clubbing  or cyanosis of the fingernails [Musculoskeletal - Swelling] : no joint swelling seen [Motor Tone] : muscle strength and tone were normal [] : no rash [Oriented To Time, Place, And Person] : oriented to person, place, and time [Impaired Insight] : insight and judgment were intact [Affect] : the affect was normal [FreeTextEntry1] : good ability to arise from seated, good strength in b/l feet and proximal muscles, no change to strength from last exam

## 2023-07-18 LAB
T3 SERPL-MCNC: 81 NG/DL
T4 FREE SERPL-MCNC: 1.3 NG/DL
TSH SERPL-ACNC: 3.08 UIU/ML

## 2023-07-20 ENCOUNTER — APPOINTMENT (OUTPATIENT)
Dept: ENDOCRINOLOGY | Facility: CLINIC | Age: 58
End: 2023-07-20
Payer: MEDICARE

## 2023-07-20 VITALS
WEIGHT: 180 LBS | BODY MASS INDEX: 30.73 KG/M2 | DIASTOLIC BLOOD PRESSURE: 70 MMHG | SYSTOLIC BLOOD PRESSURE: 112 MMHG | OXYGEN SATURATION: 98 % | HEIGHT: 64 IN | HEART RATE: 78 BPM

## 2023-07-20 PROCEDURE — 99214 OFFICE O/P EST MOD 30 MIN: CPT

## 2023-08-01 ENCOUNTER — TRANSCRIPTION ENCOUNTER (OUTPATIENT)
Age: 58
End: 2023-08-01

## 2023-08-02 ENCOUNTER — TRANSCRIPTION ENCOUNTER (OUTPATIENT)
Age: 58
End: 2023-08-02

## 2023-08-03 ENCOUNTER — APPOINTMENT (OUTPATIENT)
Dept: GASTROENTEROLOGY | Facility: CLINIC | Age: 58
End: 2023-08-03
Payer: MEDICARE

## 2023-08-03 VITALS
WEIGHT: 180 LBS | BODY MASS INDEX: 30.73 KG/M2 | SYSTOLIC BLOOD PRESSURE: 116 MMHG | HEIGHT: 64 IN | DIASTOLIC BLOOD PRESSURE: 82 MMHG | HEART RATE: 66 BPM

## 2023-08-03 DIAGNOSIS — K21.9 GASTRO-ESOPHAGEAL REFLUX DISEASE W/OUT ESOPHAGITIS: ICD-10-CM

## 2023-08-03 DIAGNOSIS — Z12.11 ENCOUNTER FOR SCREENING FOR MALIGNANT NEOPLASM OF COLON: ICD-10-CM

## 2023-08-03 PROCEDURE — 99214 OFFICE O/P EST MOD 30 MIN: CPT

## 2023-08-06 NOTE — REVIEW OF SYSTEMS
[Heartburn] : heartburn [Negative] : Heme/Lymph [Abdominal Pain] : no abdominal pain [Vomiting] : no vomiting [Constipation] : no constipation [Diarrhea] : no diarrhea [Melena (black stool)] : no melena [Bleeding] : no bleeding [Bloating (gassiness)] : no bloating

## 2023-08-06 NOTE — HISTORY OF PRESENT ILLNESS
[FreeTextEntry1] : Sailaja Santos is a 58 year old female with complicated PMH including vasculitis, EoE and atrophic gastritis presents today for routine follow up. Pt reports some mild GERD symptoms despite medications, denies nausea, vomiting, overt dysphagia, or unintentional weight loss. Denies any lower GI complaints such as abdominal pain, constipation, diarrhea, or bleeding such as melena or hematochezia. Does have + FMH of CRC in multiple maternal relatives, last colonoscopy was in 2017.

## 2023-08-06 NOTE — ASSESSMENT
[FreeTextEntry1] : Plan: Since pt is having GERD symptoms and has hx of EoE would recommend EGD. Would also recommend colonoscopy given + [facility] and last colonoscopy was in 2017. Risks versus benefits as well as instructions reviewed, pt agrees to planned procedures. All questions answered, pt expressed understanding. Seen and discussed with Dr. Burr, I have spent 30 minutes on this encounter.   will check egd with biopsy due for surveillance colnoscopy dp

## 2023-08-17 ENCOUNTER — APPOINTMENT (OUTPATIENT)
Dept: RHEUMATOLOGY | Facility: CLINIC | Age: 58
End: 2023-08-17
Payer: MEDICARE

## 2023-08-17 VITALS
SYSTOLIC BLOOD PRESSURE: 119 MMHG | DIASTOLIC BLOOD PRESSURE: 74 MMHG | RESPIRATION RATE: 17 BRPM | OXYGEN SATURATION: 97 % | HEART RATE: 58 BPM | TEMPERATURE: 98.2 F

## 2023-08-17 VITALS — DIASTOLIC BLOOD PRESSURE: 67 MMHG | SYSTOLIC BLOOD PRESSURE: 106 MMHG

## 2023-08-17 PROCEDURE — 96374 THER/PROPH/DIAG INJ IV PUSH: CPT

## 2023-08-17 RX ORDER — ZOLEDRONIC ACID 5 MG/100ML
5 INJECTION INTRAVENOUS
Qty: 0 | Refills: 0 | Status: COMPLETED
Start: 2023-07-17

## 2023-08-17 NOTE — HISTORY OF PRESENT ILLNESS
[Denies] : Denies [No] : No [Yes] : Yes [Left upper extremity] : Left upper extremity [24g] : 24g [Start Time: ___] : Medication Start Time: [unfilled] [End Time: ___] : Medication End Time: [unfilled] [IV discontinued. Intact. No signs or symptoms of IV complications noted. Time: ___] : IV discontinued. Intact. No signs or symptoms of IV complications noted. Time: [unfilled] [Patient  instructed to seek medical attention with signs and symptoms of adverse effects] : Patient  instructed to seek medical attention with signs and symptoms of adverse effects [Patient left unit in no acute distress] : Patient left unit in no acute distress [Medications administered as ordered and tolerated well.] : Medications administered as ordered and tolerated well. [de-identified] : 11:46 AM

## 2023-09-18 ENCOUNTER — APPOINTMENT (OUTPATIENT)
Dept: RHEUMATOLOGY | Facility: CLINIC | Age: 58
End: 2023-09-18
Payer: MEDICARE

## 2023-09-18 VITALS
WEIGHT: 184 LBS | HEIGHT: 64 IN | TEMPERATURE: 98 F | DIASTOLIC BLOOD PRESSURE: 80 MMHG | SYSTOLIC BLOOD PRESSURE: 120 MMHG | BODY MASS INDEX: 31.41 KG/M2 | OXYGEN SATURATION: 97 % | HEART RATE: 61 BPM

## 2023-09-18 DIAGNOSIS — M62.830 MUSCLE SPASM OF BACK: ICD-10-CM

## 2023-09-18 DIAGNOSIS — G62.9 POLYNEUROPATHY, UNSPECIFIED: ICD-10-CM

## 2023-09-18 PROCEDURE — 99214 OFFICE O/P EST MOD 30 MIN: CPT

## 2023-09-18 RX ORDER — TIZANIDINE 2 MG/1
2 TABLET ORAL
Qty: 30 | Refills: 0 | Status: ACTIVE | COMMUNITY
Start: 2023-09-18 | End: 1900-01-01

## 2023-09-22 ENCOUNTER — LABORATORY RESULT (OUTPATIENT)
Age: 58
End: 2023-09-22

## 2023-09-24 LAB
ALBUMIN SERPL ELPH-MCNC: 4.9 G/DL
ALP BLD-CCNC: 115 U/L
ALT SERPL-CCNC: 21 U/L
ANION GAP SERPL CALC-SCNC: 15 MMOL/L
APPEARANCE: CLEAR
AST SERPL-CCNC: 75 U/L
BACTERIA: NEGATIVE /HPF
BILIRUB SERPL-MCNC: 0.3 MG/DL
BILIRUBIN URINE: NEGATIVE
BLOOD URINE: NEGATIVE
BUN SERPL-MCNC: 17 MG/DL
CALCIUM SERPL-MCNC: 9.6 MG/DL
CAST: 0 /LPF
CHLORIDE SERPL-SCNC: 105 MMOL/L
CO2 SERPL-SCNC: 23 MMOL/L
COLOR: YELLOW
CREAT SERPL-MCNC: 0.8 MG/DL
CRP SERPL-MCNC: <3 MG/L
EGFR: 85 ML/MIN/1.73M2
EPITHELIAL CELLS: 1 /HPF
ERYTHROCYTE [SEDIMENTATION RATE] IN BLOOD BY WESTERGREN METHOD: 29 MM/HR
FOLATE SERPL-MCNC: 11.9 NG/ML
GLUCOSE QUALITATIVE U: NEGATIVE MG/DL
GLUCOSE SERPL-MCNC: 87 MG/DL
KETONES URINE: NEGATIVE MG/DL
LEUKOCYTE ESTERASE URINE: ABNORMAL
MICROSCOPIC-UA: NORMAL
NITRITE URINE: NEGATIVE
PH URINE: 6.5
POTASSIUM SERPL-SCNC: 4.8 MMOL/L
PROT SERPL-MCNC: 6.7 G/DL
PROTEIN URINE: NEGATIVE MG/DL
RED BLOOD CELLS URINE: 0 /HPF
REVIEW: NORMAL
SODIUM SERPL-SCNC: 143 MMOL/L
SPECIFIC GRAVITY URINE: 1.01
UROBILINOGEN URINE: 0.2 MG/DL
VIT B12 SERPL-MCNC: 903 PG/ML
WHITE BLOOD CELLS URINE: 1 /HPF

## 2023-10-02 ENCOUNTER — APPOINTMENT (OUTPATIENT)
Dept: PAIN MANAGEMENT | Facility: CLINIC | Age: 58
End: 2023-10-02
Payer: MEDICARE

## 2023-10-02 PROCEDURE — 99213 OFFICE O/P EST LOW 20 MIN: CPT

## 2023-10-05 ENCOUNTER — TRANSCRIPTION ENCOUNTER (OUTPATIENT)
Age: 58
End: 2023-10-05

## 2023-10-05 NOTE — ASU PATIENT PROFILE, ADULT - NSICDXPASTSURGICALHX_GEN_ALL_CORE_FT
PAST SURGICAL HISTORY:  H/O  section two    H/O right nephrectomy nephrotic kidney 2004    History of resection of small bowel

## 2023-10-05 NOTE — ASU PATIENT PROFILE, ADULT - NSICDXPASTMEDICALHX_GEN_ALL_CORE_FT
PAST MEDICAL HISTORY:  Anxiety and depression     Asthma controlled    GERD (gastroesophageal reflux disease)     H/O autoimmune disorder vasculitis    H/O osteoporosis     H/O valvular heart disease     History of pernicious anemia     HTN (hypertension)     Hypothyroid     Neuropathy 8/2020    Sciatica

## 2023-10-05 NOTE — ASU PATIENT PROFILE, ADULT - HISTORY OF COVID-19 VACCINATION
Problem: Safety  Goal: Free from self harm  Pt is legal hold, no sitter, Q15 checks, per MD pt is allowed to have visitors but they must visit at the nursing station, pt is allowed to have phone          Vaccine status unknown

## 2023-10-05 NOTE — ASU DISCHARGE PLAN (ADULT/PEDIATRIC) - NS MD DC FALL RISK RISK
For information on Fall & Injury Prevention, visit: [email or web address] OR  [email or web address] OR  https://www.cdc.gov/steadi/patient.html

## 2023-10-06 NOTE — PHYSICAL THERAPY INITIAL EVALUATION ADULT - LEVEL OF CONSCIOUSNESS, REHAB EVAL
"Relay     \"Iron levels low and lead levels high.  A Copy of his labs were sent to the [facility] because of high lead level.  Continue the multivitamin with iron daily. And follow up on 10/17\"    " alert

## 2023-10-09 ENCOUNTER — OUTPATIENT (OUTPATIENT)
Dept: OUTPATIENT SERVICES | Facility: HOSPITAL | Age: 58
LOS: 1 days | Discharge: HOME | End: 2023-10-09
Payer: MEDICARE

## 2023-10-09 ENCOUNTER — APPOINTMENT (OUTPATIENT)
Dept: ORTHOPEDIC SURGERY | Facility: HOSPITAL | Age: 58
End: 2023-10-09
Payer: MEDICARE

## 2023-10-09 VITALS
WEIGHT: 179.9 LBS | TEMPERATURE: 98 F | OXYGEN SATURATION: 99 % | HEIGHT: 64 IN | RESPIRATION RATE: 13 BRPM | SYSTOLIC BLOOD PRESSURE: 150 MMHG | HEART RATE: 58 BPM | DIASTOLIC BLOOD PRESSURE: 78 MMHG

## 2023-10-09 VITALS
RESPIRATION RATE: 18 BRPM | OXYGEN SATURATION: 99 % | HEART RATE: 60 BPM | TEMPERATURE: 98 F | DIASTOLIC BLOOD PRESSURE: 84 MMHG | SYSTOLIC BLOOD PRESSURE: 144 MMHG

## 2023-10-09 DIAGNOSIS — M54.16 RADICULOPATHY, LUMBAR REGION: ICD-10-CM

## 2023-10-09 DIAGNOSIS — Z90.49 ACQUIRED ABSENCE OF OTHER SPECIFIED PARTS OF DIGESTIVE TRACT: Chronic | ICD-10-CM

## 2023-10-09 DIAGNOSIS — Z90.5 ACQUIRED ABSENCE OF KIDNEY: Chronic | ICD-10-CM

## 2023-10-09 DIAGNOSIS — Z98.891 HISTORY OF UTERINE SCAR FROM PREVIOUS SURGERY: Chronic | ICD-10-CM

## 2023-10-09 PROCEDURE — 62323 NJX INTERLAMINAR LMBR/SAC: CPT

## 2023-10-09 RX ORDER — TIZANIDINE 4 MG/1
1 TABLET ORAL
Refills: 0 | DISCHARGE

## 2023-10-09 RX ORDER — CALCIUM CARBONATE 500(1250)
1 TABLET ORAL
Qty: 0 | Refills: 0 | DISCHARGE

## 2023-10-09 RX ORDER — ACETAMINOPHEN 500 MG
2 TABLET ORAL
Refills: 0 | DISCHARGE

## 2023-11-28 ENCOUNTER — APPOINTMENT (OUTPATIENT)
Dept: GASTROENTEROLOGY | Facility: AMBULATORY MEDICAL SERVICES | Age: 58
End: 2023-11-28
Payer: MEDICARE

## 2023-11-28 PROCEDURE — 45378 DIAGNOSTIC COLONOSCOPY: CPT | Mod: GC

## 2023-12-12 ENCOUNTER — TRANSCRIPTION ENCOUNTER (OUTPATIENT)
Age: 58
End: 2023-12-12

## 2023-12-12 PROBLEM — F41.9 ANXIETY DISORDER, UNSPECIFIED: Chronic | Status: ACTIVE | Noted: 2023-10-05

## 2023-12-12 PROBLEM — K21.9 GASTRO-ESOPHAGEAL REFLUX DISEASE WITHOUT ESOPHAGITIS: Chronic | Status: ACTIVE | Noted: 2023-10-05

## 2023-12-12 PROBLEM — Z87.39 PERSONAL HISTORY OF OTHER DISEASES OF THE MUSCULOSKELETAL SYSTEM AND CONNECTIVE TISSUE: Chronic | Status: ACTIVE | Noted: 2023-10-05

## 2023-12-12 PROBLEM — Z86.2 PERSONAL HISTORY OF DISEASES OF THE BLOOD AND BLOOD-FORMING ORGANS AND CERTAIN DISORDERS INVOLVING THE IMMUNE MECHANISM: Chronic | Status: ACTIVE | Noted: 2023-10-05

## 2023-12-12 PROBLEM — Z86.79 PERSONAL HISTORY OF OTHER DISEASES OF THE CIRCULATORY SYSTEM: Chronic | Status: ACTIVE | Noted: 2023-10-05

## 2023-12-13 ENCOUNTER — TRANSCRIPTION ENCOUNTER (OUTPATIENT)
Age: 58
End: 2023-12-13

## 2023-12-17 ENCOUNTER — RX RENEWAL (OUTPATIENT)
Age: 58
End: 2023-12-17

## 2023-12-19 ENCOUNTER — APPOINTMENT (OUTPATIENT)
Dept: INTERNAL MEDICINE | Facility: CLINIC | Age: 58
End: 2023-12-19
Payer: MEDICARE

## 2023-12-19 VITALS
TEMPERATURE: 97 F | WEIGHT: 180 LBS | OXYGEN SATURATION: 97 % | HEIGHT: 66 IN | RESPIRATION RATE: 16 BRPM | SYSTOLIC BLOOD PRESSURE: 138 MMHG | DIASTOLIC BLOOD PRESSURE: 75 MMHG | HEART RATE: 72 BPM | BODY MASS INDEX: 28.93 KG/M2

## 2023-12-19 DIAGNOSIS — J45.40 MODERATE PERSISTENT ASTHMA, UNCOMPLICATED: ICD-10-CM

## 2023-12-19 DIAGNOSIS — I10 ESSENTIAL (PRIMARY) HYPERTENSION: ICD-10-CM

## 2023-12-19 DIAGNOSIS — Z00.00 ENCOUNTER FOR GENERAL ADULT MEDICAL EXAMINATION W/OUT ABNORMAL FINDINGS: ICD-10-CM

## 2023-12-19 DIAGNOSIS — M30.1 POLYARTERITIS WITH LUNG INVOLVEMENT [CHURG-STRAUSS]: ICD-10-CM

## 2023-12-19 DIAGNOSIS — D72.18 POLYARTERITIS WITH LUNG INVOLVEMENT [CHURG-STRAUSS]: ICD-10-CM

## 2023-12-19 PROCEDURE — 99214 OFFICE O/P EST MOD 30 MIN: CPT | Mod: 25

## 2023-12-19 PROCEDURE — 94060 EVALUATION OF WHEEZING: CPT

## 2023-12-19 PROCEDURE — 94729 DIFFUSING CAPACITY: CPT

## 2023-12-19 PROCEDURE — ZZZZZ: CPT

## 2023-12-19 PROCEDURE — 94727 GAS DIL/WSHOT DETER LNG VOL: CPT

## 2023-12-19 RX ORDER — METHYLPREDNISOLONE 125 MG/2ML
125 INJECTION, POWDER, LYOPHILIZED, FOR SOLUTION INTRAMUSCULAR; INTRAVENOUS
Qty: 1 | Refills: 0 | Status: DISCONTINUED | COMMUNITY
Start: 2022-05-27 | End: 2023-12-19

## 2023-12-19 RX ORDER — RITUXIMAB 10 MG/ML
500 INJECTION, SOLUTION INTRAVENOUS
Qty: 2 | Refills: 0 | Status: DISCONTINUED | COMMUNITY
Start: 2022-05-27 | End: 2023-12-19

## 2023-12-19 RX ORDER — METHYLPREDNISOLONE 4 MG/1
4 TABLET ORAL
Qty: 21 | Refills: 0 | Status: DISCONTINUED | COMMUNITY
Start: 2023-03-06 | End: 2023-12-19

## 2023-12-19 RX ORDER — ACETAMINOPHEN 325 MG/1
325 TABLET ORAL
Qty: 2 | Refills: 0 | Status: DISCONTINUED | COMMUNITY
Start: 2022-05-27 | End: 2023-12-19

## 2023-12-19 RX ORDER — DIPHENHYDRAMINE HYDROCHLORIDE 50 MG/ML
50 INJECTION, SOLUTION INTRAMUSCULAR; INTRAVENOUS
Qty: 0.5 | Refills: 0 | Status: DISCONTINUED | COMMUNITY
Start: 2022-05-27 | End: 2023-12-19

## 2023-12-19 NOTE — PHYSICAL EXAM
[No Acute Distress] : no acute distress [Supple] : supple [Clear to Auscultation] : lungs were clear to auscultation bilaterally [Normal Rate] : normal rate  [Normal S1, S2] : normal S1 and S2 [No Edema] : there was no peripheral edema [Soft] : abdomen soft [Normal Bowel Sounds] : normal bowel sounds [Normal Posterior Cervical Nodes] : no posterior cervical lymphadenopathy [Normal Anterior Cervical Nodes] : no anterior cervical lymphadenopathy [No Rash] : no rash [Normal Affect] : the affect was normal [Normal Insight/Judgement] : insight and judgment were intact [de-identified] : Spine tenderness on palpation

## 2023-12-19 NOTE — PLAN
[FreeTextEntry1] : 1. Continue current medications as outlined above.   2. Follow up in 6 months with wellness evaluation, EKG, and routine fasting blood work.    3. Continue to follow with rheumatologist, Dr. Posadas, regarding history of anca associated vasculitis and neuropathy.    4.  Continue to follow with endocrinologist, Dr. Mosquera, regarding history of hypothyroidism.  I have asked her to discuss the possibility of instituting of a GLP-1 inhibitor for weight loss  5. The COVID and Influenza vaccinations are recommended at this time.   6. Cardiovascular exercise as tolerated.  7.  The patient will now undergo a follow-up surveillance CAT scan of the chest, to reevaluate the previously documented groundglass opacities, which were felt to be due to the vasculitic process/EGPA.

## 2023-12-19 NOTE — DATA REVIEWED
[FreeTextEntry1] : A pulmonary function test is performed.  Lung volumes are within normal limits except for a slight decrease in the FRC.  Lung mechanics reveal a mild decrease in flow rates with mild to moderate bronchodilator reactivity demonstrated.  The DLCO and saturation are maintained.  This represents a mild degree of restriction.  The restriction is most likely due to her centripetal obesity.  Mild obstruction is noted with airway reactivity.  When compared to the prior study, the lung volumes and flow rates have all improved.  The DLCO is also improved.

## 2023-12-19 NOTE — HISTORY OF PRESENT ILLNESS
[FreeTextEntry1] : The patient comes in today for a routine follow-up evaluation. [de-identified] : The patient is feeling well at this time. She follows with rheumatology, Dr. Posadas, every 3 months regarding a history of anca associated vasculitis/EGPA. She is now on a drug holiday from Rituxan after completing 2 years of treatment. She does continue to take gabapentin 300 mg TiD for bilateral neuropathy in her hands and feet. The gabapentin provides mild improvement for her symptoms.   The patient does have a history of moderate persistent asthma, and also continues to be compliant with Symbicort inhaler 2 puffs BiD. There has been no recent exacerbations of the asthma. She has not used her albuterol inhaler in the last 2 years. She stopped doing the nasal washes about one month ago, but plans to restart it due to nasal congestion of late. She denies any yellow or green nasal secretions. There has been no cough, sputum production, or wheeze. There have been no fevers, chills, or night sweats.   The patient continues to take LT4 88 mcg daily for hypothyroidism. She denies any palpitations, tremors, anxiousness or severe fatigue, temp intolerance, significant weight changes or hair loss. She continues to follow with endocrinologist, Dr. Mosquera.   She is up to date on colonoscopy since a few weeks ago. She denies any bowel or urinary changes. There have been no other acute constitutional symptoms. She comes in for this assessment.

## 2024-01-08 ENCOUNTER — APPOINTMENT (OUTPATIENT)
Dept: RHEUMATOLOGY | Facility: CLINIC | Age: 59
End: 2024-01-08
Payer: MEDICARE

## 2024-01-08 VITALS
WEIGHT: 179 LBS | BODY MASS INDEX: 28.77 KG/M2 | HEART RATE: 76 BPM | HEIGHT: 66 IN | TEMPERATURE: 97.3 F | DIASTOLIC BLOOD PRESSURE: 80 MMHG | SYSTOLIC BLOOD PRESSURE: 128 MMHG | OXYGEN SATURATION: 97 %

## 2024-01-08 DIAGNOSIS — T38.0X5A OTHER OSTEOPOROSIS W/OUT CURRENT PATHOLOGICAL FRACTURE: ICD-10-CM

## 2024-01-08 DIAGNOSIS — M81.8 OTHER OSTEOPOROSIS W/OUT CURRENT PATHOLOGICAL FRACTURE: ICD-10-CM

## 2024-01-08 DIAGNOSIS — J32.9 CHRONIC SINUSITIS, UNSPECIFIED: ICD-10-CM

## 2024-01-08 PROCEDURE — G2211 COMPLEX E/M VISIT ADD ON: CPT

## 2024-01-08 PROCEDURE — 99214 OFFICE O/P EST MOD 30 MIN: CPT

## 2024-01-08 NOTE — HISTORY OF PRESENT ILLNESS
[FreeTextEntry1] : JOSE ALEJANDRO HOFF is a 56 year old woman with pmh R nephrectomy in 2004 for unclear reason, HTN, asthma- with recurrent need for steroids nearly consistently since 8/20- 10-40 mg daily, hypothyroidism. Initially seen as hospital consult for new onset progressive peripheral neuropathy, purpuric rash, granulomatous rash on elbows, and oral/ nasal ulcerations, and significantly 50 lb wt loss. Was in usual state health with intermittent asthma/ and sinus infection but otherwise healthy till 8/20. Recent evaluation for mid epigastric abd/ non bloody emesis, EGD + gastritis confirmed eosinophilic esophagitis/ gastritis. Extensive w/u neuro/ rheum work up negative as per patient in the past. Here she is noted to have + MPO; +P-ANCA; +RF; +DS-DNA. C-ANCA is now positive. Discharged, then readmitted with GI perforation, tissue appeared inflammatory during resection, sent for pathology which confirming inflammatory etiology. Skin biopsy also consistent with vasculitis.   Presently healing well from abd sx, no GI sx at present. Rashes, lung issues, sinus involvement improved with steroids. Weight has stabilized, no other constitutional sx. Ongoing neuropathy with weak .   --------- 4/12/21 -- Just discharged from repeat hospital admission, found with SBO at site of anastomosis and associated abscess, s/p Abx and IR guided drainage, feeling better, no current abdominal complaints aside from mild nausea. Ongoing neurologic complaints which are slightly worse.   6/21/21 -- Tolerated Rituxan loading well, no SE, had no current GI sx, strength improving slowly, has been doing home exercises. No other vasculitis sx, no F/C, weight stable, improved appetite and slowly broadening diet without issues. Ongoing lower back pain, improved with opioids.   8/30/21 -- Doing well, no further rashes or GI sx, tolerating a normal diet, strength continues to improve. No infectious sx, F/C, unintentional weight loss. Back pain stable and slowly improving, not using opioids.   11/29/21 -- Doing well, getting 2nd dose of q6 month Rituxan today, reports feeling some muscle weakness just prior to the 1st dose that has now resolved. No other inflammatory sx today. Peripheral neuropathy is stable but chronic, OT is helping significantly. Also doing PT which continues to help for the back, but can only ambulate for approx 10 min then needs to rest. No worsening back pain. Hair is growing back in, saw derm, likely stress related. No infectious sx, feels well otherwise.   3/7/22 -- No current GI sx or new neuropathic sx but ongoing chronic neuropathic issues despite PT and OT, some contractures starting in her R hand, insurance did not approve her OT brace. No rashes, no F/C, night sweats, unintentional weight loss. Worsening SI joint pain which limits her ability to stand to approx 5 min before she has to sit again, OA changes on recent imaging. Also with worsening of her asthma and seasonal allergies, PMD/pulm has had to put her on steroids. No falls, asking for disability tag for her car as very hard to walk any distance. Asking about Evusheld, remains reluctant to get covid vaccines 2/2 concern for clots.   5/16/22 -- Recent admission for SBO, non surgically resolved, ? ex lap to see why it happened but she is not sure she wants to pursue this. No current GI issues, no current vasculitis activity. OT and gabapentin helping with weakness/neuropathy. No rashes, fevers, night sweats. Ongoing L spine spasm and pain tho has been more able to manage.   9/12/22 -- Overall has been doing well, no further GI issues s/p SBO, exploratory sx is not being actively considered, remainder of vasculitis ROS negative. OT and meds continue to help with neuropathy/weakness in hands and L spine pain/spasm. S/p Rituxan in June, Reclast in Aug, no SE with either.   12/5/22 -- Ongoing LBP which is limiting ability to ambulate/stand for prolonged periods but she does get benefit from swimming in summer, no radicular sx or LE weakness, tylenol prior to walking did not help much, she has completed her insurance covered PT and OT visits for this year. Stable strength in R hand, has been diligent with HEP. No GI sx, no new neuropathic sx, no rashes. No falls. Asking to see if she can taper off gabapentin to see if she can take less.   3/6/23 -- Severe fatigue s/p Rituxan this time, now beginning to lift, also with some worsening neuropathic pain in RUE and RLE but no worsening weakness, no other neurologic sx, remainder of vasculitis ROS negative. ?in setting of her tapering back her gabapentin as now decreased by 50%. Ongoing LBP, continues to make walking and ADLs difficult, R sided SI joint injection was effective, L sided x 2 much less so.   6/5/23 -- Overall doing well, no weakness but neuropathic pain tho improved with resuming gabapentin, back pain has improved and is able to walk longer distances now, no falls, no active vasculitis sx, no infectious sx.   9/18/23 -- Doing well, no active vasculitis sx, stable strength, ongoing back pain and spasm but improved mobility, no falls.   1/8/24 -- Some b/l hand paresthesia

## 2024-01-08 NOTE — ASSESSMENT
[FreeTextEntry1] : JOSE ALEJANDRO HOFF is a 58 year old woman with ANCA + (+ MPO; +P-ANCA; +RF; +DS-DNA, C-ANCA) vasculitis with clinical manifestations of vasculitic rashes, oral/nasal ulcerations, asthma exacerbation, eosinophilic esophagitis, GI perforation with inflammatory pathology, and neuropathy. Unclear if EGPA vs MPA at present, features can match both and eosinophils only seen in some areas of her case. Given extensive Ab +, Rituxan chosen as steroid sparing treatment with marked responsiveness and able to taper off steroids. Without GI or cutaneous involvement at present, stable neurologic sx which are likely partially chronic, improved strength with OT and PT but has now plateaued and R hand contracture developing tho continued OT is helping minimize progression and now appears to have stopped progressing.  # vasculitis, hx as above, currently quiescent, Rituxan treatment started April 2021, s/p last dose Feb 2023 - given her overall minimal activity since initial presentation we will proceed to a surveillance phase as she has completed 2 years of use, and will plan for prn dosing if flares - repeat labs as below now  # chronic neuropathy and R hand contracture - c/w HEP for now, if new/worsening weakness will need to resume OT and PT as in the past was helping significantly more than HEP alone - c/w use of splint to avoid fixed R hand contracture - c/w gabapentin 300mg TID - c/w B12 supplementation and will repeat levels to monitor  # SI joint arthritis, L spine spasm - improved s/p MARIA EUGENIA - US guided local injections helped right side, persistent L sided pain - trial of zanaflex as flexeril too sedating - c/w tylenol prn back pain, off opioids - f/u pain mgmt - We also discussed possible use of Botox injections with a neurologist in [place] area if above not helpful  # immunocompromised status resolved as now past 6 months from last Rituxan - has declined vaccination in the past, will revisit if further Rituxan doses needed  # 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 - Weight bearing exercise for 30min 3-4x/week to maintain BMD as tolerated, can break into 4 10 min sessions - S/p Reclast Aug 2021, 2022, 2023 - repeat DEXA 2025 but will need to interpret with caution given normal range but + compression fx - check Vit D level  RTC in 4 months

## 2024-01-08 NOTE — REVIEW OF SYSTEMS
[Arthralgias] : arthralgias [As Noted in HPI] : as noted in HPI [Difficulty Walking] : difficulty walking [Negative] : Heme/Lymph [Joint Swelling] : no joint swelling [Joint Stiffness] : no joint stiffness

## 2024-01-08 NOTE — PHYSICAL EXAM
[General Appearance - Alert] : alert [General Appearance - In No Acute Distress] : in no acute distress [General Appearance - Well Nourished] : well nourished [Sclera] : the sclera and conjunctiva were normal [PERRL With Normal Accommodation] : pupils were equal in size, round, and reactive to light [Extraocular Movements] : extraocular movements were intact [Outer Ear] : the ears and nose were normal in appearance [Oropharynx] : the oropharynx was normal [Neck Appearance] : the appearance of the neck was normal [Auscultation Breath Sounds / Voice Sounds] : lungs were clear to auscultation bilaterally [Heart Rate And Rhythm] : heart rate was normal and rhythm regular [Heart Sounds] : normal S1 and S2 [Murmurs] : no murmurs [Edema] : there was no peripheral edema [Bowel Sounds] : normal bowel sounds [Abdomen Soft] : soft [Abdomen Tenderness] : non-tender [Abdomen Mass (___ Cm)] : no abdominal mass palpated [No CVA Tenderness] : no ~M costovertebral angle tenderness [No Spinal Tenderness] : no spinal tenderness [Abnormal Walk] : normal gait [Nail Clubbing] : no clubbing  or cyanosis of the fingernails [Musculoskeletal - Swelling] : no joint swelling seen [Motor Tone] : muscle strength and tone were normal [] : no rash [Oriented To Time, Place, And Person] : oriented to person, place, and time [Impaired Insight] : insight and judgment were intact [Affect] : the affect was normal [FreeTextEntry1] : good ability to arise from seated, good strength in b/l feet and proximal muscles, no change to strength from last exam

## 2024-01-11 ENCOUNTER — TRANSCRIPTION ENCOUNTER (OUTPATIENT)
Age: 59
End: 2024-01-11

## 2024-01-11 ENCOUNTER — LABORATORY RESULT (OUTPATIENT)
Age: 59
End: 2024-01-11

## 2024-01-11 LAB
25(OH)D3 SERPL-MCNC: 47.9 NG/ML
ALBUMIN SERPL ELPH-MCNC: 4.4 G/DL
ALP BLD-CCNC: 109 U/L
ALT SERPL-CCNC: 17 U/L
ANION GAP SERPL CALC-SCNC: 10 MMOL/L
APPEARANCE: CLEAR
AST SERPL-CCNC: 64 U/L
BACTERIA: NEGATIVE /HPF
BASOPHILS # BLD AUTO: 0.06 K/UL
BASOPHILS NFR BLD AUTO: 0.8 %
BILIRUB SERPL-MCNC: 0.3 MG/DL
BILIRUBIN URINE: NEGATIVE
BLOOD URINE: NEGATIVE
BUN SERPL-MCNC: 16 MG/DL
CALCIUM SERPL-MCNC: 9.7 MG/DL
CAST: 0 /LPF
CHLORIDE SERPL-SCNC: 104 MMOL/L
CO2 SERPL-SCNC: 26 MMOL/L
COLOR: YELLOW
CREAT SERPL-MCNC: 0.83 MG/DL
CRP SERPL-MCNC: <3 MG/L
EGFR: 81 ML/MIN/1.73M2
EOSINOPHIL # BLD AUTO: 0.88 K/UL
EOSINOPHIL NFR BLD AUTO: 12.2 %
EPITHELIAL CELLS: 2 /HPF
ERYTHROCYTE [SEDIMENTATION RATE] IN BLOOD BY WESTERGREN METHOD: 31 MM/HR
GLUCOSE QUALITATIVE U: NEGATIVE MG/DL
GLUCOSE SERPL-MCNC: 93 MG/DL
HCT VFR BLD CALC: 40.7 %
HGB BLD-MCNC: 12.6 G/DL
IMM GRANULOCYTES NFR BLD AUTO: 0.3 %
KETONES URINE: NEGATIVE MG/DL
LEUKOCYTE ESTERASE URINE: ABNORMAL
LYMPHOCYTES # BLD AUTO: 1.7 K/UL
LYMPHOCYTES NFR BLD AUTO: 23.6 %
MAN DIFF?: NORMAL
MCHC RBC-ENTMCNC: 26.7 PG
MCHC RBC-ENTMCNC: 31 GM/DL
MCV RBC AUTO: 86.2 FL
MICROSCOPIC-UA: NORMAL
MONOCYTES # BLD AUTO: 0.62 K/UL
MONOCYTES NFR BLD AUTO: 8.6 %
NEUTROPHILS # BLD AUTO: 3.92 K/UL
NEUTROPHILS NFR BLD AUTO: 54.5 %
NITRITE URINE: NEGATIVE
PH URINE: 5.5
PLATELET # BLD AUTO: 309 K/UL
POTASSIUM SERPL-SCNC: 4.9 MMOL/L
PROT SERPL-MCNC: 6.3 G/DL
PROTEIN URINE: NEGATIVE MG/DL
RBC # BLD: 4.72 M/UL
RBC # FLD: 14.9 %
RED BLOOD CELLS URINE: 1 /HPF
SODIUM SERPL-SCNC: 141 MMOL/L
SPECIFIC GRAVITY URINE: 1.02
UROBILINOGEN URINE: 0.2 MG/DL
WBC # FLD AUTO: 7.2 K/UL
WHITE BLOOD CELLS URINE: 1 /HPF

## 2024-01-12 ENCOUNTER — TRANSCRIPTION ENCOUNTER (OUTPATIENT)
Age: 59
End: 2024-01-12

## 2024-01-15 ENCOUNTER — APPOINTMENT (OUTPATIENT)
Dept: ENDOCRINOLOGY | Facility: CLINIC | Age: 59
End: 2024-01-15
Payer: MEDICARE

## 2024-01-15 VITALS
SYSTOLIC BLOOD PRESSURE: 122 MMHG | OXYGEN SATURATION: 98 % | HEART RATE: 65 BPM | BODY MASS INDEX: 28.93 KG/M2 | HEIGHT: 66 IN | WEIGHT: 180 LBS | DIASTOLIC BLOOD PRESSURE: 80 MMHG

## 2024-01-15 DIAGNOSIS — E03.9 HYPOTHYROIDISM, UNSPECIFIED: ICD-10-CM

## 2024-01-15 DIAGNOSIS — E55.9 VITAMIN D DEFICIENCY, UNSPECIFIED: ICD-10-CM

## 2024-01-15 DIAGNOSIS — E04.2 NONTOXIC MULTINODULAR GOITER: ICD-10-CM

## 2024-01-15 LAB
T3 SERPL-MCNC: 79 NG/DL
T4 FREE SERPL-MCNC: 1.5 NG/DL
TSH SERPL-ACNC: 1.34 UIU/ML

## 2024-01-15 PROCEDURE — 99214 OFFICE O/P EST MOD 30 MIN: CPT

## 2024-01-15 RX ORDER — LEVOTHYROXINE SODIUM 0.09 MG/1
88 TABLET ORAL
Qty: 90 | Refills: 1 | Status: ACTIVE | COMMUNITY
Start: 2019-11-14 | End: 1900-01-01

## 2024-01-15 NOTE — PHYSICAL EXAM
[Alert] : alert [Well Nourished] : well nourished [No Acute Distress] : no acute distress [Normal Sclera/Conjunctiva] : normal sclera/conjunctiva [No Proptosis] : no proptosis [No Lid Lag] : no lid lag [No Neck Mass] : no neck mass was observed [Supple] : the neck was supple [Thyroid Not Enlarged] : the thyroid was not enlarged [No Respiratory Distress] : no respiratory distress [No Accessory Muscle Use] : no accessory muscle use [Normal Rate and Effort] : normal respiratory rate and effort [Clear to Auscultation] : lungs were clear to auscultation bilaterally [Normal S1, S2] : normal S1 and S2 [No Murmurs] : no murmurs [Normal Rate] : heart rate was normal [Regular Rhythm] : with a regular rhythm [No Tremors] : no tremors [Oriented x3] : oriented to person, place, and time [Normal Affect] : the affect was normal [Normal Insight/Judgement] : insight and judgment were intact [Normal Mood] : the mood was normal

## 2024-01-15 NOTE — REVIEW OF SYSTEMS
[Recent Weight Gain (___ Lbs)] : recent weight gain: [unfilled] lbs [Fatigue] : no fatigue [Decreased Appetite] : appetite not decreased [Recent Weight Loss (___ Lbs)] : no recent weight loss [Visual Field Defect] : no visual field defect [Dry Eyes] : no dryness [Eye Pain] : no pain [Dysphagia] : no dysphagia [Neck Pain] : no neck pain [Chest Pain] : no chest pain [Palpitations] : no palpitations [Lower Ext Edema] : no lower extremity edema [Shortness Of Breath] : no shortness of breath [Cough] : no cough [Nausea] : no nausea [Vomiting] : no vomiting [Diarrhea] : no diarrhea [Joint Pain] : no joint pain [Muscle Weakness] : no muscle weakness [Acanthosis] : no acanthosis  [Dry Skin] : no dry skin [Headaches] : no headaches [Dizziness] : no dizziness [Tremors] : no tremors [Pain/Numbness of Digits] : no pain/numbness of digits [Depression] : no depression [Polydipsia] : no polydipsia [Cold Intolerance] : no cold intolerance [Heat Intolerance] : no heat intolerance [Easy Bleeding] : no ~M tendency for easy bleeding [Easy Bruising] : no tendency for easy bruising

## 2024-01-15 NOTE — HISTORY OF PRESENT ILLNESS
[FreeTextEntry1] : Sailaja is a 58  yr old female, who is here for follow up of  hypothyroidism and hashimoto's disease. Per patient she was diagnosed 10+ years Has been on replacement since then. Currently on levothyroxine 88 mcg daily. She tells me she has been diagnosed with polyneuropathy and vasculitis. . Here for follow up . Takes Levothyroxine 88 mcg daily. Empty stomach in morning, denies missing any doses.  Energy is fine. Lost 70 pounds few years ago, now going up again gained 10 to 20 pounds back in 1 yr.  Was on rituxan infusion every 6 months,stopped for 2 years will be watched. Also diagnosed with clinical osteoporosis, compression fracture in back.  Being managed  by rheumatologist. Started with reclast infusion in 5/21, got 3 so far , by her rheumatologist. Had recent DXA in 7/23 shows normal BMD. Takes vitamin D 3, 5000 units daily  She says many years ago, she was told to have thyroid nodules, were followed by endo for many years, last US was about 4 years ago. Then had US in 7/22 and in 7/23 shows stable nodule. No family h/o thyroid disorder or cancer.no h/o neck radiation. No dysphagia, no SOB.Denies heat or cold intolerance, no weight changes, no constipation or diarrhea, no palpitations, energy level fair, no skin or hair changes.

## 2024-01-15 NOTE — ASSESSMENT
[FreeTextEntry1] :  Sailaja is a 58 yr old female, who is here for follow up of hypothyroidism and hashimoto's disease.   Currently on levothyroxine 88 mcg daily.  She tells me she has been diagnosed with polyneuropathy and vasculitis since 1 yr.  Also recently diagnosed with clinical osteoporosis, compression fracture in back. Started with reclast infusion, got 2 DOSES so far 5/21, by her rheumatologist.    Hypothyroidism:  TSH normal  1.34 in  1/24,  continue same dose for now.  Discussed again with patient how to take thyroid medication appropriately,empty stomach ,  all Multi vitamins 4 to 6 hrs away form thyroid medication, he voiced understanding.  Will repeat blood work next visit.    Thyroid nodules:  She had thyroid US in 7/22, shows a small 1 cm nodule on right side, due to small size plan was to follow up on ultrasound at this point.  Has recent repeat US in 7/23 shows stable nodule  I have discussed with the patient the incidence of thyroid nodules (fairly high) and the incidence of thyroid cancer (fairly low). We also discussed what can be the worrisome features of malignant nodule and increased incidence of thyroid cancer in patients.  Since nodule stable will follow up on yearly US in 7/24      Vitamin D def:  to continue vitamin D replacement.    Clinical osteoporosis:  on reclast infusion, being managed by her rheumatologist,    RTC in 6 months.

## 2024-01-15 NOTE — DATA REVIEWED
[FreeTextEntry1] : 10/21:\par  TSH:2.210\par  FT4: 1.38\par  Vitamin D : 53.4\par  \par  3/22:\par  TSH: 2.870\par  Vitamin D: 54.5

## 2024-03-01 ENCOUNTER — RX RENEWAL (OUTPATIENT)
Age: 59
End: 2024-03-01

## 2024-03-01 RX ORDER — BUDESONIDE AND FORMOTEROL FUMARATE DIHYDRATE 160; 4.5 UG/1; UG/1
160-4.5 AEROSOL RESPIRATORY (INHALATION)
Qty: 3 | Refills: 3 | Status: ACTIVE | COMMUNITY
Start: 2022-01-31 | End: 1900-01-01

## 2024-03-11 ENCOUNTER — APPOINTMENT (OUTPATIENT)
Dept: RHEUMATOLOGY | Facility: CLINIC | Age: 59
End: 2024-03-11
Payer: MEDICARE

## 2024-03-11 VITALS
HEIGHT: 66 IN | OXYGEN SATURATION: 96 % | DIASTOLIC BLOOD PRESSURE: 86 MMHG | WEIGHT: 180 LBS | TEMPERATURE: 96.5 F | SYSTOLIC BLOOD PRESSURE: 138 MMHG | BODY MASS INDEX: 28.93 KG/M2 | HEART RATE: 68 BPM

## 2024-03-11 DIAGNOSIS — G62.9 POLYNEUROPATHY, UNSPECIFIED: ICD-10-CM

## 2024-03-11 DIAGNOSIS — I77.82 ANTINEUTROPHILIC CYTOPLASMIC ANTIBODY [ANCA] VASCULITIS: ICD-10-CM

## 2024-03-11 DIAGNOSIS — M54.16 RADICULOPATHY, LUMBAR REGION: ICD-10-CM

## 2024-03-11 DIAGNOSIS — M81.0 AGE-RELATED OSTEOPOROSIS W/OUT CURRENT PATHOLOGICAL FRACTURE: ICD-10-CM

## 2024-03-11 DIAGNOSIS — D84.9 IMMUNODEFICIENCY, UNSPECIFIED: ICD-10-CM

## 2024-03-11 PROCEDURE — 99214 OFFICE O/P EST MOD 30 MIN: CPT

## 2024-03-11 PROCEDURE — G2211 COMPLEX E/M VISIT ADD ON: CPT

## 2024-03-15 RX ORDER — MOMETASONE FUROATE AND FORMOTEROL FUMARATE DIHYDRATE 200; 5 UG/1; UG/1
200-5 AEROSOL RESPIRATORY (INHALATION)
Qty: 1 | Refills: 11 | Status: ACTIVE | COMMUNITY
Start: 2024-03-15 | End: 1900-01-01

## 2024-03-16 ENCOUNTER — NON-APPOINTMENT (OUTPATIENT)
Age: 59
End: 2024-03-16

## 2024-03-25 ENCOUNTER — APPOINTMENT (OUTPATIENT)
Dept: OTOLARYNGOLOGY | Facility: CLINIC | Age: 59
End: 2024-03-25
Payer: MEDICARE

## 2024-03-25 VITALS — TEMPERATURE: 96.9 F | WEIGHT: 180 LBS | BODY MASS INDEX: 28.93 KG/M2 | HEIGHT: 66 IN

## 2024-03-25 VITALS — WEIGHT: 180 LBS | HEIGHT: 64 IN | BODY MASS INDEX: 30.73 KG/M2 | TEMPERATURE: 96.4 F

## 2024-03-25 DIAGNOSIS — H65.22 CHRONIC SEROUS OTITIS MEDIA, LEFT EAR: ICD-10-CM

## 2024-03-25 DIAGNOSIS — H90.3 SENSORINEURAL HEARING LOSS, BILATERAL: ICD-10-CM

## 2024-03-25 DIAGNOSIS — H61.23 IMPACTED CERUMEN, BILATERAL: ICD-10-CM

## 2024-03-25 DIAGNOSIS — H69.93 UNSPECIFIED EUSTACHIAN TUBE DISORDER, BILATERAL: ICD-10-CM

## 2024-03-25 PROCEDURE — 92567 TYMPANOMETRY: CPT

## 2024-03-25 PROCEDURE — 92553 AUDIOMETRY AIR & BONE: CPT

## 2024-03-25 PROCEDURE — 99203 OFFICE O/P NEW LOW 30 MIN: CPT | Mod: 25

## 2024-03-25 PROCEDURE — G0268 REMOVAL OF IMPACTED WAX MD: CPT

## 2024-03-25 RX ORDER — CEFDINIR 300 MG/1
300 CAPSULE ORAL
Refills: 0 | Status: ACTIVE | COMMUNITY

## 2024-03-25 RX ORDER — PREDNISONE 10 MG/1
10 TABLET ORAL
Qty: 18 | Refills: 2 | Status: ACTIVE | COMMUNITY
Start: 2024-03-25 | End: 1900-01-01

## 2024-03-25 NOTE — PHYSICAL EXAM
[Midline] : trachea located in midline position [de-identified] : ramiro [Normal] : no abnormal secretions

## 2024-03-25 NOTE — REVIEW OF SYSTEMS
[Seasonal Allergies] : seasonal allergies [Negative] : Heme/Lymph [de-identified] : left ear pain, went to urgent care on the 17th, ear still muffles and clogged

## 2024-03-25 NOTE — HISTORY OF PRESENT ILLNESS
[de-identified] : onset 2 weeks ago severe as pain to ER and rx cefdinir still au plugging hx otitis as child question of hearing

## 2024-03-25 NOTE — DATA REVIEWED
[de-identified] : Mild to moderately-severe SNHL 6570-4257 Hz right ear/Type A tymp Moderate to severe MIXED loss left ear/Type B tymp Retest after Tx

## 2024-03-25 NOTE — ASSESSMENT
[FreeTextEntry1] : cerumen cleared ad mild ot externa audio left ok and mixed cond loss as eust tube dys pred 10 #18 fu 3 weeks consider ad m and t

## 2024-04-08 ENCOUNTER — INPATIENT (INPATIENT)
Facility: HOSPITAL | Age: 59
LOS: 0 days | Discharge: HOME | DRG: 390 | End: 2024-04-09
Attending: SURGERY | Admitting: SURGERY
Payer: MEDICARE

## 2024-04-08 VITALS
SYSTOLIC BLOOD PRESSURE: 133 MMHG | RESPIRATION RATE: 18 BRPM | DIASTOLIC BLOOD PRESSURE: 97 MMHG | HEART RATE: 87 BPM | OXYGEN SATURATION: 100 % | TEMPERATURE: 98 F

## 2024-04-08 DIAGNOSIS — Z90.5 ACQUIRED ABSENCE OF KIDNEY: Chronic | ICD-10-CM

## 2024-04-08 DIAGNOSIS — Z90.49 ACQUIRED ABSENCE OF OTHER SPECIFIED PARTS OF DIGESTIVE TRACT: Chronic | ICD-10-CM

## 2024-04-08 DIAGNOSIS — K56.609 UNSPECIFIED INTESTINAL OBSTRUCTION, UNSPECIFIED AS TO PARTIAL VERSUS COMPLETE OBSTRUCTION: ICD-10-CM

## 2024-04-08 DIAGNOSIS — Z98.891 HISTORY OF UTERINE SCAR FROM PREVIOUS SURGERY: Chronic | ICD-10-CM

## 2024-04-08 LAB
ALBUMIN SERPL ELPH-MCNC: 3.6 G/DL — SIGNIFICANT CHANGE UP (ref 3.3–5)
ALP SERPL-CCNC: 106 U/L — SIGNIFICANT CHANGE UP (ref 40–120)
ALT FLD-CCNC: 25 U/L — SIGNIFICANT CHANGE UP (ref 12–78)
ANION GAP SERPL CALC-SCNC: 2 MMOL/L — LOW (ref 5–17)
APPEARANCE UR: CLEAR — SIGNIFICANT CHANGE UP
AST SERPL-CCNC: 56 U/L — HIGH (ref 15–37)
BASOPHILS # BLD AUTO: 0.03 K/UL — SIGNIFICANT CHANGE UP (ref 0–0.2)
BASOPHILS NFR BLD AUTO: 0.3 % — SIGNIFICANT CHANGE UP (ref 0–2)
BILIRUB SERPL-MCNC: 0.3 MG/DL — SIGNIFICANT CHANGE UP (ref 0.2–1.2)
BILIRUB UR-MCNC: NEGATIVE — SIGNIFICANT CHANGE UP
BLD GP AB SCN SERPL QL: SIGNIFICANT CHANGE UP
BUN SERPL-MCNC: 15 MG/DL — SIGNIFICANT CHANGE UP (ref 7–23)
CALCIUM SERPL-MCNC: 9.1 MG/DL — SIGNIFICANT CHANGE UP (ref 8.5–10.1)
CHLORIDE SERPL-SCNC: 111 MMOL/L — HIGH (ref 96–108)
CO2 SERPL-SCNC: 29 MMOL/L — SIGNIFICANT CHANGE UP (ref 22–31)
COLOR SPEC: YELLOW — SIGNIFICANT CHANGE UP
CREAT SERPL-MCNC: 0.93 MG/DL — SIGNIFICANT CHANGE UP (ref 0.5–1.3)
DIFF PNL FLD: NEGATIVE — SIGNIFICANT CHANGE UP
EGFR: 71 ML/MIN/1.73M2 — SIGNIFICANT CHANGE UP
EOSINOPHIL # BLD AUTO: 0.33 K/UL — SIGNIFICANT CHANGE UP (ref 0–0.5)
EOSINOPHIL NFR BLD AUTO: 3.2 % — SIGNIFICANT CHANGE UP (ref 0–6)
GLUCOSE SERPL-MCNC: 118 MG/DL — HIGH (ref 70–99)
GLUCOSE UR QL: NEGATIVE MG/DL — SIGNIFICANT CHANGE UP
HCT VFR BLD CALC: 38.4 % — SIGNIFICANT CHANGE UP (ref 34.5–45)
HGB BLD-MCNC: 12.2 G/DL — SIGNIFICANT CHANGE UP (ref 11.5–15.5)
IMM GRANULOCYTES NFR BLD AUTO: 0.3 % — SIGNIFICANT CHANGE UP (ref 0–0.9)
KETONES UR-MCNC: NEGATIVE MG/DL — SIGNIFICANT CHANGE UP
LEUKOCYTE ESTERASE UR-ACNC: NEGATIVE — SIGNIFICANT CHANGE UP
LIDOCAIN IGE QN: 41 U/L — SIGNIFICANT CHANGE UP (ref 13–75)
LYMPHOCYTES # BLD AUTO: 1.73 K/UL — SIGNIFICANT CHANGE UP (ref 1–3.3)
LYMPHOCYTES # BLD AUTO: 17 % — SIGNIFICANT CHANGE UP (ref 13–44)
MCHC RBC-ENTMCNC: 26.9 PG — LOW (ref 27–34)
MCHC RBC-ENTMCNC: 31.8 GM/DL — LOW (ref 32–36)
MCV RBC AUTO: 84.8 FL — SIGNIFICANT CHANGE UP (ref 80–100)
MONOCYTES # BLD AUTO: 0.92 K/UL — HIGH (ref 0–0.9)
MONOCYTES NFR BLD AUTO: 9 % — SIGNIFICANT CHANGE UP (ref 2–14)
NEUTROPHILS # BLD AUTO: 7.16 K/UL — SIGNIFICANT CHANGE UP (ref 1.8–7.4)
NEUTROPHILS NFR BLD AUTO: 70.2 % — SIGNIFICANT CHANGE UP (ref 43–77)
NITRITE UR-MCNC: NEGATIVE — SIGNIFICANT CHANGE UP
PH UR: 6.5 — SIGNIFICANT CHANGE UP (ref 5–8)
PLATELET # BLD AUTO: 305 K/UL — SIGNIFICANT CHANGE UP (ref 150–400)
POTASSIUM SERPL-MCNC: 4.2 MMOL/L — SIGNIFICANT CHANGE UP (ref 3.5–5.3)
POTASSIUM SERPL-SCNC: 4.2 MMOL/L — SIGNIFICANT CHANGE UP (ref 3.5–5.3)
PROT SERPL-MCNC: 6.7 GM/DL — SIGNIFICANT CHANGE UP (ref 6–8.3)
PROT UR-MCNC: NEGATIVE MG/DL — SIGNIFICANT CHANGE UP
RBC # BLD: 4.53 M/UL — SIGNIFICANT CHANGE UP (ref 3.8–5.2)
RBC # FLD: 14.6 % — HIGH (ref 10.3–14.5)
SODIUM SERPL-SCNC: 142 MMOL/L — SIGNIFICANT CHANGE UP (ref 135–145)
SP GR SPEC: 1.01 — SIGNIFICANT CHANGE UP (ref 1–1.03)
UROBILINOGEN FLD QL: 0.2 MG/DL — SIGNIFICANT CHANGE UP (ref 0.2–1)
WBC # BLD: 10.2 K/UL — SIGNIFICANT CHANGE UP (ref 3.8–10.5)
WBC # FLD AUTO: 10.2 K/UL — SIGNIFICANT CHANGE UP (ref 3.8–10.5)

## 2024-04-08 PROCEDURE — 74018 RADEX ABDOMEN 1 VIEW: CPT | Mod: 26

## 2024-04-08 PROCEDURE — C9113: CPT

## 2024-04-08 PROCEDURE — 93010 ELECTROCARDIOGRAM REPORT: CPT

## 2024-04-08 PROCEDURE — 85049 AUTOMATED PLATELET COUNT: CPT | Mod: XU

## 2024-04-08 PROCEDURE — 99285 EMERGENCY DEPT VISIT HI MDM: CPT

## 2024-04-08 PROCEDURE — 36415 COLL VENOUS BLD VENIPUNCTURE: CPT

## 2024-04-08 PROCEDURE — 83735 ASSAY OF MAGNESIUM: CPT

## 2024-04-08 PROCEDURE — 71045 X-RAY EXAM CHEST 1 VIEW: CPT | Mod: 26

## 2024-04-08 PROCEDURE — 74018 RADEX ABDOMEN 1 VIEW: CPT

## 2024-04-08 PROCEDURE — 85025 COMPLETE CBC W/AUTO DIFF WBC: CPT

## 2024-04-08 PROCEDURE — 71045 X-RAY EXAM CHEST 1 VIEW: CPT

## 2024-04-08 PROCEDURE — 84100 ASSAY OF PHOSPHORUS: CPT

## 2024-04-08 PROCEDURE — 80048 BASIC METABOLIC PNL TOTAL CA: CPT

## 2024-04-08 PROCEDURE — 74177 CT ABD & PELVIS W/CONTRAST: CPT | Mod: 26,MC

## 2024-04-08 RX ORDER — ALBUTEROL 90 UG/1
2 AEROSOL, METERED ORAL
Qty: 0 | Refills: 0 | DISCHARGE

## 2024-04-08 RX ORDER — SODIUM CHLORIDE 9 MG/ML
1000 INJECTION, SOLUTION INTRAVENOUS
Refills: 0 | Status: DISCONTINUED | OUTPATIENT
Start: 2024-04-08 | End: 2024-04-09

## 2024-04-08 RX ORDER — ZOLEDRONIC ACID 5 MG/100ML
5 INJECTION, SOLUTION INTRAVENOUS
Refills: 0 | DISCHARGE

## 2024-04-08 RX ORDER — ACETAMINOPHEN 500 MG
1000 TABLET ORAL ONCE
Refills: 0 | Status: COMPLETED | OUTPATIENT
Start: 2024-04-08 | End: 2024-04-08

## 2024-04-08 RX ORDER — LANOLIN ALCOHOL/MO/W.PET/CERES
1 CREAM (GRAM) TOPICAL
Refills: 0 | DISCHARGE

## 2024-04-08 RX ORDER — ONDANSETRON 8 MG/1
4 TABLET, FILM COATED ORAL ONCE
Refills: 0 | Status: COMPLETED | OUTPATIENT
Start: 2024-04-08 | End: 2024-04-08

## 2024-04-08 RX ORDER — LEVOTHYROXINE SODIUM 125 MCG
1 TABLET ORAL
Refills: 0 | DISCHARGE

## 2024-04-08 RX ORDER — MORPHINE SULFATE 50 MG/1
2 CAPSULE, EXTENDED RELEASE ORAL EVERY 4 HOURS
Refills: 0 | Status: DISCONTINUED | OUTPATIENT
Start: 2024-04-08 | End: 2024-04-09

## 2024-04-08 RX ORDER — DILTIAZEM HCL 120 MG
1 CAPSULE, EXT RELEASE 24 HR ORAL
Qty: 0 | Refills: 0 | DISCHARGE

## 2024-04-08 RX ORDER — HEPARIN SODIUM 5000 [USP'U]/ML
5000 INJECTION INTRAVENOUS; SUBCUTANEOUS EVERY 8 HOURS
Refills: 0 | Status: DISCONTINUED | OUTPATIENT
Start: 2024-04-08 | End: 2024-04-09

## 2024-04-08 RX ORDER — PANTOPRAZOLE SODIUM 20 MG/1
40 TABLET, DELAYED RELEASE ORAL DAILY
Refills: 0 | Status: DISCONTINUED | OUTPATIENT
Start: 2024-04-08 | End: 2024-04-09

## 2024-04-08 RX ORDER — ZINC SULFATE TAB 220 MG (50 MG ZINC EQUIVALENT) 220 (50 ZN) MG
1 TAB ORAL
Qty: 0 | Refills: 0 | DISCHARGE

## 2024-04-08 RX ORDER — ONDANSETRON 8 MG/1
4 TABLET, FILM COATED ORAL EVERY 8 HOURS
Refills: 0 | Status: DISCONTINUED | OUTPATIENT
Start: 2024-04-08 | End: 2024-04-09

## 2024-04-08 RX ORDER — KETOROLAC TROMETHAMINE 30 MG/ML
15 SYRINGE (ML) INJECTION EVERY 6 HOURS
Refills: 0 | Status: DISCONTINUED | OUTPATIENT
Start: 2024-04-08 | End: 2024-04-09

## 2024-04-08 RX ORDER — ACETAMINOPHEN 500 MG
1000 TABLET ORAL ONCE
Refills: 0 | Status: DISCONTINUED | OUTPATIENT
Start: 2024-04-08 | End: 2024-04-09

## 2024-04-08 RX ORDER — MORPHINE SULFATE 50 MG/1
4 CAPSULE, EXTENDED RELEASE ORAL ONCE
Refills: 0 | Status: DISCONTINUED | OUTPATIENT
Start: 2024-04-08 | End: 2024-04-08

## 2024-04-08 RX ORDER — RITUXIMAB 10 MG/ML
375 INJECTION, SOLUTION INTRAVENOUS
Refills: 0 | DISCHARGE

## 2024-04-08 RX ORDER — BUDESONIDE AND FORMOTEROL FUMARATE DIHYDRATE 160; 4.5 UG/1; UG/1
2 AEROSOL RESPIRATORY (INHALATION)
Qty: 0 | Refills: 0 | DISCHARGE

## 2024-04-08 RX ORDER — GABAPENTIN 400 MG/1
1 CAPSULE ORAL
Refills: 0 | DISCHARGE

## 2024-04-08 RX ORDER — SODIUM CHLORIDE 9 MG/ML
1000 INJECTION INTRAMUSCULAR; INTRAVENOUS; SUBCUTANEOUS ONCE
Refills: 0 | Status: COMPLETED | OUTPATIENT
Start: 2024-04-08 | End: 2024-04-08

## 2024-04-08 RX ORDER — PHENOL/SODIUM PHENOLATE
1 AEROSOL, SPRAY (ML) MUCOUS MEMBRANE
Refills: 0 | Status: DISCONTINUED | OUTPATIENT
Start: 2024-04-08 | End: 2024-04-09

## 2024-04-08 RX ORDER — PROCHLORPERAZINE MALEATE 5 MG
5 TABLET ORAL EVERY 8 HOURS
Refills: 0 | Status: DISCONTINUED | OUTPATIENT
Start: 2024-04-08 | End: 2024-04-09

## 2024-04-08 RX ORDER — ACETAMINOPHEN 500 MG
1000 TABLET ORAL ONCE
Refills: 0 | Status: COMPLETED | OUTPATIENT
Start: 2024-04-08 | End: 2024-04-09

## 2024-04-08 RX ORDER — CHOLECALCIFEROL (VITAMIN D3) 125 MCG
7000 CAPSULE ORAL
Qty: 0 | Refills: 0 | DISCHARGE

## 2024-04-08 RX ORDER — SODIUM CHLORIDE 9 MG/ML
1000 INJECTION, SOLUTION INTRAVENOUS
Refills: 0 | Status: DISCONTINUED | OUTPATIENT
Start: 2024-04-08 | End: 2024-04-08

## 2024-04-08 RX ORDER — ACETAMINOPHEN 500 MG
1000 TABLET ORAL ONCE
Refills: 0 | Status: DISCONTINUED | OUTPATIENT
Start: 2024-04-08 | End: 2024-04-08

## 2024-04-08 RX ADMIN — SODIUM CHLORIDE 120 MILLILITER(S): 9 INJECTION, SOLUTION INTRAVENOUS at 12:01

## 2024-04-08 RX ADMIN — HEPARIN SODIUM 5000 UNIT(S): 5000 INJECTION INTRAVENOUS; SUBCUTANEOUS at 15:13

## 2024-04-08 RX ADMIN — Medication 1000 MILLIGRAM(S): at 08:53

## 2024-04-08 RX ADMIN — MORPHINE SULFATE 4 MILLIGRAM(S): 50 CAPSULE, EXTENDED RELEASE ORAL at 03:07

## 2024-04-08 RX ADMIN — ONDANSETRON 4 MILLIGRAM(S): 8 TABLET, FILM COATED ORAL at 06:20

## 2024-04-08 RX ADMIN — Medication 5 MILLIGRAM(S): at 12:09

## 2024-04-08 RX ADMIN — Medication 1 SPRAY(S): at 21:28

## 2024-04-08 RX ADMIN — SODIUM CHLORIDE 1000 MILLILITER(S): 9 INJECTION INTRAMUSCULAR; INTRAVENOUS; SUBCUTANEOUS at 03:07

## 2024-04-08 RX ADMIN — MORPHINE SULFATE 2 MILLIGRAM(S): 50 CAPSULE, EXTENDED RELEASE ORAL at 10:30

## 2024-04-08 RX ADMIN — SODIUM CHLORIDE 100 MILLILITER(S): 9 INJECTION, SOLUTION INTRAVENOUS at 09:03

## 2024-04-08 RX ADMIN — ONDANSETRON 4 MILLIGRAM(S): 8 TABLET, FILM COATED ORAL at 09:02

## 2024-04-08 RX ADMIN — MORPHINE SULFATE 4 MILLIGRAM(S): 50 CAPSULE, EXTENDED RELEASE ORAL at 08:53

## 2024-04-08 RX ADMIN — SODIUM CHLORIDE 120 MILLILITER(S): 9 INJECTION, SOLUTION INTRAVENOUS at 21:29

## 2024-04-08 RX ADMIN — MORPHINE SULFATE 2 MILLIGRAM(S): 50 CAPSULE, EXTENDED RELEASE ORAL at 10:36

## 2024-04-08 RX ADMIN — HEPARIN SODIUM 5000 UNIT(S): 5000 INJECTION INTRAVENOUS; SUBCUTANEOUS at 21:28

## 2024-04-08 RX ADMIN — PANTOPRAZOLE SODIUM 40 MILLIGRAM(S): 20 TABLET, DELAYED RELEASE ORAL at 12:10

## 2024-04-08 RX ADMIN — ONDANSETRON 4 MILLIGRAM(S): 8 TABLET, FILM COATED ORAL at 03:07

## 2024-04-08 RX ADMIN — Medication 400 MILLIGRAM(S): at 06:20

## 2024-04-08 NOTE — H&P ADULT - NSHPPHYSICALEXAM_GEN_ALL_CORE
Physical Exam:  General: AAOx3, Well developed, NAD  Chest: Normal respiratory effort  Heart: RRR  Abdomen: Soft, mild to moderately distended, TTP diffusely mostly mid and lower abdomen, no r/G  Neuro/Psych: No localized deficits. Normal speech, normal tone  Skin: Normal, no rashes, no lesions noted.   Extremities: Warm, well perfused, no edema, Pulses intact      Vitals:  T(C): 36.5 (04-08 @ 02:04), Max: 36.5 (04-08 @ 02:04)  HR: 87 (04-08 @ 02:04) (87 - 87)  BP: 133/97 (04-08 @ 02:04) (133/97 - 133/97)  RR: 18 (04-08 @ 02:04) (18 - 18)  SpO2: 100% (04-08 @ 02:04) (100% - 100%)

## 2024-04-08 NOTE — ED ADULT NURSE REASSESSMENT NOTE - NS ED NURSE REASSESS COMMENT FT1
Pt received A+Ox4. VSS. Pt c/o nausea despite NGT in place. Suction and connections checked. NGT checked for placement. Small amt of bile draining. Dr Ferreira instructed to pull back 3cm of NGT based on xray. Done. Pt reports nausea resolving. Pt ambulated to BR without difficulty. NGT to LCS. Monitored closely. Instructed in plan of care. Monitored closely. Call bell in reach.
Pt's nausea has resolved since NGT advanced. Tube checked for placement, irrigated and water was removed via suction. Pt c/o 6/10 mid abdominal pain. Morphine given with good results. Monitored closely. Call bell in reach.
Received handoff from JD Torres. Pt sitting in stretcher in room. Pt admitted and awaiting bed on the unit and for surgery consult. No acute distress noted at this time. Comfort and safety measures maintained.
16 Fr NG tube placed in right nostril at this time. suction set to low intermittent. pt tolerated well.

## 2024-04-08 NOTE — ED ADULT NURSE NOTE - OBJECTIVE STATEMENT
pt presents to ED c/o abdominal pain. pt describes pain as sharp. pain began last night. pt hx of bowel obstructions; states it feels the same. pt endorsing n/v; denies diarrhea. pt hx htn, hashimoto's, asthma, vasculitis. pt denies cp, sob, ha. pt is a&o x4 with no further complaints.

## 2024-04-08 NOTE — ED PROVIDER NOTE - CLINICAL SUMMARY MEDICAL DECISION MAKING FREE TEXT BOX
57 yo F with hx of bowel resection from bowel perforation.  Hx of SBO.    Now with symptoms consistent with recurrent SBO.      labs, CXR, urine, CT to r/o SBO.    J:00AM  Consulted surgery due to CT which shows SBO.  Dr. Szymanski on call for general surgery but surgical resident trying to get in touch with Dr. Stanley who is patients previous surgeon.  Recommends NG tube at this time.

## 2024-04-08 NOTE — H&P ADULT - NSHPLABSRESULTS_GEN_ALL_CORE
04-08 @ 03:02                    12.2  CBC: 10.20>)-------(<305                     38.4                 142 | 111 | 15    CMP:  ----------------------< 118               4.2 | 29 | 0.93                      Ca:9.1  Phos:-  Mg:-               0.3|      |56        LFTs:  ------|106|-----             -|      |-        < from: CT Abdomen and Pelvis w/ Oral Cont and w/ IV Cont (04.08.24 @ 04:39) >      ACC: [id number] EXAM:  CT ABDOMEN AND PELVIS OC IC   ORDERED BY: ROMERO RICH     PROCEDURE DATE:  04/08/2024          INTERPRETATION:  CLINICAL INFORMATION: Abdominal pain. Vomiting. Clinical   concern for small bowel obstruction.    COMPARISON: 4/12/2022    CONTRAST/COMPLICATIONS:  IV Contrast: Omnipaque 350  90 cc administered   0 cc discarded  Oral Contrast: Omnipaque 300   Fruit 2o  Complications: None reported at time of study completion    PROCEDURE:  CT of the Abdomen and Pelvis was performed.  Sagittal and coronal reformats were performed.    FINDINGS:  LOWER CHEST: Mild dependent atelectasis.    LIVER: Within normal limits.  BILE DUCTS: Normal caliber.  GALLBLADDER: Within normal limits.  SPLEEN: Within normal limits.  PANCREAS: Within normal limits.  ADRENALS: Within normal limits.  KIDNEYS/URETERS: Stable right nephrectomy    BLADDER: Minimally distended.  REPRODUCTIVE ORGANS: Uterus and adnexa within normal limits.    BOWEL: Partial small bowel resection surgical changes. Dilated loops of   left abdominal small bowel measuring up to 4 cm in greatest diameter with   a transition point in the mid posterior pelvis, proximal to the   anastomosis (2:71). Appendix is normal.  PERITONEUM: No ascites.  VESSELS: Within normal limits.  RETROPERITONEUM/LYMPH NODES: No lymphadenopathy.  ABDOMINAL WALL: Within normal limits.  BONES: Diffuse osseous demineralization. Degenerative changes at multiple   levels of the imaged spine. Chronic anterior wedging deformities of lower   thoracic    IMPRESSION:  Mechanical obstruction near the anastomosis site. Read above text for   additional findings, and detailed explanations.    < end of copied text >        Current Inpatient Medications:  acetaminophen   IVPB .. 1000 milliGRAM(s) IV Intermittent once  heparin   Injectable 5000 Unit(s) SubCutaneous every 8 hours  lactated ringers. 1000 milliLiter(s) (100 mL/Hr) IV Continuous <Continuous>  ondansetron Injectable 4 milliGRAM(s) IV Push

## 2024-04-08 NOTE — PATIENT PROFILE ADULT - VISION (WITH CORRECTIVE LENSES IF THE PATIENT USUALLY WEARS THEM):
one pair of glasses with pt/Normal vision: sees adequately in most situations; can see medication labels, newsprint

## 2024-04-08 NOTE — H&P ADULT - ASSESSMENT
57 yo F w PMHx of autoimmune vasculitis, asthma, GERD, osteoporosis, valvular heart disease, pernicious anemia, HTN, hypothyroid, sciatica, hx of bowel perforation in 2021 with jejunum bowel resection and primary anastomosis w Dr Stanley, hx of SBO in 2022 manged non-operatively, presents with abdominal pain X 1 day with abdominal cramping and vomiting.  Patient had very small bowel movement yesterday  5pm, was passing gas yesterday at home.  Feels distended and had periumbilical central abdominal pain. No fever, dysuria, or hematuria.    In ED HDS, no leukocytosis CT A/P w c/f SBO at anastomosis site     P w SBO at anastomosis site.    Plan:  - admit to surgery Dr Stanley  - NPO, m fluids  - serial abdomnial exam  - monitor bowel fucntion  - DVT ppx  - If vomits> NG tube  - abdominal Xray 6h after CT   - possible operative management if fails conservative mng

## 2024-04-08 NOTE — PATIENT PROFILE ADULT - FALL HARM RISK - HARM RISK INTERVENTIONS

## 2024-04-08 NOTE — PATIENT PROFILE ADULT - NSPROPTRIGHTBILLOFRIGHTS_GEN_A_NUR
Hospitalist Progress Note    Patient: Iris Campbell MRN: [id number]  SSN: [id number]    YOB: 1949  Age: 69 y.o. Sex: female      Admit Date: 8/7/2019    LOS: 10 days     Subjective:     From previous notes: \"70 y.o.  female with chronic respiratory failure on 3lpm & nocturnal Trilogy who presents with respiratory distress.  Was found by family members this morning with her mask off, \"not looking good\", swollen and EMS was called. Family states she called about 5-6PM last night and reported Trilogy was alarming and was placing herself on O2. Unknown how long mask was off. Daughter said this morning she was conversant but then \"went out\". From last admission she has a DNR in place but family felt she was \"suffering\" today, O2 sat per family was 35% and they allowed nasal intubation.  Per EMS O2 sat was 50%--was not responsive.  She received Versed in route and in the ER then placed on Versed drip as she began to move some.   Was just discharged home Friday after hospital stay for syncopal type episode. Jaylin Rodriguez been diagnosed with small bowel mass, Heparin bridge for AVR and endoscopy was planned but cancelled per patient desires. Opal Oliver is being admitted to the ICU on vent support for acute hypoxic respiratory failure. \"    8/17 - She is calm today. No acute complaints. Denies CP/SOB. Denies F/C/N/V. Review of systems negative except stated above.     Objective:     Visit Vitals  /57   Pulse 65   Temp 98.6 °F (37 °C)   Resp 18   Ht 5' 2\" (1.575 m)   Wt 94.1 kg (207 lb 8 oz)   SpO2 94%   BMI 37.95 kg/m²      Oxygen Therapy  O2 Sat (%): 94 % (08/17/19 1113)  Pulse via Oximetry: 63 beats per minute (08/17/19 0827)  O2 Device: Nasal cannula (08/17/19 0827)  O2 Flow Rate (L/min): 3 l/min (08/17/19 0827)  FIO2 (%): 40 % (08/13/19 2132)      Intake and Output:     Intake/Output Summary (Last 24 hours) at 8/17/2019 1122  Last data filed at 8/17/2019 0938  Gross per 24 hour Intake 336 ml   Output 1450 ml   Net -1114 ml         Physical Exam:   GENERAL: alert, cooperative, no distress, appears stated age  EYE: conjunctivae/corneas clear. PERRL. THROAT & NECK: normal and no erythema or exudates noted. LUNG: Bibasilar crackles  HEART: regular rate and rhythm, S1S2, no murmur, no JVD  ABDOMEN: soft, non-tender, non-distended. Bowel sounds normal.   EXTREMITIES:  1+ BLE edema, 2+ pedal/radial pulses bilaterally  SKIN: no rash or abnormalities  NEUROLOGIC: A&Ox3. Cranial nerves 2-12 grossly intact. Lab/Data Review:  Recent Results (from the past 24 hour(s))   POC G3    Collection Time: 08/17/19  4:26 AM   Result Value Ref Range    Device: NASAL CANNULA      FIO2 (POC) 32 %    pH (POC) 7.387 7.35 - 7.45      pCO2 (POC) 63.8 (HH) 35 - 45 MMHG    pO2 (POC) 78 75 - 100 MMHG    HCO3 (POC) 38.4 (H) 22 - 26 MMOL/L    sO2 (POC) 95 95 - 98 %    Base excess (POC) 12 mmol/L    Allens test (POC) YES      Site RIGHT RADIAL      Patient temp.  98.6      Specimen type (POC) ARTERIAL      Performed by HoshkoDavisRT     CO2, POC 40 MMOL/L    Flow rate (POC) 3.000 L/min    Respiratory comment: NurseNotified     COLLECT TIME 420     PROTHROMBIN TIME + INR    Collection Time: 08/17/19  5:48 AM   Result Value Ref Range    Prothrombin time 20.8 (H) 11.7 - 14.5 sec    INR 1.7     RENAL FUNCTION PANEL    Collection Time: 08/17/19  5:48 AM   Result Value Ref Range    Sodium 140 136 - 145 mmol/L    Potassium 4.6 3.5 - 5.1 mmol/L    Chloride 97 (L) 98 - 107 mmol/L    CO2 36 (H) 21 - 32 mmol/L    Anion gap 7 7 - 16 mmol/L    Glucose 95 65 - 100 mg/dL    BUN 55 (H) 8 - 23 MG/DL    Creatinine 1.89 (H) 0.6 - 1.0 MG/DL    GFR est AA 34 (L) >60 ml/min/1.73m2    GFR est non-AA 28 (L) >60 ml/min/1.73m2    Calcium 9.5 8.3 - 10.4 MG/DL    Phosphorus 3.7 2.3 - 3.7 MG/DL    Albumin 2.8 (L) 3.2 - 4.6 g/dL   PTT    Collection Time: 08/17/19  5:48 AM   Result Value Ref Range    aPTT 102.4 (H) 24.7 - 39.8 SEC   HGB & HCT Collection Time: 08/17/19  9:19 AM   Result Value Ref Range    HGB 7.7 (L) 11.7 - 15.4 g/dL    HCT 25.7 (L) 35.8 - 46.3 %       Imaging:  Xr Chest Sngl V    Result Date: 8/9/2019  EXAM: XR CHEST SNGL V INDICATION: Pulmonary edema COMPARISON: 8/8/2019 FINDINGS: A portable AP radiograph of the chest was obtained at 0421 hours. The patient is on a cardiac monitor. Left lower lobe lobar atelectasis unchanged. . Cardiomegaly unchanged. .  The bones and soft tissues are grossly within normal limits. IMPRESSION: Left lower lobe lobar atelectasis unchanged. Cardiomegaly unchanged. Xr Chest Sngl V    Result Date: 8/8/2019  EXAM: XR CHEST SNGL V INDICATION: Heart failure COMPARISON: 8/7/2019 FINDINGS: A portable AP radiograph of the chest was obtained at 0423 hours. The patient is on a cardiac monitor. Lobar atelectasis of the left lower lobe. Endotracheal tubes in adequate position. The cardiac and mediastinal contours and pulmonary vascularity are normal.  The bones and soft tissues are grossly within normal limits. IMPRESSION: Lobar atelectasis of the left lower lobe. Xr Chest Sngl V    Result Date: 8/7/2019  CHEST X-RAY, single portable view  8/7/2019 History: Intubated. Technique: Single frontal view of the chest. Comparison: Chest x-ray 7/28/2019 Findings: A stable left-sided intracardiac device is seen. An endotracheal tube is now seen. The tip of this is low in position located at the robert directed towards the left mainstem bronchus. Retraction of this by 3 cm should place the tip at the level of a clavicles. Stable moderate to severe cardiomegaly is seen. Lungs are expanded without appreciable pneumothorax. No evolving consolidation, pleural effusion is seen. IMPRESSION: 1. Low position of endotracheal tube tip located at the robert with the tip directed towards the left mainstem bronchus. Recommend retraction of this by 3 cm and a repeat chest x-ray to confirm position. 2. Stable cardiomegaly. Xr Shoulder Rt Ap/lat Min 2 V    Result Date: 8/7/2019  THREE-VIEW RIGHT SHOULDER, TWO-VIEW RIGHT HUMERUS, TWO-VIEW RIGHT FOREARM: CLINICAL HISTORY:  Right upper extremity pain after fall. COMPARISON:  None. FINDINGS:  No definite fracture, malalignment, or oly bone destruction is evident. Right shoulder and humerus of June 6, 2019. Impaction injury is again noted at the medial aspect of the junction of the humeral head and neck. New calcification projecting over the central and medial space suggests the possibility of calcific tendinitis of the rotator cuff. No persistent radiopaque foreign body is seen. IMPRESSION:  1. No definite acute bony abnormality identified. 2.  Probable chronic impaction at the medial aspect of the junction of the humeral head and neck 3. Probable calcific tendinitis of the rotator cuff. Xr Humerus Rt    Result Date: 8/7/2019  THREE-VIEW RIGHT SHOULDER, TWO-VIEW RIGHT HUMERUS, TWO-VIEW RIGHT FOREARM: CLINICAL HISTORY:  Right upper extremity pain after fall. COMPARISON:  None. FINDINGS:  No definite fracture, malalignment, or oly bone destruction is evident. Right shoulder and humerus of June 6, 2019. Impaction injury is again noted at the medial aspect of the junction of the humeral head and neck. New calcification projecting over the central and medial space suggests the possibility of calcific tendinitis of the rotator cuff. No persistent radiopaque foreign body is seen. IMPRESSION:  1. No definite acute bony abnormality identified. 2.  Probable chronic impaction at the medial aspect of the junction of the humeral head and neck 3. Probable calcific tendinitis of the rotator cuff. Xr Forearm Rt Ap/lat    Result Date: 8/7/2019  THREE-VIEW RIGHT SHOULDER, TWO-VIEW RIGHT HUMERUS, TWO-VIEW RIGHT FOREARM: CLINICAL HISTORY:  Right upper extremity pain after fall. COMPARISON:  None.  FINDINGS:  No definite fracture, malalignment, or oly bone destruction is evident. Right shoulder and humerus of June 6, 2019. Impaction injury is again noted at the medial aspect of the junction of the humeral head and neck. New calcification projecting over the central and medial space suggests the possibility of calcific tendinitis of the rotator cuff. No persistent radiopaque foreign body is seen. IMPRESSION:  1. No definite acute bony abnormality identified. 2.  Probable chronic impaction at the medial aspect of the junction of the humeral head and neck 3. Probable calcific tendinitis of the rotator cuff. Ct Head Wo Cont    Result Date: 7/29/2019  Examination: CT scan of the brain without contrast. History: Confusion/delirium, altered LOC, unexplained, 69 years Female Patient presents with daughter. Daughter states patient is supposed to use a walker to ambulate but does not always. Patient was found this afternoon face up, sideways, on the bed. Daughter states they assume syncopal episode, patient has a history of falls. Patient does not recall incident. Daughter brought patient because she is lethargic, patient was able to eat a small meal PTA. Patient denies pain or injuries. Patient on 3L NC at all times for COPD. Daughter states hx of HTN, took medication just PTA with meal Technique: 5 mm axial imaging of the brain from the posterior fossa to the vertex. Radiation dose reduction techniques were used for this study:  Our CT scanners use one or all of the following: Automated exposure control, adjustment of the mA and/or kVp according to patient's size, iterative reconstruction. Comparison:  CT brain September 07, 2018 Findings: Probable mild microvascular disease unchanged. The ventricles, sulci are age-appropriate. No intracranial hemorrhage or extra-axial collection is identified. No evidence of acute infarct. No mass effect or midline shift is present. Basal cisterns are intact. Small fluid levels are seen in the bilateral maxillary sinuses.   Mild mucosal reaction bilateral ethmoid sinuses. The visualized mastoid air cells are clear. The orbits, bones, and soft tissues are normal in appearance. Image quality somewhat degraded by motion artifact. Impression:  No acute intracranial abnormality. Paranasal sinus mucosal disease as above. Xr Chest Port    Result Date: 8/7/2019  CHEST X-RAY, single portable view  8/7/2019 History: Tube repositioning. Technique: Single frontal view of the chest. Comparison: Chest x-ray 8/7/2019 Findings: A stable left-sided intracardiac device is seen. The patient is intubated. The endotracheal tube has been repositioned and is now in good position with the line 3 cm above the robert. Stable cardiomegaly is seen. Lungs are expanded without appreciable pneumothorax. No evolving consolidation, or pleural effusion is seen. IMPRESSION: 1. Good position of endotracheal tube. 2. Stable cardiomegaly. Xr Chest Port    Result Date: 7/28/2019  AP chest radiograph History: syncope, 69 years Female Comparison: Chest radiograph June 07, 2019 Findings:  Cardiac pacing device obscures part of the left chest wall, single lead appears to remain in anatomic position. Normal cardiomediastinal silhouette with evidence of CABG. Persistent mild hyperinflation suggestive of COPD. Nonspecific mild diffuse interstitial prominence appears slightly increased since prior, may represent edema in the appropriate clinical setting. Trace bilateral pleural effusions appear slightly increased since prior. Mild subsegmental atelectasis bilateral lung bases. No evidence of pneumothorax. Visualized soft tissue and osseous structures otherwise unremarkable. Impression:  Suspect worsening mild interstitial edema. No results found for this visit on 08/07/19. Cultures:   All Micro Results     Procedure Component Value Units Date/Time    CULTURE, BLOOD [[id number]] Collected:  08/07/19 0953    Order Status:  Completed Specimen:  Blood Updated: 08/12/19 1106     Special Requests: --        RIGHT  Antecubital       Culture result: NO GROWTH 5 DAYS       CULTURE, BLOOD [[id number]] Collected:  08/07/19 1959    Order Status:  Completed Specimen:  Blood Updated:  08/12/19 1106     Special Requests: --        LEFT  HAND       Culture result: NO GROWTH 5 DAYS             Assessment/Plan:     Principal Problem:    Acute on chronic respiratory failure with hypoxia ([address] (8/7/2019)  - Resolved  - Continue Albuterol  - Continue Lasix  - Continue vent machine at night    Active Problems:    Acute exacerbation of CHF (congestive heart failure) ([address] (11/14/2018)  - No acute issues  - Continue Coreg  - Continue Lasix  - Strict I/O      Acute renal failure superimposed on chronic kidney disease ([facility] (8/8/2019)  - Creatinine improved  - Continue current Lasix dose  - Repeat BMP in AM      MDS (myelodysplastic syndrome) ([facility] (12/17/2011)      Chronic combined systolic and diastolic heart failure (HCC) (1/20/2013)  - Continue Coreg  - Continue Lasix      CKD (chronic kidney disease) stage 4, GFR 15-29 ml/min ([facility]) (7/10/2013)      COPD (chronic obstructive pulmonary disease) ([address] (4/2/2014)      CAD (coronary artery disease) (1/20/2013)  - No acute CP  - Continue ASA  - Continue Coreg      REJI (obstructive sleep apnea)-cpap (4/2/2014)      S/P AVR (aortic valve replacement) ()  - Continue Warfarin  - INR 1.7 today  - Continue Heparin gtt  - Goal INR 2.5 prior to discharge      Cardiomyopathy ([address] ()      Type 2 diabetes mellitus with nephropathy ([facility] [address] (12/18/2017)  - No acute issues      ICD (implantable cardioverter-defibrillator) in place (10/2/2014)      Debility (9/8/2018)      Severe obesity ([address] (1/15/2019)    Dispo - Continue current meds. Continue Heparin gtt. Discharge when INR >2.5.     DIET REGULAR    DVT Prophylaxis: Warfarin      Signed By: Bhavani Anna DO     August 17, 2019 patient

## 2024-04-08 NOTE — ED ADULT NURSE NOTE - SUICIDE SCREENING QUESTION 3
No No. MARITO screening performed.  STOP BANG Legend: 0-2 = LOW Risk; 3-4 = INTERMEDIATE Risk; 5-8 = HIGH Risk

## 2024-04-08 NOTE — ED PROVIDER NOTE - GASTROINTESTINAL, MLM
Abdomen soft, obese; diminished bowel sounds; mildly distended; no focal tenderness; no rebound or guarding.

## 2024-04-08 NOTE — H&P ADULT - HISTORY OF PRESENT ILLNESS
HPI:  57 yo F w PMHx of autoimmune vasculitis, asthma, GERD, osteoporosis, valvular heart disease, pernicious anemia, HTN, hypothyroid, sciatica, hx of bowel perforation in 2021 with jejunum bowel resection and primary anastomosis w Dr Stanley, hx of SBO in 2022 manged non-operatively, presents with abdominal pain X 1 day with abdominal cramping and vomiting.  Patient had very small bowel movement yesterday  5pm, was passing gas yesterday at home.  Feels distended and had periumbilical central abdominal pain. No fever, dysuria, or hematuria.    In ED HDS, no leukocytosis CT A/P w c/f SBO at anastomosis site

## 2024-04-08 NOTE — PATIENT PROFILE ADULT - TOBACCO USE
Pt starting new job and will have different insurance than [employer].  Pt canceled colonoscopy scheduled at [facility] on 4/10/23.  Pt will call clinic when able to reschedule colonoscopy to [facility].   Never smoker

## 2024-04-08 NOTE — ED ADULT TRIAGE NOTE - CHIEF COMPLAINT QUOTE
Pt arrives to the ED c/o mid abdominal pain that started at 7pm last night. pt describes pain as 'cramping'. Pt also endorses nausea/vomiting. Pt states she has a hx vasculitis and bowel perforation in 2021. Pt states last time this happened she had to have an operation d/t bowel perforation. Last BM yesterday, but states BMs are small. Denies any dizziness, denies any other complaints. PMH- asthma, HTN, neuropathy. sees Dr. Hood for GI. allergies to penicillin and cipro. no meds PTA. AAO x4.

## 2024-04-08 NOTE — ED PROVIDER NOTE - OBJECTIVE STATEMENT
Pt. is a 57 yo F with hx of autoimmune vasculitis, asthma, GERD, osteoporosis, valvular heart disease, pernicious anemia, HTN, hypothyroid, sciatica, hx of bowel perforation in 2021 with bowel resection, hx of SBO, presents with abdominal pain X 1 day with abdominal cramping and vomiting.  Patient had very small bowel movement yesterday morning.  Feels distended and had periumbilical central abdominal pain. Pain feels identical to prior SBO.  Surg; [place].  No fever, dysuria, or hematuria.

## 2024-04-09 ENCOUNTER — TRANSCRIPTION ENCOUNTER (OUTPATIENT)
Age: 59
End: 2024-04-09

## 2024-04-09 VITALS
RESPIRATION RATE: 18 BRPM | TEMPERATURE: 98 F | OXYGEN SATURATION: 100 % | SYSTOLIC BLOOD PRESSURE: 146 MMHG | DIASTOLIC BLOOD PRESSURE: 72 MMHG | HEART RATE: 72 BPM

## 2024-04-09 LAB
ANION GAP SERPL CALC-SCNC: 4 MMOL/L — LOW (ref 5–17)
BASOPHILS # BLD AUTO: 0.06 K/UL — SIGNIFICANT CHANGE UP (ref 0–0.2)
BASOPHILS NFR BLD AUTO: 0.8 % — SIGNIFICANT CHANGE UP (ref 0–2)
BUN SERPL-MCNC: 8 MG/DL — SIGNIFICANT CHANGE UP (ref 7–23)
CALCIUM SERPL-MCNC: 8.8 MG/DL — SIGNIFICANT CHANGE UP (ref 8.5–10.1)
CHLORIDE SERPL-SCNC: 111 MMOL/L — HIGH (ref 96–108)
CLOSURE TME COLL+EPINEP BLD: 205 K/UL — SIGNIFICANT CHANGE UP (ref 150–400)
CO2 SERPL-SCNC: 25 MMOL/L — SIGNIFICANT CHANGE UP (ref 22–31)
CREAT SERPL-MCNC: 0.81 MG/DL — SIGNIFICANT CHANGE UP (ref 0.5–1.3)
CULTURE RESULTS: SIGNIFICANT CHANGE UP
EGFR: 84 ML/MIN/1.73M2 — SIGNIFICANT CHANGE UP
EOSINOPHIL # BLD AUTO: 0.34 K/UL — SIGNIFICANT CHANGE UP (ref 0–0.5)
EOSINOPHIL NFR BLD AUTO: 4.8 % — SIGNIFICANT CHANGE UP (ref 0–6)
GLUCOSE SERPL-MCNC: 89 MG/DL — SIGNIFICANT CHANGE UP (ref 70–99)
HCT VFR BLD CALC: 39.9 % — SIGNIFICANT CHANGE UP (ref 34.5–45)
HGB BLD-MCNC: 12.6 G/DL — SIGNIFICANT CHANGE UP (ref 11.5–15.5)
IMM GRANULOCYTES NFR BLD AUTO: 0.3 % — SIGNIFICANT CHANGE UP (ref 0–0.9)
LYMPHOCYTES # BLD AUTO: 1.38 K/UL — SIGNIFICANT CHANGE UP (ref 1–3.3)
LYMPHOCYTES # BLD AUTO: 19.3 % — SIGNIFICANT CHANGE UP (ref 13–44)
MAGNESIUM SERPL-MCNC: 2.1 MG/DL — SIGNIFICANT CHANGE UP (ref 1.6–2.6)
MCHC RBC-ENTMCNC: 27.2 PG — SIGNIFICANT CHANGE UP (ref 27–34)
MCHC RBC-ENTMCNC: 31.6 GM/DL — LOW (ref 32–36)
MCV RBC AUTO: 86 FL — SIGNIFICANT CHANGE UP (ref 80–100)
MONOCYTES # BLD AUTO: 0.53 K/UL — SIGNIFICANT CHANGE UP (ref 0–0.9)
MONOCYTES NFR BLD AUTO: 7.4 % — SIGNIFICANT CHANGE UP (ref 2–14)
NEUTROPHILS # BLD AUTO: 4.81 K/UL — SIGNIFICANT CHANGE UP (ref 1.8–7.4)
NEUTROPHILS NFR BLD AUTO: 67.4 % — SIGNIFICANT CHANGE UP (ref 43–77)
PHOSPHATE SERPL-MCNC: 3.9 MG/DL — SIGNIFICANT CHANGE UP (ref 2.5–4.5)
PLATELET # BLD AUTO: SIGNIFICANT CHANGE UP K/UL (ref 150–400)
POTASSIUM SERPL-MCNC: 4.6 MMOL/L — SIGNIFICANT CHANGE UP (ref 3.5–5.3)
POTASSIUM SERPL-SCNC: 4.6 MMOL/L — SIGNIFICANT CHANGE UP (ref 3.5–5.3)
RBC # BLD: 4.64 M/UL — SIGNIFICANT CHANGE UP (ref 3.8–5.2)
RBC # FLD: 14.7 % — HIGH (ref 10.3–14.5)
SODIUM SERPL-SCNC: 140 MMOL/L — SIGNIFICANT CHANGE UP (ref 135–145)
SPECIMEN SOURCE: SIGNIFICANT CHANGE UP
WBC # BLD: 7.14 K/UL — SIGNIFICANT CHANGE UP (ref 3.8–10.5)
WBC # FLD AUTO: 7.14 K/UL — SIGNIFICANT CHANGE UP (ref 3.8–10.5)

## 2024-04-09 PROCEDURE — 74018 RADEX ABDOMEN 1 VIEW: CPT | Mod: 26

## 2024-04-09 RX ORDER — GABAPENTIN 400 MG/1
300 CAPSULE ORAL ONCE
Refills: 0 | Status: COMPLETED | OUTPATIENT
Start: 2024-04-09 | End: 2024-04-09

## 2024-04-09 RX ADMIN — GABAPENTIN 300 MILLIGRAM(S): 400 CAPSULE ORAL at 16:52

## 2024-04-09 RX ADMIN — HEPARIN SODIUM 5000 UNIT(S): 5000 INJECTION INTRAVENOUS; SUBCUTANEOUS at 05:10

## 2024-04-09 RX ADMIN — HEPARIN SODIUM 5000 UNIT(S): 5000 INJECTION INTRAVENOUS; SUBCUTANEOUS at 14:16

## 2024-04-09 RX ADMIN — PANTOPRAZOLE SODIUM 40 MILLIGRAM(S): 20 TABLET, DELAYED RELEASE ORAL at 10:12

## 2024-04-09 RX ADMIN — Medication 1000 MILLIGRAM(S): at 05:23

## 2024-04-09 RX ADMIN — SODIUM CHLORIDE 120 MILLILITER(S): 9 INJECTION, SOLUTION INTRAVENOUS at 02:00

## 2024-04-09 RX ADMIN — Medication 400 MILLIGRAM(S): at 05:08

## 2024-04-09 NOTE — DISCHARGE NOTE PROVIDER - CARE PROVIDER_API CALL
Iraj Stanley  Surgery  [address] [place] [address], [address]  [place] [address]  Phone: [phone]  Fax: [phone]  Follow Up Time: 2 weeks

## 2024-04-09 NOTE — PROGRESS NOTE ADULT - ASSESSMENT
57 yo F w PMHx of autoimmune vasculitis, asthma, GERD, osteoporosis, valvular heart disease, pernicious anemia, HTN, hypothyroid, sciatica, hx of bowel perforation in 2021 with jejunum bowel resection and primary anastomosis w Dr Stanley, hx of SBO in 2022 manged non-operatively, presents with abdominal pain, CT A/P w c/f SBO at anastomosis site     NG placed, significantly reduced ouput, pt started passing gas, 6h KUB showed contrast in colon, pain improved, less tender and less distended    Plan:  - follow AM Abd XR  - possible NG tube removal today> sips   - serial abdomnial exam  - monitor bowel fucntion  - DVT ppx  - restart essential home meds after NG removal

## 2024-04-09 NOTE — DISCHARGE NOTE NURSING/CASE MANAGEMENT/SOCIAL WORK - PATIENT PORTAL LINK FT
You can access the FollowMyHealth Patient Portal offered by [facility] by registering at the following website: [email or web address]. By joining [facility]’s FollowMyHealth portal, you will also be able to view your health information using other applications (apps) compatible with our system.

## 2024-04-09 NOTE — PROGRESS NOTE ADULT - ATTENDING COMMENTS
Patient resting in bed comfortable w/o complaints, AXR revealed contrast in colon, having GI fxn,  cc overnight. D/W patient SBO caused by abdominal adhesions & possible stricture of her anastomosis, and offered surgical options for elective basis. Patient will follow-up in office for continuing observation vs. surgery.       Plan  - D/C NGT & IV lock  - Adv diet to clrs if tolerating LRD  - Possible d/c home today if tolerates diet

## 2024-04-09 NOTE — PROGRESS NOTE ADULT - SUBJECTIVE AND OBJECTIVE BOX
Pt admitted for SBO at anastomosis site, NG placed, pt started passing gas, Abd XRAy yesterday w contrast in colon. less distended. reports pain improved, NG output reduced. no fever o/n          Physical Exam:  General: AAOx3, Well developed, NAD  Chest: Normal respiratory effort  Heart: RRR  Abdomen: Soft, minimal tender, mildly distended, NG w minimal output   Neuro/Psych: No localized deficits. Normal speech, normal tone  Skin: Normal, no rashes, no lesions noted.   Extremities: Warm, well perfused, no edema, Pulses intact      Vitals:  T(C): 36.7 (04-09 @ 00:00), Max: 36.7 (04-09 @ 00:00)  HR: 75 (04-09 @ 00:00) (54 - 87)  BP: 149/82 (04-09 @ 00:00) (124/80 - 149/82)  RR: 17 (04-09 @ 00:00) (17 - 19)  SpO2: 94% (04-09 @ 00:00) (94% - 100%)        04-08 @ 03:02                    12.2  CBC: 10.20>)-------(<305                     38.4                 142 | 111 | 15    CMP:  ----------------------< 118               4.2 | 29 | 0.93                      Ca:9.1  Phos:-  Mg:-               0.3|      |56        LFTs:  ------|106|-----             -|      |-            Current Inpatient Medications:  acetaminophen   IVPB .. 1000 milliGRAM(s) IV Intermittent once PRN  acetaminophen   IVPB .. 1000 milliGRAM(s) IV Intermittent once PRN  heparin   Injectable 5000 Unit(s) SubCutaneous every 8 hours  ketorolac   Injectable 15 milliGRAM(s) IV Push every 6 hours PRN  lactated ringers. 1000 milliLiter(s) (120 mL/Hr) IV Continuous <Continuous>  morphine  - Injectable 2 milliGRAM(s) IV Push every 4 hours PRN  ondansetron Injectable 4 milliGRAM(s) IV Push every 8 hours PRN  pantoprazole  Injectable 40 milliGRAM(s) IV Push daily  phenol 1.4% (CHLORASEPTIC) Oral Spray 1 Spray(s) Topical every 2 hours PRN  prochlorperazine   Injectable 5 milliGRAM(s) IV Push every 8 hours PRN

## 2024-04-09 NOTE — DISCHARGE NOTE PROVIDER - NSDCMRMEDTOKEN_GEN_ALL_CORE_FT
dilTIAZem 300 mg/24 hours oral capsule, extended release: 1 cap(s) orally once a day  gabapentin 300 mg oral capsule: 1 cap(s) orally 2 times a day  levothyroxine 88 mcg (0.088 mg) oral tablet: 1 tab(s) orally once a day  melatonin 5 mg oral tablet: 1 tab(s) orally once a day (at bedtime) as needed for sleep  Multiple Vitamins oral tablet: 1 tab(s) orally once a day  Rituxan 10 mg/mL intravenous solution: 375 intravenously every 3 to 6 months  Symbicort 160 mcg-4.5 mcg/inh inhalation aerosol: 2 puff(s) inhaled 2 times a day  Vitamin D3 25 mcg (1000 intl units) oral tablet: 7000 iu&#x27;s orally once a day

## 2024-04-09 NOTE — DISCHARGE NOTE PROVIDER - HOSPITAL COURSE
Pt was admitted with small bowel obstruction managed conservatively. Pt is doing well, tolerating diet, pain controlled, having bowel function. Pt is safe for discharge home.

## 2024-04-19 ENCOUNTER — TRANSCRIPTION ENCOUNTER (OUTPATIENT)
Age: 59
End: 2024-04-19

## 2024-04-22 ENCOUNTER — LABORATORY RESULT (OUTPATIENT)
Age: 59
End: 2024-04-22

## 2024-04-22 DIAGNOSIS — I10 ESSENTIAL (PRIMARY) HYPERTENSION: ICD-10-CM

## 2024-04-22 DIAGNOSIS — F41.8 OTHER SPECIFIED ANXIETY DISORDERS: ICD-10-CM

## 2024-04-22 DIAGNOSIS — Z88.1 ALLERGY STATUS TO OTHER ANTIBIOTIC AGENTS STATUS: ICD-10-CM

## 2024-04-22 DIAGNOSIS — Z88.0 ALLERGY STATUS TO PENICILLIN: ICD-10-CM

## 2024-04-22 DIAGNOSIS — K56.609 UNSPECIFIED INTESTINAL OBSTRUCTION, UNSPECIFIED AS TO PARTIAL VERSUS COMPLETE OBSTRUCTION: ICD-10-CM

## 2024-04-22 DIAGNOSIS — E03.9 HYPOTHYROIDISM, UNSPECIFIED: ICD-10-CM

## 2024-04-22 DIAGNOSIS — Z79.890 HORMONE REPLACEMENT THERAPY: ICD-10-CM

## 2024-04-22 DIAGNOSIS — D51.0 VITAMIN B12 DEFICIENCY ANEMIA DUE TO INTRINSIC FACTOR DEFICIENCY: ICD-10-CM

## 2024-04-22 DIAGNOSIS — J45.909 UNSPECIFIED ASTHMA, UNCOMPLICATED: ICD-10-CM

## 2024-04-22 DIAGNOSIS — K21.9 GASTRO-ESOPHAGEAL REFLUX DISEASE WITHOUT ESOPHAGITIS: ICD-10-CM

## 2024-04-22 DIAGNOSIS — M81.0 AGE-RELATED OSTEOPOROSIS WITHOUT CURRENT PATHOLOGICAL FRACTURE: ICD-10-CM

## 2024-04-23 LAB
ALBUMIN SERPL ELPH-MCNC: 4.6 G/DL
ALP BLD-CCNC: 111 U/L
ALT SERPL-CCNC: 14 U/L
ANION GAP SERPL CALC-SCNC: 15 MMOL/L
APPEARANCE: CLEAR
AST SERPL-CCNC: 58 U/L
BACTERIA: NEGATIVE /HPF
BASOPHILS # BLD AUTO: 0.03 K/UL
BASOPHILS NFR BLD AUTO: 0.5 %
BILIRUB SERPL-MCNC: 0.2 MG/DL
BILIRUBIN URINE: NEGATIVE
BLOOD URINE: NEGATIVE
BUN SERPL-MCNC: 13 MG/DL
CALCIUM SERPL-MCNC: 9.6 MG/DL
CAST: 0 /LPF
CHLORIDE SERPL-SCNC: 105 MMOL/L
CO2 SERPL-SCNC: 24 MMOL/L
COLOR: YELLOW
CREAT SERPL-MCNC: 0.78 MG/DL
CRP SERPL-MCNC: <3 MG/L
EGFR: 87 ML/MIN/1.73M2
EOSINOPHIL # BLD AUTO: 0.29 K/UL
EOSINOPHIL NFR BLD AUTO: 4.8 %
EPITHELIAL CELLS: 2 /HPF
ERYTHROCYTE [SEDIMENTATION RATE] IN BLOOD BY WESTERGREN METHOD: 24 MM/HR
FOLATE SERPL-MCNC: 17.9 NG/ML
GLUCOSE QUALITATIVE U: NEGATIVE MG/DL
GLUCOSE SERPL-MCNC: 90 MG/DL
HCT VFR BLD CALC: 41.5 %
HGB BLD-MCNC: 12.9 G/DL
IMM GRANULOCYTES NFR BLD AUTO: 0.2 %
KETONES URINE: NEGATIVE MG/DL
LEUKOCYTE ESTERASE URINE: NEGATIVE
LYMPHOCYTES # BLD AUTO: 1.83 K/UL
LYMPHOCYTES NFR BLD AUTO: 30.3 %
MAN DIFF?: NORMAL
MCHC RBC-ENTMCNC: 27.3 PG
MCHC RBC-ENTMCNC: 31.1 GM/DL
MCV RBC AUTO: 87.7 FL
MICROSCOPIC-UA: NORMAL
MONOCYTES # BLD AUTO: 0.5 K/UL
MONOCYTES NFR BLD AUTO: 8.3 %
NEUTROPHILS # BLD AUTO: 3.38 K/UL
NEUTROPHILS NFR BLD AUTO: 55.9 %
NITRITE URINE: NEGATIVE
PH URINE: 6
PLATELET # BLD AUTO: 327 K/UL
POTASSIUM SERPL-SCNC: 4.5 MMOL/L
PROT SERPL-MCNC: 6.4 G/DL
PROTEIN URINE: NEGATIVE MG/DL
RBC # BLD: 4.73 M/UL
RBC # FLD: 15.6 %
RED BLOOD CELLS URINE: 1 /HPF
SODIUM SERPL-SCNC: 143 MMOL/L
SPECIFIC GRAVITY URINE: 1.01
UROBILINOGEN URINE: 0.2 MG/DL
VIT B12 SERPL-MCNC: 834 PG/ML
WBC # FLD AUTO: 6.04 K/UL
WHITE BLOOD CELLS URINE: 1 /HPF

## 2024-04-24 ENCOUNTER — TRANSCRIPTION ENCOUNTER (OUTPATIENT)
Age: 59
End: 2024-04-24

## 2024-04-25 ENCOUNTER — TRANSCRIPTION ENCOUNTER (OUTPATIENT)
Age: 59
End: 2024-04-25

## 2024-05-02 ENCOUNTER — APPOINTMENT (OUTPATIENT)
Dept: OTOLARYNGOLOGY | Facility: CLINIC | Age: 59
End: 2024-05-02

## 2024-05-08 NOTE — ED ADULT NURSE NOTE - NS ED NURSE REPORT GIVEN TO FT
Physical Therapy Visit    Visit Type: Daily Treatment Note  Visit: 2  Referring Provider: Bharat Reza MD  Medical Diagnosis (from order): M75.21 - Tendonitis of upper biceps tendon of right shoulder     SUBJECTIVE                                                                                                               Pt states her shoulders are tight from the exercises. 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 Pt had a massage on Monday. Pain front of shoulder and above shoulder blade.    Pain / Symptoms  - Pain rating (out of 10): Current: 2       OBJECTIVE                                                                                                                     Range of Motion (ROM)   (degrees unless noted; active unless noted; norms in ( ); negative=lacking to 0, positive=beyond 0)  Shoulder:    - Flexion (180):         Right: WFL    - Abduction (180):         Right: WFL                     Treatment     Therapeutic Exercise  Performed:  Sidelying shoulder external rotation 1# with towel under axilla 2x10  Prone T (palms down) 2x10  Prone bilateral shoulder extension 2x10  Corner Pec Major Stretch 3 reps - 30 seconds   Pec minor stretch in corner 3x30 seconds  Shoulder External Rotation and Scapular Retraction with red band x10 reps  Standing Shoulder Row with red band x10 reps  Right shoulder internal rotation red band 2x10  Red plyoball scapular stabilization circles x10 CW and CCW right             Manual Therapy   Soft tissue mobilization right proximal biceps, right upper trapezius, right pectorals in supine    Skilled input: verbal instruction/cues and tactile instruction/cues    Writer verbally educated and received verbal consent for hand placement, positioning of patient, and techniques to be performed today from patient, patient's guardian and patient's parent for clothing adjustments for techniques, therapist position for techniques and hand  placement and palpation for techniques as described above and how they are pertinent to the patient's plan of care.  Home Exercise Program  Access Code: [email or web address]  URL: [email or web address]  Date: 05/08/2024  Prepared by: Stacey Wadas    Exercises  - Corner Pec Major Stretch  - 2 x daily - 1 sets - 3 reps - 30 seconds hold  - Standing Shoulder Row with Anchored Resistance  - 2 x daily - 2 sets - 10 reps  - Shoulder External Rotation and Scapular Retraction with Resistance  - 2 x daily - 2 sets - 10 reps  - Prone Scapular Retraction Arms at Side  - 2 x daily - 2 sets - 10 reps  - Prone Scapular Slide with Shoulder Extension  - 2 x daily - 2 sets - 10 reps  - Standing Wall Ball Circles with Mini Swiss Ball  - 2 x daily - 1-2 sets - 10 reps  - Shoulder Internal Rotation with Resistance  - 2 x daily - 2 sets - 10 reps      ASSESSMENT                                                                                                            Pt tolerated treatment session well. No significant pain with new exercises. Pt did have some slight increase in tingling while performing pec stretches. Continue to see pt for scapular strengthening to assist with symptom reduction.  Education:   - Results of above outlined education: Verbalizes understanding and Demonstrates understanding    PLAN                                                                                                                           Suggestions for next session as indicated: Progress per plan of care       Therapy procedure time and total treatment time can be found documented on the Time Entry flowsheet     5s

## 2024-05-30 ENCOUNTER — RX RENEWAL (OUTPATIENT)
Age: 59
End: 2024-05-30

## 2024-05-30 RX ORDER — DILTIAZEM HYDROCHLORIDE 300 MG/1
300 CAPSULE, EXTENDED RELEASE ORAL
Qty: 90 | Refills: 3 | Status: ACTIVE | COMMUNITY
Start: 2020-12-08 | End: 1900-01-01

## 2024-06-13 ENCOUNTER — NON-APPOINTMENT (OUTPATIENT)
Age: 59
End: 2024-06-13

## 2024-06-13 LAB
HCT VFR BLD CALC: 39.7 %
HGB BLD-MCNC: 12.9 G/DL
MCHC RBC-ENTMCNC: 27.6 PG
MCHC RBC-ENTMCNC: 32.5 GM/DL
MCV RBC AUTO: 84.8 FL
PLATELET # BLD AUTO: 291 K/UL
RBC # BLD: 4.68 M/UL
RBC # FLD: 15 %
WBC # FLD AUTO: 6.15 K/UL

## 2024-06-14 ENCOUNTER — NON-APPOINTMENT (OUTPATIENT)
Age: 59
End: 2024-06-14

## 2024-06-14 LAB
25(OH)D3 SERPL-MCNC: 43.5 NG/ML
ALBUMIN SERPL ELPH-MCNC: 4.6 G/DL
ALP BLD-CCNC: 95 U/L
ALT SERPL-CCNC: 15 U/L
ANION GAP SERPL CALC-SCNC: 15 MMOL/L
APPEARANCE: CLEAR
AST SERPL-CCNC: 57 U/L
BACTERIA: NEGATIVE /HPF
BILIRUB SERPL-MCNC: 0.3 MG/DL
BILIRUBIN URINE: NEGATIVE
BLOOD URINE: NEGATIVE
BUN SERPL-MCNC: 14 MG/DL
CALCIUM SERPL-MCNC: 9.9 MG/DL
CAST: 0 /LPF
CHLORIDE SERPL-SCNC: 107 MMOL/L
CHOLEST SERPL-MCNC: 209 MG/DL
CO2 SERPL-SCNC: 23 MMOL/L
COLOR: YELLOW
CREAT SERPL-MCNC: 0.91 MG/DL
EGFR: 73 ML/MIN/1.73M2
EPITHELIAL CELLS: 1 /HPF
ERYTHROCYTE [SEDIMENTATION RATE] IN BLOOD BY WESTERGREN METHOD: 12 MM/HR
GLUCOSE QUALITATIVE U: NEGATIVE MG/DL
GLUCOSE SERPL-MCNC: 94 MG/DL
HDLC SERPL-MCNC: 72 MG/DL
KETONES URINE: NEGATIVE MG/DL
LDLC SERPL CALC-MCNC: 127 MG/DL
LEUKOCYTE ESTERASE URINE: ABNORMAL
MICROSCOPIC-UA: NORMAL
NITRITE URINE: NEGATIVE
NONHDLC SERPL-MCNC: 137 MG/DL
PH URINE: 6.5
POTASSIUM SERPL-SCNC: 5 MMOL/L
PROT SERPL-MCNC: 6.4 G/DL
PROTEIN URINE: NORMAL MG/DL
RED BLOOD CELLS URINE: 1 /HPF
SODIUM SERPL-SCNC: 145 MMOL/L
SPECIFIC GRAVITY URINE: 1.02
T3FREE SERPL-MCNC: 2.04 PG/ML
T4 FREE SERPL-MCNC: 1.3 NG/DL
TRIGL SERPL-MCNC: 55 MG/DL
TSH SERPL-ACNC: 4.54 UIU/ML
UROBILINOGEN URINE: 1 MG/DL
WHITE BLOOD CELLS URINE: 0 /HPF

## 2024-06-16 LAB
ESTIMATED AVERAGE GLUCOSE: 103 MG/DL
HBA1C MFR BLD HPLC: 5.2 %

## 2024-06-17 ENCOUNTER — NON-APPOINTMENT (OUTPATIENT)
Age: 59
End: 2024-06-17

## 2024-06-19 ENCOUNTER — TRANSCRIPTION ENCOUNTER (OUTPATIENT)
Age: 59
End: 2024-06-19

## 2024-06-24 PROBLEM — D84.9 IMMUNOSUPPRESSED STATUS: Status: ACTIVE | Noted: 2021-08-30

## 2024-06-24 PROBLEM — M54.16 CHRONIC LUMBAR RADICULOPATHY: Status: ACTIVE | Noted: 2023-03-14

## 2024-06-24 NOTE — ASSESSMENT
[FreeTextEntry1] : JOSE ALEJANDRO HOFF is a 58 year old woman with ANCA + (+ MPO; +P-ANCA; +RF; +DS-DNA, C-ANCA) vasculitis with clinical manifestations of vasculitis rashes, oral/nasal ulcerations, asthma exacerbation, eosinophilic esophagitis, GI perforation with inflammatory pathology, and neuropathy. Unclear if EGPA vs MPA at present, features can match both and eosinophils only seen in some areas of her case. Given extensive Ab +, Rituxan chosen as steroid sparing treatment with marked responsiveness and able to taper off steroids. Without GI or cutaneous involvement at present, stable neurologic sx which are likely partially chronic, improved strength with OT and PT but has now plateaued and R hand contracture developing tho continued OT is helping minimize progression and now appears to have stopped progressing.   # vasculitis, hx as above, Rituxan treatment started April 2021, s/p last dose Feb 2023. Concern for flare at last visit but now improved R hand and sinus issues moreso ENT.  - [place] labs stable, reviewed with her - repeat labs as below next month  - monitor current sx and monitor for any skin or GI sx  - ENT f/u as needed   # chronic neuropathy and R hand contracture - c/w HEP for now  - c/w use of splint to avoid fixed R hand contracture - c/w gabapentin tapering down as tolerated  - c/w B12 supplementation   # SI joint arthritis, L spine spasm - partially improved s/p MARIA EUGENIA - US guided local injections helped right side, persistent L sided pain - Muscle relaxers too sedating, monitor off - c/w tylenol prn back pain, off opioids - f/u pain mgmt if worsening. 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 - Weight bearing exercise for 30min 3-4x/week to maintain BMD as tolerated, can break into 4 10 min sessions - S/p Reclast Aug 2021, 2022, 2023 - currently on drug holiday  - repeat DEXA 2025 but will need to interpret with caution given normal range but + compression fx   RTC in 4 months

## 2024-06-24 NOTE — HISTORY OF PRESENT ILLNESS
[FreeTextEntry1] : JOSE ALEJANDRO HOFF is a 56 year old woman with pmh R nephrectomy in 2004 for unclear reason, HTN, asthma- with recurrent need for steroids nearly consistently since 8/20- 10-40 mg daily, hypothyroidism. Initially seen as hospital consult for new onset progressive peripheral neuropathy, purpuric rash, granulomatous rash on elbows, and oral/ nasal ulcerations, and significantly 50 lb wt loss. Was in usual state health with intermittent asthma/ and sinus infection but otherwise healthy till 8/20. Recent evaluation for mid epigastric abd/ non bloody emesis, EGD + gastritis confirmed eosinophilic esophagitis/ gastritis. Extensive w/u neuro/ rheum work up negative as per patient in the past. Here she is noted to have + MPO; +P-ANCA; +RF; +DS-DNA. C-ANCA is now positive. Discharged, then readmitted with GI perforation, tissue appeared inflammatory during resection, sent for pathology which confirming inflammatory etiology. Skin biopsy also consistent with vasculitis.   Presently healing well from abd sx, no GI sx at present. Rashes, lung issues, sinus involvement improved with steroids. Weight has stabilized, no other constitutional sx. Ongoing neuropathy with weak .   --------- 4/12/21 -- Just discharged from repeat hospital admission, found with SBO at site of anastomosis and associated abscess, s/p Abx and IR guided drainage, feeling better, no current abdominal complaints aside from mild nausea. Ongoing neurologic complaints which are slightly worse.   6/21/21 -- Tolerated Rituxan loading well, no SE, had no current GI sx, strength improving slowly, has been doing home exercises. No other vasculitis sx, no F/C, weight stable, improved appetite and slowly broadening diet without issues. Ongoing lower back pain, improved with opioids.   8/30/21 -- Doing well, no further rashes or GI sx, tolerating a normal diet, strength continues to improve. No infectious sx, F/C, unintentional weight loss. Back pain stable and slowly improving, not using opioids.   11/29/21 -- Doing well, getting 2nd dose of q6 month Rituxan today, reports feeling some muscle weakness just prior to the 1st dose that has now resolved. No other inflammatory sx today. Peripheral neuropathy is stable but chronic, OT is helping significantly. Also doing PT which continues to help for the back, but can only ambulate for approx 10 min then needs to rest. No worsening back pain. Hair is growing back in, saw derm, likely stress related. No infectious sx, feels well otherwise.   3/7/22 -- No current GI sx or new neuropathic sx but ongoing chronic neuropathic issues despite PT and OT, some contractures starting in her R hand, insurance did not approve her OT brace. No rashes, no F/C, night sweats, unintentional weight loss. Worsening SI joint pain which limits her ability to stand to approx 5 min before she has to sit again, OA changes on recent imaging. Also with worsening of her asthma and seasonal allergies, PMD/pulm has had to put her on steroids. No falls, asking for disability tag for her car as very hard to walk any distance. Asking about Evusheld, remains reluctant to get covid vaccines 2/2 concern for clots.   5/16/22 -- Recent admission for SBO, non surgically resolved, ? ex lap to see why it happened but she is not sure she wants to pursue this. No current GI issues, no current vasculitis activity. OT and gabapentin helping with weakness/neuropathy. No rashes, fevers, night sweats. Ongoing L spine spasm and pain tho has been more able to manage.   9/12/22 -- Overall has been doing well, no further GI issues s/p SBO, exploratory sx is not being actively considered, remainder of vasculitis ROS negative. OT and meds continue to help with neuropathy/weakness in hands and L spine pain/spasm. S/p Rituxan in June, Reclast in Aug, no SE with either.   12/5/22 -- Ongoing LBP which is limiting ability to ambulate/stand for prolonged periods but she does get benefit from swimming in summer, no radicular sx or LE weakness, tylenol prior to walking did not help much, she has completed her insurance covered PT and OT visits for this year. Stable strength in R hand, has been diligent with HEP. No GI sx, no new neuropathic sx, no rashes. No falls. Asking to see if she can taper off gabapentin to see if she can take less.   3/6/23 -- Severe fatigue s/p Rituxan this time, now beginning to lift, also with some worsening neuropathic pain in RUE and RLE but no worsening weakness, no other neurologic sx, remainder of vasculitis ROS negative. ?in setting of her tapering back her gabapentin as now decreased by 50%. Ongoing LBP, continues to make walking and ADLs difficult, R sided SI joint injection was effective, L sided x 2 much less so.   6/5/23 -- Overall doing well, no weakness but neuropathic pain tho improved with resuming gabapentin, back pain has improved and is able to walk longer distances now, no falls, no active vasculitis sx, no infectious sx.   9/18/23 -- Doing well, no active vasculitis sx, stable strength, ongoing back pain and spasm but improved mobility, no falls.   1/8/24 -- Some b/l hand paresthesia, new mild recurrence of weakness over R hand since last visit, mild recurrence of non infectious sinusitis similar to initial presentation, no other active vasculitis sx, no infectious sx. Low back stable, walking as much as she can for exercise, no falls.   3/11/24 -- Marked improvement in paresthesia and hand strength, tapered down slightly on gabapentin without issue. Nasal washes helping with sinusitis, no epistaxis, no hemoptysis. Stable back pain, will avoid further MARIA EUGENIA as hasn't gotten improvement, can walk a block. No infectious sx.

## 2024-06-24 NOTE — PHYSICAL EXAM
[General Appearance - Alert] : alert [General Appearance - In No Acute Distress] : in no acute distress [General Appearance - Well Nourished] : well nourished [Sclera] : the sclera and conjunctiva were normal [PERRL With Normal Accommodation] : pupils were equal in size, round, and reactive to light [Extraocular Movements] : extraocular movements were intact [Oropharynx] : the oropharynx was normal [Outer Ear] : the ears and nose were normal in appearance [Neck Appearance] : the appearance of the neck was normal [Auscultation Breath Sounds / Voice Sounds] : lungs were clear to auscultation bilaterally [Heart Rate And Rhythm] : heart rate was normal and rhythm regular [Heart Sounds] : normal S1 and S2 [Murmurs] : no murmurs [Edema] : there was no peripheral edema [Bowel Sounds] : normal bowel sounds [Abdomen Soft] : soft [Abdomen Tenderness] : non-tender [Abdomen Mass (___ Cm)] : no abdominal mass palpated [Cervical Lymph Nodes Enlarged Posterior Bilaterally] : posterior cervical [Cervical Lymph Nodes Enlarged Anterior Bilaterally] : anterior cervical [No CVA Tenderness] : no ~M costovertebral angle tenderness [Supraclavicular Lymph Nodes Enlarged Bilaterally] : supraclavicular [No Spinal Tenderness] : no spinal tenderness [Abnormal Walk] : normal gait [Nail Clubbing] : no clubbing  or cyanosis of the fingernails [Musculoskeletal - Swelling] : no joint swelling seen [Motor Tone] : muscle strength and tone were normal [] : no rash [Impaired Insight] : insight and judgment were intact [Oriented To Time, Place, And Person] : oriented to person, place, and time [Affect] : the affect was normal [FreeTextEntry1] : good ability to arise from seated, good strength in b/l feet and proximal muscles

## 2024-07-07 ENCOUNTER — NON-APPOINTMENT (OUTPATIENT)
Age: 59
End: 2024-07-07

## 2024-07-08 ENCOUNTER — APPOINTMENT (OUTPATIENT)
Dept: RHEUMATOLOGY | Facility: CLINIC | Age: 59
End: 2024-07-08
Payer: MEDICARE

## 2024-07-08 VITALS
DIASTOLIC BLOOD PRESSURE: 66 MMHG | WEIGHT: 176 LBS | SYSTOLIC BLOOD PRESSURE: 122 MMHG | BODY MASS INDEX: 30.05 KG/M2 | TEMPERATURE: 97.3 F | HEART RATE: 68 BPM | HEIGHT: 64 IN | OXYGEN SATURATION: 97 %

## 2024-07-08 DIAGNOSIS — M54.9 DORSALGIA, UNSPECIFIED: ICD-10-CM

## 2024-07-08 DIAGNOSIS — T38.0X5A OTHER OSTEOPOROSIS W/OUT CURRENT PATHOLOGICAL FRACTURE: ICD-10-CM

## 2024-07-08 DIAGNOSIS — G89.29 DORSALGIA, UNSPECIFIED: ICD-10-CM

## 2024-07-08 DIAGNOSIS — M81.8 OTHER OSTEOPOROSIS W/OUT CURRENT PATHOLOGICAL FRACTURE: ICD-10-CM

## 2024-07-08 DIAGNOSIS — I77.82 ANTINEUTROPHILIC CYTOPLASMIC ANTIBODY [ANCA] VASCULITIS: ICD-10-CM

## 2024-07-08 PROCEDURE — G2211 COMPLEX E/M VISIT ADD ON: CPT

## 2024-07-08 PROCEDURE — 99214 OFFICE O/P EST MOD 30 MIN: CPT

## 2024-07-15 ENCOUNTER — NON-APPOINTMENT (OUTPATIENT)
Age: 59
End: 2024-07-15

## 2024-07-15 ENCOUNTER — APPOINTMENT (OUTPATIENT)
Dept: INTERNAL MEDICINE | Facility: CLINIC | Age: 59
End: 2024-07-15
Payer: MEDICARE

## 2024-07-15 VITALS
OXYGEN SATURATION: 96 % | SYSTOLIC BLOOD PRESSURE: 140 MMHG | RESPIRATION RATE: 15 BRPM | HEIGHT: 64 IN | TEMPERATURE: 98.6 F | DIASTOLIC BLOOD PRESSURE: 90 MMHG | BODY MASS INDEX: 29.88 KG/M2 | HEART RATE: 59 BPM | WEIGHT: 175 LBS

## 2024-07-15 DIAGNOSIS — Z00.00 ENCOUNTER FOR GENERAL ADULT MEDICAL EXAMINATION W/OUT ABNORMAL FINDINGS: ICD-10-CM

## 2024-07-15 PROCEDURE — G0439: CPT

## 2024-07-17 ENCOUNTER — OUTPATIENT (OUTPATIENT)
Dept: OUTPATIENT SERVICES | Facility: HOSPITAL | Age: 59
LOS: 1 days | End: 2024-07-17
Payer: MEDICARE

## 2024-07-17 ENCOUNTER — APPOINTMENT (OUTPATIENT)
Dept: ULTRASOUND IMAGING | Facility: CLINIC | Age: 59
End: 2024-07-17
Payer: MEDICARE

## 2024-07-17 DIAGNOSIS — Z90.5 ACQUIRED ABSENCE OF KIDNEY: Chronic | ICD-10-CM

## 2024-07-17 DIAGNOSIS — Z90.49 ACQUIRED ABSENCE OF OTHER SPECIFIED PARTS OF DIGESTIVE TRACT: Chronic | ICD-10-CM

## 2024-07-17 DIAGNOSIS — E03.9 HYPOTHYROIDISM, UNSPECIFIED: ICD-10-CM

## 2024-07-17 DIAGNOSIS — Z98.891 HISTORY OF UTERINE SCAR FROM PREVIOUS SURGERY: Chronic | ICD-10-CM

## 2024-07-17 PROCEDURE — 76536 US EXAM OF HEAD AND NECK: CPT

## 2024-07-17 PROCEDURE — 76536 US EXAM OF HEAD AND NECK: CPT | Mod: 26

## 2024-07-22 ENCOUNTER — APPOINTMENT (OUTPATIENT)
Dept: ENDOCRINOLOGY | Facility: CLINIC | Age: 59
End: 2024-07-22
Payer: MEDICARE

## 2024-07-22 ENCOUNTER — RX RENEWAL (OUTPATIENT)
Age: 59
End: 2024-07-22

## 2024-07-22 VITALS
BODY MASS INDEX: 30.05 KG/M2 | DIASTOLIC BLOOD PRESSURE: 84 MMHG | WEIGHT: 176 LBS | HEIGHT: 64 IN | SYSTOLIC BLOOD PRESSURE: 130 MMHG | OXYGEN SATURATION: 97 % | HEART RATE: 55 BPM

## 2024-07-22 DIAGNOSIS — E04.2 NONTOXIC MULTINODULAR GOITER: ICD-10-CM

## 2024-07-22 DIAGNOSIS — E03.9 HYPOTHYROIDISM, UNSPECIFIED: ICD-10-CM

## 2024-07-22 PROCEDURE — 99214 OFFICE O/P EST MOD 30 MIN: CPT

## 2024-07-22 NOTE — HISTORY OF PRESENT ILLNESS
[FreeTextEntry1] : Sailaja is a 58  yr old female, who is here for follow up of  hypothyroidism and hashimoto's disease. Per patient she was diagnosed 10+ years Has been on replacement since then. Currently on levothyroxine 88 mcg daily.  Here for follow up . Takes Levothyroxine 88 mcg daily. Empty stomach in morning, denies missing any doses.  Energy is fine. Lost 70 pounds few years ago, then  gained 10 to 20 pounds back in 1 yr.  Now doing intermittent fasting , lost 10 pounds. Was on rituxan infusion every 6 months,stopped for 2 to 3  years will be watched. Also diagnosed with clinical osteoporosis, compression fracture in back.  Being managed  by rheumatologist. Started with reclast infusion in 5/21, got 4 so far , by her rheumatologist. Had  DXA in 7/23 shows normal BMD. Takes vitamin D 3, 5000 units daily  she has been diagnosed with polyneuropathy and vasculitis. She says many years ago, she was told to have thyroid nodules, were followed by endo for many years, last US was about 4 years ago. Then had US in 7/22 and in 7/23 shows stable nodule. No family h/o thyroid disorder or cancer.no h/o neck radiation. No dysphagia, no SOB.Denies heat or cold intolerance, no weight changes, no constipation or diarrhea, no palpitations, energy level fair, no skin or hair changes.

## 2024-07-22 NOTE — ASSESSMENT
[FreeTextEntry1] : Sailaja is a 58 yr old female, who is here for follow up of hypothyroidism and hashimoto's disease.   Currently on levothyroxine 88 mcg daily.  She tells me she has been diagnosed with polyneuropathy and vasculitis since 1 yr.  Also recently diagnosed with clinical osteoporosis, compression fracture in back. Started with reclast infusion, got 2 DOSES so far 5/21, by her rheumatologist.    Hypothyroidism:  TSH normal  4.54  in  6/24,  will go up on dose of levothyroxine to 100 mcg daily. repeat labs next visit.  Discussed again with patient how to take thyroid medication appropriately,empty stomach ,  all Multi vitamins 4 to 6 hrs away form thyroid medication, he voiced understanding.     Thyroid nodules:  She had thyroid US in 7/22, shows a small 1 cm nodule on right side, due to small size plan was to follow up on ultrasound at this point. Had repeat US in 7/23 shows stable nodule. Had repeat US recently, still not reported.  I have again discussed with the patient the incidence of thyroid nodules (fairly high) and the incidence of thyroid cancer (fairly low). We also discussed what can be the worrisome features of malignant nodule and increased incidence of thyroid cancer in patients.    RTC in 3 to 4 months   Vitamin D def:  to continue vitamin D replacement.    Clinical osteoporosis:  on reclast infusion, being managed by her rheumatologist,    RTC in 6 months.

## 2024-07-22 NOTE — PHYSICAL EXAM
[Alert] : alert [Well Nourished] : well nourished [No Acute Distress] : no acute distress [Normal Sclera/Conjunctiva] : normal sclera/conjunctiva [No Proptosis] : no proptosis [No Lid Lag] : no lid lag [No Neck Mass] : no neck mass was observed [Thyroid Not Enlarged] : the thyroid was not enlarged [No Respiratory Distress] : no respiratory distress [No Accessory Muscle Use] : no accessory muscle use [Clear to Auscultation] : lungs were clear to auscultation bilaterally [Normal S1, S2] : normal S1 and S2 [Normal Rate] : heart rate was normal [Regular Rhythm] : with a regular rhythm [No Tremors] : no tremors [Oriented x3] : oriented to person, place, and time [Normal Affect] : the affect was normal [Normal Insight/Judgement] : insight and judgment were intact [Normal Mood] : the mood was normal

## 2024-07-24 ENCOUNTER — APPOINTMENT (OUTPATIENT)
Dept: GASTROENTEROLOGY | Facility: CLINIC | Age: 59
End: 2024-07-24

## 2024-07-31 NOTE — DISCHARGE NOTE PROVIDER - PROVIDER RX CONTACT NUMBER
PAMELA WOODY WOULD LIKE FOR YOU TO CALL HIM BACK ABOUT THE SLEEP STUDY. THANK YOU.    [phone]

## 2024-08-12 ENCOUNTER — APPOINTMENT (OUTPATIENT)
Dept: ORTHOPEDIC SURGERY | Facility: CLINIC | Age: 59
End: 2024-08-12

## 2024-08-12 ENCOUNTER — TRANSCRIPTION ENCOUNTER (OUTPATIENT)
Age: 59
End: 2024-08-12

## 2024-08-12 VITALS
HEIGHT: 64 IN | BODY MASS INDEX: 30.05 KG/M2 | HEART RATE: 73 BPM | DIASTOLIC BLOOD PRESSURE: 84 MMHG | WEIGHT: 176 LBS | SYSTOLIC BLOOD PRESSURE: 129 MMHG | TEMPERATURE: 98 F

## 2024-08-12 DIAGNOSIS — M75.42 IMPINGEMENT SYNDROME OF LEFT SHOULDER: ICD-10-CM

## 2024-08-12 DIAGNOSIS — Z86.79 PERSONAL HISTORY OF OTHER DISEASES OF THE CIRCULATORY SYSTEM: ICD-10-CM

## 2024-08-12 PROCEDURE — 99203 OFFICE O/P NEW LOW 30 MIN: CPT | Mod: 25

## 2024-08-12 PROCEDURE — 20610 DRAIN/INJ JOINT/BURSA W/O US: CPT | Mod: LT

## 2024-08-12 PROCEDURE — 73030 X-RAY EXAM OF SHOULDER: CPT | Mod: LT

## 2024-08-13 PROBLEM — Z86.79 HISTORY OF HYPERTENSION: Status: RESOLVED | Noted: 2024-08-13 | Resolved: 2024-08-13

## 2024-08-13 PROBLEM — M75.42 IMPINGEMENT SYNDROME OF LEFT SHOULDER: Status: ACTIVE | Noted: 2024-08-12

## 2024-08-13 NOTE — CONSULT LETTER
[Dear  ___] : Dear  [unfilled], [Consult Letter:] : I had the pleasure of evaluating your patient, [unfilled]. [Please see my note below.] : Please see my note below. [Consult Closing:] : Thank you very much for allowing me to participate in the care of this patient.  If you have any questions, please do not hesitate to contact me. [Sincerely,] : Sincerely, [FreeTextEntry3] : Himanshu Baldwin, III, MD [facility]

## 2024-08-13 NOTE — DISCUSSION/SUMMARY
[de-identified] : At this time, I recommended ice and elevation for the left shoulder impingement syndrome.  She will be reassessed in 3-4 weeks.

## 2024-08-13 NOTE — PROCEDURE
[de-identified] :   Indication: Impingement syndrome of left shoulder   Consent: At this time, I have recommended an injection to the left shoulder.  The risks and benefits of the procedure were discussed with the patient in detail.  Upon verbal consent of the patient, we proceeded with the injection as noted below.   Description of Procedure: After a sterile prep, the patient underwent an injection of approximately 9 mL of 1% Lidocaine (10 mg/mL) without epinephrine and 1 mL of triamcinolone acetonide (40 mg/mL) into the left shoulder.  The patient tolerated the procedure well.  There were no complications.   : [employer] Drug Name: Triamcinolone Acetonide Injectable Suspension USP NDC#: [address]-8 Lot#:  [id number] Expiration Date: 08/31/25

## 2024-08-13 NOTE — PHYSICAL EXAM
[Normal] : Gait: normal [de-identified] : Right Shoulder: Shoulder: Range of Motion in Degrees:                                 Claimant:          Normal:    Abduction (Active)   180   180 degrees    Abduction (Passive)   180   180 degrees    Forward elevation (Active):   180   180 degrees    Forward elevation (Passive):  180   180 degrees    External rotation (Active):   45   45 degrees    External rotation (Passive):   45   45 degrees    Internal rotation (Active):   L-1   L-1    Internal rotation (Passive):   L-1   L-1    No motor weakness to internal rotation, external rotation or abduction in the scapular plane.  Negative crank test.  Negative [place]'s test.  Negative Speeds test. Negative Yergason's test.  Negative cross arm test.  No tenderness to palpation at the AC joint. Negative Valdez sign.  Negative Neers sign.  Negative apprehension. Negative sulcus sign.  No gross neurological or vascular deficits distally.  Skin is intact.  No rashes, scars or lesions.  2+ radial and ulnar pulses. No extra-articular swelling or tenderness.   Left Shoulder:  Shoulder: Range of Motion in Degrees:                                       Claimant: Normal:  Abduction (Active)                   180                180 degrees  Abduction (Passive)   180                180 degrees  Forward elevation (Active):   180                180 degrees  Forward elevation (Passive):   180                180 degrees  External rotation (Active):    45                45 degrees  External rotation (Passive):    45                45 degrees  Internal rotation (Active):    L-1                L-1  Internal rotation (Passive):    L-1                L-1    No motor weakness to internal rotation, external rotation or abduction in the scapular plane.  Negative crank test.  Negative [place]'s test.  Negative Speeds test. Negative Yergason's test.  Negative cross arm test.  No tenderness to palpation at the AC joint. Positive Valdez sign.  Positive Neers sign. Negative apprehension. Negative sulcus sign.  No gross neurological or vascular deficits distally.  Skin is intact.  No rashes, scars or lesions. 2+ radial and ulnar pulses. No extra-articular swelling or tenderness.     [de-identified] : Appearance:  Well-developed, well-nourished female in no acute distress.   [de-identified] : Radiographs, two views of the left shoulder taken in the office today, show no obvious osseous abnormalities.

## 2024-08-13 NOTE — DISCUSSION/SUMMARY
[de-identified] : At this time, I recommended ice and elevation for the left shoulder impingement syndrome.  She will be reassessed in 3-4 weeks.

## 2024-08-13 NOTE — PHYSICAL EXAM
[Normal] : Gait: normal [de-identified] : Right Shoulder: Shoulder: Range of Motion in Degrees:                                 Claimant:          Normal:    Abduction (Active)   180   180 degrees    Abduction (Passive)   180   180 degrees    Forward elevation (Active):   180   180 degrees    Forward elevation (Passive):  180   180 degrees    External rotation (Active):   45   45 degrees    External rotation (Passive):   45   45 degrees    Internal rotation (Active):   L-1   L-1    Internal rotation (Passive):   L-1   L-1    No motor weakness to internal rotation, external rotation or abduction in the scapular plane.  Negative crank test.  Negative Talihina's test.  Negative Speeds test. Negative Yergason's test.  Negative cross arm test.  No tenderness to palpation at the AC joint. Negative Valdez sign.  Negative Neers sign.  Negative apprehension. Negative sulcus sign.  No gross neurological or vascular deficits distally.  Skin is intact.  No rashes, scars or lesions.  2+ radial and ulnar pulses. No extra-articular swelling or tenderness.   Left Shoulder:  Shoulder: Range of Motion in Degrees:                                       Claimant: Normal:  Abduction (Active)                   180                180 degrees  Abduction (Passive)   180                180 degrees  Forward elevation (Active):   180                180 degrees  Forward elevation (Passive):   180                180 degrees  External rotation (Active):    45                45 degrees  External rotation (Passive):    45                45 degrees  Internal rotation (Active):    L-1                L-1  Internal rotation (Passive):    L-1                L-1    No motor weakness to internal rotation, external rotation or abduction in the scapular plane.  Negative crank test.  Negative Talihina's test.  Negative Speeds test. Negative Yergason's test.  Negative cross arm test.  No tenderness to palpation at the AC joint. Positive Valdez sign.  Positive Neers sign. Negative apprehension. Negative sulcus sign.  No gross neurological or vascular deficits distally.  Skin is intact.  No rashes, scars or lesions. 2+ radial and ulnar pulses. No extra-articular swelling or tenderness.     [de-identified] : Appearance:  Well-developed, well-nourished female in no acute distress.   [de-identified] : Radiographs, two views of the left shoulder taken in the office today, show no obvious osseous abnormalities.

## 2024-08-13 NOTE — HISTORY OF PRESENT ILLNESS
[de-identified] : The patient comes in today with complaints of pain to her left shoulder.  She states it has been going on for several weeks and has gotten a little worse recently.  The patient states the onset/injury occurred on 08/09/24.  This injury is not work related or due to an automobile accident.  The patient states the pain is localized.  The patient describes the pain as sharp.  [8] : a current pain level of 8/10 [de-identified] : Lifting left arm [de-identified] : Nothing

## 2024-08-13 NOTE — HISTORY OF PRESENT ILLNESS
[de-identified] : The patient comes in today with complaints of pain to her left shoulder.  She states it has been going on for several weeks and has gotten a little worse recently.  The patient states the onset/injury occurred on 08/09/24.  This injury is not work related or due to an automobile accident.  The patient states the pain is localized.  The patient describes the pain as sharp.  [8] : a current pain level of 8/10 [de-identified] : Lifting left arm [de-identified] : Nothing

## 2024-08-13 NOTE — ADDENDUM
[FreeTextEntry1] : This note was written by Farrukh Solis on 08/13/2024, acting as a scribe for Himanshu Baldwin III, MD

## 2024-08-13 NOTE — PROCEDURE
[de-identified] :   Indication: Impingement syndrome of left shoulder   Consent: At this time, I have recommended an injection to the left shoulder.  The risks and benefits of the procedure were discussed with the patient in detail.  Upon verbal consent of the patient, we proceeded with the injection as noted below.   Description of Procedure: After a sterile prep, the patient underwent an injection of approximately 9 mL of 1% Lidocaine (10 mg/mL) without epinephrine and 1 mL of triamcinolone acetonide (40 mg/mL) into the left shoulder.  The patient tolerated the procedure well.  There were no complications.   : [employer] Drug Name: Triamcinolone Acetonide Injectable Suspension USP NDC#: [address]-0 Lot#:  [id number] Expiration Date: 08/31/25

## 2024-08-22 ENCOUNTER — RX RENEWAL (OUTPATIENT)
Age: 59
End: 2024-08-22

## 2024-09-05 ENCOUNTER — APPOINTMENT (OUTPATIENT)
Dept: ORTHOPEDIC SURGERY | Facility: CLINIC | Age: 59
End: 2024-09-05

## 2024-09-05 NOTE — PATIENT PROFILE ADULT - NSPROGENOTHERPROVIDER_GEN_A_NUR
Spray Paint Text: The liquid nitrogen was applied to the skin utilizing a spray paint frosting technique. Show Spray Paint Technique Variable?: Yes Spray Paint Technique: No Billing Information: Bill by Static Price Price (Use Numbers Only, No Special Characters Or $): 25 none Post-Care Instructions: I reviewed with the patient in detail post-care instructions. Patient is to wear sunprotection, and avoid picking at any of the treated lesions. Pt may apply Vaseline to crusted or scabbing areas. Consent: The patient's consent was obtained including but not limited to risks of crusting, scabbing, blistering, scarring, darker or lighter pigmentary change, recurrence, incomplete removal and infection. The patient understands that the procedure is cosmetic in nature and is not covered by insurance. Detail Level: Simple

## 2024-09-10 ENCOUNTER — TRANSCRIPTION ENCOUNTER (OUTPATIENT)
Age: 59
End: 2024-09-10

## 2024-09-17 ENCOUNTER — APPOINTMENT (OUTPATIENT)
Dept: OBGYN | Facility: CLINIC | Age: 59
End: 2024-09-17

## 2024-10-01 PROBLEM — Z12.11 COLON CANCER SCREENING: Status: ACTIVE | Noted: 2024-10-01

## 2024-10-08 ENCOUNTER — APPOINTMENT (OUTPATIENT)
Dept: OBGYN | Facility: CLINIC | Age: 59
End: 2024-10-08

## 2024-10-08 ENCOUNTER — NON-APPOINTMENT (OUTPATIENT)
Age: 59
End: 2024-10-08

## 2024-10-08 VITALS
WEIGHT: 175 LBS | HEART RATE: 60 BPM | HEIGHT: 64 IN | DIASTOLIC BLOOD PRESSURE: 82 MMHG | SYSTOLIC BLOOD PRESSURE: 137 MMHG | BODY MASS INDEX: 29.88 KG/M2

## 2024-10-08 DIAGNOSIS — Z98.891 HISTORY OF UTERINE SCAR FROM PREVIOUS SURGERY: ICD-10-CM

## 2024-10-08 DIAGNOSIS — Z00.00 ENCOUNTER FOR GENERAL ADULT MEDICAL EXAMINATION W/OUT ABNORMAL FINDINGS: ICD-10-CM

## 2024-10-08 DIAGNOSIS — Z12.39 ENCOUNTER FOR OTHER SCREENING FOR MALIGNANT NEOPLASM OF BREAST: ICD-10-CM

## 2024-10-08 DIAGNOSIS — Z80.0 FAMILY HISTORY OF MALIGNANT NEOPLASM OF DIGESTIVE ORGANS: ICD-10-CM

## 2024-10-08 DIAGNOSIS — Z12.4 ENCOUNTER FOR SCREENING FOR MALIGNANT NEOPLASM OF CERVIX: ICD-10-CM

## 2024-10-08 DIAGNOSIS — Z01.419 ENCOUNTER FOR GYNECOLOGICAL EXAMINATION (GENERAL) (ROUTINE) W/OUT ABNORMAL FINDINGS: ICD-10-CM

## 2024-10-08 DIAGNOSIS — R82.90 UNSPECIFIED ABNORMAL FINDINGS IN URINE: ICD-10-CM

## 2024-10-08 DIAGNOSIS — Z12.11 ENCOUNTER FOR SCREENING FOR MALIGNANT NEOPLASM OF COLON: ICD-10-CM

## 2024-10-08 DIAGNOSIS — Z80.3 FAMILY HISTORY OF MALIGNANT NEOPLASM OF BREAST: ICD-10-CM

## 2024-10-08 LAB
BILIRUB UR QL STRIP: NEGATIVE
CLARITY UR: CLEAR
COLLECTION METHOD: NORMAL
DATE COLLECTED: NORMAL
GLUCOSE UR-MCNC: NEGATIVE
HCG UR QL: 0 EU/DL
HEMOCCULT SP1 STL QL: NEGATIVE
HEMOGLOBIN: 13.3
HGB UR QL STRIP.AUTO: NEGATIVE
KETONES UR-MCNC: NEGATIVE
LEUKOCYTE ESTERASE UR QL STRIP: NORMAL
NITRITE UR QL STRIP: NEGATIVE
PH UR STRIP: 6
PROT UR STRIP-MCNC: NEGATIVE
QUALITY CONTROL: YES
SP GR UR STRIP: 1

## 2024-10-08 PROCEDURE — G0101: CPT

## 2024-10-08 PROCEDURE — 81003 URINALYSIS AUTO W/O SCOPE: CPT | Mod: QW

## 2024-10-08 PROCEDURE — 82270 OCCULT BLOOD FECES: CPT

## 2024-10-08 PROCEDURE — 85018 HEMOGLOBIN: CPT | Mod: QW

## 2024-10-14 LAB — CYTOLOGY CVX/VAG DOC THIN PREP: ABNORMAL

## 2024-11-21 ENCOUNTER — TRANSCRIPTION ENCOUNTER (OUTPATIENT)
Age: 59
End: 2024-11-21

## 2024-11-21 RX ORDER — FLUTICASONE PROPIONATE AND SALMETEROL 250; 50 UG/1; UG/1
250-50 POWDER RESPIRATORY (INHALATION) TWICE DAILY
Qty: 1 | Refills: 11 | Status: ACTIVE | COMMUNITY
Start: 2024-11-21 | End: 1900-01-01

## 2024-11-22 ENCOUNTER — LABORATORY RESULT (OUTPATIENT)
Age: 59
End: 2024-11-22

## 2024-11-22 ENCOUNTER — TRANSCRIPTION ENCOUNTER (OUTPATIENT)
Age: 59
End: 2024-11-22

## 2024-11-25 ENCOUNTER — APPOINTMENT (OUTPATIENT)
Dept: ENDOCRINOLOGY | Facility: CLINIC | Age: 59
End: 2024-11-25
Payer: MEDICARE

## 2024-11-25 VITALS
WEIGHT: 176 LBS | OXYGEN SATURATION: 98 % | HEIGHT: 64 IN | HEART RATE: 56 BPM | DIASTOLIC BLOOD PRESSURE: 80 MMHG | SYSTOLIC BLOOD PRESSURE: 124 MMHG | BODY MASS INDEX: 30.05 KG/M2

## 2024-11-25 DIAGNOSIS — E04.2 NONTOXIC MULTINODULAR GOITER: ICD-10-CM

## 2024-11-25 DIAGNOSIS — E03.9 HYPOTHYROIDISM, UNSPECIFIED: ICD-10-CM

## 2024-11-25 PROCEDURE — 99214 OFFICE O/P EST MOD 30 MIN: CPT

## 2024-11-26 ENCOUNTER — TRANSCRIPTION ENCOUNTER (OUTPATIENT)
Age: 59
End: 2024-11-26

## 2024-12-12 ENCOUNTER — APPOINTMENT (OUTPATIENT)
Dept: ORTHOPEDIC SURGERY | Facility: CLINIC | Age: 59
End: 2024-12-12

## 2024-12-12 DIAGNOSIS — M75.41 IMPINGEMENT SYNDROME OF RIGHT SHOULDER: ICD-10-CM

## 2024-12-12 DIAGNOSIS — S43.431A SUPERIOR GLENOID LABRUM LESION OF RIGHT SHOULDER, INITIAL ENCOUNTER: ICD-10-CM

## 2024-12-12 PROCEDURE — 73030 X-RAY EXAM OF SHOULDER: CPT | Mod: RT

## 2024-12-12 PROCEDURE — 99212 OFFICE O/P EST SF 10 MIN: CPT | Mod: 25

## 2024-12-12 PROCEDURE — 20610 DRAIN/INJ JOINT/BURSA W/O US: CPT | Mod: RT

## 2024-12-16 VITALS — WEIGHT: 176 LBS | HEIGHT: 64 IN | BODY MASS INDEX: 30.05 KG/M2

## 2024-12-16 PROBLEM — S43.431A SUPERIOR GLENOID LABRUM LESION OF RIGHT SHOULDER, INITIAL ENCOUNTER: Status: ACTIVE | Noted: 2024-12-12

## 2024-12-16 PROBLEM — M75.41 IMPINGEMENT SYNDROME OF RIGHT SHOULDER: Status: ACTIVE | Noted: 2024-12-12

## 2024-12-27 ENCOUNTER — NON-APPOINTMENT (OUTPATIENT)
Age: 59
End: 2024-12-27

## 2025-01-13 ENCOUNTER — APPOINTMENT (OUTPATIENT)
Dept: RHEUMATOLOGY | Facility: CLINIC | Age: 60
End: 2025-01-13
Payer: MEDICARE

## 2025-01-13 VITALS
BODY MASS INDEX: 29.53 KG/M2 | HEART RATE: 56 BPM | OXYGEN SATURATION: 97 % | WEIGHT: 173 LBS | DIASTOLIC BLOOD PRESSURE: 76 MMHG | SYSTOLIC BLOOD PRESSURE: 124 MMHG | HEIGHT: 64 IN | TEMPERATURE: 97.5 F

## 2025-01-13 DIAGNOSIS — M75.41 IMPINGEMENT SYNDROME OF RIGHT SHOULDER: ICD-10-CM

## 2025-01-13 DIAGNOSIS — R53.83 OTHER FATIGUE: ICD-10-CM

## 2025-01-13 DIAGNOSIS — G62.9 POLYNEUROPATHY, UNSPECIFIED: ICD-10-CM

## 2025-01-13 DIAGNOSIS — M54.16 RADICULOPATHY, LUMBAR REGION: ICD-10-CM

## 2025-01-13 DIAGNOSIS — I77.82 ANTINEUTROPHILIC CYTOPLASMIC ANTIBODY [ANCA] VASCULITIS: ICD-10-CM

## 2025-01-13 DIAGNOSIS — M81.0 AGE-RELATED OSTEOPOROSIS W/OUT CURRENT PATHOLOGICAL FRACTURE: ICD-10-CM

## 2025-01-13 DIAGNOSIS — S43.431A SUPERIOR GLENOID LABRUM LESION OF RIGHT SHOULDER, INITIAL ENCOUNTER: ICD-10-CM

## 2025-01-13 PROCEDURE — G2211 COMPLEX E/M VISIT ADD ON: CPT

## 2025-01-13 PROCEDURE — 99214 OFFICE O/P EST MOD 30 MIN: CPT

## 2025-01-13 RX ORDER — OSELTAMIVIR PHOSPHATE 75 MG/1
75 CAPSULE ORAL
Qty: 10 | Refills: 0 | Status: COMPLETED | COMMUNITY
Start: 2024-12-27

## 2025-02-06 ENCOUNTER — APPOINTMENT (OUTPATIENT)
Dept: INTERNAL MEDICINE | Facility: CLINIC | Age: 60
End: 2025-02-06
Payer: MEDICARE

## 2025-02-06 VITALS
TEMPERATURE: 98.2 F | HEIGHT: 64 IN | BODY MASS INDEX: 29.71 KG/M2 | HEART RATE: 68 BPM | WEIGHT: 174 LBS | OXYGEN SATURATION: 97 % | SYSTOLIC BLOOD PRESSURE: 122 MMHG | DIASTOLIC BLOOD PRESSURE: 82 MMHG

## 2025-02-06 DIAGNOSIS — D51.0 VITAMIN B12 DEFICIENCY ANEMIA DUE TO INTRINSIC FACTOR DEFICIENCY: ICD-10-CM

## 2025-02-06 DIAGNOSIS — I77.82 ANTINEUTROPHILIC CYTOPLASMIC ANTIBODY [ANCA] VASCULITIS: ICD-10-CM

## 2025-02-06 DIAGNOSIS — J45.40 MODERATE PERSISTENT ASTHMA, UNCOMPLICATED: ICD-10-CM

## 2025-02-06 DIAGNOSIS — J98.4 OTHER DISORDERS OF LUNG: ICD-10-CM

## 2025-02-06 DIAGNOSIS — E53.8 DEFICIENCY OF OTHER SPECIFIED B GROUP VITAMINS: ICD-10-CM

## 2025-02-06 DIAGNOSIS — R53.83 OTHER FATIGUE: ICD-10-CM

## 2025-02-06 PROCEDURE — 99214 OFFICE O/P EST MOD 30 MIN: CPT | Mod: 25

## 2025-02-06 PROCEDURE — 94729 DIFFUSING CAPACITY: CPT

## 2025-02-06 PROCEDURE — 94727 GAS DIL/WSHOT DETER LNG VOL: CPT

## 2025-02-06 PROCEDURE — ZZZZZ: CPT

## 2025-02-06 PROCEDURE — 94060 EVALUATION OF WHEEZING: CPT

## 2025-02-12 ENCOUNTER — NON-APPOINTMENT (OUTPATIENT)
Age: 60
End: 2025-02-12

## 2025-02-12 LAB
BASOPHILS # BLD AUTO: 0.05 K/UL
BASOPHILS NFR BLD AUTO: 0.7 %
EOSINOPHIL # BLD AUTO: 0.22 K/UL
EOSINOPHIL NFR BLD AUTO: 2.9 %
ERYTHROCYTE [SEDIMENTATION RATE] IN BLOOD BY WESTERGREN METHOD: 6 MM/HR
FOLATE SERPL-MCNC: 10.1 NG/ML
HCT VFR BLD CALC: 43.5 %
HGB BLD-MCNC: 13.2 G/DL
IMM GRANULOCYTES NFR BLD AUTO: 0.3 %
LYMPHOCYTES # BLD AUTO: 1.74 K/UL
LYMPHOCYTES NFR BLD AUTO: 23.3 %
MAN DIFF?: NORMAL
MCHC RBC-ENTMCNC: 28.5 PG
MCHC RBC-ENTMCNC: 30.3 G/DL
MCV RBC AUTO: 94 FL
MONOCYTES # BLD AUTO: 0.58 K/UL
MONOCYTES NFR BLD AUTO: 7.8 %
NEUTROPHILS # BLD AUTO: 4.87 K/UL
NEUTROPHILS NFR BLD AUTO: 65 %
PLATELET # BLD AUTO: 342 K/UL
RBC # BLD: 4.63 M/UL
RBC # FLD: 14.7 %
VIT B12 SERPL-MCNC: 403 PG/ML
WBC # FLD AUTO: 7.48 K/UL

## 2025-02-14 ENCOUNTER — APPOINTMENT (OUTPATIENT)
Dept: INTERNAL MEDICINE | Facility: CLINIC | Age: 60
End: 2025-02-14

## 2025-02-14 PROCEDURE — 96372 THER/PROPH/DIAG INJ SC/IM: CPT

## 2025-02-14 RX ORDER — CYANOCOBALAMIN 1000 UG/ML
1000 INJECTION, SOLUTION INTRAMUSCULAR; SUBCUTANEOUS
Qty: 1 | Refills: 0 | Status: COMPLETED | OUTPATIENT
Start: 2025-02-13

## 2025-02-21 ENCOUNTER — APPOINTMENT (OUTPATIENT)
Dept: INTERNAL MEDICINE | Facility: CLINIC | Age: 60
End: 2025-02-21
Payer: MEDICARE

## 2025-02-21 PROCEDURE — 96372 THER/PROPH/DIAG INJ SC/IM: CPT

## 2025-02-21 RX ORDER — CYANOCOBALAMIN 1000 UG/ML
1000 INJECTION, SOLUTION INTRAMUSCULAR; SUBCUTANEOUS
Qty: 0 | Refills: 0 | Status: COMPLETED | OUTPATIENT
Start: 2025-02-19

## 2025-02-28 ENCOUNTER — APPOINTMENT (OUTPATIENT)
Dept: INTERNAL MEDICINE | Facility: CLINIC | Age: 60
End: 2025-02-28
Payer: MEDICARE

## 2025-02-28 PROCEDURE — 96372 THER/PROPH/DIAG INJ SC/IM: CPT

## 2025-02-28 RX ORDER — CYANOCOBALAMIN 1000 UG/ML
1000 INJECTION, SOLUTION INTRAMUSCULAR; SUBCUTANEOUS
Qty: 0 | Refills: 0 | Status: COMPLETED | OUTPATIENT
Start: 2025-02-21

## 2025-03-07 ENCOUNTER — MED ADMIN CHARGE (OUTPATIENT)
Age: 60
End: 2025-03-07

## 2025-03-07 ENCOUNTER — APPOINTMENT (OUTPATIENT)
Dept: INTERNAL MEDICINE | Facility: CLINIC | Age: 60
End: 2025-03-07
Payer: MEDICARE

## 2025-03-07 PROCEDURE — 96372 THER/PROPH/DIAG INJ SC/IM: CPT

## 2025-03-07 RX ORDER — CYANOCOBALAMIN 1000 UG/ML
1000 INJECTION, SOLUTION INTRAMUSCULAR; SUBCUTANEOUS
Qty: 0 | Refills: 0 | Status: COMPLETED | OUTPATIENT
Start: 2025-03-07

## 2025-03-10 ENCOUNTER — APPOINTMENT (OUTPATIENT)
Dept: ORTHOPEDIC SURGERY | Facility: CLINIC | Age: 60
End: 2025-03-10
Payer: MEDICARE

## 2025-03-10 VITALS
HEART RATE: 65 BPM | BODY MASS INDEX: 32.47 KG/M2 | DIASTOLIC BLOOD PRESSURE: 86 MMHG | SYSTOLIC BLOOD PRESSURE: 132 MMHG | WEIGHT: 172 LBS | HEIGHT: 61 IN

## 2025-03-10 DIAGNOSIS — M75.41 IMPINGEMENT SYNDROME OF RIGHT SHOULDER: ICD-10-CM

## 2025-03-10 PROCEDURE — 20610 DRAIN/INJ JOINT/BURSA W/O US: CPT | Mod: RT

## 2025-03-10 PROCEDURE — 99213 OFFICE O/P EST LOW 20 MIN: CPT | Mod: 25

## 2025-04-03 NOTE — DIETITIAN INITIAL EVALUATION ADULT. - WEIGHT FOR BMI (LBS)
Detail Level: Detailed Quality 226: Preventive Care And Screening: Tobacco Use: Screening And Cessation Intervention: Patient screened for tobacco use and is an ex/non-smoker Quality 47: Advance Care Plan: Advance Care Planning discussed and documented; advance care plan or surrogate decision maker documented in the medical record. 179.8

## 2025-04-07 ENCOUNTER — RX RENEWAL (OUTPATIENT)
Age: 60
End: 2025-04-07

## 2025-06-09 ENCOUNTER — RX RENEWAL (OUTPATIENT)
Age: 60
End: 2025-06-09

## 2025-06-10 ENCOUNTER — TRANSCRIPTION ENCOUNTER (OUTPATIENT)
Age: 60
End: 2025-06-10

## 2025-06-10 ENCOUNTER — LABORATORY RESULT (OUTPATIENT)
Age: 60
End: 2025-06-10

## 2025-06-10 RX ORDER — FLUTICASONE PROPIONATE AND SALMETEROL 250; 50 UG/1; UG/1
250-50 POWDER RESPIRATORY (INHALATION)
Qty: 3 | Refills: 3 | Status: ACTIVE | COMMUNITY
Start: 2025-06-10 | End: 1900-01-01

## 2025-06-17 ENCOUNTER — NON-APPOINTMENT (OUTPATIENT)
Age: 60
End: 2025-06-17

## 2025-06-30 ENCOUNTER — OUTPATIENT (OUTPATIENT)
Dept: OUTPATIENT SERVICES | Facility: HOSPITAL | Age: 60
LOS: 1 days | End: 2025-06-30
Payer: MEDICARE

## 2025-06-30 ENCOUNTER — APPOINTMENT (OUTPATIENT)
Dept: ULTRASOUND IMAGING | Facility: CLINIC | Age: 60
End: 2025-06-30
Payer: MEDICARE

## 2025-06-30 DIAGNOSIS — Z90.49 ACQUIRED ABSENCE OF OTHER SPECIFIED PARTS OF DIGESTIVE TRACT: Chronic | ICD-10-CM

## 2025-06-30 DIAGNOSIS — Z98.891 HISTORY OF UTERINE SCAR FROM PREVIOUS SURGERY: Chronic | ICD-10-CM

## 2025-06-30 DIAGNOSIS — E04.2 NONTOXIC MULTINODULAR GOITER: ICD-10-CM

## 2025-06-30 DIAGNOSIS — Z90.5 ACQUIRED ABSENCE OF KIDNEY: Chronic | ICD-10-CM

## 2025-06-30 PROCEDURE — 76536 US EXAM OF HEAD AND NECK: CPT

## 2025-06-30 PROCEDURE — 76536 US EXAM OF HEAD AND NECK: CPT | Mod: 26

## 2025-07-02 ENCOUNTER — LABORATORY RESULT (OUTPATIENT)
Age: 60
End: 2025-07-02

## 2025-07-08 ENCOUNTER — APPOINTMENT (OUTPATIENT)
Dept: ENDOCRINOLOGY | Facility: CLINIC | Age: 60
End: 2025-07-08
Payer: MEDICARE

## 2025-07-08 ENCOUNTER — NON-APPOINTMENT (OUTPATIENT)
Age: 60
End: 2025-07-08

## 2025-07-08 VITALS
WEIGHT: 176 LBS | TEMPERATURE: 98 F | SYSTOLIC BLOOD PRESSURE: 116 MMHG | HEIGHT: 61 IN | BODY MASS INDEX: 33.23 KG/M2 | HEART RATE: 60 BPM | RESPIRATION RATE: 14 BRPM | DIASTOLIC BLOOD PRESSURE: 78 MMHG | OXYGEN SATURATION: 98 %

## 2025-07-08 PROCEDURE — 99214 OFFICE O/P EST MOD 30 MIN: CPT

## 2025-07-10 ENCOUNTER — INPATIENT (INPATIENT)
Facility: HOSPITAL | Age: 60
LOS: 0 days | Discharge: HOME HEALTH | DRG: 390 | End: 2025-07-11
Attending: SURGERY | Admitting: SURGERY
Payer: MEDICARE

## 2025-07-10 VITALS
HEART RATE: 116 BPM | WEIGHT: 175.93 LBS | TEMPERATURE: 98 F | RESPIRATION RATE: 20 BRPM | SYSTOLIC BLOOD PRESSURE: 157 MMHG | DIASTOLIC BLOOD PRESSURE: 98 MMHG

## 2025-07-10 DIAGNOSIS — Z90.5 ACQUIRED ABSENCE OF KIDNEY: Chronic | ICD-10-CM

## 2025-07-10 DIAGNOSIS — Z98.891 HISTORY OF UTERINE SCAR FROM PREVIOUS SURGERY: Chronic | ICD-10-CM

## 2025-07-10 DIAGNOSIS — K56.609 UNSPECIFIED INTESTINAL OBSTRUCTION, UNSPECIFIED AS TO PARTIAL VERSUS COMPLETE OBSTRUCTION: ICD-10-CM

## 2025-07-10 DIAGNOSIS — Z90.49 ACQUIRED ABSENCE OF OTHER SPECIFIED PARTS OF DIGESTIVE TRACT: Chronic | ICD-10-CM

## 2025-07-10 LAB
ALBUMIN SERPL ELPH-MCNC: 3.7 G/DL — SIGNIFICANT CHANGE UP (ref 3.3–5)
ALP SERPL-CCNC: 111 U/L — SIGNIFICANT CHANGE UP (ref 40–120)
ALT FLD-CCNC: 21 U/L — SIGNIFICANT CHANGE UP (ref 12–78)
ANION GAP SERPL CALC-SCNC: 5 MMOL/L — SIGNIFICANT CHANGE UP (ref 5–17)
AST SERPL-CCNC: 57 U/L — HIGH (ref 15–37)
BASOPHILS # BLD AUTO: 0.07 K/UL — SIGNIFICANT CHANGE UP (ref 0–0.2)
BASOPHILS NFR BLD AUTO: 0.6 % — SIGNIFICANT CHANGE UP (ref 0–2)
BILIRUB SERPL-MCNC: 0.3 MG/DL — SIGNIFICANT CHANGE UP (ref 0.2–1.2)
BUN SERPL-MCNC: 15 MG/DL — SIGNIFICANT CHANGE UP (ref 7–23)
CALCIUM SERPL-MCNC: 9.6 MG/DL — SIGNIFICANT CHANGE UP (ref 8.5–10.1)
CHLORIDE SERPL-SCNC: 112 MMOL/L — HIGH (ref 96–108)
CO2 SERPL-SCNC: 24 MMOL/L — SIGNIFICANT CHANGE UP (ref 22–31)
CREAT SERPL-MCNC: 0.89 MG/DL — SIGNIFICANT CHANGE UP (ref 0.5–1.3)
EGFR: 74 ML/MIN/1.73M2 — SIGNIFICANT CHANGE UP
EGFR: 74 ML/MIN/1.73M2 — SIGNIFICANT CHANGE UP
EOSINOPHIL # BLD AUTO: 0.55 K/UL — HIGH (ref 0–0.5)
EOSINOPHIL NFR BLD AUTO: 4.8 % — SIGNIFICANT CHANGE UP (ref 0–6)
GLUCOSE SERPL-MCNC: 98 MG/DL — SIGNIFICANT CHANGE UP (ref 70–99)
HCT VFR BLD CALC: 41.8 % — SIGNIFICANT CHANGE UP (ref 34.5–45)
HGB BLD-MCNC: 13.5 G/DL — SIGNIFICANT CHANGE UP (ref 11.5–15.5)
IMM GRANULOCYTES # BLD AUTO: 0.03 K/UL — SIGNIFICANT CHANGE UP (ref 0–0.07)
IMM GRANULOCYTES NFR BLD AUTO: 0.3 % — SIGNIFICANT CHANGE UP (ref 0–0.9)
LIDOCAIN IGE QN: 38 U/L — SIGNIFICANT CHANGE UP (ref 13–75)
LYMPHOCYTES # BLD AUTO: 1.83 K/UL — SIGNIFICANT CHANGE UP (ref 1–3.3)
LYMPHOCYTES NFR BLD AUTO: 15.9 % — SIGNIFICANT CHANGE UP (ref 13–44)
MCHC RBC-ENTMCNC: 28.1 PG — SIGNIFICANT CHANGE UP (ref 27–34)
MCHC RBC-ENTMCNC: 32.3 G/DL — SIGNIFICANT CHANGE UP (ref 32–36)
MCV RBC AUTO: 86.9 FL — SIGNIFICANT CHANGE UP (ref 80–100)
MONOCYTES # BLD AUTO: 0.75 K/UL — SIGNIFICANT CHANGE UP (ref 0–0.9)
MONOCYTES NFR BLD AUTO: 6.5 % — SIGNIFICANT CHANGE UP (ref 2–14)
NEUTROPHILS # BLD AUTO: 8.29 K/UL — HIGH (ref 1.8–7.4)
NEUTROPHILS NFR BLD AUTO: 71.9 % — SIGNIFICANT CHANGE UP (ref 43–77)
NRBC # BLD AUTO: 0 K/UL — SIGNIFICANT CHANGE UP (ref 0–0)
NRBC # FLD: 0 K/UL — SIGNIFICANT CHANGE UP (ref 0–0)
NRBC BLD AUTO-RTO: 0 /100 WBCS — SIGNIFICANT CHANGE UP (ref 0–0)
PLATELET # BLD AUTO: 323 K/UL — SIGNIFICANT CHANGE UP (ref 150–400)
PMV BLD: 10 FL — SIGNIFICANT CHANGE UP (ref 7–13)
POTASSIUM SERPL-MCNC: 3.8 MMOL/L — SIGNIFICANT CHANGE UP (ref 3.5–5.3)
POTASSIUM SERPL-SCNC: 3.8 MMOL/L — SIGNIFICANT CHANGE UP (ref 3.5–5.3)
PROT SERPL-MCNC: 7.1 GM/DL — SIGNIFICANT CHANGE UP (ref 6–8.3)
RBC # BLD: 4.81 M/UL — SIGNIFICANT CHANGE UP (ref 3.8–5.2)
RBC # FLD: 13.1 % — SIGNIFICANT CHANGE UP (ref 10.3–14.5)
SODIUM SERPL-SCNC: 141 MMOL/L — SIGNIFICANT CHANGE UP (ref 135–145)
WBC # BLD: 11.52 K/UL — HIGH (ref 3.8–10.5)
WBC # FLD AUTO: 11.52 K/UL — HIGH (ref 3.8–10.5)

## 2025-07-10 PROCEDURE — 36415 COLL VENOUS BLD VENIPUNCTURE: CPT

## 2025-07-10 PROCEDURE — 74177 CT ABD & PELVIS W/CONTRAST: CPT | Mod: 26

## 2025-07-10 PROCEDURE — 71045 X-RAY EXAM CHEST 1 VIEW: CPT

## 2025-07-10 PROCEDURE — 94640 AIRWAY INHALATION TREATMENT: CPT

## 2025-07-10 PROCEDURE — 99285 EMERGENCY DEPT VISIT HI MDM: CPT

## 2025-07-10 PROCEDURE — 71045 X-RAY EXAM CHEST 1 VIEW: CPT | Mod: 26

## 2025-07-10 PROCEDURE — 85027 COMPLETE CBC AUTOMATED: CPT

## 2025-07-10 PROCEDURE — 74250 X-RAY XM SM INT 1CNTRST STD: CPT | Mod: 26

## 2025-07-10 PROCEDURE — 80048 BASIC METABOLIC PNL TOTAL CA: CPT

## 2025-07-10 PROCEDURE — 74250 X-RAY XM SM INT 1CNTRST STD: CPT

## 2025-07-10 RX ORDER — GABAPENTIN 400 MG/1
300 CAPSULE ORAL
Refills: 0 | Status: DISCONTINUED | OUTPATIENT
Start: 2025-07-10 | End: 2025-07-11

## 2025-07-10 RX ORDER — ONDANSETRON HCL/PF 4 MG/2 ML
4 VIAL (ML) INJECTION EVERY 6 HOURS
Refills: 0 | Status: DISCONTINUED | OUTPATIENT
Start: 2025-07-10 | End: 2025-07-11

## 2025-07-10 RX ORDER — ONDANSETRON HCL/PF 4 MG/2 ML
4 VIAL (ML) INJECTION ONCE
Refills: 0 | Status: COMPLETED | OUTPATIENT
Start: 2025-07-10 | End: 2025-07-10

## 2025-07-10 RX ORDER — ACETAMINOPHEN 500 MG/5ML
1000 LIQUID (ML) ORAL EVERY 6 HOURS
Refills: 0 | Status: DISCONTINUED | OUTPATIENT
Start: 2025-07-10 | End: 2025-07-11

## 2025-07-10 RX ORDER — POTASSIUM CHLORIDE, DEXTROSE MONOHYDRATE AND SODIUM CHLORIDE 150; 5; 900 MG/100ML; G/100ML; MG/100ML
1000 INJECTION, SOLUTION INTRAVENOUS
Refills: 0 | Status: DISCONTINUED | OUTPATIENT
Start: 2025-07-10 | End: 2025-07-10

## 2025-07-10 RX ORDER — ENOXAPARIN SODIUM 100 MG/ML
40 INJECTION SUBCUTANEOUS EVERY 24 HOURS
Refills: 0 | Status: DISCONTINUED | OUTPATIENT
Start: 2025-07-10 | End: 2025-07-11

## 2025-07-10 RX ORDER — KETOROLAC TROMETHAMINE 30 MG/ML
15 INJECTION, SOLUTION INTRAMUSCULAR; INTRAVENOUS EVERY 6 HOURS
Refills: 0 | Status: DISCONTINUED | OUTPATIENT
Start: 2025-07-10 | End: 2025-07-11

## 2025-07-10 RX ORDER — ACETAMINOPHEN 500 MG/5ML
1000 LIQUID (ML) ORAL EVERY 6 HOURS
Refills: 0 | Status: COMPLETED | OUTPATIENT
Start: 2025-07-10 | End: 2025-07-10

## 2025-07-10 RX ORDER — LEVOTHYROXINE SODIUM 300 MCG
1 TABLET ORAL
Refills: 0 | DISCHARGE

## 2025-07-10 RX ORDER — SODIUM CHLORIDE 9 G/1000ML
1000 INJECTION, SOLUTION INTRAVENOUS
Refills: 0 | Status: DISCONTINUED | OUTPATIENT
Start: 2025-07-10 | End: 2025-07-10

## 2025-07-10 RX ORDER — DILTIAZEM HYDROCHLORIDE 240 MG/1
300 TABLET, EXTENDED RELEASE ORAL DAILY
Refills: 0 | Status: DISCONTINUED | OUTPATIENT
Start: 2025-07-10 | End: 2025-07-11

## 2025-07-10 RX ORDER — LEVOTHYROXINE SODIUM 300 MCG
100 TABLET ORAL DAILY
Refills: 0 | Status: DISCONTINUED | OUTPATIENT
Start: 2025-07-10 | End: 2025-07-11

## 2025-07-10 RX ORDER — LEVOTHYROXINE SODIUM 300 MCG
100 TABLET ORAL AT BEDTIME
Refills: 0 | Status: DISCONTINUED | OUTPATIENT
Start: 2025-07-10 | End: 2025-07-10

## 2025-07-10 RX ADMIN — Medication 400 MILLIGRAM(S): at 06:14

## 2025-07-10 RX ADMIN — Medication 40 MILLIGRAM(S): at 12:45

## 2025-07-10 RX ADMIN — Medication 100 MICROGRAM(S): at 21:43

## 2025-07-10 RX ADMIN — ENOXAPARIN SODIUM 40 MILLIGRAM(S): 100 INJECTION SUBCUTANEOUS at 21:46

## 2025-07-10 RX ADMIN — Medication 4 MILLIGRAM(S): at 02:38

## 2025-07-10 RX ADMIN — GABAPENTIN 300 MILLIGRAM(S): 400 CAPSULE ORAL at 21:43

## 2025-07-10 RX ADMIN — SODIUM CHLORIDE 110 MILLILITER(S): 9 INJECTION, SOLUTION INTRAVENOUS at 06:42

## 2025-07-10 RX ADMIN — POTASSIUM CHLORIDE, DEXTROSE MONOHYDRATE AND SODIUM CHLORIDE 100 MILLILITER(S): 150; 5; 900 INJECTION, SOLUTION INTRAVENOUS at 11:51

## 2025-07-10 RX ADMIN — Medication 4 MILLIGRAM(S): at 06:53

## 2025-07-10 RX ADMIN — Medication 4 MILLIGRAM(S): at 03:08

## 2025-07-10 NOTE — PATIENT PROFILE ADULT - CAREGIVER RELATION TO PATIENT
"Pt is alert and oriented x4. Pt denies pain or discomfort. Pt is on oxygen 2L NC. Pt denies shortness of breath. Pt is on IV Rocephin for Cellulitis. Pt is obese. Pt is on potassium and magnesium protocol. Recheck in the morning. Pt is on Tele monitoring, SR. Pt is on fluid restriction 1800ml/day. Pt likes to sleep in her chair. Pt has a pure wick in place. Chair alarm in place and call light within reach.     Goal Outcome Evaluation:      Plan of Care Reviewed With: patient    Overall Patient Progress: improvingOverall Patient Progress: improving    Outcome Evaluation: pt is on oxygen 2L NC. pt denies shortness of breath. pt is omn Tele montioring      Problem: Adult Inpatient Plan of Care  Goal: Plan of Care Review  Description: The Plan of Care Review/Shift note should be completed every shift.  The Outcome Evaluation is a brief statement about your assessment that the patient is improving, declining, or no change.  This information will be displayed automatically on your shift  note.  Outcome: Progressing  Flowsheets (Taken 6/22/2024 0015)  Outcome Evaluation: pt is on oxygen 2L NC. pt denies shortness of breath. pt is omn Tele montioring  Plan of Care Reviewed With: patient  Overall Patient Progress: improving  Goal: Patient-Specific Goal (Individualized)  Description: You can add care plan individualizations to a care plan. Examples of Individualization might be:  \"Parent requests to be called daily at 9am for status\", \"I have a hard time hearing out of my right ear\", or \"Do not touch me to wake me up as it startles  me\".  Outcome: Progressing  Goal: Absence of Hospital-Acquired Illness or Injury  Outcome: Progressing  Intervention: Identify and Manage Fall Risk  Recent Flowsheet Documentation  Taken 6/21/2024 2056 by Angel Perkins RN  Safety Promotion/Fall Prevention:   assistive device/personal items within reach   activity supervised   clutter free environment maintained   increase visualization " of patient   increased rounding and observation   lighting adjusted   nonskid shoes/slippers when out of bed   patient and family education   safety round/check completed  Intervention: Prevent Skin Injury  Recent Flowsheet Documentation  Taken 6/21/2024 2056 by Angel Perkins RN  Body Position: weight shifting  Intervention: Prevent and Manage VTE (Venous Thromboembolism) Risk  Recent Flowsheet Documentation  Taken 6/21/2024 2056 by Angel Perkins RN  VTE Prevention/Management: SCDs (sequential compression devices) on  Intervention: Prevent Infection  Recent Flowsheet Documentation  Taken 6/21/2024 2056 by Angel Perkins RN  Infection Prevention:   single patient room provided   rest/sleep promoted   hand hygiene promoted  Goal: Optimal Comfort and Wellbeing  Outcome: Progressing  Goal: Readiness for Transition of Care  Outcome: Progressing     Problem: Comorbidity Management  Goal: Maintenance of Heart Failure Symptom Control  Outcome: Progressing  Intervention: Maintain Heart Failure Management  Recent Flowsheet Documentation  Taken 6/21/2024 2056 by Angel Perkins RN  Medication Review/Management: medications reviewed  Goal: Blood Pressure in Desired Range  Outcome: Progressing  Intervention: Maintain Blood Pressure Management  Recent Flowsheet Documentation  Taken 6/21/2024 2056 by Angel Perkins RN  Medication Review/Management: medications reviewed  Goal: Maintenance of Osteoarthritis Symptom Control  Outcome: Progressing  Intervention: Maintain Osteoarthritis Symptom Control  Recent Flowsheet Documentation  Taken 6/21/2024 2056 by Angel Perkins RN  Assistive Device Utilized: lift device  Activity Management:   activity encouraged   activity adjusted per tolerance   up in chair  Medication Review/Management: medications reviewed     Problem: Gas Exchange Impaired  Goal: Optimal Gas Exchange  Outcome: Progressing     Problem: Skin or Soft  Tissue Infection  Goal: Absence of Infection Signs and Symptoms  Outcome: Progressing  Intervention: Minimize and Manage Infection Progression  Recent Flowsheet Documentation  Taken 6/21/2024 2056 by Angel Perkins RN  Infection Prevention:   single patient room provided   rest/sleep promoted   hand hygiene promoted      son

## 2025-07-10 NOTE — ED ADULT TRIAGE NOTE - CHIEF COMPLAINT QUOTE
Pt BIBEMS c/o umbilical abdominal pain x 2 hours. Pt reports pain woke her out of sleep. Denies n/v/d/fevers. Hx of R nephrectomy, SBO and bowel perforation. AO x 4. Ambulatory. No meds PTA.

## 2025-07-10 NOTE — PATIENT PROFILE ADULT - WHAT IS YOUR LIVING SITUATION TODAY?
xkoto Activation    Thank you for requesting access to xkoto. Please follow the instructions below to securely access and download your online medical record. xkoto allows you to send messages to your doctor, view your test results, renew your prescriptions, schedule appointments, and more. How Do I Sign Up? 1. In your internet browser, go to [email or web address]  2. Click on the First Time User? Click Here link in the Sign In box. You will be redirect to the New Member Sign Up page. 3. Enter your xkoto Access Code exactly as it appears below. You will not need to use this code after youve completed the sign-up process. If you do not sign up before the expiration date, you must request a new code. xkoto Access Code: [email or web address]  Expires: 3/7/2018 10:52 AM (This is the date your xkoto access code will )    4. Enter the last four digits of your Social Security Number (xxxx) and Date of Birth (mm/dd/yyyy) as indicated and click Submit. You will be taken to the next sign-up page. 5. Create a xkoto ID. This will be your xkoto login ID and cannot be changed, so think of one that is secure and easy to remember. 6. Create a xkoto password. You can change your password at any time. 7. Enter your Password Reset Question and Answer. This can be used at a later time if you forget your password. 8. Enter your e-mail address. You will receive e-mail notification when new information is available in [address]. 9. Click Sign Up. You can now view and download portions of your medical record. 10. Click the Download Summary menu link to download a portable copy of your medical information. Additional Information    If you have questions, please visit the Frequently Asked Questions section of the xkoto website at [email or web address] Remember, xkoto is NOT to be used for urgent needs. For medical emergencies, dial 911.
no

## 2025-07-10 NOTE — ED ADULT NURSE NOTE - OBJECTIVE STATEMENT
Pt presents to the ED with c/o abdominal pain which woke her up from her sleep, N/V. Pt states she also had diarrhea yesterday. Hx SBO and bowel perforation.

## 2025-07-10 NOTE — PATIENT PROFILE ADULT - FUNCTIONAL ASSESSMENT - DAILY ACTIVITY 4.
HISTORY OF PRESENT ILLNESS:  The patient is a 79-year-old woman who is referred at the [address] [place] [address] regarding nonhealing wound at a  site.  The patient had  in 2021.  She developed a wound infection at the  site and then was found to have an associated intraabdominal infection.  She had the intraabdominal abscess drained percutaneously. Keene Hodgkin was treated with IV antibiotics.  She had a wound VAC in place at the  site for a time.     The patient was first seen at [facility] on 2021.     She had been using  Dakin's solution on gauze as a wet-to-dry dressing change daily.     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  She has no pain associated with wound.     The patient had gestational diabetes but did not have history of diabetes before or after the pregnancy.  She had hypertension during the pregnancy.     The patient has no history of heart disease.  She has no cardiac symptoms and no history of MI or coronary intervention.  She has no dyspnea. Keene Hodgkin is active and ambulatory.     PAST MEDICAL HISTORY: Shaina Yeboah for a seizure disorder (no seizure since ), hypertension, liver disease, polycystic ovarian disease.     The patient has history of gastric bypass for obesity.     Prior dressing as of 2021:  Aquacel AG to be applied over the wound, covered by dry gauze and tape.  To be changed daily.     Current dressing as of 2021:  Aquacel AG to be applied over the wound, covered by silicone bordered absorbant dressing.  To be changed daily.     Reported weight 198 pounds, height 5 feet 7 inches.     PHYSICAL EXAMINATION:     Alert woman, NAD     ABDOMEN:  Examination of the patient's abdomen reveals in the low abdominal skinfold an open wound at prior  site which is 0.5 x 0.4 x 0.1 cm in dimension.  The entire surface is clean granulation.  There is no deep tracking.  Granulation is somewhat exuberant.               Wound improving.  Now no tape irritation around wound.        Dressing ordered:  Aquacel AG to be applied over the wound, covered by silicone bordered absorbant dressing.  To be changed daily.     It is okay for the patient to shower.     Consider Hydrofera Blue.     The patient will follow up in [address] [place] [address] in 2 weeks.     FINAL DIAGNOSIS:  Nonhealing wound at  site.     S31.109D        Isra Chang MD 4 = No assist / stand by assistance

## 2025-07-10 NOTE — H&P ADULT - NSHPLABSRESULTS_GEN_ALL_CORE
Vital Signs Last 24 Hrs  T(C): 36.7 (10 Jul 2025 02:08), Max: 36.7 (10 Jul 2025 02:08)  T(F): 98 (10 Jul 2025 02:08), Max: 98 (10 Jul 2025 02:08)  HR: 116 (10 Jul 2025 02:08) (116 - 116)  BP: 157/98 (10 Jul 2025 02:08) (157/98 - 157/98)  BP(mean): --  RR: 20 (10 Jul 2025 02:08) (20 - 20)  SpO2: --    Parameters below as of 10 Jul 2025 02:08  Patient On (Oxygen Delivery Method): room air        I&O's Summary        LABS:                        13.5   11.52 )-----------( 323      ( 10 Jul 2025 02:33 )             41.8     07-10    141  |  112[H]  |  15  ----------------------------<  98  3.8   |  24  |  0.89    Ca    9.6      10 Jul 2025 02:33    TPro  7.1  /  Alb  3.7  /  TBili  0.3  /  DBili  x   /  AST  57[H]  /  ALT  21  /  AlkPhos  111  07-10      Urinalysis Basic - ( 10 Jul 2025 02:33 )    Color: x / Appearance: x / SG: x / pH: x  Gluc: 98 mg/dL / Ketone: x  / Bili: x / Urobili: x   Blood: x / Protein: x / Nitrite: x   Leuk Esterase: x / RBC: x / WBC x   Sq Epi: x / Non Sq Epi: x / Bacteria: x      CAPILLARY BLOOD GLUCOSE        LIVER FUNCTIONS - ( 10 Jul 2025 02:33 )  Alb: 3.7 g/dL / Pro: 7.1 gm/dL / ALK PHOS: 111 U/L / ALT: 21 U/L / AST: 57 U/L / GGT: x             RADIOLOGY & ADDITIONAL STUDIES:  < from: CT Abdomen and Pelvis w/ Oral Cont and w/ IV Cont (07.10.25 @ 03:47) >      BOWEL: Dilated loops of distal small bowel containing feculent material   and fluid measuring up to 3.5 cm in diameter. There is a transition point   at the lower midline small bowel anastomosis (2:52). There is no   significant bowel wall thickening or mesenteric edema or pneumatosis.   Appendix is normal.  PERITONEUM/RETROPERITONEUM: Within normal limits.  VESSELS: Within normal limits.  LYMPH NODES: No lymphadenopathy.  ABDOMINAL WALL: Within normal limits.  BONES: Degenerative changes. Scattered vertebral body height loss   unchanged from the previous exam    IMPRESSION:    Small bowel obstruction as described above.    < end of copied text >

## 2025-07-10 NOTE — ED PROVIDER NOTE - CLINICAL SUMMARY MEDICAL DECISION MAKING FREE TEXT BOX
hx of bowel obs and perf hx of bowel obs and perf w/ epigastric pain, will CT, pain control, reassess

## 2025-07-10 NOTE — ED PROVIDER NOTE - PHYSICAL EXAMINATION
GEN - NAD; well appearing; A+O x3  HEAD - NC/AT    EYES - EOMI, no conjunctival pallor, no scleral icterus  ENT -   mucous membranes  moist , no discharge  PULM - slight expiratory wheeze   COR -  RRR, S1 S2, no murmurs  ABD - epigastric ruq ttp  EXTREMS -no edema, no deformity, warm and well perfused   SKIN - no rash or bruising      NEUROLOGIC - alert, sensation nl, motor 5/5 RUE/LUE/RLE/LLE

## 2025-07-10 NOTE — ED PROVIDER NOTE - OBJECTIVE STATEMENT
58yo F w PMHx of autoimmune vasculitis, asthma, GERD, osteoporosis, valvular heart disease, pernicious anemia, HTN, hypothyroid, sciatica, hx of bowel perforation, Presents with epigastric abdominal pain.  Positive nausea.  Passed stools today.  No blood in the stool.  No fevers or chills.

## 2025-07-10 NOTE — H&P ADULT - NSHPPHYSICALEXAM_GEN_ALL_CORE
Physical Exam:  General: NAD, resting comfortably  HEENT: NC/AT, EOMI, normal hearing, no oral lesions, no LAD, neck supple  Pulmonary: normal resp effort, CTA-B  Cardiovascular: NSR, no murmurs  Abdominal: soft, ND, tender to mid abdomen, no organomegaly  Extremities: WWP, normal strength, no clubbing/cyanosis/edema  Neuro: A/O x 3, CNs II-XII grossly intact, normal sensation, no focal deficits  Pulses: palpable distal pulses

## 2025-07-10 NOTE — PROGRESS NOTE ADULT - PROBLEM SELECTOR PLAN 1
- NPO/IVFs  - Serial abdominal exams  - Hold NGT for now  - Small bowel series today  - GI ppx  - DVT ppx

## 2025-07-10 NOTE — H&P ADULT - HISTORY OF PRESENT ILLNESS
HPI:  60 yo F w PMHx of autoimmune vasculitis, asthma, GERD, osteoporosis, valvular heart disease, pernicious anemia, HTN, hypothyroid, sciatica, hx of bowel perforation in 2021 with jejunum bowel resection and primary anastomosis w Dr Stanley, hx of multiple admission for SBO in ,  and last in  managed non-operatively, presents with abdominal pain since  last night and vomiting. Vomited last night but non today. Patient had bowel movement yesterday. denies any flatus Does not feel distended. No fever, chills, dysuria, or hematuria. No blood in BM's.    In ED; initially tachycardic; now sinus rythm, leukocytosis to 11.5. CT A/P w  oral contrast shows SBO at anastomosis site; like previous admissions.       PAST MEDICAL & SURGICAL HISTORY:  Asthma  controlled      Sciatica      HTN (hypertension)      Hypothyroid      H/O autoimmune disorder  vasculitis      Neuropathy  2020      Anxiety and depression      H/O osteoporosis      History of pernicious anemia      H/O valvular heart disease      GERD (gastroesophageal reflux disease)      H/O right nephrectomy  nephrotic kidney       H/O  section  two      History of resection of small bowel          MEDICATIONS  (STANDING):    MEDICATIONS  (PRN):      Allergies    ciprofloxacin (Hives)  penicillin (Unknown)    Intolerances

## 2025-07-11 ENCOUNTER — TRANSCRIPTION ENCOUNTER (OUTPATIENT)
Age: 60
End: 2025-07-11

## 2025-07-11 VITALS — HEART RATE: 68 BPM

## 2025-07-11 LAB
ANION GAP SERPL CALC-SCNC: 5 MMOL/L — SIGNIFICANT CHANGE UP (ref 5–17)
BUN SERPL-MCNC: 10 MG/DL — SIGNIFICANT CHANGE UP (ref 7–23)
CALCIUM SERPL-MCNC: 8.9 MG/DL — SIGNIFICANT CHANGE UP (ref 8.5–10.1)
CHLORIDE SERPL-SCNC: 111 MMOL/L — HIGH (ref 96–108)
CO2 SERPL-SCNC: 26 MMOL/L — SIGNIFICANT CHANGE UP (ref 22–31)
CREAT SERPL-MCNC: 0.87 MG/DL — SIGNIFICANT CHANGE UP (ref 0.5–1.3)
EGFR: 76 ML/MIN/1.73M2 — SIGNIFICANT CHANGE UP
EGFR: 76 ML/MIN/1.73M2 — SIGNIFICANT CHANGE UP
GLUCOSE SERPL-MCNC: 96 MG/DL — SIGNIFICANT CHANGE UP (ref 70–99)
HCT VFR BLD CALC: 40 % — SIGNIFICANT CHANGE UP (ref 34.5–45)
HGB BLD-MCNC: 12.7 G/DL — SIGNIFICANT CHANGE UP (ref 11.5–15.5)
MCHC RBC-ENTMCNC: 28.5 PG — SIGNIFICANT CHANGE UP (ref 27–34)
MCHC RBC-ENTMCNC: 31.8 G/DL — LOW (ref 32–36)
MCV RBC AUTO: 89.9 FL — SIGNIFICANT CHANGE UP (ref 80–100)
NRBC # BLD AUTO: 0 K/UL — SIGNIFICANT CHANGE UP (ref 0–0)
NRBC # FLD: 0 K/UL — SIGNIFICANT CHANGE UP (ref 0–0)
NRBC BLD AUTO-RTO: 0 /100 WBCS — SIGNIFICANT CHANGE UP (ref 0–0)
PLATELET # BLD AUTO: 261 K/UL — SIGNIFICANT CHANGE UP (ref 150–400)
PMV BLD: 9.9 FL — SIGNIFICANT CHANGE UP (ref 7–13)
POTASSIUM SERPL-MCNC: 4.2 MMOL/L — SIGNIFICANT CHANGE UP (ref 3.5–5.3)
POTASSIUM SERPL-SCNC: 4.2 MMOL/L — SIGNIFICANT CHANGE UP (ref 3.5–5.3)
RBC # BLD: 4.45 M/UL — SIGNIFICANT CHANGE UP (ref 3.8–5.2)
RBC # FLD: 13.2 % — SIGNIFICANT CHANGE UP (ref 10.3–14.5)
SODIUM SERPL-SCNC: 142 MMOL/L — SIGNIFICANT CHANGE UP (ref 135–145)
WBC # BLD: 5.7 K/UL — SIGNIFICANT CHANGE UP (ref 3.8–10.5)
WBC # FLD AUTO: 5.7 K/UL — SIGNIFICANT CHANGE UP (ref 3.8–10.5)

## 2025-07-11 RX ADMIN — GABAPENTIN 300 MILLIGRAM(S): 400 CAPSULE ORAL at 10:06

## 2025-07-11 RX ADMIN — Medication 100 MICROGRAM(S): at 06:44

## 2025-07-11 RX ADMIN — Medication 40 MILLIGRAM(S): at 10:07

## 2025-07-11 RX ADMIN — Medication 1 DOSE(S): at 09:23

## 2025-07-11 RX ADMIN — DILTIAZEM HYDROCHLORIDE 300 MILLIGRAM(S): 240 TABLET, EXTENDED RELEASE ORAL at 10:07

## 2025-07-11 NOTE — PROGRESS NOTE ADULT - SUBJECTIVE AND OBJECTIVE BOX
Hospital Day#: 1      The patient is doing well without complaints, having GI fxn, denies nausea & vomiting, tolerating clrs, small bowel series reveal' s resolution of her SBO. From surgical standpoint can adv diet to LRD & d/c home today.     Vital Signs Last 24 Hrs  T(C): 36.6 (11 Jul 2025 08:09), Max: 36.6 (11 Jul 2025 08:09)  T(F): 97.9 (11 Jul 2025 08:09), Max: 97.9 (11 Jul 2025 08:09)  HR: 68 (11 Jul 2025 09:29) (56 - 68)  BP: 138/77 (11 Jul 2025 08:09) (121/84 - 138/77)  BP(mean): --  RR: 17 (11 Jul 2025 08:09) (16 - 18)  SpO2: 97% (11 Jul 2025 09:23) (96% - 98%)    Parameters below as of 11 Jul 2025 09:23  Patient On (Oxygen Delivery Method): room air        PHYSICAL EXAM:  General: NAD.  HEENT: no JVD, no jaundice.  LUNGS: CTAB.  Heart: S1 S2 RRR  Abd: soft nt/nd                             12.7   5.70  )-----------( 261      ( 11 Jul 2025 09:21 )             40.0       07-11    142  |  111[H]  |  10  ----------------------------<  96  4.2   |  26  |  0.87    Ca    8.9      11 Jul 2025 09:21    TPro  7.1  /  Alb  3.7  /  TBili  0.3  /  DBili  x   /  AST  57[H]  /  ALT  21  /  AlkPhos  111  07-10        
Hospital Day#: 0      The patient is doing well with complaint's of slight nausea, having flatus, abdominal exam is benign. 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 Patient is feeling better today. I re-discuss surgical options with patient. Patient currently would like to try conservative management at this time and will follow-up outpatient for possible elective revision of anastomosis.     Vital Signs Last 24 Hrs  T(C): 36.6 (10 Jul 2025 07:36), Max: 36.7 (10 Jul 2025 02:08)  T(F): 97.9 (10 Jul 2025 07:36), Max: 98.1 (10 Jul 2025 06:45)  HR: 58 (10 Jul 2025 07:36) (57 - 116)  BP: 123/72 (10 Jul 2025 07:36) (123/72 - 157/98)  BP(mean): 100 (10 Jul 2025 06:05) (94 - 100)  RR: 18 (10 Jul 2025 07:36) (18 - 20)  SpO2: 98% (10 Jul 2025 07:36) (94% - 98%)    Parameters below as of 10 Jul 2025 07:36  Patient On (Oxygen Delivery Method): room air        PHYSICAL EXAM:  General: NAD.  HEENT: no JVD, no jaundice.  LUNGS: CTAB.  Heart: S1 S2 RRR  Abd: soft nt/nd                             13.5   11.52 )-----------( 323      ( 10 Jul 2025 02:33 )             41.8       07-10    141  |  112[H]  |  15  ----------------------------<  98  3.8   |  24  |  0.89    Ca    9.6      10 Jul 2025 02:33    TPro  7.1  /  Alb  3.7  /  TBili  0.3  /  DBili  x   /  AST  57[H]  /  ALT  21  /  AlkPhos  111  07-10

## 2025-07-11 NOTE — DISCHARGE NOTE PROVIDER - CARE PROVIDER_API CALL
Iraj Stanley  Surgery (General Surgery)  [address] [facility], [address] [address]  Phone: [phone]  Fax: [phone]  Follow Up Time: 2 weeks

## 2025-07-11 NOTE — ED STATDOCS - CHPI ED SYMPTOM POS
Problem: Safety - Adult  Goal: Free from fall injury  7/10/2025 2201 by Itz Broussard LPN  Outcome: Progressing  7/10/2025 1545 by Paxton Erickson RN  Outcome: Progressing     Problem: Respiratory - Adult  Goal: Achieves optimal ventilation and oxygenation  7/10/2025 2201 by Itz Broussard LPN  Outcome: Progressing  7/10/2025 2004 by Alaina Osborne, RT  Outcome: Progressing  7/10/2025 1545 by Paxton Erickson RN  Outcome: Progressing  Flowsheets (Taken 7/10/2025 0755)  Achieves optimal ventilation and oxygenation: Assess for changes in respiratory status      PAIN/VOMITING

## 2025-07-11 NOTE — DISCHARGE NOTE PROVIDER - NSDCFUSCHEDAPPT_GEN_ALL_CORE_FT
Yaneth Posadas  [facility]  RHEUM [address] [facility]  Scheduled Appointment: 07/14/2025    Johnny Sheldon  [facility]  INTMED [address]  Scheduled Appointment: 09/02/2025

## 2025-07-11 NOTE — DISCHARGE NOTE PROVIDER - HOSPITAL COURSE
Patient is an 60 yo F a/w with SBO, S/P Small Bowel series yesterday. +Flatus, Tolerated Clears liq diet this AM. Denies any abd pain or nausea. Will adv to reg diet. DC home if tolerating reg diet.  D/W Dr. Stanley

## 2025-07-11 NOTE — DISCHARGE NOTE PROVIDER - NSDCMRMEDTOKEN_GEN_ALL_CORE_FT
ALT/DFM/DPM  1:1000 V/V EXP 8/15/2023  1:100 V/V EXP 10/15/2023  1:10 V/V EXP 6/14/2024  1:1 V/V EXP 6/14/2024    CHARGED 30 UNITS  CHECKED BY ABS  
dilTIAZem 300 mg/24 hours oral capsule, extended release: 1 cap(s) orally once a day  fluticasone-salmeterol 250 mcg-50 mcg/inh inhalation powder: 1 puff(s) inhaled every 12 hours  gabapentin 300 mg oral capsule: 1 cap(s) orally 2 times a day  levothyroxine 100 mcg (0.1 mg) oral tablet: 1 tab(s) orally once a day  melatonin 5 mg oral tablet: 1 tab(s) orally once a day (at bedtime) as needed for sleep

## 2025-07-14 ENCOUNTER — APPOINTMENT (OUTPATIENT)
Dept: RHEUMATOLOGY | Facility: CLINIC | Age: 60
End: 2025-07-14
Payer: MEDICARE

## 2025-07-14 VITALS
DIASTOLIC BLOOD PRESSURE: 74 MMHG | SYSTOLIC BLOOD PRESSURE: 118 MMHG | HEART RATE: 76 BPM | TEMPERATURE: 97.6 F | BODY MASS INDEX: 32.1 KG/M2 | WEIGHT: 170 LBS | HEIGHT: 61 IN | OXYGEN SATURATION: 96 %

## 2025-07-14 PROBLEM — Z98.891 HISTORY OF C-SECTION: Status: RESOLVED | Noted: 2024-10-08 | Resolved: 2025-07-14

## 2025-07-14 PROCEDURE — G2211 COMPLEX E/M VISIT ADD ON: CPT

## 2025-07-14 PROCEDURE — 99214 OFFICE O/P EST MOD 30 MIN: CPT

## 2025-07-22 DIAGNOSIS — K21.9 GASTRO-ESOPHAGEAL REFLUX DISEASE WITHOUT ESOPHAGITIS: ICD-10-CM

## 2025-07-22 DIAGNOSIS — I77.6 ARTERITIS, UNSPECIFIED: ICD-10-CM

## 2025-07-22 DIAGNOSIS — J45.909 UNSPECIFIED ASTHMA, UNCOMPLICATED: ICD-10-CM

## 2025-07-22 DIAGNOSIS — M81.0 AGE-RELATED OSTEOPOROSIS WITHOUT CURRENT PATHOLOGICAL FRACTURE: ICD-10-CM

## 2025-07-22 DIAGNOSIS — E03.9 HYPOTHYROIDISM, UNSPECIFIED: ICD-10-CM

## 2025-07-22 DIAGNOSIS — Z88.0 ALLERGY STATUS TO PENICILLIN: ICD-10-CM

## 2025-07-22 DIAGNOSIS — K56.609 UNSPECIFIED INTESTINAL OBSTRUCTION, UNSPECIFIED AS TO PARTIAL VERSUS COMPLETE OBSTRUCTION: ICD-10-CM

## 2025-07-22 DIAGNOSIS — Z90.5 ACQUIRED ABSENCE OF KIDNEY: ICD-10-CM

## 2025-07-22 DIAGNOSIS — Z88.1 ALLERGY STATUS TO OTHER ANTIBIOTIC AGENTS: ICD-10-CM

## 2025-07-22 DIAGNOSIS — F32.A DEPRESSION, UNSPECIFIED: ICD-10-CM

## 2025-07-22 DIAGNOSIS — M35.9 SYSTEMIC INVOLVEMENT OF CONNECTIVE TISSUE, UNSPECIFIED: ICD-10-CM

## 2025-07-22 DIAGNOSIS — I10 ESSENTIAL (PRIMARY) HYPERTENSION: ICD-10-CM

## 2025-07-31 NOTE — ASU PATIENT PROFILE, ADULT - VISION (WITH CORRECTIVE LENSES IF THE PATIENT USUALLY WEARS THEM):
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    Susie was born at term, appropriately sized for her gestational age, and has no history of chronic illness or medications that could impact her growth. Her mid-parental height prediction is 64 inches, which places her at approximately the 46th percentile for adult height, indicating a normal genetic height potential. While she does have an aunt who is 4 feet 11 inches tall, there is no family history of heart murmurs or other features suggestive of a genetic syndrome associated with short stature. Susie has met all developmental milestones on time and appears clinically euthyroid. Of note, she has a short fourth metacarpal in both hands, but no other significant dysmorphic features.    Since age 10, Susie has experienced a noticeable deceleration in her growth rate and has not gained weight as expected, resulting in a drop across percentiles. Despite this, her appetite remains normal, and her parents have only observed that she tends to eat slowly, though she does finish her meals. There are no concerns about malabsorption, body image issues, or low energy.    Susie has undergone a thorough evaluation for treatable causes of short stature. Her IGF-I was low, but IGFBP-3, thyroid function, and celiac screening were all normal. Genetic testing (karyotype) was also normal. She passed both growth hormone and ACTH stimulation tests. There are no clinical signs of malabsorption, and she appears euthyroid. Her most  recent data show no further weight loss. Her most recent bone age, height at her most recent visit (-2.25 SD), and predicted adult height (2 SD below her genetic potential) support a diagnosis of idiopathic short stature (ISS). For this reason, I sought approval for growth hormone therapy under the FDA-approved indication of idiopathic short stature which was approved.    During today s visit, I spent the majority of our time providing reassurance to Susie's parents about the use and safety of growth hormone therapy in children with idiopathic short stature. I explained that growth hormone therapy is a established and safe treatment option for children with ISS. I emphasized that growth hormone therapy will not interfere with her pubertal development or affect her puberty axis, an important point as they were worried that growth hormone might delay or disrupt normal puberty, but current evidence shows that it does not have this effect.    We again discussed the expected benefits of growth hormone therapy, including improved linear growth, potential increases in bone mineral density, and increased lean body mass. I again explained that growth hormone is administered via a pen device with a very fine, short needle that is injected just under the skin with most children tolerating the injections well, with minimal discomfort (and sounds like Susie feels comfortable on starting these).     I also again explained that we would expect to see a response in height velocity within the first 6 to 12 months of therapy. Treatment would continue until her growth plates are nearly closed, until she reaches her desired adult height, or until she chooses to discontinue therapy.    I also reviewed the potential side effects of growth hormone therapy, including rare but serious risks such as pseudotumor cerebri (intracranial hypertension), which would present as a severe headache, possibly with vomiting, that does not respond to  typical pain relievers. I instructed the family to notify us immediately if Susie develops such symptoms, and we would hold the medication until further evaluation. Other rare complications include scoliosis and slipped capital femoral epiphysis (SCFE), which would present as hip or knee pain or a limp. More common, but less serious, side effects include mild insulin resistance and local irritation at the injection site.    After a thorough discussion of the risks, benefits, and what to expect with growth hormone therapy, her parents expressed interest in proceeding with treatment.    Given her physical findings, I also recommended to continue with the planned genetics consultation for further evaluation, including additional testing beyond her already completed normal karyotype (46,XX), due to her short fourth metacarpals. This is scheduled for November 2025.    The longitudinal plan of care for the diagnosis(es)/condition(s) as documented were addressed during this visit. Due to the added complexity in care, I will continue to support Susie in the subsequent management and with ongoing continuity of care.     Plan:    - Reviewed Susie's growth charts.  - Reviewed Susie's previous lab results.  - Reviewed prior imaging studies (Bone age x-ray).  - Reviewed notes from PCP.  - Will await genetics evaluation (11/2025)  - Will soon start Susie on growth hormone therapy at a dose of 1.5 mg subcutaneous q day (0.30 mg/kg/week).  - Growth hormone factors to be obtained ~6 weeks after the start of her growth hormone therapy.  - Follow up with endocrinology in 4-6 months (already scheduled with me for January 19, 2026).     No orders of the defined types were placed in this encounter.     Plan of care, including education on the safe and effective use of medication(s) and/or medical equipment if prescribed, were discussed with the patient/family. Patient/family verbalized understanding and agreed with the treatment  options discussed.    Thank you for allowing me to participate in the care of Susie.  Please do not hesitate to call with questions or concerns.    Sincerely,    Geraldo Salmon MD  Division of Pediatric Endocrinology  [facility]    A total of 29 minutes were spent on Jul 31, 2025 doing chart review, history and exam, documentation and further activities per the note.       Pediatric Endocrinology Follow-up Consultation    Dear Mellissa Arriaga:    I had the pleasure of seeing your patient, Susie Miller at the Pediatric Endocrinology Clinic of the [facility] via video visit for a follow-up visit regarding short stature. History was obtained from the patient, Susie's mother, and review of their medical record.      Virtual Visit Details    Type of service:  Video Visit     Originating Location (pt. Location): Home    Distant Location (provider location):  On-site  Platform used for Video Visit: [employer]     Clinical Summary:    Susie is a 12 year old 3 month old female with no significant past medical history who was first seen in our endocrinology clinic on 2/20/2025 for evaluation of short stature.     Susie has been a healthy individual with no significant history of chronic illness or prematurity. Her parents noticed that since she turned 10, her growth had slowed, as she was no longer outgrowing her pants and shoes each year. There was no history of ADHD or the use of stimulants or inhaled corticosteroids.    Review of her growth charts at the time of her initial visit showed that Susie had been tracking around the 8th to 10th percentile for height until age 10, when her growth began to decelerate, and drop percentiles. Her weight followed a similar pattern, tracking around the 10th to 15th percentiles until age 10, after which it also declined. Her parents reported that Susie had a good appetite  and calorie intake, although she ate slowly, taking a long time to finish her meals. She denied any body image issues.    Susie denied experiencing any symptoms ot nausea, abdominal pain, vomiting, diarrhea, constipation, polyuria, polydipsia, heat or cold intolerance, headaches, vision changes, or fatigue. Her first tooth erupted before 12 months of age, and there was no history of delayed developmental milestones or recurrent ear infections. She slept well through the night and woke up with good energy levels. Susie reportedly developed body odor the summer before 6th grade and had axillary hair for the past 6 to 8 months, but there was no pubic hair or breast development. There was no history of fractures.    A review of Susie's laboratory and imaging studies completed as part of her initial visit showed that her electrolytes and renal function were normal. Liver function tests revealed a minimally elevated AST level. Growth hormone factors showed an IGF-1 level of 197 ng/ml (-1.6 SD) and an IGF-BP3 level of 3.4 ug/ml (-1.7 SD), both on the low end of the reference range. Her AMH level was normal, ESR was not elevated, and CBC was unremarkable. LH and Estradiol were prepubertal. Based on these lower growth hormone results and bone age, it was recommended that Susie undergo growth hormone and ACTH stimulation tests, which were completed on March 31, 2025.    For her ACTH stimulation test, the baseline cortisol was 4.2 mcg/dL, and the peak cortisol response to ACTH was 17.1 mcg/dL. An abnormal response would be a peak value of less than 15, so the results were not consistent with ACTH Deficiency or Secondary Adrenal Insufficiency. Susie also underwent a growth hormone stimulation test on the same day. The baseline growth hormone was 0.8 mcg/L, with a peak response to clonidine of 3.6 mcg/L and to arginine of 11.2 mcg/L. An abnormal response would be if all values were less than 10, so the results were not  "consistent with Growth Hormone Deficiency. The plan was for Susie to be seen by genetics, but to expedite her evaluation, a karyotype was obtained on May 6, 2025, which was normal (46, XX).    Interval History (Jul 31, 2025):    Since their last visit with pediatric endocrinology (7/21/2025), Susie has been doing well overall.  The visit today is focused with Susie's parents through discussing review their questions about starting her on growth hormone therapy.    Patient's previous growth chart, records and laboratory tests and imaging studies are reviewed. Patient's medications, allergies, past medical, surgical, social and family histories reviewed and updated as appropriate.    Past Medical History    No past medical history on file.    Past Surgical History    No past surgical history on file.    Social History      Susie currently lives at home with her parents and sisters in [place], [place]. Susie will be in the 8th grade for the 3708-0830 academic year at [employer].     Family History      Family History   Problem Relation Age of Onset    Anxiety Disorder Mother     No Known Problems Father     Asthma Sister     Allergies Sister     Attention Deficit Disorder Sister     Dwarfism Cousin     Thyroid Disease No family hx of     Developmental delay No family hx of     Celiac Disease No family hx of     Rheumatoid Arthritis No family hx of     Lupus No family hx of     Polycystic ovary syndrome No family hx of       Mother's height: 1.6 m (5' 3\"). Ethnicity.  (PuertoRican/ White). Menarche began at the age of 13-14 years  Father's height: 1.778 m (5' 10\"). He reportedly had a late growth spurt in HS. started shaving at age 16.   Midparental height: 1.626 m (5' 4\") (+/- 3 inches) 46 %ile (Z= -0.11) based on CDC (Girls, 2-20 Years) stature-for-age data calculated at age 19 using the patient's mid-parental height.    Grandparents Heights: MGM 5'0\", MGF 6'2\", PGM 5'2\", PGF " "unknown.    Susie's aunt (half sister) is 4'11\".      History of:  Adrenal insufficiency: none  Autoimmune disease: none.  Calcium problems: none.  Delayed puberty: none.  Diabetes mellitus: MGM (Type 2 diabetes, on insulin)  Hypoglycemia: none.  Early puberty: none.  Genetic disease: none.  Short stature: none  Tall stature: none.  Thyroid disease: none   Other: cancer: none.     Allergies    No Known Allergies    Medications   Current Outpatient Medications   Medication Sig Dispense Refill    NORDITROPIN FLEXPRO 15 MG/1.5ML SOPN Inject 1.5 mg subcutaneously daily. 4.5 mL 4     Review of Systems    Gen: Negative  Eye: Negative  ENT: Negative  Pulmonary:  Negative  Cardio: Negative  Gastrointestinal: Negative  Hematologic: Negative  Genitourinary: Negative  Musculoskeletal: Negative  Psychiatric: Negative  Neurologic: Negative  Skin: Dry patches of skin (resolved, never saw derm).  Endocrine: Shirt size:12-14  Pant size:12-14 Shoe size: 4 see HPI.    Physical Exam    There were no vitals taken for this visit.  No blood pressure reading on file for this encounter.  Height: 0 cm  (0\") No height on file for this encounter.  Weight: 34.6 kg (actual weight), No weight on file for this encounter.  BMI: There is no height or weight on file to calculate BMI. No height and weight on file for this encounter.   BSA: There is no height or weight on file to calculate BSA.      Patient was not examined today.    Data    Labs:    TSH (uIU/mL)   Date Value   07/25/2024 1.42     Free T4 (ng/dL)   Date Value   07/25/2024 1.21     Insulin Growth Factor 1 (External) (ng/mL)   Date Value   03/31/2025 191     Insulin Growth Factor I SD Score (External) (SD)   Date Value   03/31/2025 -1.7     IGF Binding Protein3 (ug/mL)   Date Value   03/31/2025 5.7     IGF Binding Protein 3 SD Score (no units)   Date Value   03/31/2025 -0.2     Urea Nitrogen (mg/dL)   Date Value   02/20/2025 12.9     Creatinine (mg/dL)   Date Value   02/20/2025 0.58 "     Calcium (mg/dL)   Date Value   02/20/2025 9.8     Alkaline Phosphatase (U/L)   Date Value   02/20/2025 173     AST (U/L)   Date Value   02/20/2025 39 (H)     ALT (U/L)   Date Value   02/20/2025 34     Tissue Transglutaminase Antibody IgA (U/mL)   Date Value   07/25/2024 0.4     Immunoglobulin A (mg/dL)   Date Value   07/25/2024 203     Human Growth Hormone (ug/L)   Date Value   03/31/2025 1.0   03/31/2025 3.6   03/31/2025 3.5   03/31/2025 2.0   03/31/2025 2.3   03/31/2025 6.2   03/31/2025 11.2   03/31/2025 4.0   03/31/2025 0.8   03/31/2025 0.7     Cortisol (ug/dL)   Date Value   03/31/2025 17.1   03/31/2025 15.9   03/31/2025 13.4   03/31/2025 4.2     Luteinizing Hormone, Ultrasensitive, Ped (mIU/mL)   Date Value   02/20/2025 0.082     FSH (mIU/mL)   Date Value   02/20/2025 3.0     Estradiol Ultrasensitive (pg/mL)   Date Value   02/20/2025 3      Imaging:    Filiberto 10, 2025; at a chronologic age of 12 years and 2 months. The bone age was read by the radiologist by the method of Greulich and Guanako and interpreted as 11 years, 0 months. My interpretation by the method of Greulich and Guanako was between the 11 and 12 years, 0 months standard. This was within normal limits compared to the chronologic age. Susie's predicted adult height is 59.9 inches, below her genetic potential.     Aug 15, 2024: at a chronologic age of 11 years and 4 months. The bone age was read by the radiologist by the method of Greulich and Guanako and interpreted as 10 years. Dr. Bhandari's interpretation by the method of Greulich and Guanako was 8 years, 10 months. This was delayed compared to the chronologic age. Short 4th metacarpal was noted.          distance glasses/Partially impaired: cannot see medication labels or newsprint, but can see obstacles in path, and the surrounding layout; can count fingers at arm's length

## 2025-08-01 ENCOUNTER — APPOINTMENT (OUTPATIENT)
Dept: INTERNAL MEDICINE | Facility: CLINIC | Age: 60
End: 2025-08-01
Payer: MEDICARE

## 2025-08-01 VITALS
TEMPERATURE: 98.2 F | WEIGHT: 170.13 LBS | OXYGEN SATURATION: 95 % | DIASTOLIC BLOOD PRESSURE: 78 MMHG | SYSTOLIC BLOOD PRESSURE: 124 MMHG | BODY MASS INDEX: 32.12 KG/M2 | HEIGHT: 61 IN | HEART RATE: 69 BPM

## 2025-08-01 DIAGNOSIS — R09.82 POSTNASAL DRIP: ICD-10-CM

## 2025-08-01 DIAGNOSIS — J45.909 UNSPECIFIED ASTHMA, UNCOMPLICATED: ICD-10-CM

## 2025-08-01 DIAGNOSIS — J06.9 ACUTE UPPER RESPIRATORY INFECTION, UNSPECIFIED: ICD-10-CM

## 2025-08-01 DIAGNOSIS — R05.9 COUGH, UNSPECIFIED: ICD-10-CM

## 2025-08-01 DIAGNOSIS — J98.4 OTHER DISORDERS OF LUNG: ICD-10-CM

## 2025-08-01 DIAGNOSIS — J45.40 MODERATE PERSISTENT ASTHMA, UNCOMPLICATED: ICD-10-CM

## 2025-08-01 PROCEDURE — 95012 NITRIC OXIDE EXP GAS DETER: CPT

## 2025-08-01 PROCEDURE — 94060 EVALUATION OF WHEEZING: CPT

## 2025-08-01 PROCEDURE — 99214 OFFICE O/P EST MOD 30 MIN: CPT | Mod: 25

## 2025-08-01 RX ORDER — DOXYCYCLINE 100 MG/1
100 TABLET, FILM COATED ORAL
Qty: 14 | Refills: 10 | Status: ACTIVE | COMMUNITY
Start: 2025-08-01 | End: 1900-01-01

## 2025-08-01 RX ORDER — OMEPRAZOLE 40 MG/1
40 CAPSULE, DELAYED RELEASE ORAL DAILY
Qty: 10 | Refills: 0 | Status: ACTIVE | COMMUNITY
Start: 2025-08-01 | End: 1900-01-01

## 2025-08-01 RX ORDER — PREDNISONE 20 MG/1
20 TABLET ORAL
Qty: 19 | Refills: 0 | Status: ACTIVE | COMMUNITY
Start: 2025-08-01 | End: 1900-01-01

## 2025-08-25 LAB
25(OH)D3 SERPL-MCNC: 33.3 NG/ML
ALBUMIN SERPL ELPH-MCNC: 4.1 G/DL
ALP BLD-CCNC: 105 U/L
ALT SERPL-CCNC: 22 U/L
ANION GAP SERPL CALC-SCNC: 14 MMOL/L
APPEARANCE: CLEAR
AST SERPL-CCNC: 63 U/L
BACTERIA: NEGATIVE /HPF
BASOPHILS # BLD AUTO: 0.04 K/UL
BASOPHILS NFR BLD AUTO: 0.8 %
BILIRUB SERPL-MCNC: 0.3 MG/DL
BILIRUBIN URINE: NEGATIVE
BLOOD URINE: NEGATIVE
BUN SERPL-MCNC: 14 MG/DL
CALCIUM SERPL-MCNC: 9.3 MG/DL
CAST: 0 /LPF
CHLORIDE SERPL-SCNC: 109 MMOL/L
CHOLEST SERPL-MCNC: 220 MG/DL
CO2 SERPL-SCNC: 23 MMOL/L
COLOR: YELLOW
CREAT SERPL-MCNC: 0.85 MG/DL
EGFRCR SERPLBLD CKD-EPI 2021: 78 ML/MIN/1.73M2
EOSINOPHIL # BLD AUTO: 0.59 K/UL
EOSINOPHIL NFR BLD AUTO: 12.3 %
EPITHELIAL CELLS: 3 /HPF
ESTIMATED AVERAGE GLUCOSE: 114 MG/DL
GLUCOSE QUALITATIVE U: NEGATIVE MG/DL
GLUCOSE SERPL-MCNC: 96 MG/DL
HBA1C MFR BLD HPLC: 5.6 %
HCT VFR BLD CALC: 41 %
HDLC SERPL-MCNC: 80 MG/DL
HGB BLD-MCNC: 13.2 G/DL
IMM GRANULOCYTES NFR BLD AUTO: 0 %
KETONES URINE: NEGATIVE MG/DL
LDLC SERPL-MCNC: 129 MG/DL
LEUKOCYTE ESTERASE URINE: ABNORMAL
LYMPHOCYTES # BLD AUTO: 1.58 K/UL
LYMPHOCYTES NFR BLD AUTO: 33 %
MAN DIFF?: NORMAL
MCHC RBC-ENTMCNC: 28.4 PG
MCHC RBC-ENTMCNC: 32.2 G/DL
MCV RBC AUTO: 88.4 FL
MICROSCOPIC-UA: NORMAL
MONOCYTES # BLD AUTO: 0.34 K/UL
MONOCYTES NFR BLD AUTO: 7.1 %
NEUTROPHILS # BLD AUTO: 2.24 K/UL
NEUTROPHILS NFR BLD AUTO: 46.8 %
NITRITE URINE: NEGATIVE
NONHDLC SERPL-MCNC: 140 MG/DL
PH URINE: 8
PLATELET # BLD AUTO: 223 K/UL
POTASSIUM SERPL-SCNC: 4.7 MMOL/L
PROT SERPL-MCNC: 6.1 G/DL
PROTEIN URINE: NEGATIVE MG/DL
RBC # BLD: 4.64 M/UL
RBC # FLD: 14.1 %
RED BLOOD CELLS URINE: 1 /HPF
SODIUM SERPL-SCNC: 145 MMOL/L
SPECIFIC GRAVITY URINE: 1.02
T3FREE SERPL-MCNC: 2.64 PG/ML
T4 FREE SERPL-MCNC: 1.3 NG/DL
TRIGL SERPL-MCNC: 60 MG/DL
TSH SERPL-ACNC: 2.34 UIU/ML
UROBILINOGEN URINE: 0.2 MG/DL
WBC # FLD AUTO: 4.79 K/UL
WHITE BLOOD CELLS URINE: 9 /HPF

## 2025-09-02 ENCOUNTER — APPOINTMENT (OUTPATIENT)
Dept: INTERNAL MEDICINE | Facility: CLINIC | Age: 60
End: 2025-09-02
Payer: MEDICARE

## 2025-09-02 VITALS
SYSTOLIC BLOOD PRESSURE: 172 MMHG | RESPIRATION RATE: 16 BRPM | HEART RATE: 66 BPM | OXYGEN SATURATION: 93 % | TEMPERATURE: 99.2 F | DIASTOLIC BLOOD PRESSURE: 90 MMHG | BODY MASS INDEX: 32.47 KG/M2 | HEIGHT: 61 IN | WEIGHT: 172 LBS

## 2025-09-02 DIAGNOSIS — I10 ESSENTIAL (PRIMARY) HYPERTENSION: ICD-10-CM

## 2025-09-02 DIAGNOSIS — Z00.00 ENCOUNTER FOR GENERAL ADULT MEDICAL EXAMINATION W/OUT ABNORMAL FINDINGS: ICD-10-CM

## 2025-09-02 DIAGNOSIS — G89.29 DORSALGIA, UNSPECIFIED: ICD-10-CM

## 2025-09-02 DIAGNOSIS — M54.9 DORSALGIA, UNSPECIFIED: ICD-10-CM

## 2025-09-02 DIAGNOSIS — R79.89 OTHER SPECIFIED ABNORMAL FINDINGS OF BLOOD CHEMISTRY: ICD-10-CM

## 2025-09-02 PROCEDURE — G0439: CPT

## 2025-09-02 PROCEDURE — 93000 ELECTROCARDIOGRAM COMPLETE: CPT

## 2025-09-02 PROCEDURE — 99213 OFFICE O/P EST LOW 20 MIN: CPT | Mod: 25

## 2025-09-05 ENCOUNTER — TRANSCRIPTION ENCOUNTER (OUTPATIENT)
Age: 60
End: 2025-09-05

## 2025-09-06 ENCOUNTER — NON-APPOINTMENT (OUTPATIENT)
Age: 60
End: 2025-09-06

## 2025-09-17 ENCOUNTER — APPOINTMENT (OUTPATIENT)
Dept: INTERNAL MEDICINE | Facility: CLINIC | Age: 60
End: 2025-09-17
Payer: MEDICARE

## 2025-09-17 VITALS
DIASTOLIC BLOOD PRESSURE: 88 MMHG | OXYGEN SATURATION: 94 % | WEIGHT: 171.19 LBS | BODY MASS INDEX: 31.5 KG/M2 | HEART RATE: 75 BPM | SYSTOLIC BLOOD PRESSURE: 144 MMHG | TEMPERATURE: 98.1 F | HEIGHT: 62 IN

## 2025-09-17 VITALS — SYSTOLIC BLOOD PRESSURE: 147 MMHG | DIASTOLIC BLOOD PRESSURE: 97 MMHG

## 2025-09-17 DIAGNOSIS — I95.2 HYPOTENSION DUE TO DRUGS: ICD-10-CM

## 2025-09-17 DIAGNOSIS — R42 DIZZINESS AND GIDDINESS: ICD-10-CM

## 2025-09-17 PROCEDURE — 99213 OFFICE O/P EST LOW 20 MIN: CPT

## (undated) DEVICE — SOL INJ LR 500ML

## (undated) DEVICE — TUBING IV EXTENSION MACRO W CLAVE 7"

## (undated) DEVICE — TUBING ALARIS PUMP MODULE NON-DEHP

## (undated) DEVICE — GLV 8.5 PROTEXIS (WHITE)

## (undated) DEVICE — NDL SPINAL 22G X 3.5" QUINCKE

## (undated) DEVICE — SYR IV FLUSH SALINE 10ML 30/TY

## (undated) DEVICE — TRAY EPIDURAL SINGLE DOSE

## (undated) DEVICE — PACK IV START WITH CHG

## (undated) DEVICE — CATH IV SAFE BC 22G X 1" (BLUE)

## (undated) DEVICE — STYLET  ENDOTRACH 7.5MM X 10MM